# Patient Record
Sex: MALE | Race: WHITE | NOT HISPANIC OR LATINO
[De-identification: names, ages, dates, MRNs, and addresses within clinical notes are randomized per-mention and may not be internally consistent; named-entity substitution may affect disease eponyms.]

---

## 2017-01-03 ENCOUNTER — RESULT REVIEW (OUTPATIENT)
Age: 32
End: 2017-01-03

## 2017-01-04 ENCOUNTER — APPOINTMENT (OUTPATIENT)
Dept: HEMATOLOGY ONCOLOGY | Facility: CLINIC | Age: 32
End: 2017-01-04

## 2017-01-04 VITALS
TEMPERATURE: 99.2 F | BODY MASS INDEX: 28.7 KG/M2 | OXYGEN SATURATION: 98 % | WEIGHT: 178.55 LBS | HEIGHT: 65.98 IN | SYSTOLIC BLOOD PRESSURE: 120 MMHG | RESPIRATION RATE: 16 BRPM | DIASTOLIC BLOOD PRESSURE: 78 MMHG | HEART RATE: 120 BPM

## 2017-01-06 LAB
ALBUMIN SERPL ELPH-MCNC: 4.5 G/DL
ALP BLD-CCNC: 88 U/L
ALT SERPL-CCNC: 22 U/L
ANION GAP SERPL CALC-SCNC: 20 MMOL/L
AST SERPL-CCNC: 13 U/L
BILIRUB SERPL-MCNC: 0.4 MG/DL
BUN SERPL-MCNC: 10 MG/DL
CALCIUM SERPL-MCNC: 10.1 MG/DL
CEA SERPL-MCNC: 2.4 NG/ML
CHLORIDE SERPL-SCNC: 98 MMOL/L
CO2 SERPL-SCNC: 22 MMOL/L
CREAT SERPL-MCNC: 0.84 MG/DL
GLUCOSE SERPL-MCNC: 101 MG/DL
HBV CORE IGG+IGM SER QL: NONREACTIVE
HBV CORE IGM SER QL: NONREACTIVE
HBV SURFACE AB SER QL: NONREACTIVE
HBV SURFACE AG SER QL: NONREACTIVE
HCV AB SER QL: NONREACTIVE
HCV S/CO RATIO: 0.09 S/CO
POTASSIUM SERPL-SCNC: 4.1 MMOL/L
PROT SERPL-MCNC: 7.8 G/DL
SODIUM SERPL-SCNC: 140 MMOL/L

## 2017-01-09 ENCOUNTER — APPOINTMENT (OUTPATIENT)
Dept: RADIATION ONCOLOGY | Facility: CLINIC | Age: 32
End: 2017-01-09

## 2017-01-09 ENCOUNTER — RESULT REVIEW (OUTPATIENT)
Age: 32
End: 2017-01-09

## 2017-01-10 ENCOUNTER — LABORATORY RESULT (OUTPATIENT)
Age: 32
End: 2017-01-10

## 2017-01-10 ENCOUNTER — APPOINTMENT (OUTPATIENT)
Dept: INFUSION THERAPY | Facility: HOSPITAL | Age: 32
End: 2017-01-10

## 2017-01-24 ENCOUNTER — RESULT REVIEW (OUTPATIENT)
Age: 32
End: 2017-01-24

## 2017-01-25 ENCOUNTER — LABORATORY RESULT (OUTPATIENT)
Age: 32
End: 2017-01-25

## 2017-01-25 ENCOUNTER — APPOINTMENT (OUTPATIENT)
Dept: INFUSION THERAPY | Facility: HOSPITAL | Age: 32
End: 2017-01-25

## 2017-01-25 ENCOUNTER — APPOINTMENT (OUTPATIENT)
Dept: HEMATOLOGY ONCOLOGY | Facility: CLINIC | Age: 32
End: 2017-01-25

## 2017-01-25 VITALS
BODY MASS INDEX: 28.73 KG/M2 | SYSTOLIC BLOOD PRESSURE: 133 MMHG | TEMPERATURE: 98.9 F | OXYGEN SATURATION: 97 % | WEIGHT: 177.91 LBS | DIASTOLIC BLOOD PRESSURE: 89 MMHG | HEART RATE: 120 BPM | RESPIRATION RATE: 16 BRPM

## 2017-02-02 ENCOUNTER — MEDICATION RENEWAL (OUTPATIENT)
Age: 32
End: 2017-02-02

## 2017-02-03 ENCOUNTER — MEDICATION RENEWAL (OUTPATIENT)
Age: 32
End: 2017-02-03

## 2017-02-07 ENCOUNTER — RESULT REVIEW (OUTPATIENT)
Age: 32
End: 2017-02-07

## 2017-02-07 ENCOUNTER — OUTPATIENT (OUTPATIENT)
Dept: OUTPATIENT SERVICES | Facility: HOSPITAL | Age: 32
LOS: 1 days | Discharge: HOME | End: 2017-02-07

## 2017-02-07 DIAGNOSIS — C20 MALIGNANT NEOPLASM OF RECTUM: ICD-10-CM

## 2017-02-07 DIAGNOSIS — Z98.890 OTHER SPECIFIED POSTPROCEDURAL STATES: Chronic | ICD-10-CM

## 2017-02-07 PROBLEM — K21.9 GASTRO-ESOPHAGEAL REFLUX DISEASE WITHOUT ESOPHAGITIS: Chronic | Status: ACTIVE | Noted: 2017-01-10

## 2017-02-08 ENCOUNTER — APPOINTMENT (OUTPATIENT)
Dept: INFUSION THERAPY | Facility: HOSPITAL | Age: 32
End: 2017-02-08

## 2017-02-08 LAB
BASOPHILS # BLD AUTO: 0 K/UL — SIGNIFICANT CHANGE UP (ref 0–0.2)
BASOPHILS NFR BLD AUTO: 0 % — SIGNIFICANT CHANGE UP (ref 0–2)
EOSINOPHIL # BLD AUTO: 0.1 K/UL — SIGNIFICANT CHANGE UP (ref 0–0.5)
EOSINOPHIL NFR BLD AUTO: 3.3 % — SIGNIFICANT CHANGE UP (ref 0–6)
HCT VFR BLD CALC: 45.1 % — SIGNIFICANT CHANGE UP (ref 39–50)
HGB BLD-MCNC: 15.4 G/DL — SIGNIFICANT CHANGE UP (ref 13–17)
LYMPHOCYTES # BLD AUTO: 0.8 K/UL — LOW (ref 1–3.3)
LYMPHOCYTES # BLD AUTO: 22.7 % — SIGNIFICANT CHANGE UP (ref 13–44)
MCHC RBC-ENTMCNC: 28.9 PG — SIGNIFICANT CHANGE UP (ref 27–34)
MCHC RBC-ENTMCNC: 34 GM/DL — SIGNIFICANT CHANGE UP (ref 32–36)
MCV RBC AUTO: 85 FL — SIGNIFICANT CHANGE UP (ref 80–100)
MONOCYTES # BLD AUTO: 0.7 K/UL — SIGNIFICANT CHANGE UP (ref 0–0.9)
MONOCYTES NFR BLD AUTO: 20.5 % — HIGH (ref 2–14)
NEUTROPHILS # BLD AUTO: 1.8 K/UL — SIGNIFICANT CHANGE UP (ref 1.8–7.4)
NEUTROPHILS NFR BLD AUTO: 53.5 % — SIGNIFICANT CHANGE UP (ref 43–77)
PLATELET # BLD AUTO: 163 K/UL — SIGNIFICANT CHANGE UP (ref 150–400)
RBC # BLD: 5.31 M/UL — SIGNIFICANT CHANGE UP (ref 4.2–5.8)
RBC # FLD: 13.8 % — SIGNIFICANT CHANGE UP (ref 10.3–14.5)
WBC # BLD: 3.3 K/UL — LOW (ref 3.8–10.5)
WBC # FLD AUTO: 3.3 K/UL — LOW (ref 3.8–10.5)

## 2017-02-10 DIAGNOSIS — Z51.11 ENCOUNTER FOR ANTINEOPLASTIC CHEMOTHERAPY: ICD-10-CM

## 2017-02-10 DIAGNOSIS — R11.2 NAUSEA WITH VOMITING, UNSPECIFIED: ICD-10-CM

## 2017-02-21 ENCOUNTER — RESULT REVIEW (OUTPATIENT)
Age: 32
End: 2017-02-21

## 2017-02-22 ENCOUNTER — APPOINTMENT (OUTPATIENT)
Dept: INFUSION THERAPY | Facility: HOSPITAL | Age: 32
End: 2017-02-22

## 2017-02-22 ENCOUNTER — LABORATORY RESULT (OUTPATIENT)
Age: 32
End: 2017-02-22

## 2017-02-22 LAB
BASOPHILS # BLD AUTO: 0 K/UL — SIGNIFICANT CHANGE UP (ref 0–0.2)
BASOPHILS NFR BLD AUTO: 0.6 % — SIGNIFICANT CHANGE UP (ref 0–2)
EOSINOPHIL # BLD AUTO: 0.1 K/UL — SIGNIFICANT CHANGE UP (ref 0–0.5)
EOSINOPHIL NFR BLD AUTO: 2.8 % — SIGNIFICANT CHANGE UP (ref 0–6)
HCT VFR BLD CALC: 43.9 % — SIGNIFICANT CHANGE UP (ref 39–50)
HGB BLD-MCNC: 15.4 G/DL — SIGNIFICANT CHANGE UP (ref 13–17)
LYMPHOCYTES # BLD AUTO: 0.8 K/UL — LOW (ref 1–3.3)
LYMPHOCYTES # BLD AUTO: 25.1 % — SIGNIFICANT CHANGE UP (ref 13–44)
MCHC RBC-ENTMCNC: 29.8 PG — SIGNIFICANT CHANGE UP (ref 27–34)
MCHC RBC-ENTMCNC: 35 G/DL — SIGNIFICANT CHANGE UP (ref 32–36)
MCV RBC AUTO: 85.2 FL — SIGNIFICANT CHANGE UP (ref 80–100)
MONOCYTES # BLD AUTO: 0.6 K/UL — SIGNIFICANT CHANGE UP (ref 0–0.9)
MONOCYTES NFR BLD AUTO: 17.8 % — HIGH (ref 2–14)
NEUTROPHILS # BLD AUTO: 1.8 K/UL — SIGNIFICANT CHANGE UP (ref 1.8–7.4)
NEUTROPHILS NFR BLD AUTO: 53.6 % — SIGNIFICANT CHANGE UP (ref 43–77)
PLATELET # BLD AUTO: 160 K/UL — SIGNIFICANT CHANGE UP (ref 150–400)
RBC # BLD: 5.15 M/UL — SIGNIFICANT CHANGE UP (ref 4.2–5.8)
RBC # FLD: 14.3 % — SIGNIFICANT CHANGE UP (ref 10.3–14.5)
WBC # BLD: 3.3 K/UL — LOW (ref 3.8–10.5)
WBC # FLD AUTO: 3.3 K/UL — LOW (ref 3.8–10.5)

## 2017-03-07 ENCOUNTER — OUTPATIENT (OUTPATIENT)
Dept: OUTPATIENT SERVICES | Facility: HOSPITAL | Age: 32
LOS: 1 days | Discharge: HOME | End: 2017-03-07

## 2017-03-07 ENCOUNTER — RESULT REVIEW (OUTPATIENT)
Age: 32
End: 2017-03-07

## 2017-03-07 DIAGNOSIS — C20 MALIGNANT NEOPLASM OF RECTUM: ICD-10-CM

## 2017-03-07 DIAGNOSIS — Z98.890 OTHER SPECIFIED POSTPROCEDURAL STATES: Chronic | ICD-10-CM

## 2017-03-08 ENCOUNTER — APPOINTMENT (OUTPATIENT)
Dept: INFUSION THERAPY | Facility: HOSPITAL | Age: 32
End: 2017-03-08

## 2017-03-08 ENCOUNTER — LABORATORY RESULT (OUTPATIENT)
Age: 32
End: 2017-03-08

## 2017-03-08 LAB
BASOPHILS # BLD AUTO: 0 K/UL — SIGNIFICANT CHANGE UP (ref 0–0.2)
BASOPHILS NFR BLD AUTO: 0.4 % — SIGNIFICANT CHANGE UP (ref 0–2)
EOSINOPHIL # BLD AUTO: 0.1 K/UL — SIGNIFICANT CHANGE UP (ref 0–0.5)
EOSINOPHIL NFR BLD AUTO: 3.7 % — SIGNIFICANT CHANGE UP (ref 0–6)
HCT VFR BLD CALC: 44 % — SIGNIFICANT CHANGE UP (ref 39–50)
HGB BLD-MCNC: 15.1 G/DL — SIGNIFICANT CHANGE UP (ref 13–17)
LYMPHOCYTES # BLD AUTO: 0.9 K/UL — LOW (ref 1–3.3)
LYMPHOCYTES # BLD AUTO: 27.2 % — SIGNIFICANT CHANGE UP (ref 13–44)
MCHC RBC-ENTMCNC: 28.7 PG — SIGNIFICANT CHANGE UP (ref 27–34)
MCHC RBC-ENTMCNC: 34.3 G/DL — SIGNIFICANT CHANGE UP (ref 32–36)
MCV RBC AUTO: 83.8 FL — SIGNIFICANT CHANGE UP (ref 80–100)
MONOCYTES # BLD AUTO: 0.6 K/UL — SIGNIFICANT CHANGE UP (ref 0–0.9)
MONOCYTES NFR BLD AUTO: 18.9 % — HIGH (ref 2–14)
NEUTROPHILS # BLD AUTO: 1.6 K/UL — LOW (ref 1.8–7.4)
NEUTROPHILS NFR BLD AUTO: 49.9 % — SIGNIFICANT CHANGE UP (ref 43–77)
PLATELET # BLD AUTO: 178 K/UL — SIGNIFICANT CHANGE UP (ref 150–400)
RBC # BLD: 5.25 M/UL — SIGNIFICANT CHANGE UP (ref 4.2–5.8)
RBC # FLD: 14.5 % — SIGNIFICANT CHANGE UP (ref 10.3–14.5)
WBC # BLD: 3.2 K/UL — LOW (ref 3.8–10.5)
WBC # FLD AUTO: 3.2 K/UL — LOW (ref 3.8–10.5)

## 2017-03-09 DIAGNOSIS — R11.2 NAUSEA WITH VOMITING, UNSPECIFIED: ICD-10-CM

## 2017-03-09 DIAGNOSIS — Z51.11 ENCOUNTER FOR ANTINEOPLASTIC CHEMOTHERAPY: ICD-10-CM

## 2017-03-13 ENCOUNTER — MEDICATION RENEWAL (OUTPATIENT)
Age: 32
End: 2017-03-13

## 2017-03-21 ENCOUNTER — RESULT REVIEW (OUTPATIENT)
Age: 32
End: 2017-03-21

## 2017-03-22 ENCOUNTER — APPOINTMENT (OUTPATIENT)
Dept: INFUSION THERAPY | Facility: HOSPITAL | Age: 32
End: 2017-03-22

## 2017-03-22 ENCOUNTER — LABORATORY RESULT (OUTPATIENT)
Age: 32
End: 2017-03-22

## 2017-03-22 ENCOUNTER — APPOINTMENT (OUTPATIENT)
Dept: HEMATOLOGY ONCOLOGY | Facility: CLINIC | Age: 32
End: 2017-03-22

## 2017-03-22 VITALS
WEIGHT: 175.93 LBS | BODY MASS INDEX: 28.41 KG/M2 | OXYGEN SATURATION: 97 % | SYSTOLIC BLOOD PRESSURE: 136 MMHG | RESPIRATION RATE: 17 BRPM | HEART RATE: 138 BPM | TEMPERATURE: 99.2 F | DIASTOLIC BLOOD PRESSURE: 93 MMHG

## 2017-03-22 LAB
BASOPHILS # BLD AUTO: 0 K/UL — SIGNIFICANT CHANGE UP (ref 0–0.2)
BASOPHILS NFR BLD AUTO: 0.5 % — SIGNIFICANT CHANGE UP (ref 0–2)
EOSINOPHIL # BLD AUTO: 0.1 K/UL — SIGNIFICANT CHANGE UP (ref 0–0.5)
EOSINOPHIL NFR BLD AUTO: 3.3 % — SIGNIFICANT CHANGE UP (ref 0–6)
HCT VFR BLD CALC: 44.9 % — SIGNIFICANT CHANGE UP (ref 39–50)
HGB BLD-MCNC: 16 G/DL — SIGNIFICANT CHANGE UP (ref 13–17)
LYMPHOCYTES # BLD AUTO: 0.8 K/UL — LOW (ref 1–3.3)
LYMPHOCYTES # BLD AUTO: 21.3 % — SIGNIFICANT CHANGE UP (ref 13–44)
MCHC RBC-ENTMCNC: 29.9 PG — SIGNIFICANT CHANGE UP (ref 27–34)
MCHC RBC-ENTMCNC: 35.7 G/DL — SIGNIFICANT CHANGE UP (ref 32–36)
MCV RBC AUTO: 83.7 FL — SIGNIFICANT CHANGE UP (ref 80–100)
MONOCYTES # BLD AUTO: 0.9 K/UL — SIGNIFICANT CHANGE UP (ref 0–0.9)
MONOCYTES NFR BLD AUTO: 23.5 % — HIGH (ref 2–14)
NEUTROPHILS # BLD AUTO: 2 K/UL — SIGNIFICANT CHANGE UP (ref 1.8–7.4)
NEUTROPHILS NFR BLD AUTO: 51.3 % — SIGNIFICANT CHANGE UP (ref 43–77)
PLATELET # BLD AUTO: 189 K/UL — SIGNIFICANT CHANGE UP (ref 150–400)
RBC # BLD: 5.36 M/UL — SIGNIFICANT CHANGE UP (ref 4.2–5.8)
RBC # FLD: 14.8 % — HIGH (ref 10.3–14.5)
WBC # BLD: 3.8 K/UL — SIGNIFICANT CHANGE UP (ref 3.8–10.5)
WBC # FLD AUTO: 3.8 K/UL — SIGNIFICANT CHANGE UP (ref 3.8–10.5)

## 2017-04-02 ENCOUNTER — FORM ENCOUNTER (OUTPATIENT)
Age: 32
End: 2017-04-02

## 2017-04-03 ENCOUNTER — OUTPATIENT (OUTPATIENT)
Dept: OUTPATIENT SERVICES | Facility: HOSPITAL | Age: 32
LOS: 1 days | End: 2017-04-03
Payer: COMMERCIAL

## 2017-04-03 ENCOUNTER — APPOINTMENT (OUTPATIENT)
Dept: CT IMAGING | Facility: IMAGING CENTER | Age: 32
End: 2017-04-03

## 2017-04-03 DIAGNOSIS — C20 MALIGNANT NEOPLASM OF RECTUM: ICD-10-CM

## 2017-04-03 DIAGNOSIS — Z98.890 OTHER SPECIFIED POSTPROCEDURAL STATES: Chronic | ICD-10-CM

## 2017-04-03 PROCEDURE — 71260 CT THORAX DX C+: CPT

## 2017-04-03 PROCEDURE — 74177 CT ABD & PELVIS W/CONTRAST: CPT

## 2017-04-04 ENCOUNTER — RESULT REVIEW (OUTPATIENT)
Age: 32
End: 2017-04-04

## 2017-04-04 ENCOUNTER — OUTPATIENT (OUTPATIENT)
Dept: OUTPATIENT SERVICES | Facility: HOSPITAL | Age: 32
LOS: 1 days | Discharge: HOME | End: 2017-04-04

## 2017-04-04 DIAGNOSIS — Z79.899 OTHER LONG TERM (CURRENT) DRUG THERAPY: ICD-10-CM

## 2017-04-04 DIAGNOSIS — Z98.890 OTHER SPECIFIED POSTPROCEDURAL STATES: Chronic | ICD-10-CM

## 2017-04-04 DIAGNOSIS — C20 MALIGNANT NEOPLASM OF RECTUM: ICD-10-CM

## 2017-04-05 ENCOUNTER — LABORATORY RESULT (OUTPATIENT)
Age: 32
End: 2017-04-05

## 2017-04-05 ENCOUNTER — APPOINTMENT (OUTPATIENT)
Dept: INFUSION THERAPY | Facility: HOSPITAL | Age: 32
End: 2017-04-05

## 2017-04-05 LAB
BASOPHILS # BLD AUTO: 0 K/UL — SIGNIFICANT CHANGE UP (ref 0–0.2)
BASOPHILS NFR BLD AUTO: 0.7 % — SIGNIFICANT CHANGE UP (ref 0–2)
EOSINOPHIL # BLD AUTO: 0.1 K/UL — SIGNIFICANT CHANGE UP (ref 0–0.5)
EOSINOPHIL NFR BLD AUTO: 1.8 % — SIGNIFICANT CHANGE UP (ref 0–6)
HCT VFR BLD CALC: 41.7 % — SIGNIFICANT CHANGE UP (ref 39–50)
HGB BLD-MCNC: 14.6 G/DL — SIGNIFICANT CHANGE UP (ref 13–17)
LYMPHOCYTES # BLD AUTO: 0.9 K/UL — LOW (ref 1–3.3)
LYMPHOCYTES # BLD AUTO: 23.7 % — SIGNIFICANT CHANGE UP (ref 13–44)
MCHC RBC-ENTMCNC: 29.3 PG — SIGNIFICANT CHANGE UP (ref 27–34)
MCHC RBC-ENTMCNC: 35 G/DL — SIGNIFICANT CHANGE UP (ref 32–36)
MCV RBC AUTO: 83.7 FL — SIGNIFICANT CHANGE UP (ref 80–100)
MONOCYTES # BLD AUTO: 0.8 K/UL — SIGNIFICANT CHANGE UP (ref 0–0.9)
MONOCYTES NFR BLD AUTO: 22.7 % — HIGH (ref 2–14)
NEUTROPHILS # BLD AUTO: 1.9 K/UL — SIGNIFICANT CHANGE UP (ref 1.8–7.4)
NEUTROPHILS NFR BLD AUTO: 51.1 % — SIGNIFICANT CHANGE UP (ref 43–77)
PLATELET # BLD AUTO: 194 K/UL — SIGNIFICANT CHANGE UP (ref 150–400)
RBC # BLD: 4.98 M/UL — SIGNIFICANT CHANGE UP (ref 4.2–5.8)
RBC # FLD: 15.1 % — HIGH (ref 10.3–14.5)
WBC # BLD: 3.7 K/UL — LOW (ref 3.8–10.5)
WBC # FLD AUTO: 3.7 K/UL — LOW (ref 3.8–10.5)

## 2017-04-06 DIAGNOSIS — R11.2 NAUSEA WITH VOMITING, UNSPECIFIED: ICD-10-CM

## 2017-04-06 DIAGNOSIS — Z51.11 ENCOUNTER FOR ANTINEOPLASTIC CHEMOTHERAPY: ICD-10-CM

## 2017-04-19 ENCOUNTER — APPOINTMENT (OUTPATIENT)
Dept: INFUSION THERAPY | Facility: HOSPITAL | Age: 32
End: 2017-04-19

## 2017-04-24 ENCOUNTER — APPOINTMENT (OUTPATIENT)
Dept: HEMATOLOGY ONCOLOGY | Facility: CLINIC | Age: 32
End: 2017-04-24

## 2017-04-26 ENCOUNTER — APPOINTMENT (OUTPATIENT)
Dept: INFUSION THERAPY | Facility: HOSPITAL | Age: 32
End: 2017-04-26

## 2017-05-01 ENCOUNTER — OUTPATIENT (OUTPATIENT)
Dept: OUTPATIENT SERVICES | Facility: HOSPITAL | Age: 32
LOS: 1 days | Discharge: HOME | End: 2017-05-01

## 2017-05-01 DIAGNOSIS — C20 MALIGNANT NEOPLASM OF RECTUM: ICD-10-CM

## 2017-05-01 DIAGNOSIS — Z98.890 OTHER SPECIFIED POSTPROCEDURAL STATES: Chronic | ICD-10-CM

## 2017-05-02 ENCOUNTER — RESULT REVIEW (OUTPATIENT)
Age: 32
End: 2017-05-02

## 2017-05-02 ENCOUNTER — APPOINTMENT (OUTPATIENT)
Dept: INTERNAL MEDICINE | Facility: CLINIC | Age: 32
End: 2017-05-02

## 2017-05-02 VITALS
BODY MASS INDEX: 28.45 KG/M2 | HEART RATE: 106 BPM | RESPIRATION RATE: 16 BRPM | HEIGHT: 66 IN | DIASTOLIC BLOOD PRESSURE: 96 MMHG | SYSTOLIC BLOOD PRESSURE: 130 MMHG | OXYGEN SATURATION: 96 % | WEIGHT: 177 LBS | TEMPERATURE: 98.5 F

## 2017-05-02 DIAGNOSIS — K64.4 RESIDUAL HEMORRHOIDAL SKIN TAGS: ICD-10-CM

## 2017-05-02 DIAGNOSIS — Z87.898 PERSONAL HISTORY OF OTHER SPECIFIED CONDITIONS: ICD-10-CM

## 2017-05-02 RX ORDER — ZOLPIDEM TARTRATE 5 MG/1
5 TABLET ORAL
Qty: 15 | Refills: 0 | Status: DISCONTINUED | COMMUNITY
Start: 2016-12-13 | End: 2017-05-02

## 2017-05-03 ENCOUNTER — LABORATORY RESULT (OUTPATIENT)
Age: 32
End: 2017-05-03

## 2017-05-03 ENCOUNTER — APPOINTMENT (OUTPATIENT)
Dept: INFUSION THERAPY | Facility: HOSPITAL | Age: 32
End: 2017-05-03

## 2017-05-03 LAB
BASOPHILS # BLD AUTO: 0 K/UL — SIGNIFICANT CHANGE UP (ref 0–0.2)
BASOPHILS NFR BLD AUTO: 0.4 % — SIGNIFICANT CHANGE UP (ref 0–2)
EOSINOPHIL # BLD AUTO: 0.3 K/UL — SIGNIFICANT CHANGE UP (ref 0–0.5)
EOSINOPHIL NFR BLD AUTO: 4 % — SIGNIFICANT CHANGE UP (ref 0–6)
HCT VFR BLD CALC: 43.5 % — SIGNIFICANT CHANGE UP (ref 39–50)
HGB BLD-MCNC: 15 G/DL — SIGNIFICANT CHANGE UP (ref 13–17)
LYMPHOCYTES # BLD AUTO: 1.6 K/UL — SIGNIFICANT CHANGE UP (ref 1–3.3)
LYMPHOCYTES # BLD AUTO: 23.1 % — SIGNIFICANT CHANGE UP (ref 13–44)
MCHC RBC-ENTMCNC: 29.5 PG — SIGNIFICANT CHANGE UP (ref 27–34)
MCHC RBC-ENTMCNC: 34.4 G/DL — SIGNIFICANT CHANGE UP (ref 32–36)
MCV RBC AUTO: 85.7 FL — SIGNIFICANT CHANGE UP (ref 80–100)
MONOCYTES # BLD AUTO: 0.6 K/UL — SIGNIFICANT CHANGE UP (ref 0–0.9)
MONOCYTES NFR BLD AUTO: 9.1 % — SIGNIFICANT CHANGE UP (ref 2–14)
NEUTROPHILS # BLD AUTO: 4.3 K/UL — SIGNIFICANT CHANGE UP (ref 1.8–7.4)
NEUTROPHILS NFR BLD AUTO: 63.3 % — SIGNIFICANT CHANGE UP (ref 43–77)
PLATELET # BLD AUTO: 254 K/UL — SIGNIFICANT CHANGE UP (ref 150–400)
RBC # BLD: 5.07 M/UL — SIGNIFICANT CHANGE UP (ref 4.2–5.8)
RBC # FLD: 14.4 % — SIGNIFICANT CHANGE UP (ref 10.3–14.5)
WBC # BLD: 6.8 K/UL — SIGNIFICANT CHANGE UP (ref 3.8–10.5)
WBC # FLD AUTO: 6.8 K/UL — SIGNIFICANT CHANGE UP (ref 3.8–10.5)

## 2017-05-04 DIAGNOSIS — Z51.11 ENCOUNTER FOR ANTINEOPLASTIC CHEMOTHERAPY: ICD-10-CM

## 2017-05-04 DIAGNOSIS — R11.2 NAUSEA WITH VOMITING, UNSPECIFIED: ICD-10-CM

## 2017-05-17 ENCOUNTER — LABORATORY RESULT (OUTPATIENT)
Age: 32
End: 2017-05-17

## 2017-05-17 ENCOUNTER — RESULT REVIEW (OUTPATIENT)
Age: 32
End: 2017-05-17

## 2017-05-17 ENCOUNTER — APPOINTMENT (OUTPATIENT)
Dept: HEMATOLOGY ONCOLOGY | Facility: CLINIC | Age: 32
End: 2017-05-17

## 2017-05-17 ENCOUNTER — APPOINTMENT (OUTPATIENT)
Dept: INFUSION THERAPY | Facility: HOSPITAL | Age: 32
End: 2017-05-17

## 2017-05-17 VITALS
OXYGEN SATURATION: 97 % | TEMPERATURE: 98.9 F | HEART RATE: 124 BPM | RESPIRATION RATE: 18 BRPM | BODY MASS INDEX: 28.89 KG/M2 | SYSTOLIC BLOOD PRESSURE: 136 MMHG | WEIGHT: 179.01 LBS | DIASTOLIC BLOOD PRESSURE: 84 MMHG

## 2017-05-17 LAB
BASOPHILS # BLD AUTO: 0 K/UL — SIGNIFICANT CHANGE UP (ref 0–0.2)
BASOPHILS NFR BLD AUTO: 0.2 % — SIGNIFICANT CHANGE UP (ref 0–2)
EOSINOPHIL # BLD AUTO: 0.3 K/UL — SIGNIFICANT CHANGE UP (ref 0–0.5)
EOSINOPHIL NFR BLD AUTO: 3.9 % — SIGNIFICANT CHANGE UP (ref 0–6)
HCT VFR BLD CALC: 46.3 % — SIGNIFICANT CHANGE UP (ref 39–50)
HGB BLD-MCNC: 16.1 G/DL — SIGNIFICANT CHANGE UP (ref 13–17)
LYMPHOCYTES # BLD AUTO: 1.7 K/UL — SIGNIFICANT CHANGE UP (ref 1–3.3)
LYMPHOCYTES # BLD AUTO: 23.1 % — SIGNIFICANT CHANGE UP (ref 13–44)
MCHC RBC-ENTMCNC: 29.7 PG — SIGNIFICANT CHANGE UP (ref 27–34)
MCHC RBC-ENTMCNC: 34.7 G/DL — SIGNIFICANT CHANGE UP (ref 32–36)
MCV RBC AUTO: 85.5 FL — SIGNIFICANT CHANGE UP (ref 80–100)
MONOCYTES # BLD AUTO: 0.7 K/UL — SIGNIFICANT CHANGE UP (ref 0–0.9)
MONOCYTES NFR BLD AUTO: 9.8 % — SIGNIFICANT CHANGE UP (ref 2–14)
NEUTROPHILS # BLD AUTO: 4.6 K/UL — SIGNIFICANT CHANGE UP (ref 1.8–7.4)
NEUTROPHILS NFR BLD AUTO: 63.1 % — SIGNIFICANT CHANGE UP (ref 43–77)
PLATELET # BLD AUTO: 277 K/UL — SIGNIFICANT CHANGE UP (ref 150–400)
RBC # BLD: 5.41 M/UL — SIGNIFICANT CHANGE UP (ref 4.2–5.8)
RBC # FLD: 14.3 % — SIGNIFICANT CHANGE UP (ref 10.3–14.5)
WBC # BLD: 7.2 K/UL — SIGNIFICANT CHANGE UP (ref 3.8–10.5)
WBC # FLD AUTO: 7.2 K/UL — SIGNIFICANT CHANGE UP (ref 3.8–10.5)

## 2017-05-31 ENCOUNTER — APPOINTMENT (OUTPATIENT)
Dept: HEMATOLOGY ONCOLOGY | Facility: CLINIC | Age: 32
End: 2017-05-31

## 2017-05-31 ENCOUNTER — LABORATORY RESULT (OUTPATIENT)
Age: 32
End: 2017-05-31

## 2017-05-31 ENCOUNTER — RESULT REVIEW (OUTPATIENT)
Age: 32
End: 2017-05-31

## 2017-05-31 ENCOUNTER — APPOINTMENT (OUTPATIENT)
Dept: INFUSION THERAPY | Facility: HOSPITAL | Age: 32
End: 2017-05-31

## 2017-05-31 VITALS
RESPIRATION RATE: 16 BRPM | WEIGHT: 181.66 LBS | DIASTOLIC BLOOD PRESSURE: 92 MMHG | BODY MASS INDEX: 29.32 KG/M2 | TEMPERATURE: 98.4 F | SYSTOLIC BLOOD PRESSURE: 129 MMHG | OXYGEN SATURATION: 97 % | HEART RATE: 112 BPM

## 2017-05-31 LAB
BASOPHILS # BLD AUTO: 0 K/UL — SIGNIFICANT CHANGE UP (ref 0–0.2)
BASOPHILS NFR BLD AUTO: 0.5 % — SIGNIFICANT CHANGE UP (ref 0–2)
EOSINOPHIL # BLD AUTO: 0.2 K/UL — SIGNIFICANT CHANGE UP (ref 0–0.5)
EOSINOPHIL NFR BLD AUTO: 2.4 % — SIGNIFICANT CHANGE UP (ref 0–6)
HCT VFR BLD CALC: 45.2 % — SIGNIFICANT CHANGE UP (ref 39–50)
HGB BLD-MCNC: 15.4 G/DL — SIGNIFICANT CHANGE UP (ref 13–17)
LYMPHOCYTES # BLD AUTO: 1 K/UL — SIGNIFICANT CHANGE UP (ref 1–3.3)
LYMPHOCYTES # BLD AUTO: 14.1 % — SIGNIFICANT CHANGE UP (ref 13–44)
MCHC RBC-ENTMCNC: 29 PG — SIGNIFICANT CHANGE UP (ref 27–34)
MCHC RBC-ENTMCNC: 34 G/DL — SIGNIFICANT CHANGE UP (ref 32–36)
MCV RBC AUTO: 85.3 FL — SIGNIFICANT CHANGE UP (ref 80–100)
MONOCYTES # BLD AUTO: 0.6 K/UL — SIGNIFICANT CHANGE UP (ref 0–0.9)
MONOCYTES NFR BLD AUTO: 9 % — SIGNIFICANT CHANGE UP (ref 2–14)
NEUTROPHILS # BLD AUTO: 5 K/UL — SIGNIFICANT CHANGE UP (ref 1.8–7.4)
NEUTROPHILS NFR BLD AUTO: 74 % — SIGNIFICANT CHANGE UP (ref 43–77)
PLATELET # BLD AUTO: 232 K/UL — SIGNIFICANT CHANGE UP (ref 150–400)
RBC # BLD: 5.29 M/UL — SIGNIFICANT CHANGE UP (ref 4.2–5.8)
RBC # FLD: 14.4 % — SIGNIFICANT CHANGE UP (ref 10.3–14.5)
WBC # BLD: 6.8 K/UL — SIGNIFICANT CHANGE UP (ref 3.8–10.5)
WBC # FLD AUTO: 6.8 K/UL — SIGNIFICANT CHANGE UP (ref 3.8–10.5)

## 2017-06-08 ENCOUNTER — APPOINTMENT (OUTPATIENT)
Dept: SURGICAL ONCOLOGY | Facility: CLINIC | Age: 32
End: 2017-06-08

## 2017-06-08 VITALS
DIASTOLIC BLOOD PRESSURE: 98 MMHG | HEIGHT: 66 IN | HEART RATE: 122 BPM | WEIGHT: 177 LBS | SYSTOLIC BLOOD PRESSURE: 142 MMHG | OXYGEN SATURATION: 98 % | BODY MASS INDEX: 28.45 KG/M2

## 2017-06-09 ENCOUNTER — OUTPATIENT (OUTPATIENT)
Dept: OUTPATIENT SERVICES | Facility: HOSPITAL | Age: 32
LOS: 1 days | Discharge: HOME | End: 2017-06-09

## 2017-06-09 DIAGNOSIS — Z98.890 OTHER SPECIFIED POSTPROCEDURAL STATES: Chronic | ICD-10-CM

## 2017-06-09 DIAGNOSIS — C20 MALIGNANT NEOPLASM OF RECTUM: ICD-10-CM

## 2017-06-12 ENCOUNTER — APPOINTMENT (OUTPATIENT)
Dept: HEMATOLOGY ONCOLOGY | Facility: CLINIC | Age: 32
End: 2017-06-12

## 2017-06-14 ENCOUNTER — LABORATORY RESULT (OUTPATIENT)
Age: 32
End: 2017-06-14

## 2017-06-14 ENCOUNTER — RESULT REVIEW (OUTPATIENT)
Age: 32
End: 2017-06-14

## 2017-06-14 ENCOUNTER — APPOINTMENT (OUTPATIENT)
Dept: INFUSION THERAPY | Facility: HOSPITAL | Age: 32
End: 2017-06-14

## 2017-06-14 ENCOUNTER — APPOINTMENT (OUTPATIENT)
Dept: HEMATOLOGY ONCOLOGY | Facility: CLINIC | Age: 32
End: 2017-06-14

## 2017-06-14 LAB
BASOPHILS # BLD AUTO: 0 K/UL — SIGNIFICANT CHANGE UP (ref 0–0.2)
BASOPHILS NFR BLD AUTO: 0.1 % — SIGNIFICANT CHANGE UP (ref 0–2)
EOSINOPHIL # BLD AUTO: 0.1 K/UL — SIGNIFICANT CHANGE UP (ref 0–0.5)
EOSINOPHIL NFR BLD AUTO: 2.3 % — SIGNIFICANT CHANGE UP (ref 0–6)
HCT VFR BLD CALC: 43 % — SIGNIFICANT CHANGE UP (ref 39–50)
HGB BLD-MCNC: 15.1 G/DL — SIGNIFICANT CHANGE UP (ref 13–17)
LYMPHOCYTES # BLD AUTO: 1.4 K/UL — SIGNIFICANT CHANGE UP (ref 1–3.3)
LYMPHOCYTES # BLD AUTO: 21.8 % — SIGNIFICANT CHANGE UP (ref 13–44)
MCHC RBC-ENTMCNC: 29.7 PG — SIGNIFICANT CHANGE UP (ref 27–34)
MCHC RBC-ENTMCNC: 35.2 G/DL — SIGNIFICANT CHANGE UP (ref 32–36)
MCV RBC AUTO: 84.3 FL — SIGNIFICANT CHANGE UP (ref 80–100)
MONOCYTES # BLD AUTO: 0.7 K/UL — SIGNIFICANT CHANGE UP (ref 0–0.9)
MONOCYTES NFR BLD AUTO: 11.8 % — SIGNIFICANT CHANGE UP (ref 2–14)
NEUTROPHILS # BLD AUTO: 4 K/UL — SIGNIFICANT CHANGE UP (ref 1.8–7.4)
NEUTROPHILS NFR BLD AUTO: 63.9 % — SIGNIFICANT CHANGE UP (ref 43–77)
PLATELET # BLD AUTO: 239 K/UL — SIGNIFICANT CHANGE UP (ref 150–400)
RBC # BLD: 5.1 M/UL — SIGNIFICANT CHANGE UP (ref 4.2–5.8)
RBC # FLD: 14.3 % — SIGNIFICANT CHANGE UP (ref 10.3–14.5)
WBC # BLD: 6.2 K/UL — SIGNIFICANT CHANGE UP (ref 3.8–10.5)
WBC # FLD AUTO: 6.2 K/UL — SIGNIFICANT CHANGE UP (ref 3.8–10.5)

## 2017-06-15 DIAGNOSIS — R11.2 NAUSEA WITH VOMITING, UNSPECIFIED: ICD-10-CM

## 2017-06-15 DIAGNOSIS — Z51.11 ENCOUNTER FOR ANTINEOPLASTIC CHEMOTHERAPY: ICD-10-CM

## 2017-06-20 DIAGNOSIS — C18.9 MALIGNANT NEOPLASM OF COLON, UNSPECIFIED: ICD-10-CM

## 2017-06-20 DIAGNOSIS — Z79.899 OTHER LONG TERM (CURRENT) DRUG THERAPY: ICD-10-CM

## 2017-06-22 ENCOUNTER — MEDICATION RENEWAL (OUTPATIENT)
Age: 32
End: 2017-06-22

## 2017-06-24 ENCOUNTER — OUTPATIENT (OUTPATIENT)
Dept: OUTPATIENT SERVICES | Facility: HOSPITAL | Age: 32
LOS: 1 days | End: 2017-06-24
Payer: COMMERCIAL

## 2017-06-24 ENCOUNTER — APPOINTMENT (OUTPATIENT)
Dept: NUCLEAR MEDICINE | Facility: CLINIC | Age: 32
End: 2017-06-24

## 2017-06-24 DIAGNOSIS — Z00.8 ENCOUNTER FOR OTHER GENERAL EXAMINATION: ICD-10-CM

## 2017-06-24 DIAGNOSIS — Z98.890 OTHER SPECIFIED POSTPROCEDURAL STATES: Chronic | ICD-10-CM

## 2017-06-24 PROCEDURE — A9552: CPT

## 2017-06-24 PROCEDURE — 78815 PET IMAGE W/CT SKULL-THIGH: CPT

## 2017-06-25 ENCOUNTER — FORM ENCOUNTER (OUTPATIENT)
Age: 32
End: 2017-06-25

## 2017-06-26 ENCOUNTER — OUTPATIENT (OUTPATIENT)
Dept: OUTPATIENT SERVICES | Facility: HOSPITAL | Age: 32
LOS: 1 days | End: 2017-06-26
Payer: COMMERCIAL

## 2017-06-26 ENCOUNTER — APPOINTMENT (OUTPATIENT)
Dept: MRI IMAGING | Facility: IMAGING CENTER | Age: 32
End: 2017-06-26

## 2017-06-26 DIAGNOSIS — Z98.890 OTHER SPECIFIED POSTPROCEDURAL STATES: Chronic | ICD-10-CM

## 2017-06-26 DIAGNOSIS — C20 MALIGNANT NEOPLASM OF RECTUM: ICD-10-CM

## 2017-06-26 PROCEDURE — 74183 MRI ABD W/O CNTR FLWD CNTR: CPT

## 2017-06-26 PROCEDURE — A9585: CPT

## 2017-06-26 PROCEDURE — 72197 MRI PELVIS W/O & W/DYE: CPT

## 2017-06-28 ENCOUNTER — APPOINTMENT (OUTPATIENT)
Dept: INFUSION THERAPY | Facility: HOSPITAL | Age: 32
End: 2017-06-28

## 2017-07-07 ENCOUNTER — OUTPATIENT (OUTPATIENT)
Dept: OUTPATIENT SERVICES | Facility: HOSPITAL | Age: 32
LOS: 1 days | Discharge: HOME | End: 2017-07-07

## 2017-07-07 ENCOUNTER — APPOINTMENT (OUTPATIENT)
Dept: INTERVENTIONAL RADIOLOGY/VASCULAR | Facility: CLINIC | Age: 32
End: 2017-07-07

## 2017-07-07 ENCOUNTER — OUTPATIENT (OUTPATIENT)
Dept: OUTPATIENT SERVICES | Facility: HOSPITAL | Age: 32
LOS: 1 days | End: 2017-07-07
Payer: COMMERCIAL

## 2017-07-07 VITALS
SYSTOLIC BLOOD PRESSURE: 138 MMHG | RESPIRATION RATE: 16 BRPM | OXYGEN SATURATION: 98 % | TEMPERATURE: 99.1 F | DIASTOLIC BLOOD PRESSURE: 94 MMHG | HEIGHT: 66 IN | HEART RATE: 121 BPM | BODY MASS INDEX: 28.45 KG/M2 | WEIGHT: 177 LBS

## 2017-07-07 DIAGNOSIS — Z98.890 OTHER SPECIFIED POSTPROCEDURAL STATES: Chronic | ICD-10-CM

## 2017-07-07 DIAGNOSIS — C78.7 SECONDARY MALIGNANT NEOPLASM OF LIVER AND INTRAHEPATIC BILE DUCT: ICD-10-CM

## 2017-07-07 DIAGNOSIS — C20 MALIGNANT NEOPLASM OF RECTUM: ICD-10-CM

## 2017-07-07 PROCEDURE — 76705 ECHO EXAM OF ABDOMEN: CPT | Mod: 26,RT

## 2017-07-07 PROCEDURE — 76705 ECHO EXAM OF ABDOMEN: CPT

## 2017-07-07 RX ORDER — METOCLOPRAMIDE 10 MG/1
10 TABLET ORAL
Qty: 120 | Refills: 0 | Status: DISCONTINUED | COMMUNITY
Start: 2017-01-10 | End: 2017-07-07

## 2017-07-07 RX ORDER — ZOLPIDEM TARTRATE 5 MG/1
5 TABLET ORAL
Qty: 30 | Refills: 0 | Status: DISCONTINUED | COMMUNITY
Start: 2017-05-17 | End: 2017-07-07

## 2017-07-07 RX ORDER — DIPHENOXYLATE HYDROCHLORIDE AND ATROPINE SULFATE 2.5; .025 MG/1; MG/1
2.5-0.025 TABLET ORAL
Qty: 120 | Refills: 0 | Status: DISCONTINUED | COMMUNITY
Start: 2017-03-27 | End: 2017-07-07

## 2017-07-07 RX ORDER — ONDANSETRON 8 MG/1
8 TABLET ORAL
Qty: 12 | Refills: 0 | Status: DISCONTINUED | COMMUNITY
Start: 2016-12-21 | End: 2017-07-07

## 2017-07-07 RX ORDER — ALPRAZOLAM 0.5 MG/1
0.5 TABLET ORAL
Refills: 0 | Status: DISCONTINUED | COMMUNITY
End: 2017-07-07

## 2017-07-12 ENCOUNTER — LABORATORY RESULT (OUTPATIENT)
Age: 32
End: 2017-07-12

## 2017-07-12 ENCOUNTER — RESULT REVIEW (OUTPATIENT)
Age: 32
End: 2017-07-12

## 2017-07-12 ENCOUNTER — APPOINTMENT (OUTPATIENT)
Dept: INFUSION THERAPY | Facility: HOSPITAL | Age: 32
End: 2017-07-12

## 2017-07-12 LAB
BASOPHILS # BLD AUTO: 0 K/UL — SIGNIFICANT CHANGE UP (ref 0–0.2)
BASOPHILS NFR BLD AUTO: 0.1 % — SIGNIFICANT CHANGE UP (ref 0–2)
EOSINOPHIL # BLD AUTO: 0.3 K/UL — SIGNIFICANT CHANGE UP (ref 0–0.5)
EOSINOPHIL NFR BLD AUTO: 3.6 % — SIGNIFICANT CHANGE UP (ref 0–6)
HCT VFR BLD CALC: 46.2 % — SIGNIFICANT CHANGE UP (ref 39–50)
HGB BLD-MCNC: 15.3 G/DL — SIGNIFICANT CHANGE UP (ref 13–17)
LYMPHOCYTES # BLD AUTO: 1.3 K/UL — SIGNIFICANT CHANGE UP (ref 1–3.3)
LYMPHOCYTES # BLD AUTO: 17.9 % — SIGNIFICANT CHANGE UP (ref 13–44)
MCHC RBC-ENTMCNC: 27.8 PG — SIGNIFICANT CHANGE UP (ref 27–34)
MCHC RBC-ENTMCNC: 33.1 G/DL — SIGNIFICANT CHANGE UP (ref 32–36)
MCV RBC AUTO: 84 FL — SIGNIFICANT CHANGE UP (ref 80–100)
MONOCYTES # BLD AUTO: 0.7 K/UL — SIGNIFICANT CHANGE UP (ref 0–0.9)
MONOCYTES NFR BLD AUTO: 10.1 % — SIGNIFICANT CHANGE UP (ref 2–14)
NEUTROPHILS # BLD AUTO: 4.8 K/UL — SIGNIFICANT CHANGE UP (ref 1.8–7.4)
NEUTROPHILS NFR BLD AUTO: 68.4 % — SIGNIFICANT CHANGE UP (ref 43–77)
PLATELET # BLD AUTO: 240 K/UL — SIGNIFICANT CHANGE UP (ref 150–400)
RBC # BLD: 5.5 M/UL — SIGNIFICANT CHANGE UP (ref 4.2–5.8)
RBC # FLD: 16.1 % — HIGH (ref 10.3–14.5)
WBC # BLD: 7 K/UL — SIGNIFICANT CHANGE UP (ref 3.8–10.5)
WBC # FLD AUTO: 7 K/UL — SIGNIFICANT CHANGE UP (ref 3.8–10.5)

## 2017-07-13 DIAGNOSIS — Z79.899 OTHER LONG TERM (CURRENT) DRUG THERAPY: ICD-10-CM

## 2017-07-13 DIAGNOSIS — R11.2 NAUSEA WITH VOMITING, UNSPECIFIED: ICD-10-CM

## 2017-07-13 DIAGNOSIS — Z51.11 ENCOUNTER FOR ANTINEOPLASTIC CHEMOTHERAPY: ICD-10-CM

## 2017-07-20 ENCOUNTER — APPOINTMENT (OUTPATIENT)
Dept: SURGICAL ONCOLOGY | Facility: CLINIC | Age: 32
End: 2017-07-20

## 2017-07-25 ENCOUNTER — LABORATORY RESULT (OUTPATIENT)
Age: 32
End: 2017-07-25

## 2017-07-26 ENCOUNTER — RESULT REVIEW (OUTPATIENT)
Age: 32
End: 2017-07-26

## 2017-07-26 ENCOUNTER — APPOINTMENT (OUTPATIENT)
Dept: HEMATOLOGY ONCOLOGY | Facility: CLINIC | Age: 32
End: 2017-07-26

## 2017-07-26 ENCOUNTER — APPOINTMENT (OUTPATIENT)
Dept: INFUSION THERAPY | Facility: HOSPITAL | Age: 32
End: 2017-07-26

## 2017-07-26 VITALS
RESPIRATION RATE: 16 BRPM | TEMPERATURE: 98.8 F | SYSTOLIC BLOOD PRESSURE: 118 MMHG | BODY MASS INDEX: 28.54 KG/M2 | DIASTOLIC BLOOD PRESSURE: 78 MMHG | HEART RATE: 100 BPM | WEIGHT: 176.81 LBS

## 2017-07-26 LAB
BASOPHILS # BLD AUTO: 0 K/UL — SIGNIFICANT CHANGE UP (ref 0–0.2)
BASOPHILS NFR BLD AUTO: 0.2 % — SIGNIFICANT CHANGE UP (ref 0–2)
EOSINOPHIL # BLD AUTO: 0.4 K/UL — SIGNIFICANT CHANGE UP (ref 0–0.5)
EOSINOPHIL NFR BLD AUTO: 6.7 % — HIGH (ref 0–6)
HCT VFR BLD CALC: 41.9 % — SIGNIFICANT CHANGE UP (ref 39–50)
HGB BLD-MCNC: 14.2 G/DL — SIGNIFICANT CHANGE UP (ref 13–17)
LYMPHOCYTES # BLD AUTO: 1.3 K/UL — SIGNIFICANT CHANGE UP (ref 1–3.3)
LYMPHOCYTES # BLD AUTO: 19.3 % — SIGNIFICANT CHANGE UP (ref 13–44)
MCHC RBC-ENTMCNC: 28.1 PG — SIGNIFICANT CHANGE UP (ref 27–34)
MCHC RBC-ENTMCNC: 34 G/DL — SIGNIFICANT CHANGE UP (ref 32–36)
MCV RBC AUTO: 82.9 FL — SIGNIFICANT CHANGE UP (ref 80–100)
MONOCYTES # BLD AUTO: 0.6 K/UL — SIGNIFICANT CHANGE UP (ref 0–0.9)
MONOCYTES NFR BLD AUTO: 8.6 % — SIGNIFICANT CHANGE UP (ref 2–14)
NEUTROPHILS # BLD AUTO: 4.3 K/UL — SIGNIFICANT CHANGE UP (ref 1.8–7.4)
NEUTROPHILS NFR BLD AUTO: 65.2 % — SIGNIFICANT CHANGE UP (ref 43–77)
PLATELET # BLD AUTO: 260 K/UL — SIGNIFICANT CHANGE UP (ref 150–400)
RBC # BLD: 5.06 M/UL — SIGNIFICANT CHANGE UP (ref 4.2–5.8)
RBC # FLD: 15.6 % — HIGH (ref 10.3–14.5)
WBC # BLD: 6.6 K/UL — SIGNIFICANT CHANGE UP (ref 3.8–10.5)
WBC # FLD AUTO: 6.6 K/UL — SIGNIFICANT CHANGE UP (ref 3.8–10.5)

## 2017-07-31 ENCOUNTER — MEDICATION RENEWAL (OUTPATIENT)
Age: 32
End: 2017-07-31

## 2017-08-04 ENCOUNTER — MEDICATION RENEWAL (OUTPATIENT)
Age: 32
End: 2017-08-04

## 2017-08-09 ENCOUNTER — APPOINTMENT (OUTPATIENT)
Dept: HEMATOLOGY ONCOLOGY | Facility: CLINIC | Age: 32
End: 2017-08-09

## 2017-08-10 ENCOUNTER — OUTPATIENT (OUTPATIENT)
Dept: OUTPATIENT SERVICES | Facility: HOSPITAL | Age: 32
LOS: 1 days | Discharge: HOME | End: 2017-08-10

## 2017-08-10 DIAGNOSIS — C20 MALIGNANT NEOPLASM OF RECTUM: ICD-10-CM

## 2017-08-10 DIAGNOSIS — Z98.890 OTHER SPECIFIED POSTPROCEDURAL STATES: Chronic | ICD-10-CM

## 2017-08-15 ENCOUNTER — LABORATORY RESULT (OUTPATIENT)
Age: 32
End: 2017-08-15

## 2017-08-15 ENCOUNTER — RESULT REVIEW (OUTPATIENT)
Age: 32
End: 2017-08-15

## 2017-08-15 ENCOUNTER — APPOINTMENT (OUTPATIENT)
Dept: INFUSION THERAPY | Facility: HOSPITAL | Age: 32
End: 2017-08-15

## 2017-08-15 LAB
BASOPHILS # BLD AUTO: 0 K/UL — SIGNIFICANT CHANGE UP (ref 0–0.2)
BASOPHILS NFR BLD AUTO: 0.1 % — SIGNIFICANT CHANGE UP (ref 0–2)
EOSINOPHIL # BLD AUTO: 0.4 K/UL — SIGNIFICANT CHANGE UP (ref 0–0.5)
EOSINOPHIL NFR BLD AUTO: 5.7 % — SIGNIFICANT CHANGE UP (ref 0–6)
HCT VFR BLD CALC: 40.8 % — SIGNIFICANT CHANGE UP (ref 39–50)
HGB BLD-MCNC: 14 G/DL — SIGNIFICANT CHANGE UP (ref 13–17)
LYMPHOCYTES # BLD AUTO: 1 K/UL — SIGNIFICANT CHANGE UP (ref 1–3.3)
LYMPHOCYTES # BLD AUTO: 13.8 % — SIGNIFICANT CHANGE UP (ref 13–44)
MCHC RBC-ENTMCNC: 28.9 PG — SIGNIFICANT CHANGE UP (ref 27–34)
MCHC RBC-ENTMCNC: 34.3 G/DL — SIGNIFICANT CHANGE UP (ref 32–36)
MCV RBC AUTO: 84.3 FL — SIGNIFICANT CHANGE UP (ref 80–100)
MONOCYTES # BLD AUTO: 0.6 K/UL — SIGNIFICANT CHANGE UP (ref 0–0.9)
MONOCYTES NFR BLD AUTO: 8.3 % — SIGNIFICANT CHANGE UP (ref 2–14)
NEUTROPHILS # BLD AUTO: 5.4 K/UL — SIGNIFICANT CHANGE UP (ref 1.8–7.4)
NEUTROPHILS NFR BLD AUTO: 72 % — SIGNIFICANT CHANGE UP (ref 43–77)
PLATELET # BLD AUTO: 307 K/UL — SIGNIFICANT CHANGE UP (ref 150–400)
RBC # BLD: 4.84 M/UL — SIGNIFICANT CHANGE UP (ref 4.2–5.8)
RBC # FLD: 14.7 % — HIGH (ref 10.3–14.5)
WBC # BLD: 7.5 K/UL — SIGNIFICANT CHANGE UP (ref 3.8–10.5)
WBC # FLD AUTO: 7.5 K/UL — SIGNIFICANT CHANGE UP (ref 3.8–10.5)

## 2017-08-16 DIAGNOSIS — R11.2 NAUSEA WITH VOMITING, UNSPECIFIED: ICD-10-CM

## 2017-08-16 DIAGNOSIS — Z51.11 ENCOUNTER FOR ANTINEOPLASTIC CHEMOTHERAPY: ICD-10-CM

## 2017-08-27 ENCOUNTER — FORM ENCOUNTER (OUTPATIENT)
Age: 32
End: 2017-08-27

## 2017-08-28 ENCOUNTER — APPOINTMENT (OUTPATIENT)
Dept: CT IMAGING | Facility: IMAGING CENTER | Age: 32
End: 2017-08-28
Payer: COMMERCIAL

## 2017-08-28 ENCOUNTER — OUTPATIENT (OUTPATIENT)
Dept: OUTPATIENT SERVICES | Facility: HOSPITAL | Age: 32
LOS: 1 days | End: 2017-08-28
Payer: COMMERCIAL

## 2017-08-28 DIAGNOSIS — Z00.8 ENCOUNTER FOR OTHER GENERAL EXAMINATION: ICD-10-CM

## 2017-08-28 DIAGNOSIS — Z98.890 OTHER SPECIFIED POSTPROCEDURAL STATES: Chronic | ICD-10-CM

## 2017-08-28 PROCEDURE — 74177 CT ABD & PELVIS W/CONTRAST: CPT

## 2017-08-28 PROCEDURE — 71260 CT THORAX DX C+: CPT | Mod: 26

## 2017-08-28 PROCEDURE — 74177 CT ABD & PELVIS W/CONTRAST: CPT | Mod: 26

## 2017-08-28 PROCEDURE — 71260 CT THORAX DX C+: CPT

## 2017-08-30 ENCOUNTER — APPOINTMENT (OUTPATIENT)
Dept: HEMATOLOGY ONCOLOGY | Facility: CLINIC | Age: 32
End: 2017-08-30

## 2017-08-30 ENCOUNTER — APPOINTMENT (OUTPATIENT)
Dept: INFUSION THERAPY | Facility: HOSPITAL | Age: 32
End: 2017-08-30

## 2017-08-31 ENCOUNTER — APPOINTMENT (OUTPATIENT)
Dept: SURGICAL ONCOLOGY | Facility: CLINIC | Age: 32
End: 2017-08-31
Payer: COMMERCIAL

## 2017-08-31 VITALS
HEIGHT: 66 IN | RESPIRATION RATE: 15 BRPM | WEIGHT: 170 LBS | HEART RATE: 113 BPM | OXYGEN SATURATION: 99 % | BODY MASS INDEX: 27.32 KG/M2 | SYSTOLIC BLOOD PRESSURE: 131 MMHG | DIASTOLIC BLOOD PRESSURE: 84 MMHG

## 2017-08-31 PROCEDURE — 99214 OFFICE O/P EST MOD 30 MIN: CPT

## 2017-09-08 ENCOUNTER — APPOINTMENT (OUTPATIENT)
Dept: INTERVENTIONAL RADIOLOGY/VASCULAR | Facility: CLINIC | Age: 32
End: 2017-09-08
Payer: COMMERCIAL

## 2017-09-08 VITALS
RESPIRATION RATE: 16 BRPM | OXYGEN SATURATION: 98 % | BODY MASS INDEX: 27 KG/M2 | TEMPERATURE: 98.5 F | HEART RATE: 118 BPM | DIASTOLIC BLOOD PRESSURE: 94 MMHG | HEIGHT: 66 IN | WEIGHT: 168 LBS | SYSTOLIC BLOOD PRESSURE: 144 MMHG

## 2017-09-08 PROCEDURE — 99243 OFF/OP CNSLTJ NEW/EST LOW 30: CPT

## 2017-09-08 RX ORDER — ZOLPIDEM TARTRATE 5 MG/1
5 TABLET ORAL
Qty: 30 | Refills: 0 | Status: DISCONTINUED | COMMUNITY
Start: 2017-08-09 | End: 2017-09-08

## 2017-09-10 ENCOUNTER — TRANSCRIPTION ENCOUNTER (OUTPATIENT)
Age: 32
End: 2017-09-10

## 2017-09-25 ENCOUNTER — APPOINTMENT (OUTPATIENT)
Dept: SURGICAL ONCOLOGY | Facility: CLINIC | Age: 32
End: 2017-09-25
Payer: COMMERCIAL

## 2017-09-25 VITALS
RESPIRATION RATE: 16 BRPM | HEIGHT: 66 IN | WEIGHT: 168 LBS | BODY MASS INDEX: 27 KG/M2 | SYSTOLIC BLOOD PRESSURE: 139 MMHG | OXYGEN SATURATION: 98 % | DIASTOLIC BLOOD PRESSURE: 93 MMHG | HEART RATE: 111 BPM

## 2017-09-25 PROCEDURE — 45300 PROCTOSIGMOIDOSCOPY DX: CPT

## 2017-09-29 ENCOUNTER — OUTPATIENT (OUTPATIENT)
Dept: OUTPATIENT SERVICES | Facility: HOSPITAL | Age: 32
LOS: 1 days | End: 2017-09-29
Payer: COMMERCIAL

## 2017-09-29 DIAGNOSIS — C20 MALIGNANT NEOPLASM OF RECTUM: ICD-10-CM

## 2017-09-29 DIAGNOSIS — Z98.890 OTHER SPECIFIED POSTPROCEDURAL STATES: Chronic | ICD-10-CM

## 2017-10-02 ENCOUNTER — RESULT REVIEW (OUTPATIENT)
Age: 32
End: 2017-10-02

## 2017-10-02 PROCEDURE — 88321 CONSLTJ&REPRT SLD PREP ELSWR: CPT

## 2017-10-03 ENCOUNTER — OUTPATIENT (OUTPATIENT)
Dept: OUTPATIENT SERVICES | Facility: HOSPITAL | Age: 32
LOS: 1 days | End: 2017-10-03
Payer: COMMERCIAL

## 2017-10-03 VITALS
DIASTOLIC BLOOD PRESSURE: 94 MMHG | HEART RATE: 117 BPM | WEIGHT: 167.99 LBS | HEIGHT: 65 IN | RESPIRATION RATE: 16 BRPM | TEMPERATURE: 98 F | OXYGEN SATURATION: 98 % | SYSTOLIC BLOOD PRESSURE: 130 MMHG

## 2017-10-03 DIAGNOSIS — Z45.2 ENCOUNTER FOR ADJUSTMENT AND MANAGEMENT OF VASCULAR ACCESS DEVICE: Chronic | ICD-10-CM

## 2017-10-03 DIAGNOSIS — Z98.890 OTHER SPECIFIED POSTPROCEDURAL STATES: Chronic | ICD-10-CM

## 2017-10-03 DIAGNOSIS — Z01.818 ENCOUNTER FOR OTHER PREPROCEDURAL EXAMINATION: ICD-10-CM

## 2017-10-03 DIAGNOSIS — C20 MALIGNANT NEOPLASM OF RECTUM: ICD-10-CM

## 2017-10-03 LAB
BLD GP AB SCN SERPL QL: NEGATIVE — SIGNIFICANT CHANGE UP
HBA1C BLD-MCNC: 5.2 % — SIGNIFICANT CHANGE UP (ref 4–5.6)
HCT VFR BLD CALC: 40.6 % — SIGNIFICANT CHANGE UP (ref 39–50)
HGB BLD-MCNC: 13.2 G/DL — SIGNIFICANT CHANGE UP (ref 13–17)
INR BLD: 1.11 RATIO — SIGNIFICANT CHANGE UP (ref 0.88–1.16)
MCHC RBC-ENTMCNC: 27.6 PG — SIGNIFICANT CHANGE UP (ref 27–34)
MCHC RBC-ENTMCNC: 32.5 GM/DL — SIGNIFICANT CHANGE UP (ref 32–36)
MCV RBC AUTO: 84.9 FL — SIGNIFICANT CHANGE UP (ref 80–100)
PLATELET # BLD AUTO: 352 K/UL — SIGNIFICANT CHANGE UP (ref 150–400)
PROTHROM AB SERPL-ACNC: 12.6 SEC — SIGNIFICANT CHANGE UP (ref 10–13.1)
RBC # BLD: 4.78 M/UL — SIGNIFICANT CHANGE UP (ref 4.2–5.8)
RBC # FLD: 15.3 % — HIGH (ref 10.3–14.5)
RH IG SCN BLD-IMP: POSITIVE — SIGNIFICANT CHANGE UP
WBC # BLD: 8.81 K/UL — SIGNIFICANT CHANGE UP (ref 3.8–10.5)
WBC # FLD AUTO: 8.81 K/UL — SIGNIFICANT CHANGE UP (ref 3.8–10.5)

## 2017-10-03 PROCEDURE — 86901 BLOOD TYPING SEROLOGIC RH(D): CPT

## 2017-10-03 PROCEDURE — 86900 BLOOD TYPING SEROLOGIC ABO: CPT

## 2017-10-03 PROCEDURE — 83036 HEMOGLOBIN GLYCOSYLATED A1C: CPT

## 2017-10-03 PROCEDURE — 80053 COMPREHEN METABOLIC PANEL: CPT

## 2017-10-03 PROCEDURE — 85610 PROTHROMBIN TIME: CPT

## 2017-10-03 PROCEDURE — G0463: CPT

## 2017-10-03 PROCEDURE — 85027 COMPLETE CBC AUTOMATED: CPT

## 2017-10-03 PROCEDURE — 86850 RBC ANTIBODY SCREEN: CPT

## 2017-10-03 NOTE — H&P PST ADULT - NSANTHOSAYNRD_GEN_A_CORE
No. KAILYN screening performed.  STOP BANG Legend: 0-2 = LOW Risk; 3-4 = INTERMEDIATE Risk; 5-8 = HIGH Risk

## 2017-10-03 NOTE — H&P PST ADULT - PMH
Gastroesophageal reflux disease    Rectal cancer metastasized to liver Anxiety    Gastroesophageal reflux disease    Rectal cancer metastasized to liver Anxiety  hx of panic attacks  Gastroesophageal reflux disease    Rectal cancer metastasized to liver

## 2017-10-03 NOTE — H&P PST ADULT - HISTORY OF PRESENT ILLNESS
This is a 33 y/o male with pmhx of rectal cancer presents today for presurgical testing.  He was diagnosed with rectal cancer in 8/2016 and is now s/p radiation and chemotherapy (most recent chemo 8/15/17).  He was noted to have liver metastasis on his imaging in April, so he was evaluated by Dr. Camcaho in Interventional Radiology for possible treatment of the lesion.  He presents today to presurgical testing for planned exploratory laparotomy, liver resection, possible microwave ablation of liver lesion, and LAR.  Reports recent weight loss after chemotherapy in August and intermittent mild abdominal pain that is relieved by prn Oxycodone.  Denies n/v/d, fever, or melena.

## 2017-10-03 NOTE — H&P PST ADULT - MUSCULOSKELETAL
negative detailed exam no calf tenderness/ROM intact/no joint warmth/no joint swelling/no joint erythema/normal strength

## 2017-10-03 NOTE — H&P PST ADULT - FAMILY HISTORY
Mother  Still living? Unknown  Family history of type 2 diabetes mellitus in mother, Age at diagnosis: Age Unknown

## 2017-10-03 NOTE — H&P PST ADULT - PROBLEM SELECTOR PLAN 1
1.  Plan for ex lap, liver resection, possible MWA of liver, and LAR on 10/13/17 at 0730  2.  INR, hepatic function panel drawn for possible intraop MWA of liver lesion by Dr. Camacho  3.  Bowel prep per surgeon  4.  HgA1c, FS on day of procedure

## 2017-10-03 NOTE — H&P PST ADULT - PSH
History of hemorrhoidectomy Encounter for care related to vascular access port  right chest wall port placed 2017  History of hemorrhoidectomy

## 2017-10-03 NOTE — H&P PST ADULT - ATTENDING COMMENTS
D/w pt plan for ex lap LAR, likely ostomy, liver resection and ablation, intraop U/S    Discussed r/b/a post op expectations poss complications.      Pt understands and agrees to proceed.

## 2017-10-04 ENCOUNTER — MEDICATION RENEWAL (OUTPATIENT)
Age: 32
End: 2017-10-04

## 2017-10-04 LAB
ALBUMIN SERPL ELPH-MCNC: 4.1 G/DL — SIGNIFICANT CHANGE UP (ref 3.3–5)
ALP SERPL-CCNC: 107 U/L — SIGNIFICANT CHANGE UP (ref 40–120)
ALT FLD-CCNC: 25 U/L — SIGNIFICANT CHANGE UP (ref 10–45)
ANION GAP SERPL CALC-SCNC: 14 MMOL/L — SIGNIFICANT CHANGE UP (ref 5–17)
AST SERPL-CCNC: 24 U/L — SIGNIFICANT CHANGE UP (ref 10–40)
BILIRUB SERPL-MCNC: 0.3 MG/DL — SIGNIFICANT CHANGE UP (ref 0.2–1.2)
BUN SERPL-MCNC: 8 MG/DL — SIGNIFICANT CHANGE UP (ref 7–23)
CALCIUM SERPL-MCNC: 10.2 MG/DL — SIGNIFICANT CHANGE UP (ref 8.4–10.5)
CHLORIDE SERPL-SCNC: 100 MMOL/L — SIGNIFICANT CHANGE UP (ref 96–108)
CO2 SERPL-SCNC: 27 MMOL/L — SIGNIFICANT CHANGE UP (ref 22–31)
CREAT SERPL-MCNC: 0.88 MG/DL — SIGNIFICANT CHANGE UP (ref 0.5–1.3)
GLUCOSE SERPL-MCNC: 85 MG/DL — SIGNIFICANT CHANGE UP (ref 70–99)
POTASSIUM SERPL-MCNC: 3.8 MMOL/L — SIGNIFICANT CHANGE UP (ref 3.5–5.3)
POTASSIUM SERPL-SCNC: 3.8 MMOL/L — SIGNIFICANT CHANGE UP (ref 3.5–5.3)
PROT SERPL-MCNC: 8.1 G/DL — SIGNIFICANT CHANGE UP (ref 6–8.3)
SODIUM SERPL-SCNC: 141 MMOL/L — SIGNIFICANT CHANGE UP (ref 135–145)
SURGICAL PATHOLOGY STUDY: SIGNIFICANT CHANGE UP

## 2017-10-05 ENCOUNTER — RX RENEWAL (OUTPATIENT)
Age: 32
End: 2017-10-05

## 2017-10-10 ENCOUNTER — APPOINTMENT (OUTPATIENT)
Dept: INTERNAL MEDICINE | Facility: CLINIC | Age: 32
End: 2017-10-10
Payer: COMMERCIAL

## 2017-10-10 VITALS
HEIGHT: 66 IN | RESPIRATION RATE: 16 BRPM | TEMPERATURE: 98.9 F | DIASTOLIC BLOOD PRESSURE: 86 MMHG | OXYGEN SATURATION: 98 % | HEART RATE: 110 BPM | BODY MASS INDEX: 27.32 KG/M2 | WEIGHT: 170 LBS | SYSTOLIC BLOOD PRESSURE: 138 MMHG

## 2017-10-10 DIAGNOSIS — K52.1 TOXIC GASTROENTERITIS AND COLITIS: ICD-10-CM

## 2017-10-10 DIAGNOSIS — R03.0 ELEVATED BLOOD-PRESSURE READING, W/OUT DIAGNOSIS OF HYPERTENSION: ICD-10-CM

## 2017-10-10 DIAGNOSIS — T45.1X5A TOXIC GASTROENTERITIS AND COLITIS: ICD-10-CM

## 2017-10-10 DIAGNOSIS — Z86.59 PERSONAL HISTORY OF OTHER MENTAL AND BEHAVIORAL DISORDERS: ICD-10-CM

## 2017-10-10 DIAGNOSIS — F41.9 ANXIETY DISORDER, UNSPECIFIED: ICD-10-CM

## 2017-10-10 DIAGNOSIS — R68.89 OTHER GENERAL SYMPTOMS AND SIGNS: ICD-10-CM

## 2017-10-10 DIAGNOSIS — G47.9 SLEEP DISORDER, UNSPECIFIED: ICD-10-CM

## 2017-10-10 DIAGNOSIS — R97.0 ELEVATED CARCINOEMBRYONIC ANTIGEN [CEA]: ICD-10-CM

## 2017-10-10 DIAGNOSIS — Z79.899 OTHER LONG TERM (CURRENT) DRUG THERAPY: ICD-10-CM

## 2017-10-10 DIAGNOSIS — R79.9 ABNORMAL FINDING OF BLOOD CHEMISTRY, UNSPECIFIED: ICD-10-CM

## 2017-10-10 DIAGNOSIS — Z87.19 PERSONAL HISTORY OF OTHER DISEASES OF THE DIGESTIVE SYSTEM: ICD-10-CM

## 2017-10-10 PROCEDURE — 99244 OFF/OP CNSLTJ NEW/EST MOD 40: CPT | Mod: 25

## 2017-10-10 PROCEDURE — 90686 IIV4 VACC NO PRSV 0.5 ML IM: CPT

## 2017-10-10 PROCEDURE — 90471 IMMUNIZATION ADMIN: CPT

## 2017-10-10 RX ORDER — FAMOTIDINE 20 MG/1
20 TABLET, FILM COATED ORAL DAILY
Qty: 30 | Refills: 3 | Status: DISCONTINUED | COMMUNITY
Start: 2017-07-17 | End: 2017-10-10

## 2017-10-11 PROBLEM — G47.9 SLEEPING DIFFICULTY: Status: ACTIVE | Noted: 2017-05-02

## 2017-10-11 PROBLEM — R97.0 ELEVATED CEA: Status: ACTIVE | Noted: 2017-10-11

## 2017-10-11 PROBLEM — R68.89 COLD INTOLERANCE: Status: RESOLVED | Noted: 2017-05-02 | Resolved: 2017-10-11

## 2017-10-11 PROBLEM — R03.0 BLOOD PRESSURE ELEVATED WITHOUT HISTORY OF HTN: Status: ACTIVE | Noted: 2017-05-02

## 2017-10-12 ENCOUNTER — OUTPATIENT (OUTPATIENT)
Dept: OUTPATIENT SERVICES | Facility: HOSPITAL | Age: 32
LOS: 1 days | End: 2017-10-12
Payer: COMMERCIAL

## 2017-10-12 ENCOUNTER — APPOINTMENT (OUTPATIENT)
Dept: CT IMAGING | Facility: IMAGING CENTER | Age: 32
End: 2017-10-12
Payer: COMMERCIAL

## 2017-10-12 DIAGNOSIS — C20 MALIGNANT NEOPLASM OF RECTUM: ICD-10-CM

## 2017-10-12 DIAGNOSIS — Z98.890 OTHER SPECIFIED POSTPROCEDURAL STATES: Chronic | ICD-10-CM

## 2017-10-12 DIAGNOSIS — C78.7 SECONDARY MALIGNANT NEOPLASM OF LIVER AND INTRAHEPATIC BILE DUCT: ICD-10-CM

## 2017-10-12 DIAGNOSIS — Z45.2 ENCOUNTER FOR ADJUSTMENT AND MANAGEMENT OF VASCULAR ACCESS DEVICE: Chronic | ICD-10-CM

## 2017-10-12 PROCEDURE — 74177 CT ABD & PELVIS W/CONTRAST: CPT | Mod: 26

## 2017-10-12 PROCEDURE — 74177 CT ABD & PELVIS W/CONTRAST: CPT

## 2017-10-23 ENCOUNTER — APPOINTMENT (OUTPATIENT)
Dept: SURGICAL ONCOLOGY | Facility: HOSPITAL | Age: 32
End: 2017-10-23

## 2017-10-23 ENCOUNTER — INPATIENT (INPATIENT)
Facility: HOSPITAL | Age: 32
LOS: 10 days | Discharge: HOME | DRG: 329 | End: 2017-11-03
Attending: SURGERY | Admitting: SURGERY
Payer: COMMERCIAL

## 2017-10-23 ENCOUNTER — RESULT REVIEW (OUTPATIENT)
Age: 32
End: 2017-10-23

## 2017-10-23 VITALS
SYSTOLIC BLOOD PRESSURE: 132 MMHG | RESPIRATION RATE: 18 BRPM | DIASTOLIC BLOOD PRESSURE: 95 MMHG | HEIGHT: 65 IN | TEMPERATURE: 99 F | HEART RATE: 128 BPM | OXYGEN SATURATION: 98 % | WEIGHT: 167.99 LBS

## 2017-10-23 DIAGNOSIS — Z45.2 ENCOUNTER FOR ADJUSTMENT AND MANAGEMENT OF VASCULAR ACCESS DEVICE: Chronic | ICD-10-CM

## 2017-10-23 DIAGNOSIS — Z98.890 OTHER SPECIFIED POSTPROCEDURAL STATES: Chronic | ICD-10-CM

## 2017-10-23 DIAGNOSIS — C20 MALIGNANT NEOPLASM OF RECTUM: ICD-10-CM

## 2017-10-23 LAB
ANION GAP SERPL CALC-SCNC: 15 MMOL/L — SIGNIFICANT CHANGE UP (ref 5–17)
BUN SERPL-MCNC: 8 MG/DL — SIGNIFICANT CHANGE UP (ref 7–23)
CALCIUM SERPL-MCNC: 8.6 MG/DL — SIGNIFICANT CHANGE UP (ref 8.4–10.5)
CHLORIDE SERPL-SCNC: 99 MMOL/L — SIGNIFICANT CHANGE UP (ref 96–108)
CO2 SERPL-SCNC: 25 MMOL/L — SIGNIFICANT CHANGE UP (ref 22–31)
CREAT SERPL-MCNC: 0.99 MG/DL — SIGNIFICANT CHANGE UP (ref 0.5–1.3)
GAS PNL BLDA: SIGNIFICANT CHANGE UP
GLUCOSE BLDC GLUCOMTR-MCNC: 116 MG/DL — HIGH (ref 70–99)
GLUCOSE SERPL-MCNC: 138 MG/DL — HIGH (ref 70–99)
HCT VFR BLD CALC: 27 % — LOW (ref 39–50)
HGB BLD-MCNC: 9.1 G/DL — LOW (ref 13–17)
MCHC RBC-ENTMCNC: 27.8 PG — SIGNIFICANT CHANGE UP (ref 27–34)
MCHC RBC-ENTMCNC: 33.8 GM/DL — SIGNIFICANT CHANGE UP (ref 32–36)
MCV RBC AUTO: 82.2 FL — SIGNIFICANT CHANGE UP (ref 80–100)
PLATELET # BLD AUTO: 398 K/UL — SIGNIFICANT CHANGE UP (ref 150–400)
POTASSIUM SERPL-MCNC: 3.5 MMOL/L — SIGNIFICANT CHANGE UP (ref 3.5–5.3)
POTASSIUM SERPL-SCNC: 3.5 MMOL/L — SIGNIFICANT CHANGE UP (ref 3.5–5.3)
RBC # BLD: 3.28 M/UL — LOW (ref 4.2–5.8)
RBC # FLD: 14.2 % — SIGNIFICANT CHANGE UP (ref 10.3–14.5)
SODIUM SERPL-SCNC: 139 MMOL/L — SIGNIFICANT CHANGE UP (ref 135–145)
WBC # BLD: 9.3 K/UL — SIGNIFICANT CHANGE UP (ref 3.8–10.5)
WBC # FLD AUTO: 9.3 K/UL — SIGNIFICANT CHANGE UP (ref 3.8–10.5)

## 2017-10-23 PROCEDURE — 44141 PARTIAL REMOVAL OF COLON: CPT | Mod: 82

## 2017-10-23 PROCEDURE — 93010 ELECTROCARDIOGRAM REPORT: CPT

## 2017-10-23 PROCEDURE — 44141 PARTIAL REMOVAL OF COLON: CPT

## 2017-10-23 PROCEDURE — 47100 WEDGE BIOPSY OF LIVER: CPT | Mod: 59

## 2017-10-23 PROCEDURE — 47100 WEDGE BIOPSY OF LIVER: CPT | Mod: 82,59

## 2017-10-23 PROCEDURE — 50940 RELEASE OF URETER: CPT | Mod: 59

## 2017-10-23 RX ORDER — CEFOTETAN DISODIUM 1 G
2 VIAL (EA) INJECTION ONCE
Qty: 0 | Refills: 0 | Status: COMPLETED | OUTPATIENT
Start: 2017-10-23 | End: 2017-10-23

## 2017-10-23 RX ORDER — HYDROMORPHONE HYDROCHLORIDE 2 MG/ML
1 INJECTION INTRAMUSCULAR; INTRAVENOUS; SUBCUTANEOUS
Qty: 0 | Refills: 0 | Status: DISCONTINUED | OUTPATIENT
Start: 2017-10-23 | End: 2017-10-23

## 2017-10-23 RX ORDER — ONDANSETRON 8 MG/1
4 TABLET, FILM COATED ORAL EVERY 6 HOURS
Qty: 0 | Refills: 0 | Status: DISCONTINUED | OUTPATIENT
Start: 2017-10-23 | End: 2017-11-03

## 2017-10-23 RX ORDER — HYDROMORPHONE HYDROCHLORIDE 2 MG/ML
0.5 INJECTION INTRAMUSCULAR; INTRAVENOUS; SUBCUTANEOUS
Qty: 0 | Refills: 0 | Status: DISCONTINUED | OUTPATIENT
Start: 2017-10-23 | End: 2017-10-23

## 2017-10-23 RX ORDER — SODIUM CHLORIDE 9 MG/ML
1000 INJECTION INTRAMUSCULAR; INTRAVENOUS; SUBCUTANEOUS
Qty: 0 | Refills: 0 | Status: DISCONTINUED | OUTPATIENT
Start: 2017-10-23 | End: 2017-10-24

## 2017-10-23 RX ORDER — HEPARIN SODIUM 5000 [USP'U]/ML
5000 INJECTION INTRAVENOUS; SUBCUTANEOUS EVERY 8 HOURS
Qty: 0 | Refills: 0 | Status: DISCONTINUED | OUTPATIENT
Start: 2017-10-23 | End: 2017-10-28

## 2017-10-23 RX ORDER — ONDANSETRON 8 MG/1
4 TABLET, FILM COATED ORAL ONCE
Qty: 0 | Refills: 0 | Status: DISCONTINUED | OUTPATIENT
Start: 2017-10-23 | End: 2017-10-23

## 2017-10-23 RX ORDER — POTASSIUM CHLORIDE 20 MEQ
10 PACKET (EA) ORAL
Qty: 0 | Refills: 0 | Status: COMPLETED | OUTPATIENT
Start: 2017-10-23 | End: 2017-10-24

## 2017-10-23 RX ORDER — NALOXONE HYDROCHLORIDE 4 MG/.1ML
0.1 SPRAY NASAL
Qty: 0 | Refills: 0 | Status: DISCONTINUED | OUTPATIENT
Start: 2017-10-23 | End: 2017-11-03

## 2017-10-23 RX ORDER — SODIUM CHLORIDE 9 MG/ML
3 INJECTION INTRAMUSCULAR; INTRAVENOUS; SUBCUTANEOUS EVERY 8 HOURS
Qty: 0 | Refills: 0 | Status: DISCONTINUED | OUTPATIENT
Start: 2017-10-23 | End: 2017-10-23

## 2017-10-23 RX ADMIN — Medication 100 GRAM(S): at 09:23

## 2017-10-23 RX ADMIN — HEPARIN SODIUM 5000 UNIT(S): 5000 INJECTION INTRAVENOUS; SUBCUTANEOUS at 21:12

## 2017-10-23 RX ADMIN — Medication 100 MILLIEQUIVALENT(S): at 22:26

## 2017-10-23 RX ADMIN — SODIUM CHLORIDE 100 MILLILITER(S): 9 INJECTION INTRAMUSCULAR; INTRAVENOUS; SUBCUTANEOUS at 16:05

## 2017-10-23 NOTE — PATIENT PROFILE ADULT. - PMH
Anxiety  hx of panic attacks  Gastroesophageal reflux disease    Rectal cancer metastasized to liver

## 2017-10-23 NOTE — PROVIDER CONTACT NOTE (OTHER) - ASSESSMENT
Pt's V/S: 99.6 oral temp, , B/P 125/82, RR 18, 96 room air. Pt denies SOB, dizziness, lightheadedness, nausea/vomiting, chest pain.

## 2017-10-23 NOTE — PATIENT PROFILE ADULT. - PSH
Encounter for care related to vascular access port  right chest wall port placed 2017  History of hemorrhoidectomy

## 2017-10-23 NOTE — BRIEF OPERATIVE NOTE - OPERATION/FINDINGS
Exploratory laparotomy, Ryan's procedure, liver biopsy, rectal biopsy    Perforated rectal cancer invading in right lateral side wall, and posterior wall.

## 2017-10-23 NOTE — BRIEF OPERATIVE NOTE - PROCEDURE
<<-----Click on this checkbox to enter Procedure Liver biopsy  10/23/2017    Active  [id number]  Ryan procedure  10/23/2017    Active  [id number]

## 2017-10-24 LAB
ANION GAP SERPL CALC-SCNC: 16 MMOL/L — SIGNIFICANT CHANGE UP (ref 5–17)
BUN SERPL-MCNC: 9 MG/DL — SIGNIFICANT CHANGE UP (ref 7–23)
CALCIUM SERPL-MCNC: 9 MG/DL — SIGNIFICANT CHANGE UP (ref 8.4–10.5)
CHLORIDE SERPL-SCNC: 98 MMOL/L — SIGNIFICANT CHANGE UP (ref 96–108)
CO2 SERPL-SCNC: 24 MMOL/L — SIGNIFICANT CHANGE UP (ref 22–31)
CREAT SERPL-MCNC: 0.98 MG/DL — SIGNIFICANT CHANGE UP (ref 0.5–1.3)
GLUCOSE SERPL-MCNC: 112 MG/DL — HIGH (ref 70–99)
HCT VFR BLD CALC: 27.3 % — LOW (ref 39–50)
HGB BLD-MCNC: 8.9 G/DL — LOW (ref 13–17)
MAGNESIUM SERPL-MCNC: 1.9 MG/DL — SIGNIFICANT CHANGE UP (ref 1.6–2.6)
MCHC RBC-ENTMCNC: 27.1 PG — SIGNIFICANT CHANGE UP (ref 27–34)
MCHC RBC-ENTMCNC: 32.5 GM/DL — SIGNIFICANT CHANGE UP (ref 32–36)
MCV RBC AUTO: 83.3 FL — SIGNIFICANT CHANGE UP (ref 80–100)
PHOSPHATE SERPL-MCNC: 2.2 MG/DL — LOW (ref 2.5–4.5)
PLATELET # BLD AUTO: 485 K/UL — HIGH (ref 150–400)
POTASSIUM SERPL-MCNC: 4 MMOL/L — SIGNIFICANT CHANGE UP (ref 3.5–5.3)
POTASSIUM SERPL-SCNC: 4 MMOL/L — SIGNIFICANT CHANGE UP (ref 3.5–5.3)
RBC # BLD: 3.28 M/UL — LOW (ref 4.2–5.8)
RBC # FLD: 14.4 % — SIGNIFICANT CHANGE UP (ref 10.3–14.5)
SODIUM SERPL-SCNC: 138 MMOL/L — SIGNIFICANT CHANGE UP (ref 135–145)
WBC # BLD: 12.7 K/UL — HIGH (ref 3.8–10.5)
WBC # FLD AUTO: 12.7 K/UL — HIGH (ref 3.8–10.5)

## 2017-10-24 RX ORDER — DIPHENHYDRAMINE HCL 50 MG
12.5 CAPSULE ORAL ONCE
Qty: 0 | Refills: 0 | Status: DISCONTINUED | OUTPATIENT
Start: 2017-10-24 | End: 2017-10-24

## 2017-10-24 RX ORDER — DEXTROSE MONOHYDRATE, SODIUM CHLORIDE, AND POTASSIUM CHLORIDE 50; .745; 4.5 G/1000ML; G/1000ML; G/1000ML
1000 INJECTION, SOLUTION INTRAVENOUS
Qty: 0 | Refills: 0 | Status: DISCONTINUED | OUTPATIENT
Start: 2017-10-24 | End: 2017-10-24

## 2017-10-24 RX ORDER — DIPHENHYDRAMINE HCL 50 MG
12.5 CAPSULE ORAL ONCE
Qty: 0 | Refills: 0 | Status: COMPLETED | OUTPATIENT
Start: 2017-10-24 | End: 2017-10-24

## 2017-10-24 RX ORDER — DEXTROSE MONOHYDRATE, SODIUM CHLORIDE, AND POTASSIUM CHLORIDE 50; .745; 4.5 G/1000ML; G/1000ML; G/1000ML
1000 INJECTION, SOLUTION INTRAVENOUS
Qty: 0 | Refills: 0 | Status: DISCONTINUED | OUTPATIENT
Start: 2017-10-24 | End: 2017-10-26

## 2017-10-24 RX ORDER — MAGNESIUM SULFATE 500 MG/ML
1 VIAL (ML) INJECTION ONCE
Qty: 0 | Refills: 0 | Status: COMPLETED | OUTPATIENT
Start: 2017-10-24 | End: 2017-10-24

## 2017-10-24 RX ADMIN — HEPARIN SODIUM 5000 UNIT(S): 5000 INJECTION INTRAVENOUS; SUBCUTANEOUS at 13:07

## 2017-10-24 RX ADMIN — Medication 100 MILLIEQUIVALENT(S): at 03:23

## 2017-10-24 RX ADMIN — HEPARIN SODIUM 5000 UNIT(S): 5000 INJECTION INTRAVENOUS; SUBCUTANEOUS at 05:12

## 2017-10-24 RX ADMIN — Medication 12.5 MILLIGRAM(S): at 15:51

## 2017-10-24 RX ADMIN — Medication 63.75 MILLIMOLE(S): at 13:08

## 2017-10-24 RX ADMIN — HEPARIN SODIUM 5000 UNIT(S): 5000 INJECTION INTRAVENOUS; SUBCUTANEOUS at 21:17

## 2017-10-24 RX ADMIN — Medication 100 MILLIEQUIVALENT(S): at 00:50

## 2017-10-24 RX ADMIN — DEXTROSE MONOHYDRATE, SODIUM CHLORIDE, AND POTASSIUM CHLORIDE 75 MILLILITER(S): 50; .745; 4.5 INJECTION, SOLUTION INTRAVENOUS at 13:15

## 2017-10-24 RX ADMIN — Medication 100 GRAM(S): at 18:29

## 2017-10-24 NOTE — PROGRESS NOTE ADULT - SUBJECTIVE AND OBJECTIVE BOX
Day 1 of Anesthesia Pain Management Service    SUBJECTIVE: Patient doing well with PCEA    Pain Scale Score:   Refer to charted pain scores    THERAPY:  [X] Epidural Bupivacaine 0.0625% and Hydromorphone         [X] 10 micrograms/mL 	[ ] 5 micrograms/mL  [ ] Epidural Ropivacaine 0.1% plain – 1 mg/mL    Demand dose: 3 mL  Lockout: 15 minutes  Continuous Rate: 10 mL/hr    MEDICATIONS  (STANDING):  cefoTEtan  IVPB 2 Gram(s) IV Intermittent once  heparin  Injectable 5000 Unit(s) SubCutaneous every 8 hours  HYDROmorphone (10 MICROgram(s)/mL) + BUpivacaine 0.0625% in 0.9% Sodium Chloride PCEA 250 milliLiter(s) Epidural PCA Continuous  sodium chloride 0.9%. 1000 milliLiter(s) (100 mL/Hr) IV Continuous <Continuous>    MEDICATIONS  (PRN):  HYDROmorphone (10 MICROgram(s)/mL) + BUpivacaine 0.0625% in 0.9% Sodium Chloride PCEA Rescue Clinician  Bolus 5 milliLiter(s) Epidural every 15 minutes PRN for Pain Score greater than 6  naloxone Injectable 0.1 milliGRAM(s) IV Push every 3 minutes PRN For ANY of the following changes in patient status:  A. RR LESS THAN 10 breaths per minute, B. Oxygen saturation LESS THAN 90%, C. Sedation score of 6  ondansetron Injectable 4 milliGRAM(s) IV Push every 6 hours PRN Nausea      OBJECTIVE:    Assessment of Catheter Site:    [X] Epidural 	  [X] Dressing intact	[X] Site non-tender	[X] Site without erythema, discharge, edema  [X] Epidural tubing and connection checked	[X] Gross neurological exam within normal limits  [ ] Catheter removed – tip intact		[X] Afebrile	            [ ] Febrile: ___                          9.1    9.3   )-----------( 398      ( 23 Oct 2017 14:34 )             27.0     Vital Signs Last 24 Hrs  T(C): 37.7 (10-24-17 @ 05:45), Max: 37.7 (10-24-17 @ 05:45)  T(F): 99.9 (10-24-17 @ 05:45), Max: 99.9 (10-24-17 @ 05:45)  HR: 111 (10-24-17 @ 05:45) (97 - 112)  BP: 136/93 (10-24-17 @ 05:45) (120/78 - 138/84)  BP(mean): 98 (10-23-17 @ 18:00) (95 - 104)  RR: 18 (10-24-17 @ 05:45) (16 - 18)  SpO2: 95% (10-24-17 @ 05:45) (95% - 100%)      Sedation Score:	[X] Alert	[ ] Drowsy	[ ] Arousable  [ ] Asleep     [ ] Unresponsive    Side Effects:	[X] None	[ ] Nausea	[ ] Vomiting   [ ] Pruritus  		[ ] Weakness     [ ] Numbness	[ ] Other:    ASSESSMENT/ PLAN:    Therapy:	[X] Continue   [ ] Discontinue   [ ] Change to PRN Analgesics   [ ] Change to PCA    Documentation and Verification of current medications:  [X] Done	[ ] Not done, not eligible, reason:    COMMENTS: Reeducated to use

## 2017-10-24 NOTE — PROGRESS NOTE ADULT - SUBJECTIVE AND OBJECTIVE BOX
GENERAL SURGERY DAILY PROGRESS NOTE:     Subjective: Patient seen and examined. Patient stable with no overnight events. Patient denies CP/ SOB/ Palpatations. Patient denies N/V. Reports No flatus and No BM. Patient reports that he is thirsty, and denies pain.     MEDICATIONS  (STANDING):  cefoTEtan  IVPB 2 Gram(s) IV Intermittent once  heparin  Injectable 5000 Unit(s) SubCutaneous every 8 hours  HYDROmorphone (10 MICROgram(s)/mL) + BUpivacaine 0.0625% in 0.9% Sodium Chloride PCEA 250 milliLiter(s) Epidural PCA Continuous  sodium chloride 0.9%. 1000 milliLiter(s) (100 mL/Hr) IV Continuous <Continuous>    MEDICATIONS  (PRN):  HYDROmorphone (10 MICROgram(s)/mL) + BUpivacaine 0.0625% in 0.9% Sodium Chloride PCEA Rescue Clinician  Bolus 5 milliLiter(s) Epidural every 15 minutes PRN for Pain Score greater than 6  naloxone Injectable 0.1 milliGRAM(s) IV Push every 3 minutes PRN For ANY of the following changes in patient status:  A. RR LESS THAN 10 breaths per minute, B. Oxygen saturation LESS THAN 90%, C. Sedation score of 6  ondansetron Injectable 4 milliGRAM(s) IV Push every 6 hours PRN Nausea    Vital Signs Last 24 Hrs:   T(C): 37.7 (24 Oct 2017 05:45), Max: 37.7 (24 Oct 2017 05:45)  T(F): 99.9 (24 Oct 2017 05:45), Max: 99.9 (24 Oct 2017 05:45)  HR: 111 (24 Oct 2017 05:45) (97 - 112)  BP: 136/93 (24 Oct 2017 05:45) (120/78 - 138/84)  BP(mean): 98 (23 Oct 2017 18:00) (95 - 104)  RR: 18 (24 Oct 2017 05:45) (16 - 18)  SpO2: 95% (24 Oct 2017 05:45) (95% - 100%)    I&O's Detail:   23 Oct 2017 07:01  -  24 Oct 2017 07:00  --------------------------------------------------------  IN:    sodium chloride 0.9%.: 1600 mL    Solution: 300 mL  Total IN: 1900 mL    OUT:    Bulb: 105 mL    Bulb: 80 mL    Colostomy: 400 mL    Indwelling Catheter - Urethral: 1375 mL  Total OUT: 1960 mL  Total NET: -60 mL      24 Oct 2017 07:01  -  24 Oct 2017 09:18  --------------------------------------------------------  IN:  Total IN: 0 mL  OUT:    Bulb: 40 mL    Bulb: 30 mL  Total OUT: 70 mL  Total NET: -70 mL    LABS:                    9.1    9.3   )-----------( 398      ( 23 Oct 2017 14:34 )             27.0   10-23  139  |  99  |  8   ----------------------------<  138<H>  3.5   |  25  |  0.99  Ca    8.6      23 Oct 2017 14:34    Physical Exam:   Gen: NAD, AAOx3  Abd: soft, NT, ND  Ostomy: Patent with noted output   clean/dry/intact

## 2017-10-24 NOTE — PROGRESS NOTE ADULT - ASSESSMENT
Assessment:    32y Male who presents with Malignant neoplasm of rectum s/p Ex-Lap, Driscoll's procedure, liver biopsy, rectal biopsy     Plan:  -DVT PPX- SQH   -Pain control   -OOB as tolerated with assistance   -Sips, Ice Chips   -await GI Function     Nery Bravo   #[id number] 10-24-17 @ 09:18

## 2017-10-25 ENCOUNTER — TRANSCRIPTION ENCOUNTER (OUTPATIENT)
Age: 32
End: 2017-10-25

## 2017-10-25 LAB
ANION GAP SERPL CALC-SCNC: 13 MMOL/L — SIGNIFICANT CHANGE UP (ref 5–17)
APTT BLD: 32.2 SEC — SIGNIFICANT CHANGE UP (ref 27.5–37.4)
BUN SERPL-MCNC: 8 MG/DL — SIGNIFICANT CHANGE UP (ref 7–23)
CALCIUM SERPL-MCNC: 8.8 MG/DL — SIGNIFICANT CHANGE UP (ref 8.4–10.5)
CHLORIDE SERPL-SCNC: 96 MMOL/L — SIGNIFICANT CHANGE UP (ref 96–108)
CO2 SERPL-SCNC: 25 MMOL/L — SIGNIFICANT CHANGE UP (ref 22–31)
CREAT SERPL-MCNC: 0.94 MG/DL — SIGNIFICANT CHANGE UP (ref 0.5–1.3)
GAS PNL BLDA: SIGNIFICANT CHANGE UP
GLUCOSE SERPL-MCNC: 120 MG/DL — HIGH (ref 70–99)
HCT VFR BLD CALC: 23.6 % — LOW (ref 39–50)
HCT VFR BLD CALC: 25.9 % — LOW (ref 39–50)
HCT VFR BLD CALC: 26 % — LOW (ref 39–50)
HGB BLD-MCNC: 7.7 G/DL — LOW (ref 13–17)
HGB BLD-MCNC: 8.1 G/DL — LOW (ref 13–17)
HGB BLD-MCNC: 8.6 G/DL — LOW (ref 13–17)
INR BLD: 1.55 RATIO — HIGH (ref 0.88–1.16)
MAGNESIUM SERPL-MCNC: 2.3 MG/DL — SIGNIFICANT CHANGE UP (ref 1.6–2.6)
MCHC RBC-ENTMCNC: 25.9 PG — LOW (ref 27–34)
MCHC RBC-ENTMCNC: 27.3 PG — SIGNIFICANT CHANGE UP (ref 27–34)
MCHC RBC-ENTMCNC: 27.5 PG — SIGNIFICANT CHANGE UP (ref 27–34)
MCHC RBC-ENTMCNC: 31.2 GM/DL — LOW (ref 32–36)
MCHC RBC-ENTMCNC: 32.8 GM/DL — SIGNIFICANT CHANGE UP (ref 32–36)
MCHC RBC-ENTMCNC: 33.2 GM/DL — SIGNIFICANT CHANGE UP (ref 32–36)
MCV RBC AUTO: 83 FL — SIGNIFICANT CHANGE UP (ref 80–100)
MCV RBC AUTO: 83.1 FL — SIGNIFICANT CHANGE UP (ref 80–100)
MCV RBC AUTO: 83.4 FL — SIGNIFICANT CHANGE UP (ref 80–100)
PHOSPHATE SERPL-MCNC: 2.3 MG/DL — LOW (ref 2.5–4.5)
PLATELET # BLD AUTO: 414 K/UL — HIGH (ref 150–400)
PLATELET # BLD AUTO: 464 K/UL — HIGH (ref 150–400)
PLATELET # BLD AUTO: 531 K/UL — HIGH (ref 150–400)
POTASSIUM SERPL-MCNC: 4.1 MMOL/L — SIGNIFICANT CHANGE UP (ref 3.5–5.3)
POTASSIUM SERPL-SCNC: 4.1 MMOL/L — SIGNIFICANT CHANGE UP (ref 3.5–5.3)
PROTHROM AB SERPL-ACNC: 17 SEC — HIGH (ref 9.8–12.7)
RBC # BLD: 2.83 M/UL — LOW (ref 4.2–5.8)
RBC # BLD: 3.12 M/UL — LOW (ref 4.2–5.8)
RBC # BLD: 3.13 M/UL — LOW (ref 4.2–5.8)
RBC # FLD: 14.2 % — SIGNIFICANT CHANGE UP (ref 10.3–14.5)
RBC # FLD: 14.4 % — SIGNIFICANT CHANGE UP (ref 10.3–14.5)
RBC # FLD: 15.7 % — HIGH (ref 10.3–14.5)
SODIUM SERPL-SCNC: 134 MMOL/L — LOW (ref 135–145)
WBC # BLD: 11.84 K/UL — HIGH (ref 3.8–10.5)
WBC # BLD: 12.3 K/UL — HIGH (ref 3.8–10.5)
WBC # BLD: 14.3 K/UL — HIGH (ref 3.8–10.5)
WBC # FLD AUTO: 11.84 K/UL — HIGH (ref 3.8–10.5)
WBC # FLD AUTO: 12.3 K/UL — HIGH (ref 3.8–10.5)
WBC # FLD AUTO: 14.3 K/UL — HIGH (ref 3.8–10.5)

## 2017-10-25 PROCEDURE — 93010 ELECTROCARDIOGRAM REPORT: CPT

## 2017-10-25 PROCEDURE — 71010: CPT | Mod: 26

## 2017-10-25 PROCEDURE — 71275 CT ANGIOGRAPHY CHEST: CPT | Mod: 26

## 2017-10-25 RX ADMIN — HEPARIN SODIUM 5000 UNIT(S): 5000 INJECTION INTRAVENOUS; SUBCUTANEOUS at 05:30

## 2017-10-25 RX ADMIN — HEPARIN SODIUM 5000 UNIT(S): 5000 INJECTION INTRAVENOUS; SUBCUTANEOUS at 21:21

## 2017-10-25 RX ADMIN — Medication 63.75 MILLIMOLE(S): at 14:06

## 2017-10-25 RX ADMIN — HEPARIN SODIUM 5000 UNIT(S): 5000 INJECTION INTRAVENOUS; SUBCUTANEOUS at 14:06

## 2017-10-25 NOTE — PROGRESS NOTE ADULT - SUBJECTIVE AND OBJECTIVE BOX
GENERAL SURGERY DAILY PROGRESS NOTE:     Subjective: Patient seen and examined. Patient stable with no overnight events. Patient denies CP/ SOB/ Palpatations. Patient denies N/V. Reports No flatus and No BM.     MEDICATIONS  (STANDING):  dextrose 5% + sodium chloride 0.45% with potassium chloride 20 mEq/L 1000 milliLiter(s) (100 mL/Hr) IV Continuous <Continuous>  heparin  Injectable 5000 Unit(s) SubCutaneous every 8 hours  HYDROmorphone (10 MICROgram(s)/mL) + BUpivacaine 0.0625% in 0.9% Sodium Chloride PCEA 250 milliLiter(s) Epidural PCA Continuous    MEDICATIONS  (PRN):  HYDROmorphone (10 MICROgram(s)/mL) + BUpivacaine 0.0625% in 0.9% Sodium Chloride PCEA Rescue Clinician  Bolus 5 milliLiter(s) Epidural every 15 minutes PRN for Pain Score greater than 6  naloxone Injectable 0.1 milliGRAM(s) IV Push every 3 minutes PRN For ANY of the following changes in patient status:  A. RR LESS THAN 10 breaths per minute, B. Oxygen saturation LESS THAN 90%, C. Sedation score of 6  ondansetron Injectable 4 milliGRAM(s) IV Push every 6 hours PRN Nausea      Vital Signs Last 24 Hrs  T(C): 36.8 (25 Oct 2017 05:52), Max: 37.7 (24 Oct 2017 10:09)  T(F): 98.2 (25 Oct 2017 05:52), Max: 99.9 (24 Oct 2017 10:09)  HR: 114 (25 Oct 2017 06:20) (71 - 123)  BP: 138/89 (25 Oct 2017 06:20) (134/84 - 147/88)  BP(mean): --  RR: 18 (25 Oct 2017 05:52) (18 - 18)  SpO2: 93% (25 Oct 2017 05:52) (93% - 96%)    I&O's Detail    24 Oct 2017 07:01  -  25 Oct 2017 07:00  --------------------------------------------------------  IN:    dextrose 5% + sodium chloride 0.45% with potassium chloride 20 mEq/L: 1375 mL    dextrose 5% + sodium chloride 0.45% with potassium chloride 20 mEq/L: 300 mL    sodium chloride 0.9%: 781 mL    Solution: 100 mL    Solution: 250 mL  Total IN: 2806 mL    OUT:    Bulb: 115 mL    Bulb: 95 mL    Colostomy: 100 mL    Indwelling Catheter - Urethral: 2300 mL  Total OUT: 2610 mL    Total NET: 196 mL        LABS:                        8.9    12.7  )-----------( 485      ( 24 Oct 2017 11:45 )             27.3     10-24    138  |  98  |  9   ----------------------------<  112<H>  4.0   |  24  |  0.98    Ca    9.0      24 Oct 2017 11:45  Phos  2.2     10-24  Mg     1.9     10-24      Physical Exam:   Gen: NAD, AAOx3  Abd: soft, NT, Mild distention   dressing: clean, dry, intact   ostomy patent: no noted gas or output   STEPHANE drain: serosanguinous

## 2017-10-25 NOTE — PROGRESS NOTE ADULT - SUBJECTIVE AND OBJECTIVE BOX
Pain Management Attending Addendum    SUBJECTIVE: Patient doing well with PCEA    Therapy:    [X] PCEA    OBJECTIVE:   [X] Pain appropriately controlled    [ ] Other:    Side Effects:  [ ] None	             [ ] Nausea              [X] Pruritis        [ ] Weakness          [ ] Numbness        	[ ] Other:    ASSESSMENT/PLAN:    [X] Continue current therapy    [ ] Therapy changed to:    [ ] PRN Analgesics   [ ] IV PCA    Comments: Pruritis tolerable as per patient

## 2017-10-25 NOTE — PROVIDER CONTACT NOTE (OTHER) - ACTION/TREATMENT ORDERED:
MD aware, no further intervention at this time, will continue to monitor and notify md of any change

## 2017-10-25 NOTE — PROVIDER CONTACT NOTE (OTHER) - ASSESSMENT
pt resting comfortably w/ no complaints.  electronic, palpated @ 111bpm, regular rhythm.   pt states "my heart rate is usually high" pt w/ no c/o chest pain, or palpitations, EKG earlier in day showed sinus tach

## 2017-10-25 NOTE — PROGRESS NOTE ADULT - ASSESSMENT
Assessment:    32y Male who presents with Malignant neoplasm of rectum    Plan:  -DVT PPX- SQH   -Pain control   -OOB as tolerated with assistance   -Advance diet as tolerated, possibly CLD later today  -r/o PE due to tachycardia- CXR, ABG, EKG  -F/u AM labs   -possible d/c of PCEA bryan Bravo   #[id number] 10-25-17 @ 07:31

## 2017-10-25 NOTE — PROGRESS NOTE ADULT - SUBJECTIVE AND OBJECTIVE BOX
Day 2 of Anesthesia Pain Management Service    SUBJECTIVE: Patient doing well with PCEA    Pain Scale Score:   Refer to charted pain scores    THERAPY:  [X] Epidural Bupivacaine 0.0625% and Hydromorphone         [X] 10 micrograms/mL 	[ ] 5 micrograms/mL  [ ] Epidural Ropivacaine 0.1% plain – 1 mg/mL    Demand dose: 3 mL  Lockout: 15 minutes  Continuous Rate: 10 mL/hr    MEDICATIONS  (STANDING):  dextrose 5% + sodium chloride 0.45% with potassium chloride 20 mEq/L 1000 milliLiter(s) (100 mL/Hr) IV Continuous <Continuous>  heparin  Injectable 5000 Unit(s) SubCutaneous every 8 hours  HYDROmorphone (10 MICROgram(s)/mL) + BUpivacaine 0.0625% in 0.9% Sodium Chloride PCEA 250 milliLiter(s) Epidural PCA Continuous    MEDICATIONS  (PRN):  HYDROmorphone (10 MICROgram(s)/mL) + BUpivacaine 0.0625% in 0.9% Sodium Chloride PCEA Rescue Clinician  Bolus 5 milliLiter(s) Epidural every 15 minutes PRN for Pain Score greater than 6  naloxone Injectable 0.1 milliGRAM(s) IV Push every 3 minutes PRN For ANY of the following changes in patient status:  A. RR LESS THAN 10 breaths per minute, B. Oxygen saturation LESS THAN 90%, C. Sedation score of 6  ondansetron Injectable 4 milliGRAM(s) IV Push every 6 hours PRN Nausea      OBJECTIVE:    Assessment of Catheter Site:    [X] Epidural 	  [X] Dressing intact	[X] Site non-tender	[X] Site without erythema, discharge, edema  [X] Epidural tubing and connection checked	[X] Gross neurological exam within normal limits  [ ] Catheter removed – tip intact		[X] Afebrile	            [ ] Febrile: ___    PT/INR - ( 25 Oct 2017 08:16 )   PT: 17.0 sec;   INR: 1.55 ratio         PTT - ( 25 Oct 2017 08:16 )  PTT:32.2 sec                      8.1    11.84 )-----------( 464      ( 25 Oct 2017 08:35 )             26.0     Vital Signs Last 24 Hrs  T(C): 37.9 (10-25-17 @ 10:24), Max: 37.9 (10-25-17 @ 10:24)  T(F): 100.2 (10-25-17 @ 10:24), Max: 100.2 (10-25-17 @ 10:24)  HR: 112 (10-25-17 @ 10:24) (107 - 123)  BP: 142/95 (10-25-17 @ 10:24) (134/84 - 147/88)  BP(mean): --  RR: 18 (10-25-17 @ 10:24) (18 - 18)  SpO2: 92% (10-25-17 @ 10:24) (92% - 96%)      Sedation Score:	[X] Alert	[ ] Drowsy	[ ] Arousable  [ ] Asleep     [ ] Unresponsive    Side Effects:	[X] None	[ ] Nausea	[ ] Vomiting   [X ] Pruritus: Benadryl PRN   		[ ] Weakness     [ ] Numbness	[ ] Other:    ASSESSMENT/ PLAN:    Therapy:	[X] Continue   [ ] Discontinue   [ ] Change to PRN Analgesics   [ ] Change to PCA    Documentation and Verification of current medications:  [X] Done	[ ] Not done, not eligible, reason:    COMMENTS:

## 2017-10-26 ENCOUNTER — APPOINTMENT (OUTPATIENT)
Dept: INTERNAL MEDICINE | Facility: CLINIC | Age: 32
End: 2017-10-26

## 2017-10-26 LAB
ANION GAP SERPL CALC-SCNC: 14 MMOL/L — SIGNIFICANT CHANGE UP (ref 5–17)
APPEARANCE UR: CLEAR — SIGNIFICANT CHANGE UP
APTT BLD: 27.9 SEC — SIGNIFICANT CHANGE UP (ref 27.5–37.4)
BACTERIA # UR AUTO: ABNORMAL /HPF
BILIRUB UR-MCNC: NEGATIVE — SIGNIFICANT CHANGE UP
BLD GP AB SCN SERPL QL: NEGATIVE — SIGNIFICANT CHANGE UP
BUN SERPL-MCNC: 7 MG/DL — SIGNIFICANT CHANGE UP (ref 7–23)
CALCIUM SERPL-MCNC: 8.3 MG/DL — LOW (ref 8.4–10.5)
CHLORIDE SERPL-SCNC: 95 MMOL/L — LOW (ref 96–108)
CO2 SERPL-SCNC: 25 MMOL/L — SIGNIFICANT CHANGE UP (ref 22–31)
COLOR SPEC: SIGNIFICANT CHANGE UP
COMMENT - URINE: SIGNIFICANT CHANGE UP
CREAT SERPL-MCNC: 0.75 MG/DL — SIGNIFICANT CHANGE UP (ref 0.5–1.3)
DIFF PNL FLD: NEGATIVE — SIGNIFICANT CHANGE UP
GLUCOSE SERPL-MCNC: 136 MG/DL — HIGH (ref 70–99)
GLUCOSE UR QL: NEGATIVE — SIGNIFICANT CHANGE UP
HCT VFR BLD CALC: 23.9 % — LOW (ref 39–50)
HCT VFR BLD CALC: 25.5 % — LOW (ref 39–50)
HGB BLD-MCNC: 7.9 G/DL — LOW (ref 13–17)
HGB BLD-MCNC: 8 G/DL — LOW (ref 13–17)
INR BLD: 1.41 RATIO — HIGH (ref 0.88–1.16)
KETONES UR-MCNC: NEGATIVE — SIGNIFICANT CHANGE UP
LEUKOCYTE ESTERASE UR-ACNC: NEGATIVE — SIGNIFICANT CHANGE UP
MAGNESIUM SERPL-MCNC: 2.3 MG/DL — SIGNIFICANT CHANGE UP (ref 1.6–2.6)
MCHC RBC-ENTMCNC: 25.9 PG — LOW (ref 27–34)
MCHC RBC-ENTMCNC: 27.1 PG — SIGNIFICANT CHANGE UP (ref 27–34)
MCHC RBC-ENTMCNC: 31.4 GM/DL — LOW (ref 32–36)
MCHC RBC-ENTMCNC: 32.8 GM/DL — SIGNIFICANT CHANGE UP (ref 32–36)
MCV RBC AUTO: 82.5 FL — SIGNIFICANT CHANGE UP (ref 80–100)
MCV RBC AUTO: 82.5 FL — SIGNIFICANT CHANGE UP (ref 80–100)
NITRITE UR-MCNC: NEGATIVE — SIGNIFICANT CHANGE UP
PH UR: 7.5 — SIGNIFICANT CHANGE UP (ref 5–8)
PHOSPHATE SERPL-MCNC: 2.9 MG/DL — SIGNIFICANT CHANGE UP (ref 2.5–4.5)
PLATELET # BLD AUTO: 462 K/UL — HIGH (ref 150–400)
PLATELET # BLD AUTO: 488 K/UL — HIGH (ref 150–400)
POTASSIUM SERPL-MCNC: 4.7 MMOL/L — SIGNIFICANT CHANGE UP (ref 3.5–5.3)
POTASSIUM SERPL-SCNC: 4.7 MMOL/L — SIGNIFICANT CHANGE UP (ref 3.5–5.3)
PROT UR-MCNC: 30 MG/DL
PROTHROM AB SERPL-ACNC: 16 SEC — HIGH (ref 10–13.1)
RBC # BLD: 2.9 M/UL — LOW (ref 4.2–5.8)
RBC # BLD: 3.09 M/UL — LOW (ref 4.2–5.8)
RBC # FLD: 14.4 % — SIGNIFICANT CHANGE UP (ref 10.3–14.5)
RBC # FLD: 15.7 % — HIGH (ref 10.3–14.5)
RBC CASTS # UR COMP ASSIST: SIGNIFICANT CHANGE UP /HPF (ref 0–2)
RH IG SCN BLD-IMP: POSITIVE — SIGNIFICANT CHANGE UP
SODIUM SERPL-SCNC: 134 MMOL/L — LOW (ref 135–145)
SP GR SPEC: 1.01 — LOW (ref 1.01–1.02)
UROBILINOGEN FLD QL: NEGATIVE — SIGNIFICANT CHANGE UP
WBC # BLD: 12.1 K/UL — HIGH (ref 3.8–10.5)
WBC # BLD: 12.2 K/UL — HIGH (ref 3.8–10.5)
WBC # FLD AUTO: 12.1 K/UL — HIGH (ref 3.8–10.5)
WBC # FLD AUTO: 12.2 K/UL — HIGH (ref 3.8–10.5)
WBC UR QL: SIGNIFICANT CHANGE UP /HPF (ref 0–5)

## 2017-10-26 PROCEDURE — 71010: CPT | Mod: 26

## 2017-10-26 RX ORDER — ACETAMINOPHEN 500 MG
325 TABLET ORAL ONCE
Qty: 0 | Refills: 0 | Status: COMPLETED | OUTPATIENT
Start: 2017-10-26 | End: 2017-10-26

## 2017-10-26 RX ORDER — SODIUM CHLORIDE 9 MG/ML
1000 INJECTION, SOLUTION INTRAVENOUS
Qty: 0 | Refills: 0 | Status: DISCONTINUED | OUTPATIENT
Start: 2017-10-26 | End: 2017-10-28

## 2017-10-26 RX ADMIN — ONDANSETRON 4 MILLIGRAM(S): 8 TABLET, FILM COATED ORAL at 02:41

## 2017-10-26 RX ADMIN — ONDANSETRON 4 MILLIGRAM(S): 8 TABLET, FILM COATED ORAL at 09:21

## 2017-10-26 RX ADMIN — HEPARIN SODIUM 5000 UNIT(S): 5000 INJECTION INTRAVENOUS; SUBCUTANEOUS at 05:09

## 2017-10-26 RX ADMIN — HEPARIN SODIUM 5000 UNIT(S): 5000 INJECTION INTRAVENOUS; SUBCUTANEOUS at 13:36

## 2017-10-26 RX ADMIN — HEPARIN SODIUM 5000 UNIT(S): 5000 INJECTION INTRAVENOUS; SUBCUTANEOUS at 21:15

## 2017-10-26 RX ADMIN — Medication 325 MILLIGRAM(S): at 22:06

## 2017-10-26 NOTE — PROGRESS NOTE ADULT - SUBJECTIVE AND OBJECTIVE BOX
Pain Management Attending Addendum    SUBJECTIVE: Patient comfortable    Therapy:	  [ ] IV PCA	   [x ] Epidural           [ ] s/p Spinal Opoid              [ ] Postpartum infusion	  [ ] Patient controlled regional anesthesia (PCRA)    [ ] prn Analgesics    OBJECTIVE:   [x ] No new signs     [ ] Other:    Side Effects:  [x ] None			[ ] Other:    Assessment of Catheter Site:		[ ] Intact		[ ] Other:    ASSESSMENT/PLAN  [x ] Continue current therapy    [ ] Therapy changed to:    [ ] IV PCA       [ ] Epidural     [ ] prn Analgesics     [ ] post partum infusion    Comments:

## 2017-10-26 NOTE — PROGRESS NOTE ADULT - SUBJECTIVE AND OBJECTIVE BOX
Day 3 of Anesthesia Pain Management Service    SUBJECTIVE: Patient doing well with PCEA    Pain Scale Score:   Refer to charted pain scores    THERAPY:  [X] Epidural Bupivacaine 0.0625% and Hydromorphone         [X] 10 micrograms/mL 	[ ] 5 micrograms/mL  [ ] Epidural Ropivacaine 0.1% plain – 1 mg/mL    Demand dose: 3 mL  Lockout: 15 minutes  Continuous Rate: 10 mL/hr    MEDICATIONS  (STANDING):  dextrose 5% + sodium chloride 0.45% with potassium chloride 20 mEq/L 1000 milliLiter(s) (50 mL/Hr) IV Continuous <Continuous>  heparin  Injectable 5000 Unit(s) SubCutaneous every 8 hours  HYDROmorphone (10 MICROgram(s)/mL) + BUpivacaine 0.0625% in 0.9% Sodium Chloride PCEA 250 milliLiter(s) Epidural PCA Continuous    MEDICATIONS  (PRN):  HYDROmorphone (10 MICROgram(s)/mL) + BUpivacaine 0.0625% in 0.9% Sodium Chloride PCEA Rescue Clinician  Bolus 5 milliLiter(s) Epidural every 15 minutes PRN for Pain Score greater than 6  naloxone Injectable 0.1 milliGRAM(s) IV Push every 3 minutes PRN For ANY of the following changes in patient status:  A. RR LESS THAN 10 breaths per minute, B. Oxygen saturation LESS THAN 90%, C. Sedation score of 6  ondansetron Injectable 4 milliGRAM(s) IV Push every 6 hours PRN Nausea      OBJECTIVE:    Assessment of Catheter Site:    [X] Epidural 	  [X] Dressing intact	[X] Site non-tender	[X] Site without erythema, discharge, edema  [X] Epidural tubing and connection checked	[X] Gross neurological exam within normal limits  [ ] Catheter removed – tip intact		[X] Afebrile	            [ ] Febrile: ___    PT/INR - ( 26 Oct 2017 08:40 )   PT: 16.0 sec;   INR: 1.41 ratio         PTT - ( 26 Oct 2017 08:40 )  PTT:27.9 sec                      8.0    12.1  )-----------( 488      ( 26 Oct 2017 08:53 )             25.5     Vital Signs Last 24 Hrs  T(C): 36.6 (10-26-17 @ 05:33), Max: 37.6 (10-25-17 @ 14:17)  T(F): 97.8 (10-26-17 @ 05:33), Max: 99.7 (10-25-17 @ 14:17)  HR: 94 (10-26-17 @ 05:33) (94 - 117)  BP: 140/95 (10-26-17 @ 05:33) (133/89 - 141/93)  BP(mean): --  RR: 18 (10-26-17 @ 05:33) (18 - 18)  SpO2: 96% (10-26-17 @ 05:33) (94% - 97%)      Sedation Score:	[X] Alert 	[ ] Drowsy	[ ] Arousable  [ ] Asleep     [ ] Unresponsive    Side Effects:	[X] None	[ ] Nausea	[ ] Vomiting   [ ] Pruritus  		[ ] Weakness     [ ] Numbness	[ ] Other:    ASSESSMENT/ PLAN:    Therapy:	[X] Continue   [ ] Discontinue   [ ] Change to PRN Analgesics   [ ] Change to PCA    Documentation and Verification of current medications:  [X] Done	[ ] Not done, not eligible, reason:    COMMENTS: Monitor INR 1.4

## 2017-10-26 NOTE — PROGRESS NOTE ADULT - ASSESSMENT
31yo M s/p ex-lap, Ryan's procedure, rectal biopsy, and liver biopsy    -Pain control with PCEA  -Trial of clears  -Decrease mIVF to 50cc/hr  -DVT ppx (SQH)  -Drain education

## 2017-10-26 NOTE — PROGRESS NOTE ADULT - SUBJECTIVE AND OBJECTIVE BOX
STATUS POST:  Ryan procedure  Liver biopsy, rectal biopsy      POST OPERATIVE DAY #: 3    Vital Signs Last 24 Hrs  T(C): 36.6 (26 Oct 2017 05:33), Max: 37.9 (25 Oct 2017 10:24)  T(F): 97.8 (26 Oct 2017 05:33), Max: 100.2 (25 Oct 2017 10:24)  HR: 94 (26 Oct 2017 05:33) (94 - 117)  BP: 140/95 (26 Oct 2017 05:33) (133/89 - 142/95)  BP(mean): --  RR: 18 (26 Oct 2017 05:33) (18 - 18)  SpO2: 96% (26 Oct 2017 05:33) (92% - 97%)    I&O's Summary    25 Oct 2017 07:01  -  26 Oct 2017 07:00  --------------------------------------------------------  IN: 2650 mL / OUT: 2540 mL / NET: 110 mL        SUBJECTIVE: Pt seen and examined. Patient admits to slight nausea that was relieved by medication, as well as some belching when sitting up. Ambulating well and tolerating sips currently. No flatus or stool in ostomy appliance. Denies CP, SOB, vomiting and pain.       Physical Exam:  General Appearance: Appears well, NAD  Abdomen: Soft, ND, appropriate incisional tenderness, dressings clean and dry and intact, ostomy pink and viable  Extremities: Grossly symmetric, SCD's in place     LABS:                        8.0    12.1  )-----------( 488      ( 26 Oct 2017 08:53 )             25.5     10-26    134<L>  |  95<L>  |  7   ----------------------------<  136<H>  4.7   |  25  |  0.75    Ca    8.3<L>      26 Oct 2017 08:55  Phos  2.9     10-26  Mg     2.3     10-26      PT/INR - ( 26 Oct 2017 08:40 )   PT: 16.0 sec;   INR: 1.41 ratio         PTT - ( 26 Oct 2017 08:40 )  PTT:27.9 sec      RADIOLOGY & ADDITIONAL STUDIES: < from: CT Angio Chest w/ IV Cont (10.25.17 @ 12:55) >  No pulmonary emboli.  2.  The pulmonary metastases have increased in size.  3.  Hepatic metastases have increased in size.    < end of copied text >        Pamela Cortez PA-C  p#[id number] STATUS POST:  Ryan procedure  Liver biopsy, rectal biopsy      POST OPERATIVE DAY #: 3    Vital Signs Last 24 Hrs  T(C): 36.6 (26 Oct 2017 05:33), Max: 37.9 (25 Oct 2017 10:24)  T(F): 97.8 (26 Oct 2017 05:33), Max: 100.2 (25 Oct 2017 10:24)  HR: 94 (26 Oct 2017 05:33) (94 - 117)  BP: 140/95 (26 Oct 2017 05:33) (133/89 - 142/95)  BP(mean): --  RR: 18 (26 Oct 2017 05:33) (18 - 18)  SpO2: 96% (26 Oct 2017 05:33) (92% - 97%)    I&O's Summary    25 Oct 2017 07:01  -  26 Oct 2017 07:00  --------------------------------------------------------  IN: 2650 mL / OUT: 2540 mL / NET: 110 mL        SUBJECTIVE: Pt seen and examined. Patient admits to slight nausea that was relieved by medication, as well as some belching when sitting up. Ambulating well and tolerating sips currently. No flatus or stool in ostomy appliance. Denies CP, SOB, vomiting and pain.       Physical Exam:  General Appearance: Appears well, NAD  Abdomen: Soft, ND, appropriate incisional tenderness, dressings clean and dry and intact, ostomy pink and viable, Drains with serosanguineous output  Extremities: Grossly symmetric, SCD's in place     LABS:                        8.0    12.1  )-----------( 488      ( 26 Oct 2017 08:53 )             25.5     10-26    134<L>  |  95<L>  |  7   ----------------------------<  136<H>  4.7   |  25  |  0.75    Ca    8.3<L>      26 Oct 2017 08:55  Phos  2.9     10-26  Mg     2.3     10-26      PT/INR - ( 26 Oct 2017 08:40 )   PT: 16.0 sec;   INR: 1.41 ratio         PTT - ( 26 Oct 2017 08:40 )  PTT:27.9 sec      RADIOLOGY & ADDITIONAL STUDIES: < from: CT Angio Chest w/ IV Cont (10.25.17 @ 12:55) >  No pulmonary emboli.  2.  The pulmonary metastases have increased in size.  3.  Hepatic metastases have increased in size.    < end of copied text >        Pamela Cortez PA-C  p#[phone]

## 2017-10-27 LAB
ANION GAP SERPL CALC-SCNC: 14 MMOL/L — SIGNIFICANT CHANGE UP (ref 5–17)
APTT BLD: 30 SEC — SIGNIFICANT CHANGE UP (ref 27.5–37.4)
BUN SERPL-MCNC: 9 MG/DL — SIGNIFICANT CHANGE UP (ref 7–23)
CALCIUM SERPL-MCNC: 8.8 MG/DL — SIGNIFICANT CHANGE UP (ref 8.4–10.5)
CHLORIDE SERPL-SCNC: 94 MMOL/L — LOW (ref 96–108)
CO2 SERPL-SCNC: 25 MMOL/L — SIGNIFICANT CHANGE UP (ref 22–31)
CREAT SERPL-MCNC: 0.93 MG/DL — SIGNIFICANT CHANGE UP (ref 0.5–1.3)
GLUCOSE SERPL-MCNC: 104 MG/DL — HIGH (ref 70–99)
HCT VFR BLD CALC: 23.4 % — LOW (ref 39–50)
HGB BLD-MCNC: 7.8 G/DL — LOW (ref 13–17)
INR BLD: 1.78 RATIO — HIGH (ref 0.88–1.16)
MAGNESIUM SERPL-MCNC: 1.9 MG/DL — SIGNIFICANT CHANGE UP (ref 1.6–2.6)
MCHC RBC-ENTMCNC: 27.4 PG — SIGNIFICANT CHANGE UP (ref 27–34)
MCHC RBC-ENTMCNC: 33.2 GM/DL — SIGNIFICANT CHANGE UP (ref 32–36)
MCV RBC AUTO: 82.4 FL — SIGNIFICANT CHANGE UP (ref 80–100)
PHOSPHATE SERPL-MCNC: 2.8 MG/DL — SIGNIFICANT CHANGE UP (ref 2.5–4.5)
PLATELET # BLD AUTO: 486 K/UL — HIGH (ref 150–400)
POTASSIUM SERPL-MCNC: 4.2 MMOL/L — SIGNIFICANT CHANGE UP (ref 3.5–5.3)
POTASSIUM SERPL-SCNC: 4.2 MMOL/L — SIGNIFICANT CHANGE UP (ref 3.5–5.3)
PROTHROM AB SERPL-ACNC: 19.5 SEC — HIGH (ref 9.8–12.7)
RBC # BLD: 2.83 M/UL — LOW (ref 4.2–5.8)
RBC # FLD: 14.6 % — HIGH (ref 10.3–14.5)
SODIUM SERPL-SCNC: 133 MMOL/L — LOW (ref 135–145)
WBC # BLD: 14 K/UL — HIGH (ref 3.8–10.5)
WBC # FLD AUTO: 14 K/UL — HIGH (ref 3.8–10.5)

## 2017-10-27 PROCEDURE — 74177 CT ABD & PELVIS W/CONTRAST: CPT | Mod: 26

## 2017-10-27 PROCEDURE — 93970 EXTREMITY STUDY: CPT | Mod: 26

## 2017-10-27 RX ORDER — PHYTONADIONE (VIT K1) 5 MG
5 TABLET ORAL ONCE
Qty: 0 | Refills: 0 | Status: COMPLETED | OUTPATIENT
Start: 2017-10-27 | End: 2017-10-27

## 2017-10-27 RX ORDER — ACETAMINOPHEN 500 MG
1000 TABLET ORAL ONCE
Qty: 0 | Refills: 0 | Status: COMPLETED | OUTPATIENT
Start: 2017-10-27 | End: 2017-10-27

## 2017-10-27 RX ORDER — MAGNESIUM SULFATE 500 MG/ML
1 VIAL (ML) INJECTION ONCE
Qty: 0 | Refills: 0 | Status: COMPLETED | OUTPATIENT
Start: 2017-10-27 | End: 2017-10-27

## 2017-10-27 RX ADMIN — HEPARIN SODIUM 5000 UNIT(S): 5000 INJECTION INTRAVENOUS; SUBCUTANEOUS at 05:19

## 2017-10-27 RX ADMIN — Medication 400 MILLIGRAM(S): at 17:45

## 2017-10-27 RX ADMIN — HEPARIN SODIUM 5000 UNIT(S): 5000 INJECTION INTRAVENOUS; SUBCUTANEOUS at 23:02

## 2017-10-27 RX ADMIN — HEPARIN SODIUM 5000 UNIT(S): 5000 INJECTION INTRAVENOUS; SUBCUTANEOUS at 16:37

## 2017-10-27 RX ADMIN — Medication 101 MILLIGRAM(S): at 12:12

## 2017-10-27 RX ADMIN — Medication 63.75 MILLIMOLE(S): at 16:37

## 2017-10-27 RX ADMIN — Medication 1000 MILLIGRAM(S): at 17:50

## 2017-10-27 RX ADMIN — Medication 100 GRAM(S): at 20:50

## 2017-10-27 NOTE — PROGRESS NOTE ADULT - SUBJECTIVE AND OBJECTIVE BOX
SURGICAL ONCOLOGY PROGRESS NOTE    No complaints.  Felt slightly "warm" last night.  Denies any complaints    Vital Signs Last 24 Hrs  T(C): 37.7 (27 Oct 2017 06:23), Max: 39.2 (26 Oct 2017 21:47)  T(F): 99.9 (27 Oct 2017 06:23), Max: 102.5 (26 Oct 2017 21:47)  HR: 113 (27 Oct 2017 06:23) (100 - 115)  BP: 138/95 (27 Oct 2017 06:23) (132/94 - 145/93)  BP(mean): --  RR: 18 (27 Oct 2017 06:23) (18 - 18)  SpO2: 95% (27 Oct 2017 06:23) (94% - 95%)  I&O's Detail    26 Oct 2017 07:01  -  27 Oct 2017 07:00  --------------------------------------------------------  IN:    dextrose 5% + sodium chloride 0.45% with potassium chloride 20 mEq/L: 350 mL    dextrose 5% + sodium chloride 0.9%.: 1025 mL    Oral Fluid: 720 mL  Total IN: 2095 mL    OUT:    Bulb: 22 mL    Bulb: 3 mL    Indwelling Catheter - Urethral: 1900 mL  Total OUT: 1925 mL    Total NET: 170 mL          PE:    A&A  NAD    soft, NT, ND, No peritoneal signs    inc  cdi                          7.9    12.2  )-----------( 462      ( 26 Oct 2017 22:43 )             23.9     10-26    134<L>  |  95<L>  |  7   ----------------------------<  136<H>  4.7   |  25  |  0.75    Ca    8.3<L>      26 Oct 2017 08:55  Phos  2.9     10-26  Mg     2.3     10-26

## 2017-10-27 NOTE — PROGRESS NOTE ADULT - SUBJECTIVE AND OBJECTIVE BOX
Surgery Progress Note    S: Patient seen and examined. No acute events overnight. Pain well controlled with current regimen. Denies nausea/vomiting.     O:  Vital Signs Last 24 Hrs  T(C): 37.9 (27 Oct 2017 14:10), Max: 39.2 (26 Oct 2017 21:47)  T(F): 100.3 (27 Oct 2017 14:10), Max: 102.5 (26 Oct 2017 21:47)  HR: 108 (27 Oct 2017 14:10) (106 - 115)  BP: 129/86 (27 Oct 2017 14:10) (126/84 - 144/92)  BP(mean): --  RR: 18 (27 Oct 2017 14:10) (18 - 18)  SpO2: 95% (27 Oct 2017 14:10) (95% - 97%)    I&O's Detail    26 Oct 2017 07:01  -  27 Oct 2017 07:00  --------------------------------------------------------  IN:    dextrose 5% + sodium chloride 0.45% with potassium chloride 20 mEq/L: 350 mL    dextrose 5% + sodium chloride 0.9%.: 1025 mL    Oral Fluid: 720 mL  Total IN: 2095 mL    OUT:    Bulb: 22 mL    Bulb: 3 mL    Indwelling Catheter - Urethral: 1900 mL  Total OUT: 1925 mL    Total NET: 170 mL      27 Oct 2017 07:01  -  27 Oct 2017 17:05  --------------------------------------------------------  IN:    dextrose 5% + sodium chloride 0.9%.: 400 mL    Oral Fluid: 250 mL  Total IN: 650 mL    OUT:    Bulb: 10 mL    Colostomy: 20 mL    Indwelling Catheter - Urethral: 600 mL  Total OUT: 630 mL    Total NET: 20 mL          MEDICATIONS  (STANDING):  dextrose 5% + sodium chloride 0.9%. 1000 milliLiter(s) (50 mL/Hr) IV Continuous <Continuous>  heparin  Injectable 5000 Unit(s) SubCutaneous every 8 hours  HYDROmorphone (10 MICROgram(s)/mL) + BUpivacaine 0.0625% in 0.9% Sodium Chloride PCEA 250 milliLiter(s) Epidural PCA Continuous  magnesium sulfate  IVPB 1 Gram(s) IV Intermittent once    MEDICATIONS  (PRN):  HYDROmorphone (10 MICROgram(s)/mL) + BUpivacaine 0.0625% in 0.9% Sodium Chloride PCEA Rescue Clinician  Bolus 5 milliLiter(s) Epidural every 15 minutes PRN for Pain Score greater than 6  naloxone Injectable 0.1 milliGRAM(s) IV Push every 3 minutes PRN For ANY of the following changes in patient status:  A. RR LESS THAN 10 breaths per minute, B. Oxygen saturation LESS THAN 90%, C. Sedation score of 6  ondansetron Injectable 4 milliGRAM(s) IV Push every 6 hours PRN Nausea                            7.8    14.0  )-----------( 486      ( 27 Oct 2017 10:30 )             23.4       10-27    133<L>  |  94<L>  |  9   ----------------------------<  104<H>  4.2   |  25  |  0.93    Ca    8.8      27 Oct 2017 10:30  Phos  2.8     10-27  Mg     1.9     10-27        Physical Exam:  Gen: Laying in bed, NAD, alert and oriented.   Resp: Unlabored breathing  Abd: soft, NTND. Ostomy pink/viable, with stool in bag. STEPHANE#1 serosang, STEPHANE#2 with brown, fowl smelling contents.

## 2017-10-27 NOTE — PROGRESS NOTE ADULT - SUBJECTIVE AND OBJECTIVE BOX
Pain Management Attending Addendum    SUBJECTIVE: Patient doing well with PCEA    Therapy:    [X] PCEA    OBJECTIVE:   [X] Pain appropriately controlled    [ ] Other:    Side Effects:  [X] None	             [ ] Nausea              [ ] Pruritis        [ ] Weakness          [ ] Numbness        	[ ] Other:    ASSESSMENT/PLAN:    [ ] Continue current therapy    [X] Therapy changed to:    [X] PRN Analgesics   [ ] IV PCA    Comments: Can d/c epidural today or tomorrow pending coags

## 2017-10-27 NOTE — PROVIDER CONTACT NOTE (OTHER) - ACTION/TREATMENT ORDERED:
MD aware, Tylenol, CBC, blood CX, UA, CXR, ordered and  completed. B/L LE dopplers pending. will continue to monitor

## 2017-10-27 NOTE — PROVIDER CONTACT NOTE (OTHER) - RECOMMENDATIONS
cold packs given, IS encouraged, will notify MD cold packs given, IS encouraged, Lory from pain service notified, will notify MD

## 2017-10-27 NOTE — PROGRESS NOTE ADULT - ASSESSMENT
31yo M s/p ex-lap, Ryan's procedure, rectal biopsy, and liver biopsy    -Pain control with PCEA  -Advance to LRD  -DVT ppx (SQH)  -Drain education  -CT a/p with contrast to evaluate for source of STEPHANE#2 contents

## 2017-10-27 NOTE — PROGRESS NOTE ADULT - SUBJECTIVE AND OBJECTIVE BOX
Day 4 of Anesthesia Pain Management Service    SUBJECTIVE: I'm okay  Pain Scale Score:    [X] Refer to charted pain scores    THERAPY: Epidural Bupivacaine 0.01 % and Fentanyl 3 micrograms/mL     Demand Dose: 3 mL  Lockout: 15 minutes   Continuous Rate:  10 mL    MEDICATIONS  (STANDING):  dextrose 5% + sodium chloride 0.9%. 1000 milliLiter(s) (50 mL/Hr) IV Continuous <Continuous>  heparin  Injectable 5000 Unit(s) SubCutaneous every 8 hours  HYDROmorphone (10 MICROgram(s)/mL) + BUpivacaine 0.0625% in 0.9% Sodium Chloride PCEA 250 milliLiter(s) Epidural PCA Continuous    MEDICATIONS  (PRN):  HYDROmorphone (10 MICROgram(s)/mL) + BUpivacaine 0.0625% in 0.9% Sodium Chloride PCEA Rescue Clinician  Bolus 5 milliLiter(s) Epidural every 15 minutes PRN for Pain Score greater than 6  naloxone Injectable 0.1 milliGRAM(s) IV Push every 3 minutes PRN For ANY of the following changes in patient status:  A. RR LESS THAN 10 breaths per minute, B. Oxygen saturation LESS THAN 90%, C. Sedation score of 6  ondansetron Injectable 4 milliGRAM(s) IV Push every 6 hours PRN Nausea      OBJECTIVE:    Assessment of Epidural Catheter Site: 	    [X] Dressing intact	[X] Site non-tender	[X] Site without erythema, discharge, edema  [X] Epidural tubing and connection checked	[X] Gross neurological exam within normal limits  [ ] Catheter removed – tip intact		    PT/INR - ( 27 Oct 2017 10:29 )   PT: 19.5 sec;   INR: 1.78 ratio         PTT - ( 27 Oct 2017 10:29 )  PTT:30.0 sec                      7.8    14.0  )-----------( 486      ( 27 Oct 2017 10:30 )             23.4     Vital Signs Last 24 Hrs  T(C): 37.7 (10-27-17 @ 10:00), Max: 39.2 (10-26-17 @ 21:47)  T(F): 99.8 (10-27-17 @ 10:00), Max: 102.5 (10-26-17 @ 21:47)  HR: 109 (10-27-17 @ 10:00) (100 - 115)  BP: 126/84 (10-27-17 @ 10:00) (126/84 - 145/93)  BP(mean): --  RR: 18 (10-27-17 @ 10:00) (18 - 18)  SpO2: 97% (10-27-17 @ 10:00) (94% - 97%)      Sedation Score:	[X] Alert 	[ ] Drowsy	[ ] Arousable  [ ] Asleep  [ ] Unresponsive    Side Effects:	[X] None	[ ] Nausea	[ ] Vomiting  [ ] Pruritus  		[ ] Weakness  [ ] Numbness  [ ] Other:    ASSESSMENT/ PLAN:    Therapy:                         [X] Continue   [ ] Discontinue   [ ] Change to PRN Analgesics    Documentation and Verification of current medications:  [X] Done	[ ] Not done, not eligible, reason:    Comments: INR 1.78 Continue epidural until INR normalizes. Site unremarkable.

## 2017-10-28 LAB
ANION GAP SERPL CALC-SCNC: 17 MMOL/L — SIGNIFICANT CHANGE UP (ref 5–17)
APPEARANCE UR: CLEAR — SIGNIFICANT CHANGE UP
APTT BLD: 28.9 SEC — SIGNIFICANT CHANGE UP (ref 27.5–37.4)
BILIRUB UR-MCNC: NEGATIVE — SIGNIFICANT CHANGE UP
BUN SERPL-MCNC: 9 MG/DL — SIGNIFICANT CHANGE UP (ref 7–23)
CALCIUM SERPL-MCNC: 9 MG/DL — SIGNIFICANT CHANGE UP (ref 8.4–10.5)
CHLORIDE SERPL-SCNC: 97 MMOL/L — SIGNIFICANT CHANGE UP (ref 96–108)
CO2 SERPL-SCNC: 22 MMOL/L — SIGNIFICANT CHANGE UP (ref 22–31)
COLOR SPEC: SIGNIFICANT CHANGE UP
CREAT SERPL-MCNC: 0.84 MG/DL — SIGNIFICANT CHANGE UP (ref 0.5–1.3)
DIFF PNL FLD: NEGATIVE — SIGNIFICANT CHANGE UP
GLUCOSE SERPL-MCNC: 88 MG/DL — SIGNIFICANT CHANGE UP (ref 70–99)
GLUCOSE UR QL: NEGATIVE — SIGNIFICANT CHANGE UP
HCT VFR BLD CALC: 23.1 % — LOW (ref 39–50)
HCT VFR BLD CALC: 24.2 % — LOW (ref 39–50)
HGB BLD-MCNC: 7.6 G/DL — LOW (ref 13–17)
HGB BLD-MCNC: 8 G/DL — LOW (ref 13–17)
INR BLD: 1.33 RATIO — HIGH (ref 0.88–1.16)
KETONES UR-MCNC: ABNORMAL
LEUKOCYTE ESTERASE UR-ACNC: NEGATIVE — SIGNIFICANT CHANGE UP
MAGNESIUM SERPL-MCNC: 2.1 MG/DL — SIGNIFICANT CHANGE UP (ref 1.6–2.6)
MCHC RBC-ENTMCNC: 25.4 PG — LOW (ref 27–34)
MCHC RBC-ENTMCNC: 28.4 PG — SIGNIFICANT CHANGE UP (ref 27–34)
MCHC RBC-ENTMCNC: 31.4 GM/DL — LOW (ref 32–36)
MCHC RBC-ENTMCNC: 34.5 GM/DL — SIGNIFICANT CHANGE UP (ref 32–36)
MCV RBC AUTO: 80.9 FL — SIGNIFICANT CHANGE UP (ref 80–100)
MCV RBC AUTO: 82.3 FL — SIGNIFICANT CHANGE UP (ref 80–100)
NITRITE UR-MCNC: NEGATIVE — SIGNIFICANT CHANGE UP
PH UR: 6 — SIGNIFICANT CHANGE UP (ref 5–8)
PHOSPHATE SERPL-MCNC: 3.3 MG/DL — SIGNIFICANT CHANGE UP (ref 2.5–4.5)
PLATELET # BLD AUTO: 455 K/UL — HIGH (ref 150–400)
PLATELET # BLD AUTO: 507 K/UL — HIGH (ref 150–400)
POTASSIUM SERPL-MCNC: 3.9 MMOL/L — SIGNIFICANT CHANGE UP (ref 3.5–5.3)
POTASSIUM SERPL-SCNC: 3.9 MMOL/L — SIGNIFICANT CHANGE UP (ref 3.5–5.3)
PROT UR-MCNC: SIGNIFICANT CHANGE UP
PROTHROM AB SERPL-ACNC: 15.1 SEC — HIGH (ref 10–13.1)
RBC # BLD: 2.81 M/UL — LOW (ref 4.2–5.8)
RBC # BLD: 2.99 M/UL — LOW (ref 4.2–5.8)
RBC # FLD: 14.8 % — HIGH (ref 10.3–14.5)
RBC # FLD: 16 % — HIGH (ref 10.3–14.5)
SODIUM SERPL-SCNC: 136 MMOL/L — SIGNIFICANT CHANGE UP (ref 135–145)
SP GR SPEC: 1.01 — LOW (ref 1.01–1.02)
UROBILINOGEN FLD QL: NEGATIVE — SIGNIFICANT CHANGE UP
WBC # BLD: 12.64 K/UL — HIGH (ref 3.8–10.5)
WBC # BLD: 17.3 K/UL — HIGH (ref 3.8–10.5)
WBC # FLD AUTO: 12.64 K/UL — HIGH (ref 3.8–10.5)
WBC # FLD AUTO: 17.3 K/UL — HIGH (ref 3.8–10.5)

## 2017-10-28 PROCEDURE — 71010: CPT | Mod: 26

## 2017-10-28 RX ORDER — ACETAMINOPHEN 500 MG
650 TABLET ORAL EVERY 6 HOURS
Qty: 0 | Refills: 0 | Status: DISCONTINUED | OUTPATIENT
Start: 2017-10-28 | End: 2017-10-29

## 2017-10-28 RX ORDER — ENOXAPARIN SODIUM 100 MG/ML
40 INJECTION SUBCUTANEOUS DAILY
Qty: 0 | Refills: 0 | Status: DISCONTINUED | OUTPATIENT
Start: 2017-10-28 | End: 2017-11-03

## 2017-10-28 RX ORDER — ACETAMINOPHEN 500 MG
650 TABLET ORAL ONCE
Qty: 0 | Refills: 0 | Status: COMPLETED | OUTPATIENT
Start: 2017-10-28 | End: 2017-10-28

## 2017-10-28 RX ORDER — OXYCODONE HYDROCHLORIDE 5 MG/1
5 TABLET ORAL EVERY 4 HOURS
Qty: 0 | Refills: 0 | Status: DISCONTINUED | OUTPATIENT
Start: 2017-10-28 | End: 2017-10-29

## 2017-10-28 RX ORDER — OXYCODONE HYDROCHLORIDE 5 MG/1
10 TABLET ORAL EVERY 4 HOURS
Qty: 0 | Refills: 0 | Status: DISCONTINUED | OUTPATIENT
Start: 2017-10-28 | End: 2017-10-29

## 2017-10-28 RX ORDER — HYDROMORPHONE HYDROCHLORIDE 2 MG/ML
1 INJECTION INTRAMUSCULAR; INTRAVENOUS; SUBCUTANEOUS EVERY 4 HOURS
Qty: 0 | Refills: 0 | Status: DISCONTINUED | OUTPATIENT
Start: 2017-10-28 | End: 2017-11-03

## 2017-10-28 RX ORDER — PIPERACILLIN AND TAZOBACTAM 4; .5 G/20ML; G/20ML
3.38 INJECTION, POWDER, LYOPHILIZED, FOR SOLUTION INTRAVENOUS EVERY 8 HOURS
Qty: 0 | Refills: 0 | Status: DISCONTINUED | OUTPATIENT
Start: 2017-10-28 | End: 2017-10-29

## 2017-10-28 RX ORDER — POTASSIUM CHLORIDE 20 MEQ
10 PACKET (EA) ORAL ONCE
Qty: 0 | Refills: 0 | Status: COMPLETED | OUTPATIENT
Start: 2017-10-28 | End: 2017-10-28

## 2017-10-28 RX ADMIN — OXYCODONE HYDROCHLORIDE 10 MILLIGRAM(S): 5 TABLET ORAL at 17:23

## 2017-10-28 RX ADMIN — ENOXAPARIN SODIUM 40 MILLIGRAM(S): 100 INJECTION SUBCUTANEOUS at 14:46

## 2017-10-28 RX ADMIN — PIPERACILLIN AND TAZOBACTAM 25 GRAM(S): 4; .5 INJECTION, POWDER, LYOPHILIZED, FOR SOLUTION INTRAVENOUS at 21:17

## 2017-10-28 RX ADMIN — OXYCODONE HYDROCHLORIDE 10 MILLIGRAM(S): 5 TABLET ORAL at 17:24

## 2017-10-28 RX ADMIN — OXYCODONE HYDROCHLORIDE 10 MILLIGRAM(S): 5 TABLET ORAL at 21:56

## 2017-10-28 RX ADMIN — PIPERACILLIN AND TAZOBACTAM 25 GRAM(S): 4; .5 INJECTION, POWDER, LYOPHILIZED, FOR SOLUTION INTRAVENOUS at 14:46

## 2017-10-28 RX ADMIN — Medication 10 MILLIEQUIVALENT(S): at 17:23

## 2017-10-28 RX ADMIN — HEPARIN SODIUM 5000 UNIT(S): 5000 INJECTION INTRAVENOUS; SUBCUTANEOUS at 06:13

## 2017-10-28 RX ADMIN — OXYCODONE HYDROCHLORIDE 10 MILLIGRAM(S): 5 TABLET ORAL at 13:53

## 2017-10-28 RX ADMIN — Medication 650 MILLIGRAM(S): at 22:01

## 2017-10-28 RX ADMIN — OXYCODONE HYDROCHLORIDE 10 MILLIGRAM(S): 5 TABLET ORAL at 21:26

## 2017-10-28 RX ADMIN — OXYCODONE HYDROCHLORIDE 10 MILLIGRAM(S): 5 TABLET ORAL at 13:52

## 2017-10-28 NOTE — PROGRESS NOTE ADULT - SUBJECTIVE AND OBJECTIVE BOX
Surgery Progress Note    S: Patient seen and examined. No acute events overnight. Pain well controlled with current regimen. Denies nausea/vomiting.     O:  Vital Signs Last 24 Hrs  T(C): 37.6 (28 Oct 2017 06:08), Max: 38.1 (27 Oct 2017 17:24)  T(F): 99.7 (28 Oct 2017 06:08), Max: 100.5 (27 Oct 2017 17:24)  HR: 108 (28 Oct 2017 06:08) (99 - 113)  BP: 125/82 (28 Oct 2017 06:08) (125/82 - 149/89)  BP(mean): --  RR: 18 (28 Oct 2017 06:08) (18 - 18)  SpO2: 95% (28 Oct 2017 06:08) (95% - 98%)    I&O's Detail    27 Oct 2017 07:01  -  28 Oct 2017 07:00  --------------------------------------------------------  IN:    dextrose 5% + sodium chloride 0.9%.: 1200 mL    Oral Fluid: 730 mL    Solution: 100 mL    Solution: 250 mL    Solution: 100 mL  Total IN: 2380 mL    OUT:    Bulb: 10 mL    Bulb: 20 mL    Colostomy: 20 mL    Indwelling Catheter - Urethral: 4900 mL  Total OUT: 4950 mL    Total NET: -2570 mL          MEDICATIONS  (STANDING):  dextrose 5% + sodium chloride 0.9%. 1000 milliLiter(s) (50 mL/Hr) IV Continuous <Continuous>  heparin  Injectable 5000 Unit(s) SubCutaneous every 8 hours  HYDROmorphone (10 MICROgram(s)/mL) + BUpivacaine 0.0625% in 0.9% Sodium Chloride PCEA 250 milliLiter(s) Epidural PCA Continuous    MEDICATIONS  (PRN):  HYDROmorphone (10 MICROgram(s)/mL) + BUpivacaine 0.0625% in 0.9% Sodium Chloride PCEA Rescue Clinician  Bolus 5 milliLiter(s) Epidural every 15 minutes PRN for Pain Score greater than 6  naloxone Injectable 0.1 milliGRAM(s) IV Push every 3 minutes PRN For ANY of the following changes in patient status:  A. RR LESS THAN 10 breaths per minute, B. Oxygen saturation LESS THAN 90%, C. Sedation score of 6  ondansetron Injectable 4 milliGRAM(s) IV Push every 6 hours PRN Nausea                            7.8    14.0  )-----------( 486      ( 27 Oct 2017 10:30 )             23.4       10-27    133<L>  |  94<L>  |  9   ----------------------------<  104<H>  4.2   |  25  |  0.93    Ca    8.8      27 Oct 2017 10:30  Phos  2.8     10-27  Mg     1.9     10-27        Physical Exam:  Gen: Laying in bed, NAD, alert and oriented.   Resp: Unlabored breathing  Abd: soft, NTND. Ostomy with fecal contents & gas in bag. Vertical midline incision c/d/i with staples. STEPHANE#1 serosanguinous, STEPHANE#2 with feculent, foul-smelling contents.

## 2017-10-28 NOTE — PROGRESS NOTE ADULT - SUBJECTIVE AND OBJECTIVE BOX
Day ___ of Anesthesia Pain Management Service    SUBJECTIVE:  Pain Scale Score	At rest: ___ 	With Activity: ___ 	[  ] Refer to charted pain scores    THERAPY:  [ ] Epidural Bupivacaine 0.0625% and Hydromorphone 10 micrograms/mL  [ ] Epidural Ropivacaine 0.2% plain   [ ] Epidural Bupivacaine 0.01 % and Fentanyl 3 micrograms/mL  (OB)    Demand dose ___ lockout ___ (minutes) Continuous Rate ___       MEDICATIONS  (STANDING):  dextrose 5% + sodium chloride 0.9%. 1000 milliLiter(s) (50 mL/Hr) IV Continuous <Continuous>  heparin  Injectable 5000 Unit(s) SubCutaneous every 8 hours  HYDROmorphone (10 MICROgram(s)/mL) + BUpivacaine 0.0625% in 0.9% Sodium Chloride PCEA 250 milliLiter(s) Epidural PCA Continuous    MEDICATIONS  (PRN):  HYDROmorphone (10 MICROgram(s)/mL) + BUpivacaine 0.0625% in 0.9% Sodium Chloride PCEA Rescue Clinician  Bolus 5 milliLiter(s) Epidural every 15 minutes PRN for Pain Score greater than 6  naloxone Injectable 0.1 milliGRAM(s) IV Push every 3 minutes PRN For ANY of the following changes in patient status:  A. RR LESS THAN 10 breaths per minute, B. Oxygen saturation LESS THAN 90%, C. Sedation score of 6  ondansetron Injectable 4 milliGRAM(s) IV Push every 6 hours PRN Nausea      OBJECTIVE:    Assessment of Epidural Catheter Site: 	    [x ] Dressing intact	[x ] Site non-tender	[ x] Site without erythema, discharge, edema  [ ]x Epidural tubing and connection checked	[x [ Gross neurological exam within normal limits  [ x] Catheter removed – tip intact		    PT/INR - ( 28 Oct 2017 08:51 )   PT: 15.1 sec;   INR: 1.33 ratio         PTT - ( 28 Oct 2017 08:51 )  PTT:28.9 sec                      7.6    12.64 )-----------( 455      ( 28 Oct 2017 08:51 )             24.2     Vital Signs Last 24 Hrs  T(C): 37.7 (10-28-17 @ 09:38), Max: 38.1 (10-27-17 @ 17:24)  T(F): 99.9 (10-28-17 @ 09:38), Max: 100.5 (10-27-17 @ 17:24)  HR: 106 (10-28-17 @ 09:38) (99 - 113)  BP: 126/79 (10-28-17 @ 09:38) (125/82 - 149/89)  BP(mean): --  RR: 18 (10-28-17 @ 09:38) (18 - 18)  SpO2: 96% (10-28-17 @ 09:38) (95% - 98%)      Sedation Score:	[ x] Alert	[ ] Drowsy	[ ] Arousable  [ ] Asleep  [ ] Unresponsive    Side Effects:	[ x] None	[ ] Nausea	[ ] Vomiting  [ ] Pruritus  		[ ] Weakness  [ ] Numbness  [ ] Other:    ASSESSMENT/ PLAN:    Therapy to  be:	[ ] Continue   [x ] Discontinued   [x ] Change to prn Analgesics    Documentation and Verification of current medications:  [ X ] Done	[ ] Not done, not eligible, reason:    Comments:

## 2017-10-28 NOTE — PROGRESS NOTE ADULT - ASSESSMENT
33yo M s/p ex-lap, Ryan's procedure, rectal biopsy, and liver biopsy    -Pain control - plan to dc PCEA and transition to PO pain meds today  -Advanced to LRD  -DVT ppx (SQH)  -Drain education  -CT a/p with contrast to evaluate for source of STEPHANE#2 contents - prelim read says small collection just posterior to drain tip    Anselmo Joseph, PGY-1  [facility] [phone] 33yo M s/p ex-lap, Ryan's procedure, rectal biopsy, and liver biopsy    - Pain control - dc PCEA and transition to PO pain meds today  - Advanced to LRD  - Start zosyn  - Monitor vitals  - DVT ppx with lovenox  - Drain education  - CT a/p with contrast to evaluate for source of STEPHANE#2 contents - prelim read says small collection just posterior to drain tip  - Consult IR for drainage for abdominal fluid collection  - Flush JPs with sterile saline    Patient seen and examined with Dr. Pedro Joseph, PGY-1  [facility] [phone]

## 2017-10-29 LAB
ANION GAP SERPL CALC-SCNC: 15 MMOL/L — SIGNIFICANT CHANGE UP (ref 5–17)
APTT BLD: 27.9 SEC — SIGNIFICANT CHANGE UP (ref 27.5–37.4)
BLD GP AB SCN SERPL QL: NEGATIVE — SIGNIFICANT CHANGE UP
BUN SERPL-MCNC: 8 MG/DL — SIGNIFICANT CHANGE UP (ref 7–23)
CALCIUM SERPL-MCNC: 9.1 MG/DL — SIGNIFICANT CHANGE UP (ref 8.4–10.5)
CHLORIDE SERPL-SCNC: 97 MMOL/L — SIGNIFICANT CHANGE UP (ref 96–108)
CO2 SERPL-SCNC: 26 MMOL/L — SIGNIFICANT CHANGE UP (ref 22–31)
CREAT SERPL-MCNC: 1.03 MG/DL — SIGNIFICANT CHANGE UP (ref 0.5–1.3)
GLUCOSE SERPL-MCNC: 106 MG/DL — HIGH (ref 70–99)
HCT VFR BLD CALC: 23.6 % — LOW (ref 39–50)
HGB BLD-MCNC: 7.3 G/DL — LOW (ref 13–17)
INR BLD: 1.29 RATIO — HIGH (ref 0.88–1.16)
MAGNESIUM SERPL-MCNC: 2.2 MG/DL — SIGNIFICANT CHANGE UP (ref 1.6–2.6)
MCHC RBC-ENTMCNC: 25.2 PG — LOW (ref 27–34)
MCHC RBC-ENTMCNC: 30.9 GM/DL — LOW (ref 32–36)
MCV RBC AUTO: 81.4 FL — SIGNIFICANT CHANGE UP (ref 80–100)
PHOSPHATE SERPL-MCNC: 3.6 MG/DL — SIGNIFICANT CHANGE UP (ref 2.5–4.5)
PLATELET # BLD AUTO: 538 K/UL — HIGH (ref 150–400)
POTASSIUM SERPL-MCNC: 4 MMOL/L — SIGNIFICANT CHANGE UP (ref 3.5–5.3)
POTASSIUM SERPL-SCNC: 4 MMOL/L — SIGNIFICANT CHANGE UP (ref 3.5–5.3)
PROTHROM AB SERPL-ACNC: 14.7 SEC — HIGH (ref 10–13.1)
RBC # BLD: 2.9 M/UL — LOW (ref 4.2–5.8)
RBC # FLD: 16.2 % — HIGH (ref 10.3–14.5)
RH IG SCN BLD-IMP: POSITIVE — SIGNIFICANT CHANGE UP
SODIUM SERPL-SCNC: 138 MMOL/L — SIGNIFICANT CHANGE UP (ref 135–145)
WBC # BLD: 16.2 K/UL — HIGH (ref 3.8–10.5)
WBC # FLD AUTO: 16.2 K/UL — HIGH (ref 3.8–10.5)

## 2017-10-29 RX ORDER — VANCOMYCIN HCL 1 G
1000 VIAL (EA) INTRAVENOUS EVERY 12 HOURS
Qty: 0 | Refills: 0 | Status: DISCONTINUED | OUTPATIENT
Start: 2017-10-29 | End: 2017-10-31

## 2017-10-29 RX ORDER — ACETAMINOPHEN 500 MG
1000 TABLET ORAL ONCE
Qty: 0 | Refills: 0 | Status: DISCONTINUED | OUTPATIENT
Start: 2017-10-29 | End: 2017-11-03

## 2017-10-29 RX ORDER — OXYCODONE AND ACETAMINOPHEN 5; 325 MG/1; MG/1
2 TABLET ORAL EVERY 6 HOURS
Qty: 0 | Refills: 0 | Status: DISCONTINUED | OUTPATIENT
Start: 2017-10-29 | End: 2017-11-03

## 2017-10-29 RX ORDER — VANCOMYCIN HCL 1 G
1000 VIAL (EA) INTRAVENOUS ONCE
Qty: 0 | Refills: 0 | Status: COMPLETED | OUTPATIENT
Start: 2017-10-29 | End: 2017-10-29

## 2017-10-29 RX ORDER — ACETAMINOPHEN 500 MG
1000 TABLET ORAL ONCE
Qty: 0 | Refills: 0 | Status: COMPLETED | OUTPATIENT
Start: 2017-10-29 | End: 2017-10-29

## 2017-10-29 RX ORDER — DEXTROSE MONOHYDRATE, SODIUM CHLORIDE, AND POTASSIUM CHLORIDE 50; .745; 4.5 G/1000ML; G/1000ML; G/1000ML
1000 INJECTION, SOLUTION INTRAVENOUS
Qty: 0 | Refills: 0 | Status: DISCONTINUED | OUTPATIENT
Start: 2017-10-29 | End: 2017-10-30

## 2017-10-29 RX ORDER — PIPERACILLIN AND TAZOBACTAM 4; .5 G/20ML; G/20ML
3.38 INJECTION, POWDER, LYOPHILIZED, FOR SOLUTION INTRAVENOUS EVERY 8 HOURS
Qty: 0 | Refills: 0 | Status: DISCONTINUED | OUTPATIENT
Start: 2017-10-29 | End: 2017-11-02

## 2017-10-29 RX ORDER — VANCOMYCIN HCL 1 G
VIAL (EA) INTRAVENOUS
Qty: 0 | Refills: 0 | Status: DISCONTINUED | OUTPATIENT
Start: 2017-10-29 | End: 2017-10-31

## 2017-10-29 RX ORDER — OXYCODONE AND ACETAMINOPHEN 5; 325 MG/1; MG/1
1 TABLET ORAL EVERY 4 HOURS
Qty: 0 | Refills: 0 | Status: DISCONTINUED | OUTPATIENT
Start: 2017-10-29 | End: 2017-11-03

## 2017-10-29 RX ADMIN — OXYCODONE AND ACETAMINOPHEN 2 TABLET(S): 5; 325 TABLET ORAL at 10:34

## 2017-10-29 RX ADMIN — PIPERACILLIN AND TAZOBACTAM 25 GRAM(S): 4; .5 INJECTION, POWDER, LYOPHILIZED, FOR SOLUTION INTRAVENOUS at 06:01

## 2017-10-29 RX ADMIN — Medication 250 MILLIGRAM(S): at 17:19

## 2017-10-29 RX ADMIN — ENOXAPARIN SODIUM 40 MILLIGRAM(S): 100 INJECTION SUBCUTANEOUS at 11:07

## 2017-10-29 RX ADMIN — PIPERACILLIN AND TAZOBACTAM 25 GRAM(S): 4; .5 INJECTION, POWDER, LYOPHILIZED, FOR SOLUTION INTRAVENOUS at 13:04

## 2017-10-29 RX ADMIN — OXYCODONE AND ACETAMINOPHEN 2 TABLET(S): 5; 325 TABLET ORAL at 17:23

## 2017-10-29 RX ADMIN — OXYCODONE AND ACETAMINOPHEN 2 TABLET(S): 5; 325 TABLET ORAL at 23:32

## 2017-10-29 RX ADMIN — Medication 400 MILLIGRAM(S): at 23:32

## 2017-10-29 RX ADMIN — PIPERACILLIN AND TAZOBACTAM 25 GRAM(S): 4; .5 INJECTION, POWDER, LYOPHILIZED, FOR SOLUTION INTRAVENOUS at 21:47

## 2017-10-29 RX ADMIN — Medication 250 MILLIGRAM(S): at 07:46

## 2017-10-29 RX ADMIN — OXYCODONE HYDROCHLORIDE 10 MILLIGRAM(S): 5 TABLET ORAL at 05:58

## 2017-10-29 NOTE — PROGRESS NOTE ADULT - SUBJECTIVE AND OBJECTIVE BOX
Surgery Progress Note    S: Patient seen and examined. No acute events overnight. Pain well controlled with current regimen. Denies nausea/vomiting. Ostomy functioning well. Febrile overnight to 102.6F.    O:  Vital Signs Last 24 Hrs  T(C): 38.3 (29 Oct 2017 05:47), Max: 39.2 (28 Oct 2017 21:34)  T(F): 101 (29 Oct 2017 05:47), Max: 102.6 (28 Oct 2017 21:34)  HR: 116 (29 Oct 2017 05:47) (96 - 123)  BP: 129/82 (29 Oct 2017 05:47) (122/83 - 143/91)  BP(mean): --  RR: 18 (29 Oct 2017 05:47) (18 - 18)  SpO2: 98% (29 Oct 2017 05:47) (96% - 99%)    I&O's Detail    27 Oct 2017 07:01  -  28 Oct 2017 07:00  --------------------------------------------------------  IN:    dextrose 5% + sodium chloride 0.9%: 1200 mL    Oral Fluid: 730 mL    Solution: 100 mL    Solution: 250 mL    Solution: 100 mL  Total IN: 2380 mL    OUT:    Bulb: 10 mL    Bulb: 20 mL    Colostomy: 20 mL    Indwelling Catheter - Urethral: 4900 mL  Total OUT: 4950 mL    Total NET: -2570 mL      28 Oct 2017 07:01  -  29 Oct 2017 06:57  --------------------------------------------------------  IN:    dextrose 5% + sodium chloride 0.9%: 200 mL    Oral Fluid: 480 mL    Solution: 100 mL  Total IN: 780 mL    OUT:    Bulb: 30 mL    Bulb: 30 mL    Colostomy: 350 mL    Indwelling Catheter - Urethral: 1850 mL    Voided: 1200 mL  Total OUT: 3460 mL    Total NET: -2680 mL          MEDICATIONS  (STANDING):  enoxaparin Injectable 40 milliGRAM(s) SubCutaneous daily  piperacillin/tazobactam IVPB. 3.375 Gram(s) IV Intermittent every 8 hours    MEDICATIONS  (PRN):  acetaminophen   Tablet. 650 milliGRAM(s) Oral every 6 hours PRN Mild Pain (1 - 3)  HYDROmorphone  Injectable 1 milliGRAM(s) IV Push every 4 hours PRN Breakthrough pain  naloxone Injectable 0.1 milliGRAM(s) IV Push every 3 minutes PRN For ANY of the following changes in patient status:  A. RR LESS THAN 10 breaths per minute, B. Oxygen saturation LESS THAN 90%, C. Sedation score of 6  ondansetron Injectable 4 milliGRAM(s) IV Push every 6 hours PRN Nausea  oxyCODONE    IR 5 milliGRAM(s) Oral every 4 hours PRN Moderate Pain (4 - 6)  oxyCODONE    IR 10 milliGRAM(s) Oral every 4 hours PRN Severe Pain (7 - 10)                            8.0    17.3  )-----------( 507      ( 28 Oct 2017 23:01 )             23.1       10-28    136  |  97  |  9   ----------------------------<  88  3.9   |  22  |  0.84    Ca    9.0      28 Oct 2017 08:51  Phos  3.3     10-28  Mg     2.1     10-28        Physical Exam:  Gen: Laying in bed, NAD, alert and oriented.   Resp: Unlabored breathing  Abd: soft, NTND. Incision c/d/i. Ostomy with stool and gas in bag. JPs feculent x2, foul-smelling.

## 2017-10-29 NOTE — PROGRESS NOTE ADULT - ASSESSMENT
31yo M s/p ex-lap, Ryan's procedure, rectal biopsy, and liver biopsy    - Febrile to 102.6F overnight - cultures sent, CXR performed - f/u results  - Pain control w/ PO pain meds  - Continue LRD  - Continue zosyn  - Monitor vitals  - DVT ppx with lovenox  - Drain education  - CT a/p 10/28 shows: peripherally enhancing, thick rimmed fluid and air-containing structure of 4.2 x 3.4 cm adjacent to the rectal stump  - Consult IR for drainage for abdominal fluid collection  - Flush JPs with sterile saline qd    Patient seen and examined with Dr. Pedro Joseph, PGY-1  [facility] [phone]

## 2017-10-29 NOTE — PROVIDER CONTACT NOTE (OTHER) - BACKGROUND
10/23 paulaap anahi, liver biopsy. 10/25CT angio r/o PE. 10/26 pan cultured for elevated temperature

## 2017-10-30 LAB
ANION GAP SERPL CALC-SCNC: 13 MMOL/L — SIGNIFICANT CHANGE UP (ref 5–17)
BUN SERPL-MCNC: 6 MG/DL — LOW (ref 7–23)
CALCIUM SERPL-MCNC: 9.1 MG/DL — SIGNIFICANT CHANGE UP (ref 8.4–10.5)
CHLORIDE SERPL-SCNC: 96 MMOL/L — SIGNIFICANT CHANGE UP (ref 96–108)
CO2 SERPL-SCNC: 27 MMOL/L — SIGNIFICANT CHANGE UP (ref 22–31)
CREAT SERPL-MCNC: 1.02 MG/DL — SIGNIFICANT CHANGE UP (ref 0.5–1.3)
CULTURE RESULTS: NO GROWTH — SIGNIFICANT CHANGE UP
GLUCOSE SERPL-MCNC: 126 MG/DL — HIGH (ref 70–99)
HCT VFR BLD CALC: 23.6 % — LOW (ref 39–50)
HGB BLD-MCNC: 7.2 G/DL — LOW (ref 13–17)
MAGNESIUM SERPL-MCNC: 2.3 MG/DL — SIGNIFICANT CHANGE UP (ref 1.6–2.6)
MCHC RBC-ENTMCNC: 25.2 PG — LOW (ref 27–34)
MCHC RBC-ENTMCNC: 30.5 GM/DL — LOW (ref 32–36)
MCV RBC AUTO: 82.5 FL — SIGNIFICANT CHANGE UP (ref 80–100)
PHOSPHATE SERPL-MCNC: 3.5 MG/DL — SIGNIFICANT CHANGE UP (ref 2.5–4.5)
PLATELET # BLD AUTO: 512 K/UL — HIGH (ref 150–400)
POTASSIUM SERPL-MCNC: 3.8 MMOL/L — SIGNIFICANT CHANGE UP (ref 3.5–5.3)
POTASSIUM SERPL-SCNC: 3.8 MMOL/L — SIGNIFICANT CHANGE UP (ref 3.5–5.3)
RBC # BLD: 2.86 M/UL — LOW (ref 4.2–5.8)
RBC # FLD: 16 % — HIGH (ref 10.3–14.5)
SODIUM SERPL-SCNC: 136 MMOL/L — SIGNIFICANT CHANGE UP (ref 135–145)
SPECIMEN SOURCE: SIGNIFICANT CHANGE UP
WBC # BLD: 12.48 K/UL — HIGH (ref 3.8–10.5)
WBC # FLD AUTO: 12.48 K/UL — HIGH (ref 3.8–10.5)

## 2017-10-30 PROCEDURE — 49406 IMAGE CATH FLUID PERI/RETRO: CPT

## 2017-10-30 PROCEDURE — 99232 SBSQ HOSP IP/OBS MODERATE 35: CPT | Mod: 24

## 2017-10-30 RX ORDER — ALPRAZOLAM 0.25 MG
0.5 TABLET ORAL DAILY
Qty: 0 | Refills: 0 | Status: DISCONTINUED | OUTPATIENT
Start: 2017-10-30 | End: 2017-11-03

## 2017-10-30 RX ORDER — ALPRAZOLAM 0.25 MG
1 TABLET ORAL DAILY
Qty: 0 | Refills: 0 | Status: DISCONTINUED | OUTPATIENT
Start: 2017-10-30 | End: 2017-10-30

## 2017-10-30 RX ORDER — SODIUM CHLORIDE 9 MG/ML
3 INJECTION INTRAMUSCULAR; INTRAVENOUS; SUBCUTANEOUS EVERY 8 HOURS
Qty: 0 | Refills: 0 | Status: DISCONTINUED | OUTPATIENT
Start: 2017-10-30 | End: 2017-11-03

## 2017-10-30 RX ADMIN — Medication 250 MILLIGRAM(S): at 04:48

## 2017-10-30 RX ADMIN — OXYCODONE AND ACETAMINOPHEN 2 TABLET(S): 5; 325 TABLET ORAL at 19:45

## 2017-10-30 RX ADMIN — Medication 250 MILLIGRAM(S): at 18:50

## 2017-10-30 RX ADMIN — OXYCODONE AND ACETAMINOPHEN 2 TABLET(S): 5; 325 TABLET ORAL at 18:45

## 2017-10-30 RX ADMIN — OXYCODONE AND ACETAMINOPHEN 2 TABLET(S): 5; 325 TABLET ORAL at 09:42

## 2017-10-30 RX ADMIN — OXYCODONE AND ACETAMINOPHEN 2 TABLET(S): 5; 325 TABLET ORAL at 10:42

## 2017-10-30 RX ADMIN — DEXTROSE MONOHYDRATE, SODIUM CHLORIDE, AND POTASSIUM CHLORIDE 100 MILLILITER(S): 50; .745; 4.5 INJECTION, SOLUTION INTRAVENOUS at 13:11

## 2017-10-30 RX ADMIN — PIPERACILLIN AND TAZOBACTAM 25 GRAM(S): 4; .5 INJECTION, POWDER, LYOPHILIZED, FOR SOLUTION INTRAVENOUS at 22:22

## 2017-10-30 RX ADMIN — OXYCODONE AND ACETAMINOPHEN 2 TABLET(S): 5; 325 TABLET ORAL at 00:02

## 2017-10-30 RX ADMIN — PIPERACILLIN AND TAZOBACTAM 25 GRAM(S): 4; .5 INJECTION, POWDER, LYOPHILIZED, FOR SOLUTION INTRAVENOUS at 06:41

## 2017-10-30 RX ADMIN — Medication 0.5 MILLIGRAM(S): at 23:13

## 2017-10-30 RX ADMIN — PIPERACILLIN AND TAZOBACTAM 25 GRAM(S): 4; .5 INJECTION, POWDER, LYOPHILIZED, FOR SOLUTION INTRAVENOUS at 13:11

## 2017-10-30 RX ADMIN — DEXTROSE MONOHYDRATE, SODIUM CHLORIDE, AND POTASSIUM CHLORIDE 100 MILLILITER(S): 50; .745; 4.5 INJECTION, SOLUTION INTRAVENOUS at 22:22

## 2017-10-30 NOTE — PROGRESS NOTE ADULT - ASSESSMENT
Assessment:    32y Male who presents with Malignant neoplasm of rectum s/p Ex-La[, Ryan procedure, liver biopsy, rectal biopsy     Plan:  -DVT PPX- Lovenox  -Pain control   -OOB as tolerated with assistance   -Advance diet as tolerated s/p IR   -monitor Gi Fxn   -Possible IR drainage today  -Vanco trough in the am   -continue with zosyn and vancomycin   -Dispo Planning     Nery Bravo   #[phone] 10-30-17 @ 07:29

## 2017-10-30 NOTE — PROGRESS NOTE ADULT - SUBJECTIVE AND OBJECTIVE BOX
Surgery Post-Op Note    Procedure: IR drainage of pelvic collection    Subjective:   Pt seen and examined at the bedside. Pt w/ no complaints. Denies F/C/N/V. Pain controlled with medication. Tolerating diet well    Vital Signs Last 24 Hrs  T(C): 37.5 (30 Oct 2017 20:30), Max: 37.9 (29 Oct 2017 21:42)  T(F): 99.5 (30 Oct 2017 20:30), Max: 100.3 (29 Oct 2017 21:42)  HR: 109 (30 Oct 2017 20:30) (101 - 125)  BP: 145/89 (30 Oct 2017 20:30) (121/79 - 152/106)  BP(mean): --  RR: 18 (30 Oct 2017 20:30) (18 - 18)  SpO2: 96% (30 Oct 2017 20:30) (94% - 98%)    Physical Exam:  General: NAD, resting comfortably in bed  Neuro: A/O x 3, no focal deficits  Pulmonary: Nonlabored breathing, no respiratory distress  Cardiovascular: NSR  Abdominal: soft, nt, nd  Incision: C/D/I   Drain, draining serosanguinous, nonpurulent fluid.        LABS:                        7.2    12.48 )-----------( 512      ( 30 Oct 2017 08:59 )             23.6     10-30    136  |  96  |  6<L>  ----------------------------<  126<H>  3.8   |  27  |  1.02    Ca    9.1      30 Oct 2017 09:03  Phos  3.5     10-30  Mg     2.3     10-30      PT/INR - ( 29 Oct 2017 08:33 )   PT: 14.7 sec;   INR: 1.29 ratio         PTT - ( 29 Oct 2017 08:33 )  PTT:27.9 sec  CAPILLARY BLOOD GLUCOSE        Urinalysis Basic - ( 28 Oct 2017 23:42 )    Color: PL Yellow / Appearance: Clear / S.008 / pH: x  Gluc: x / Ketone: Trace  / Bili: Negative / Urobili: Negative   Blood: x / Protein: Trace / Nitrite: Negative   Leuk Esterase: Negative / RBC: x / WBC x   Sq Epi: x / Non Sq Epi: x / Bacteria: x      Assessment:32y Male s/p IR drainage, doing well    Plan:  regular diet  monitor drain output  trend fever curve  oob/ambulate  pain controle

## 2017-10-30 NOTE — PROGRESS NOTE ADULT - SUBJECTIVE AND OBJECTIVE BOX
Interventional Radiology Brief- Operative Note    Procedure: CT guided pelvic collection drainage    Operators: Kay Worthy    Anesthesia (type): Monitored anesthesia care per anesthesiology attending	    Contrast:  0    EBL: 1 cc    Findings/Follow up Plan of Care: 20 cc cloudy thick fluid aspirated. Follow up drain output and fluid culture.    Specimens Removed: 20 cc cloudy thick fluid aspirated.    Implants: 8.5 FR drain left in place.    Complications: None.    Condition/Disposition: Stable/Recovery.    Please call Interventional Radiology x [phone] with any questions, concerns, or issues.

## 2017-10-30 NOTE — PROGRESS NOTE ADULT - SUBJECTIVE AND OBJECTIVE BOX
GENERAL SURGERY DAILY PROGRESS NOTE:     Subjective: Patient seen and examined. Patient stable with no overnight events. Patient denies CP/ SOB/ Palpatations. Patient denies N/V. Reports flatus and ostomy output. Tolerating diet with no issues.     MEDICATIONS  (STANDING):  dextrose 5% + sodium chloride 0.45% with potassium chloride 20 mEq/L 1000 milliLiter(s) (100 mL/Hr) IV Continuous <Continuous>  enoxaparin Injectable 40 milliGRAM(s) SubCutaneous daily  piperacillin/tazobactam IVPB. 3.375 Gram(s) IV Intermittent every 8 hours  vancomycin  IVPB 1000 milliGRAM(s) IV Intermittent every 12 hours  vancomycin  IVPB        MEDICATIONS  (PRN):  acetaminophen  IVPB. 1000 milliGRAM(s) IV Intermittent once PRN Pain/Headache  HYDROmorphone  Injectable 1 milliGRAM(s) IV Push every 4 hours PRN Breakthrough pain  naloxone Injectable 0.1 milliGRAM(s) IV Push every 3 minutes PRN For ANY of the following changes in patient status:  A. RR LESS THAN 10 breaths per minute, B. Oxygen saturation LESS THAN 90%, C. Sedation score of 6  ondansetron Injectable 4 milliGRAM(s) IV Push every 6 hours PRN Nausea  oxyCODONE    5 mG/acetaminophen 325 mG 1 Tablet(s) Oral every 4 hours PRN Mild Pain (1 - 3)  oxyCODONE    5 mG/acetaminophen 325 mG 2 Tablet(s) Oral every 6 hours PRN Moderate Pain (4 - 6)    Vital Signs Last 24 Hrs:   T(C): 37.5 (30 Oct 2017 06:30), Max: 37.9 (29 Oct 2017 21:42)  T(F): 99.5 (30 Oct 2017 06:30), Max: 100.3 (29 Oct 2017 21:42)  HR: 106 (30 Oct 2017 06:30) (102 - 128)  BP: 123/84 (30 Oct 2017 06:30) (121/79 - 132/85)  RR: 18 (30 Oct 2017 06:30) (18 - 18)  SpO2: 97% (30 Oct 2017 06:30) (96% - 97%)    I&O's Detail:   29 Oct 2017 07:01  -  30 Oct 2017 07:00  --------------------------------------------------------  IN:    dextrose 5% + sodium chloride 0.45% with potassium chloride 20 mEq/L: 1200 mL    Oral Fluid: 1020 mL    Solution: 350 mL  Total IN: 2570 mL  OUT:    Bulb: 28 mL    Bulb: 28 mL    Voided: 3475 mL  Total OUT: 3531 mL  Total NET: -961 mL    LABS:                     7.3    16.20 )-----------( 538      ( 29 Oct 2017 08:33 )             23.6   10-  138  |  97  |  8   ----------------------------<  106<H>  4.0   |  26  |  1.03  Ca    9.1      29 Oct 2017 08:43  Phos  3.6     10-  Mg     2.2     10-  PT/INR - ( 29 Oct 2017 08:33 )   PT: 14.7 sec;   INR: 1.29 ratio    PTT - ( 29 Oct 2017 08:33 )  PTT:27.9 sec    Urinalysis Basic - ( 28 Oct 2017 23:42 )  Color: PL Yellow / Appearance: Clear / S.008 / pH: x  Gluc: x / Ketone: Trace  / Bili: Negative / Urobili: Negative   Blood: x / Protein: Trace / Nitrite: Negative   Leuk Esterase: Negative / RBC: x / WBC x   Sq Epi: x / Non Sq Epi: x / Bacteria: x    Physical Exam:   Gen: NAD, AAOx3  Abd: soft, NT, ND  ostomy: patent with function   clean/dry/intact

## 2017-10-30 NOTE — PROGRESS NOTE ADULT - SUBJECTIVE AND OBJECTIVE BOX
Interventional Radiology Pre-Procedure Note    This is a 32y Male with PMH of GERD, anxiety and rectal CA with liver mets who was admitted on 10/23 for planned surgical procedure, now s/p hartmanns procedure and liver bx. Pt found to have "A peripherally enhancing, thick rimmed fluid and air-containing structure of 4.2 x 3.4 cm adjacent to the rectal stump" on CT from 10/27 and has been febrile Tmax 101 in past 24hr. Pt referred to IR fro drainage of collection. Pt offers no complaints at this time, NPO since midnight, denies adverse events from anesthesia in the past. Currently on Zosyn q8h (last dose 1312),  vancomycin q12 (last dose 0448) and ppx Lovenox last dose >24hrs ago. Plan is for CT guided drainage of pelvic collection.        PAST MEDICAL & SURGICAL HISTORY:  Anxiety: hx of panic attacks  Gastroesophageal reflux disease  Rectal cancer metastasized to liver  Encounter for care related to vascular access port: right chest wall port placed 2017  History of hemorrhoidectomy     Vital Signs Last 24 Hrs  T(C): 37 (30 Oct 2017 13:59), Max: 37.9 (29 Oct 2017 21:42)  T(F): 98.6 (30 Oct 2017 13:59), Max: 100.3 (29 Oct 2017 21:42)  HR: 101 (30 Oct 2017 13:59) (101 - 114)  BP: 131/84 (30 Oct 2017 13:59) (121/79 - 132/81)  BP(mean): --  RR: 18 (30 Oct 2017 13:59) (18 - 18)  SpO2: 97% (30 Oct 2017 13:59) (94% - 97%)    Allergies: No Known Allergies    Physical Exam: Gen: NAD, A&Ox3    Labs:                         7.2    12.48 )-----------( 512      ( 30 Oct 2017 08:59 )             23.6     10-30    136  |  96  |  6<L>  ----------------------------<  126<H>  3.8   |  27  |  1.02    Ca    9.1      30 Oct 2017 09:03  Phos  3.5     10-30  Mg     2.3     10-30      PT/INR - ( 29 Oct 2017 08:33 )   PT: 14.7 sec;   INR: 1.29 ratio         PTT - ( 29 Oct 2017 08:33 )  PTT:27.9 sec    Plan is for CT guided drainage of pelvic collection. The full procedure/ risks/ benefits and alternatives were discussed with the pt and informed consent obtained. All questions and concerns have been addressed at this time.

## 2017-10-31 LAB
ANION GAP SERPL CALC-SCNC: 15 MMOL/L — SIGNIFICANT CHANGE UP (ref 5–17)
BUN SERPL-MCNC: 5 MG/DL — LOW (ref 7–23)
CALCIUM SERPL-MCNC: 9.1 MG/DL — SIGNIFICANT CHANGE UP (ref 8.4–10.5)
CHLORIDE SERPL-SCNC: 97 MMOL/L — SIGNIFICANT CHANGE UP (ref 96–108)
CO2 SERPL-SCNC: 25 MMOL/L — SIGNIFICANT CHANGE UP (ref 22–31)
CREAT SERPL-MCNC: 1.06 MG/DL — SIGNIFICANT CHANGE UP (ref 0.5–1.3)
GLUCOSE SERPL-MCNC: 102 MG/DL — HIGH (ref 70–99)
GRAM STN FLD: SIGNIFICANT CHANGE UP
HCT VFR BLD CALC: 23.5 % — LOW (ref 39–50)
HGB BLD-MCNC: 7.1 G/DL — LOW (ref 13–17)
MAGNESIUM SERPL-MCNC: 2.3 MG/DL — SIGNIFICANT CHANGE UP (ref 1.6–2.6)
MCHC RBC-ENTMCNC: 24.7 PG — LOW (ref 27–34)
MCHC RBC-ENTMCNC: 30.2 GM/DL — LOW (ref 32–36)
MCV RBC AUTO: 81.9 FL — SIGNIFICANT CHANGE UP (ref 80–100)
PHOSPHATE SERPL-MCNC: 4.1 MG/DL — SIGNIFICANT CHANGE UP (ref 2.5–4.5)
PLATELET # BLD AUTO: 571 K/UL — HIGH (ref 150–400)
POTASSIUM SERPL-MCNC: 4.2 MMOL/L — SIGNIFICANT CHANGE UP (ref 3.5–5.3)
POTASSIUM SERPL-SCNC: 4.2 MMOL/L — SIGNIFICANT CHANGE UP (ref 3.5–5.3)
RBC # BLD: 2.87 M/UL — LOW (ref 4.2–5.8)
RBC # FLD: 16.3 % — HIGH (ref 10.3–14.5)
SODIUM SERPL-SCNC: 137 MMOL/L — SIGNIFICANT CHANGE UP (ref 135–145)
SPECIMEN SOURCE: SIGNIFICANT CHANGE UP
VANCOMYCIN TROUGH SERPL-MCNC: 7.3 UG/ML — LOW (ref 10–20)
WBC # BLD: 11.5 K/UL — HIGH (ref 3.8–10.5)
WBC # FLD AUTO: 11.5 K/UL — HIGH (ref 3.8–10.5)

## 2017-10-31 PROCEDURE — 99232 SBSQ HOSP IP/OBS MODERATE 35: CPT | Mod: 24

## 2017-10-31 RX ORDER — FERROUS SULFATE 325(65) MG
325 TABLET ORAL EVERY 8 HOURS
Qty: 0 | Refills: 0 | Status: DISCONTINUED | OUTPATIENT
Start: 2017-10-31 | End: 2017-11-03

## 2017-10-31 RX ORDER — VANCOMYCIN HCL 1 G
1250 VIAL (EA) INTRAVENOUS EVERY 12 HOURS
Qty: 0 | Refills: 0 | Status: DISCONTINUED | OUTPATIENT
Start: 2017-10-31 | End: 2017-11-02

## 2017-10-31 RX ADMIN — Medication 0.5 MILLIGRAM(S): at 22:38

## 2017-10-31 RX ADMIN — HYDROMORPHONE HYDROCHLORIDE 1 MILLIGRAM(S): 2 INJECTION INTRAMUSCULAR; INTRAVENOUS; SUBCUTANEOUS at 04:35

## 2017-10-31 RX ADMIN — Medication 325 MILLIGRAM(S): at 21:19

## 2017-10-31 RX ADMIN — OXYCODONE AND ACETAMINOPHEN 2 TABLET(S): 5; 325 TABLET ORAL at 00:59

## 2017-10-31 RX ADMIN — ENOXAPARIN SODIUM 40 MILLIGRAM(S): 100 INJECTION SUBCUTANEOUS at 11:50

## 2017-10-31 RX ADMIN — OXYCODONE AND ACETAMINOPHEN 2 TABLET(S): 5; 325 TABLET ORAL at 19:48

## 2017-10-31 RX ADMIN — PIPERACILLIN AND TAZOBACTAM 25 GRAM(S): 4; .5 INJECTION, POWDER, LYOPHILIZED, FOR SOLUTION INTRAVENOUS at 21:19

## 2017-10-31 RX ADMIN — Medication 166.67 MILLIGRAM(S): at 18:08

## 2017-10-31 RX ADMIN — SODIUM CHLORIDE 3 MILLILITER(S): 9 INJECTION INTRAMUSCULAR; INTRAVENOUS; SUBCUTANEOUS at 21:23

## 2017-10-31 RX ADMIN — HYDROMORPHONE HYDROCHLORIDE 1 MILLIGRAM(S): 2 INJECTION INTRAMUSCULAR; INTRAVENOUS; SUBCUTANEOUS at 05:30

## 2017-10-31 RX ADMIN — SODIUM CHLORIDE 3 MILLILITER(S): 9 INJECTION INTRAMUSCULAR; INTRAVENOUS; SUBCUTANEOUS at 13:02

## 2017-10-31 RX ADMIN — Medication 250 MILLIGRAM(S): at 05:52

## 2017-10-31 RX ADMIN — OXYCODONE AND ACETAMINOPHEN 2 TABLET(S): 5; 325 TABLET ORAL at 02:12

## 2017-10-31 RX ADMIN — SODIUM CHLORIDE 3 MILLILITER(S): 9 INJECTION INTRAMUSCULAR; INTRAVENOUS; SUBCUTANEOUS at 05:57

## 2017-10-31 RX ADMIN — PIPERACILLIN AND TAZOBACTAM 25 GRAM(S): 4; .5 INJECTION, POWDER, LYOPHILIZED, FOR SOLUTION INTRAVENOUS at 07:30

## 2017-10-31 RX ADMIN — PIPERACILLIN AND TAZOBACTAM 25 GRAM(S): 4; .5 INJECTION, POWDER, LYOPHILIZED, FOR SOLUTION INTRAVENOUS at 13:12

## 2017-10-31 RX ADMIN — OXYCODONE AND ACETAMINOPHEN 2 TABLET(S): 5; 325 TABLET ORAL at 20:30

## 2017-10-31 RX ADMIN — OXYCODONE AND ACETAMINOPHEN 2 TABLET(S): 5; 325 TABLET ORAL at 12:38

## 2017-10-31 RX ADMIN — OXYCODONE AND ACETAMINOPHEN 2 TABLET(S): 5; 325 TABLET ORAL at 07:07

## 2017-10-31 RX ADMIN — Medication 325 MILLIGRAM(S): at 13:12

## 2017-10-31 NOTE — DIETITIAN INITIAL EVALUATION ADULT. - ORAL INTAKE PTA
good/Breakfast: oatmeal or cream of rice; Lunch: turkey sandwich; Dinner: chicken or turkey with baked potato and salad. No vitamins/supplements PTA. Pt states overall appetite/intake has been strong before/during/after treatments/chemotherapy. Per chart review, pt noted with wt loss in august (chart attributes to chemo but pt states it was due to trimmed portions and healthier eating.

## 2017-10-31 NOTE — PROVIDER CONTACT NOTE (CRITICAL VALUE NOTIFICATION) - TEST AND RESULT REPORTED:
Body Fluid with Gram Stain: polymorphonuclear leukocytes per low power field, few gram positive rods per oil power field, few gram positive cocci in pairs per oil power

## 2017-10-31 NOTE — PROVIDER CONTACT NOTE (OTHER) - ACTION/TREATMENT ORDERED:
MD Veliz stated that she was aware of the situation and that she knows that he runs tachycardic. Xanax ordered PRN for pt. MD Veliz stated that EKG was not necessary at this time.

## 2017-10-31 NOTE — PROGRESS NOTE ADULT - SUBJECTIVE AND OBJECTIVE BOX
Interventional Radiology Follow- Up Note    S: No acute events overnight. Patient reports soreness at site of right transgluteal drain, otherwise feels fine. Drain put out ~30cc.    O:  Vitals: T(F): 98.5 (10-31-17 @ 05:46), Max: 99.8 (10-30-17 @ 10:30)  HR: 104 (10-31-17 @ 05:46) (101 - 125)  BP: 132/88 (10-31-17 @ 05:46) (128/86 - 152/106)  RR: 18 (10-31-17 @ 05:46) (16 - 18)  SpO2: 98% (10-31-17 @ 05:46) (94% - 98%)  Wt(kg): --    LABS:                        7.2    12.48 )-----------( 512      ( 30 Oct 2017 08:59 )             23.6     10-30    136  |  96  |  6<L>  ----------------------------<  126<H>  3.8   |  27  |  1.02    Ca    9.1      30 Oct 2017 09:03  Phos  3.5     10-30  Mg     2.3     10-30      PT/INR - ( 29 Oct 2017 08:33 )   PT: 14.7 sec;   INR: 1.29 ratio         PTT - ( 29 Oct 2017 08:33 )  PTT:27.9 sec  I&O's Detail    29 Oct 2017 07:01  -  30 Oct 2017 07:00  --------------------------------------------------------  IN:    dextrose 5% + sodium chloride 0.45% with potassium chloride 20 mEq/L: 1200 mL    Oral Fluid: 1020 mL    Solution: 350 mL  Total IN: 2570 mL    OUT:    Bulb: 28 mL    Bulb: 28 mL    Voided: 3475 mL  Total OUT: 3531 mL    Total NET: -961 mL      30 Oct 2017 07:01  -  31 Oct 2017 06:54  --------------------------------------------------------  IN:    Oral Fluid: 240 mL    Solution: 350 mL  Total IN: 590 mL    OUT:    Bulb: 10 mL    Bulb: 30 mL    Bulb: 5 mL    Colostomy: 120 mL    Voided: 1405 mL  Total OUT: 1570 mL    Total NET: -980 mL    PHYSICAL EXAM:    General: Awake, alert, pleasant  Abdomen: Soft, NT, ND. Right transgluteal drain (#3) to STEPHANE suction. Dressing c/d/i.      Impression: 31 y/o male with rectal cancer and liver metastases s/p Ryan's procedure with pelvic collection s/p transgluteal drainage.     Plan:  -continue to monitor drain output  -flush drain 5 cc normal saline everyday  -f/u culture results       Please call IR at extension [phone] with any questions, concerns, or issues regarding above.

## 2017-10-31 NOTE — DIETITIAN INITIAL EVALUATION ADULT. - ENERGY NEEDS
Height: 65 inches, Weight: 166.5 pounds, BMI: 27.7 kg/m2 IBW: 136 pounds (+/-10%), %IBW: 122%  No edema, no pressure injuries noted at present.

## 2017-10-31 NOTE — PROVIDER CONTACT NOTE (OTHER) - ASSESSMENT
Pt A&Ox4. PT vital signs , BP, 152/106, RR 18, SpO2 97% and temp 98.5. Pt resting comfortably in bed w/ no c/o chest pain, sob or difficulty breathing. Pt states that he is currently feeling anxious at this time and that he takes xanax at home.

## 2017-10-31 NOTE — DIETITIAN INITIAL EVALUATION ADULT. - NS AS NUTRI INTERV MEALS SNACK
continue low fiber diet as tolerated. Encouraged PO intake. Adequate nutrition emphasized for optimal strength and energy. Discussed small, frequent meals high in protein and energy.

## 2017-10-31 NOTE — PROGRESS NOTE ADULT - ASSESSMENT
Assessment:    32y Male who presents with Malignant neoplasm of rectum s/p Ex-Lap, Driscoll's procedure, liver biopsy, rectal biopsy s/p IR drainage of collection     Plan:  -DVT PPX- Lovenox  -Pain control   -OOB as tolerated with assistance   -Advance diet as tolerated  -monitor Gi Meghan Bravo   #[id number] 10-31-17 @ 07:10

## 2017-10-31 NOTE — PROGRESS NOTE ADULT - SUBJECTIVE AND OBJECTIVE BOX
GENERAL SURGERY DAILY PROGRESS NOTE:     Subjective: Patient seen and examined. Patient stable with no overnight events. Patient denies CP/ SOB/ Palpatations. Patient denies N/V. Reports flatus and ostomy output. Patient reports he is hungry.     MEDICATIONS  (STANDING):  enoxaparin Injectable 40 milliGRAM(s) SubCutaneous daily  piperacillin/tazobactam IVPB. 3.375 Gram(s) IV Intermittent every 8 hours  sodium chloride 0.9% lock flush 3 milliLiter(s) IV Push every 8 hours  vancomycin  IVPB 1000 milliGRAM(s) IV Intermittent every 12 hours  vancomycin  IVPB        MEDICATIONS  (PRN):  acetaminophen  IVPB. 1000 milliGRAM(s) IV Intermittent once PRN Pain/Headache  ALPRAZolam 0.5 milliGRAM(s) Oral daily PRN anxiety  HYDROmorphone  Injectable 1 milliGRAM(s) IV Push every 4 hours PRN Breakthrough pain  naloxone Injectable 0.1 milliGRAM(s) IV Push every 3 minutes PRN For ANY of the following changes in patient status:  A. RR LESS THAN 10 breaths per minute, B. Oxygen saturation LESS THAN 90%, C. Sedation score of 6  ondansetron Injectable 4 milliGRAM(s) IV Push every 6 hours PRN Nausea  oxyCODONE    5 mG/acetaminophen 325 mG 1 Tablet(s) Oral every 4 hours PRN Mild Pain (1 - 3)  oxyCODONE    5 mG/acetaminophen 325 mG 2 Tablet(s) Oral every 6 hours PRN Moderate Pain (4 - 6)    Vital Signs Last 24 Hrs  T(C): 36.9 (31 Oct 2017 05:46), Max: 37.7 (30 Oct 2017 10:30)  T(F): 98.5 (31 Oct 2017 05:46), Max: 99.8 (30 Oct 2017 10:30)  HR: 104 (31 Oct 2017 05:46) (101 - 125)  BP: 132/88 (31 Oct 2017 05:46) (128/86 - 152/106)  RR: 18 (31 Oct 2017 05:46) (16 - 18)  SpO2: 98% (31 Oct 2017 05:46) (94% - 98%)    I&O's Detail  30 Oct 2017 07:01  -  31 Oct 2017 07:00  --------------------------------------------------------  IN:    Oral Fluid: 240 mL    Solution: 350 mL  Total IN: 590 mL  OUT:    Bulb: 10 mL    Bulb: 30 mL    Bulb: 5 mL    Colostomy: 120 mL    Voided: 1405 mL  Total OUT: 1570 mL  Total NET: -980 mL    LABS:                    7.2    12.48 )-----------( 512      ( 30 Oct 2017 08:59 )             23.6   10-30  136  |  96  |  6<L>  ----------------------------<  126<H>  3.8   |  27  |  1.02  Ca    9.1      30 Oct 2017 09:03  Phos  3.5     10-30  Mg     2.3     10-30  PT/INR - ( 29 Oct 2017 08:33 )   PT: 14.7 sec;   INR: 1.29 ratio    PTT - ( 29 Oct 2017 08:33 )  PTT:27.9 sec    Physical Exam:   Gen: NAD, AAOx3  Abd: soft, NT, ND  clean/dry/ intact  Surgical Drains: serous x2   IR drain: serosanguinous

## 2017-10-31 NOTE — PROVIDER CONTACT NOTE (CRITICAL VALUE NOTIFICATION) - ACTION/TREATMENT ORDERED:
Pt on IVPB Vancomyocin and Zosyn. MD Veliz made aware, no action necessar at this time. MD Veliz will consult with day team in the morning. Will continue to monitor the pt.

## 2017-10-31 NOTE — DIETITIAN INITIAL EVALUATION ADULT. - NS AS NUTRI INTERV ED CONTENT
Nutrition relationship to health/disease/Recommended modifications/Purpose of the nutrition education/Other or related topics/Other (specify)/Reviewed [facility] Colostomy nutrition therapy/food list handout (provided by ostomy RN). Discussed eating low-fiber foods, chewing foods well, small frequent meals high in protein & energy. Reviewed foods that may cause odors &/or gas, discussed foods that bulk stool and thin stool, and importance of adequate hydration. RD to remain available for further nutritional interventions as indicated/requested by medical team/pt.

## 2017-10-31 NOTE — DIETITIAN INITIAL EVALUATION ADULT. - OTHER INFO
Pt seen for length of stay. S/p ex-lap, Ryan procedure, liver biopsy, rectal biopsy, s/p IR drainage of collection. Pt noted with rectal cancer with mets to the liver. Pt reports good appetite/intake at meals (~75% x 3 meals/day). Denies N+V. Colostomy output noted, pasty/solid per chart review. Pt states NKFA. Reports some prior knowledge of colostomy/low fiber diet but accepts ostomy nutrition education review/reinforcement.

## 2017-11-01 LAB
-  AMPICILLIN: SIGNIFICANT CHANGE UP
-  CIPROFLOXACIN: SIGNIFICANT CHANGE UP
-  ERYTHROMYCIN: SIGNIFICANT CHANGE UP
-  TETRACYCLINE: SIGNIFICANT CHANGE UP
-  VANCOMYCIN: SIGNIFICANT CHANGE UP
ANION GAP SERPL CALC-SCNC: 14 MMOL/L — SIGNIFICANT CHANGE UP (ref 5–17)
BUN SERPL-MCNC: 6 MG/DL — LOW (ref 7–23)
CALCIUM SERPL-MCNC: 9.2 MG/DL — SIGNIFICANT CHANGE UP (ref 8.4–10.5)
CHLORIDE SERPL-SCNC: 98 MMOL/L — SIGNIFICANT CHANGE UP (ref 96–108)
CO2 SERPL-SCNC: 26 MMOL/L — SIGNIFICANT CHANGE UP (ref 22–31)
CREAT SERPL-MCNC: 0.98 MG/DL — SIGNIFICANT CHANGE UP (ref 0.5–1.3)
CULTURE RESULTS: SIGNIFICANT CHANGE UP
GLUCOSE SERPL-MCNC: 99 MG/DL — SIGNIFICANT CHANGE UP (ref 70–99)
HCT VFR BLD CALC: 24.8 % — LOW (ref 39–50)
HGB BLD-MCNC: 7.5 G/DL — LOW (ref 13–17)
MAGNESIUM SERPL-MCNC: 2.2 MG/DL — SIGNIFICANT CHANGE UP (ref 1.6–2.6)
MCHC RBC-ENTMCNC: 25.2 PG — LOW (ref 27–34)
MCHC RBC-ENTMCNC: 30.2 GM/DL — LOW (ref 32–36)
MCV RBC AUTO: 83.2 FL — SIGNIFICANT CHANGE UP (ref 80–100)
METHOD TYPE: SIGNIFICANT CHANGE UP
PHOSPHATE SERPL-MCNC: 4.4 MG/DL — SIGNIFICANT CHANGE UP (ref 2.5–4.5)
PLATELET # BLD AUTO: 601 K/UL — HIGH (ref 150–400)
POTASSIUM SERPL-MCNC: 4.3 MMOL/L — SIGNIFICANT CHANGE UP (ref 3.5–5.3)
POTASSIUM SERPL-SCNC: 4.3 MMOL/L — SIGNIFICANT CHANGE UP (ref 3.5–5.3)
RBC # BLD: 2.98 M/UL — LOW (ref 4.2–5.8)
RBC # FLD: 16 % — HIGH (ref 10.3–14.5)
SODIUM SERPL-SCNC: 138 MMOL/L — SIGNIFICANT CHANGE UP (ref 135–145)
SPECIMEN SOURCE: SIGNIFICANT CHANGE UP
SURGICAL PATHOLOGY STUDY: SIGNIFICANT CHANGE UP
VANCOMYCIN TROUGH SERPL-MCNC: 9.3 UG/ML — LOW (ref 10–20)
WBC # BLD: 10.51 K/UL — HIGH (ref 3.8–10.5)
WBC # FLD AUTO: 10.51 K/UL — HIGH (ref 3.8–10.5)

## 2017-11-01 PROCEDURE — 99232 SBSQ HOSP IP/OBS MODERATE 35: CPT | Mod: 24

## 2017-11-01 RX ADMIN — SODIUM CHLORIDE 3 MILLILITER(S): 9 INJECTION INTRAMUSCULAR; INTRAVENOUS; SUBCUTANEOUS at 21:40

## 2017-11-01 RX ADMIN — HYDROMORPHONE HYDROCHLORIDE 1 MILLIGRAM(S): 2 INJECTION INTRAMUSCULAR; INTRAVENOUS; SUBCUTANEOUS at 06:54

## 2017-11-01 RX ADMIN — Medication 325 MILLIGRAM(S): at 13:23

## 2017-11-01 RX ADMIN — OXYCODONE AND ACETAMINOPHEN 2 TABLET(S): 5; 325 TABLET ORAL at 17:41

## 2017-11-01 RX ADMIN — Medication 325 MILLIGRAM(S): at 06:17

## 2017-11-01 RX ADMIN — OXYCODONE AND ACETAMINOPHEN 2 TABLET(S): 5; 325 TABLET ORAL at 02:40

## 2017-11-01 RX ADMIN — OXYCODONE AND ACETAMINOPHEN 2 TABLET(S): 5; 325 TABLET ORAL at 10:21

## 2017-11-01 RX ADMIN — Medication 0.5 MILLIGRAM(S): at 22:53

## 2017-11-01 RX ADMIN — PIPERACILLIN AND TAZOBACTAM 25 GRAM(S): 4; .5 INJECTION, POWDER, LYOPHILIZED, FOR SOLUTION INTRAVENOUS at 13:24

## 2017-11-01 RX ADMIN — HYDROMORPHONE HYDROCHLORIDE 1 MILLIGRAM(S): 2 INJECTION INTRAMUSCULAR; INTRAVENOUS; SUBCUTANEOUS at 06:12

## 2017-11-01 RX ADMIN — OXYCODONE AND ACETAMINOPHEN 2 TABLET(S): 5; 325 TABLET ORAL at 10:19

## 2017-11-01 RX ADMIN — OXYCODONE AND ACETAMINOPHEN 2 TABLET(S): 5; 325 TABLET ORAL at 01:56

## 2017-11-01 RX ADMIN — Medication 166.67 MILLIGRAM(S): at 06:17

## 2017-11-01 RX ADMIN — Medication 166.67 MILLIGRAM(S): at 17:36

## 2017-11-01 RX ADMIN — OXYCODONE AND ACETAMINOPHEN 2 TABLET(S): 5; 325 TABLET ORAL at 23:50

## 2017-11-01 RX ADMIN — PIPERACILLIN AND TAZOBACTAM 25 GRAM(S): 4; .5 INJECTION, POWDER, LYOPHILIZED, FOR SOLUTION INTRAVENOUS at 06:17

## 2017-11-01 RX ADMIN — SODIUM CHLORIDE 3 MILLILITER(S): 9 INJECTION INTRAMUSCULAR; INTRAVENOUS; SUBCUTANEOUS at 13:25

## 2017-11-01 RX ADMIN — SODIUM CHLORIDE 3 MILLILITER(S): 9 INJECTION INTRAMUSCULAR; INTRAVENOUS; SUBCUTANEOUS at 06:05

## 2017-11-01 RX ADMIN — PIPERACILLIN AND TAZOBACTAM 25 GRAM(S): 4; .5 INJECTION, POWDER, LYOPHILIZED, FOR SOLUTION INTRAVENOUS at 21:41

## 2017-11-01 RX ADMIN — Medication 325 MILLIGRAM(S): at 21:41

## 2017-11-01 RX ADMIN — ENOXAPARIN SODIUM 40 MILLIGRAM(S): 100 INJECTION SUBCUTANEOUS at 11:22

## 2017-11-01 RX ADMIN — OXYCODONE AND ACETAMINOPHEN 2 TABLET(S): 5; 325 TABLET ORAL at 17:40

## 2017-11-01 NOTE — PROGRESS NOTE ADULT - SUBJECTIVE AND OBJECTIVE BOX
GENERAL SURGERY DAILY PROGRESS NOTE:     Subjective: Patient seen and examined. Patient stable with no overnight events. Patient denies CP/ SOB/ Palpatations. Patient denies N/V. Reports  flatus and  report stool and ostomy output BM.     MEDICATIONS  (STANDING):  enoxaparin Injectable 40 milliGRAM(s) SubCutaneous daily  ferrous    sulfate 325 milliGRAM(s) Oral every 8 hours  piperacillin/tazobactam IVPB. 3.375 Gram(s) IV Intermittent every 8 hours  sodium chloride 0.9% lock flush 3 milliLiter(s) IV Push every 8 hours  vancomycin  IVPB 1250 milliGRAM(s) IV Intermittent every 12 hours    MEDICATIONS  (PRN):  acetaminophen  IVPB. 1000 milliGRAM(s) IV Intermittent once PRN Pain/Headache  ALPRAZolam 0.5 milliGRAM(s) Oral daily PRN anxiety  HYDROmorphone  Injectable 1 milliGRAM(s) IV Push every 4 hours PRN Breakthrough pain  naloxone Injectable 0.1 milliGRAM(s) IV Push every 3 minutes PRN For ANY of the following changes in patient status:  A. RR LESS THAN 10 breaths per minute, B. Oxygen saturation LESS THAN 90%, C. Sedation score of 6  ondansetron Injectable 4 milliGRAM(s) IV Push every 6 hours PRN Nausea  oxyCODONE    5 mG/acetaminophen 325 mG 1 Tablet(s) Oral every 4 hours PRN Mild Pain (1 - 3)  oxyCODONE    5 mG/acetaminophen 325 mG 2 Tablet(s) Oral every 6 hours PRN Moderate Pain (4 - 6)    Vital Signs Last 24 Hrs:   T(C): 37.1 (2017 05:56), Max: 37.4 (31 Oct 2017 14:28)  T(F): 98.7 (2017 05:56), Max: 99.4 (2017 00:49)  HR: 103 (2017 05:56) (94 - 116)  BP: 133/87 (2017 05:56) (126/86 - 135/86)  RR: 18 (2017 05:56) (18 - 18)  SpO2: 98% (2017 05:56) (96% - 98%)    I&O's Detail:   31 Oct 2017 07:01  -  2017 07:00  --------------------------------------------------------  IN:    Oral Fluid: 480 mL    Solution: 800 mL  Total IN: 1280 mL  OUT:    Bulb: 15 mL    Bulb: 5 mL    Bulb: 13 mL    Colostomy: 100 mL    Voided: 2700 mL  Total OUT: 2833 mL  Total NET: -1553 mL  Daily Weight in k.5 (31 Oct 2017 11:10)    LABS:                    7.1    11.50 )-----------( 571      ( 31 Oct 2017 07:34 )             23.5   10  137  |  97  |  5<L>  ----------------------------<  102<H>  4.2   |  25  |  1.06  Ca    9.1      31 Oct 2017 07:39  Phos  4.1     10-31  Mg     2.3     10-31    Physical Exam:  Gen: NAD, AAOx3  Abd: soft, NT, ND  STEPHANE x2: serosanguinous   STEPHANE IR drain: merky but serous

## 2017-11-01 NOTE — PROGRESS NOTE ADULT - ASSESSMENT
Assessment:    32y Male who presents with Malignant neoplasm of rectum    Plan:  -DVT PPX- Lovenox  -Pain control   -OOB as tolerated with assistance   -Regular diet as tolerated  -continue to monitor Gi Meghan Bravo   #[id number] 11-01-17 @ 07:52

## 2017-11-02 LAB
-  AMPICILLIN: SIGNIFICANT CHANGE UP
-  CIPROFLOXACIN: SIGNIFICANT CHANGE UP
-  ERYTHROMYCIN: SIGNIFICANT CHANGE UP
-  TETRACYCLINE: SIGNIFICANT CHANGE UP
-  VANCOMYCIN: SIGNIFICANT CHANGE UP
ANION GAP SERPL CALC-SCNC: 16 MMOL/L — SIGNIFICANT CHANGE UP (ref 5–17)
BUN SERPL-MCNC: 6 MG/DL — LOW (ref 7–23)
CALCIUM SERPL-MCNC: 9.6 MG/DL — SIGNIFICANT CHANGE UP (ref 8.4–10.5)
CHLORIDE SERPL-SCNC: 95 MMOL/L — LOW (ref 96–108)
CO2 SERPL-SCNC: 26 MMOL/L — SIGNIFICANT CHANGE UP (ref 22–31)
CREAT SERPL-MCNC: 1.21 MG/DL — SIGNIFICANT CHANGE UP (ref 0.5–1.3)
GLUCOSE SERPL-MCNC: 104 MG/DL — HIGH (ref 70–99)
HCT VFR BLD CALC: 26 % — LOW (ref 39–50)
HGB BLD-MCNC: 7.8 G/DL — LOW (ref 13–17)
MAGNESIUM SERPL-MCNC: 2.3 MG/DL — SIGNIFICANT CHANGE UP (ref 1.6–2.6)
MCHC RBC-ENTMCNC: 24.7 PG — LOW (ref 27–34)
MCHC RBC-ENTMCNC: 30 GM/DL — LOW (ref 32–36)
MCV RBC AUTO: 82.3 FL — SIGNIFICANT CHANGE UP (ref 80–100)
METHOD TYPE: SIGNIFICANT CHANGE UP
PHOSPHATE SERPL-MCNC: 4 MG/DL — SIGNIFICANT CHANGE UP (ref 2.5–4.5)
PLATELET # BLD AUTO: 668 K/UL — HIGH (ref 150–400)
POTASSIUM SERPL-MCNC: 4.6 MMOL/L — SIGNIFICANT CHANGE UP (ref 3.5–5.3)
POTASSIUM SERPL-SCNC: 4.6 MMOL/L — SIGNIFICANT CHANGE UP (ref 3.5–5.3)
RBC # BLD: 3.16 M/UL — LOW (ref 4.2–5.8)
RBC # FLD: 15.9 % — HIGH (ref 10.3–14.5)
SODIUM SERPL-SCNC: 137 MMOL/L — SIGNIFICANT CHANGE UP (ref 135–145)
VANCOMYCIN TROUGH SERPL-MCNC: 13.5 UG/ML — SIGNIFICANT CHANGE UP (ref 10–20)
WBC # BLD: 12.98 K/UL — HIGH (ref 3.8–10.5)
WBC # FLD AUTO: 12.98 K/UL — HIGH (ref 3.8–10.5)

## 2017-11-02 PROCEDURE — 99232 SBSQ HOSP IP/OBS MODERATE 35: CPT | Mod: 24

## 2017-11-02 RX ORDER — METRONIDAZOLE 500 MG
500 TABLET ORAL EVERY 8 HOURS
Qty: 0 | Refills: 0 | Status: DISCONTINUED | OUTPATIENT
Start: 2017-11-02 | End: 2017-11-03

## 2017-11-02 RX ORDER — CIPROFLOXACIN LACTATE 400MG/40ML
400 VIAL (ML) INTRAVENOUS EVERY 12 HOURS
Qty: 0 | Refills: 0 | Status: DISCONTINUED | OUTPATIENT
Start: 2017-11-02 | End: 2017-11-03

## 2017-11-02 RX ADMIN — OXYCODONE AND ACETAMINOPHEN 2 TABLET(S): 5; 325 TABLET ORAL at 14:51

## 2017-11-02 RX ADMIN — OXYCODONE AND ACETAMINOPHEN 2 TABLET(S): 5; 325 TABLET ORAL at 21:37

## 2017-11-02 RX ADMIN — Medication 100 MILLIGRAM(S): at 21:07

## 2017-11-02 RX ADMIN — OXYCODONE AND ACETAMINOPHEN 2 TABLET(S): 5; 325 TABLET ORAL at 07:56

## 2017-11-02 RX ADMIN — OXYCODONE AND ACETAMINOPHEN 2 TABLET(S): 5; 325 TABLET ORAL at 00:20

## 2017-11-02 RX ADMIN — Medication 325 MILLIGRAM(S): at 05:45

## 2017-11-02 RX ADMIN — SODIUM CHLORIDE 3 MILLILITER(S): 9 INJECTION INTRAMUSCULAR; INTRAVENOUS; SUBCUTANEOUS at 21:07

## 2017-11-02 RX ADMIN — Medication 325 MILLIGRAM(S): at 14:14

## 2017-11-02 RX ADMIN — Medication 200 MILLIGRAM(S): at 17:27

## 2017-11-02 RX ADMIN — OXYCODONE AND ACETAMINOPHEN 2 TABLET(S): 5; 325 TABLET ORAL at 14:21

## 2017-11-02 RX ADMIN — Medication 100 MILLIGRAM(S): at 14:14

## 2017-11-02 RX ADMIN — OXYCODONE AND ACETAMINOPHEN 2 TABLET(S): 5; 325 TABLET ORAL at 08:26

## 2017-11-02 RX ADMIN — SODIUM CHLORIDE 3 MILLILITER(S): 9 INJECTION INTRAMUSCULAR; INTRAVENOUS; SUBCUTANEOUS at 14:01

## 2017-11-02 RX ADMIN — PIPERACILLIN AND TAZOBACTAM 25 GRAM(S): 4; .5 INJECTION, POWDER, LYOPHILIZED, FOR SOLUTION INTRAVENOUS at 05:44

## 2017-11-02 RX ADMIN — SODIUM CHLORIDE 3 MILLILITER(S): 9 INJECTION INTRAMUSCULAR; INTRAVENOUS; SUBCUTANEOUS at 05:45

## 2017-11-02 RX ADMIN — OXYCODONE AND ACETAMINOPHEN 2 TABLET(S): 5; 325 TABLET ORAL at 21:07

## 2017-11-02 RX ADMIN — ENOXAPARIN SODIUM 40 MILLIGRAM(S): 100 INJECTION SUBCUTANEOUS at 11:43

## 2017-11-02 RX ADMIN — Medication 325 MILLIGRAM(S): at 21:06

## 2017-11-02 RX ADMIN — Medication 0.5 MILLIGRAM(S): at 22:12

## 2017-11-02 NOTE — PROGRESS NOTE ADULT - ASSESSMENT
Assessment:    32y Male who presents with Malignant neoplasm of rectum s/p Ryan procedure, Liver biopsy, rectal biopsy, and IR drain of pelvic collection POD #10    Plan:  -DVT PPX- Lovenox  -Pain control   -OOB as tolerated with assistance   -Regular diet as tolerated  -continue to monitor Gi Fxn   -Dispo planning  -STEPHANE drain education  -PO ABX

## 2017-11-02 NOTE — PROGRESS NOTE ADULT - SUBJECTIVE AND OBJECTIVE BOX
STATUS POST:  Ryan procedure, Liver biopsy, rectal biopsy, and IR drain of pelvic collection      POST OPERATIVE DAY #: 10    Vital Signs Last 24 Hrs  T(C): 37.2 (02 Nov 2017 05:39), Max: 37.3 (01 Nov 2017 21:08)  T(F): 99 (02 Nov 2017 05:39), Max: 99.1 (01 Nov 2017 21:08)  HR: 100 (02 Nov 2017 05:39) (80 - 100)  BP: 131/86 (02 Nov 2017 05:39) (127/88 - 136/85)  BP(mean): --  RR: 18 (02 Nov 2017 05:39) (18 - 18)  SpO2: 97% (02 Nov 2017 05:39) (94% - 97%)    I&O's Summary    01 Nov 2017 07:01  -  02 Nov 2017 07:00  --------------------------------------------------------  IN: 1270 mL / OUT: 2371 mL / NET: -1101 mL        SUBJECTIVE: Pt seen and examined. No complaints. Denies N/V and pain. No SOB and CP. Ostomy with air and stool.       Physical Exam:  General Appearance: Appears well, NAD  Abdomen: Soft, NT, ND, ostomy with stool, Incision c/d/i, staples in place, STEPHANE #3 purulent, STEPHANE #1-2 serous  Extremities: Grossly symmetric, SCD's in place     LABS:                        7.8    12.98 )-----------( 668      ( 02 Nov 2017 06:40 )             26.0     11-01    138  |  98  |  6<L>  ----------------------------<  99  4.3   |  26  |  0.98    Ca    9.2      01 Nov 2017 07:48  Phos  4.4     11-01  Mg     2.2     11-01        Pamela Cortez PA-C  p#[id number]

## 2017-11-03 ENCOUNTER — TRANSCRIPTION ENCOUNTER (OUTPATIENT)
Age: 32
End: 2017-11-03

## 2017-11-03 VITALS
HEART RATE: 93 BPM | SYSTOLIC BLOOD PRESSURE: 129 MMHG | RESPIRATION RATE: 18 BRPM | OXYGEN SATURATION: 97 % | TEMPERATURE: 99 F | DIASTOLIC BLOOD PRESSURE: 84 MMHG

## 2017-11-03 LAB
CULTURE RESULTS: SIGNIFICANT CHANGE UP
HCT VFR BLD CALC: 24.7 % — LOW (ref 39–50)
HGB BLD-MCNC: 7.6 G/DL — LOW (ref 13–17)
MCHC RBC-ENTMCNC: 25.1 PG — LOW (ref 27–34)
MCHC RBC-ENTMCNC: 30.8 GM/DL — LOW (ref 32–36)
MCV RBC AUTO: 81.5 FL — SIGNIFICANT CHANGE UP (ref 80–100)
ORGANISM # SPEC MICROSCOPIC CNT: SIGNIFICANT CHANGE UP
PLATELET # BLD AUTO: 629 K/UL — HIGH (ref 150–400)
RBC # BLD: 3.03 M/UL — LOW (ref 4.2–5.8)
RBC # FLD: 16 % — HIGH (ref 10.3–14.5)
SPECIMEN SOURCE: SIGNIFICANT CHANGE UP
WBC # BLD: 11.65 K/UL — HIGH (ref 3.8–10.5)
WBC # FLD AUTO: 11.65 K/UL — HIGH (ref 3.8–10.5)

## 2017-11-03 PROCEDURE — 84295 ASSAY OF SERUM SODIUM: CPT

## 2017-11-03 PROCEDURE — 82962 GLUCOSE BLOOD TEST: CPT

## 2017-11-03 PROCEDURE — 81311 NRAS GENE VARIANTS EXON 2&3: CPT

## 2017-11-03 PROCEDURE — 81276 KRAS GENE ADDL VARIANTS: CPT

## 2017-11-03 PROCEDURE — 87186 SC STD MICRODIL/AGAR DIL: CPT

## 2017-11-03 PROCEDURE — 71275 CT ANGIOGRAPHY CHEST: CPT

## 2017-11-03 PROCEDURE — 93970 EXTREMITY STUDY: CPT

## 2017-11-03 PROCEDURE — C1769: CPT

## 2017-11-03 PROCEDURE — 93005 ELECTROCARDIOGRAM TRACING: CPT

## 2017-11-03 PROCEDURE — C1729: CPT

## 2017-11-03 PROCEDURE — 81405 MOPATH PROCEDURE LEVEL 6: CPT

## 2017-11-03 PROCEDURE — 99232 SBSQ HOSP IP/OBS MODERATE 35: CPT | Mod: 24

## 2017-11-03 PROCEDURE — 81001 URINALYSIS AUTO W/SCOPE: CPT

## 2017-11-03 PROCEDURE — 82435 ASSAY OF BLOOD CHLORIDE: CPT

## 2017-11-03 PROCEDURE — 87075 CULTR BACTERIA EXCEPT BLOOD: CPT

## 2017-11-03 PROCEDURE — 87040 BLOOD CULTURE FOR BACTERIA: CPT

## 2017-11-03 PROCEDURE — 82330 ASSAY OF CALCIUM: CPT

## 2017-11-03 PROCEDURE — 84100 ASSAY OF PHOSPHORUS: CPT

## 2017-11-03 PROCEDURE — 83735 ASSAY OF MAGNESIUM: CPT

## 2017-11-03 PROCEDURE — 81003 URINALYSIS AUTO W/O SCOPE: CPT

## 2017-11-03 PROCEDURE — 84132 ASSAY OF SERUM POTASSIUM: CPT

## 2017-11-03 PROCEDURE — 81404 MOPATH PROCEDURE LEVEL 5: CPT

## 2017-11-03 PROCEDURE — 87205 SMEAR GRAM STAIN: CPT

## 2017-11-03 PROCEDURE — 81235 EGFR GENE COM VARIANTS: CPT

## 2017-11-03 PROCEDURE — 85027 COMPLETE CBC AUTOMATED: CPT

## 2017-11-03 PROCEDURE — 74177 CT ABD & PELVIS W/CONTRAST: CPT

## 2017-11-03 PROCEDURE — 85730 THROMBOPLASTIN TIME PARTIAL: CPT

## 2017-11-03 PROCEDURE — 82803 BLOOD GASES ANY COMBINATION: CPT

## 2017-11-03 PROCEDURE — 49406 IMAGE CATH FLUID PERI/RETRO: CPT

## 2017-11-03 PROCEDURE — 80048 BASIC METABOLIC PNL TOTAL CA: CPT

## 2017-11-03 PROCEDURE — 83605 ASSAY OF LACTIC ACID: CPT

## 2017-11-03 PROCEDURE — 85610 PROTHROMBIN TIME: CPT

## 2017-11-03 PROCEDURE — 71045 X-RAY EXAM CHEST 1 VIEW: CPT

## 2017-11-03 PROCEDURE — 87070 CULTURE OTHR SPECIMN AEROBIC: CPT

## 2017-11-03 PROCEDURE — 87086 URINE CULTURE/COLONY COUNT: CPT

## 2017-11-03 PROCEDURE — 81314 PDGFRA GENE: CPT

## 2017-11-03 PROCEDURE — C1889: CPT

## 2017-11-03 PROCEDURE — 81210 BRAF GENE: CPT

## 2017-11-03 PROCEDURE — P9016: CPT

## 2017-11-03 PROCEDURE — 86923 COMPATIBILITY TEST ELECTRIC: CPT

## 2017-11-03 PROCEDURE — 85014 HEMATOCRIT: CPT

## 2017-11-03 PROCEDURE — 82947 ASSAY GLUCOSE BLOOD QUANT: CPT

## 2017-11-03 PROCEDURE — 82565 ASSAY OF CREATININE: CPT

## 2017-11-03 PROCEDURE — 81275 KRAS GENE VARIANTS EXON 2: CPT

## 2017-11-03 PROCEDURE — 86850 RBC ANTIBODY SCREEN: CPT

## 2017-11-03 PROCEDURE — 81403 MOPATH PROCEDURE LEVEL 4: CPT

## 2017-11-03 PROCEDURE — 86901 BLOOD TYPING SEROLOGIC RH(D): CPT

## 2017-11-03 PROCEDURE — 81272 KIT GENE TARGETED SEQ ANALYS: CPT

## 2017-11-03 PROCEDURE — 86900 BLOOD TYPING SEROLOGIC ABO: CPT

## 2017-11-03 PROCEDURE — 80202 ASSAY OF VANCOMYCIN: CPT

## 2017-11-03 RX ORDER — OXYCODONE HYDROCHLORIDE 5 MG/1
1 TABLET ORAL
Qty: 0 | Refills: 0 | COMMUNITY

## 2017-11-03 RX ORDER — MOXIFLOXACIN HYDROCHLORIDE TABLETS, 400 MG 400 MG/1
1 TABLET, FILM COATED ORAL
Qty: 20 | Refills: 0 | OUTPATIENT
Start: 2017-11-03 | End: 2017-11-13

## 2017-11-03 RX ORDER — METRONIDAZOLE 500 MG
1 TABLET ORAL
Qty: 30 | Refills: 0 | OUTPATIENT
Start: 2017-11-03 | End: 2017-11-13

## 2017-11-03 RX ORDER — OXYCODONE HYDROCHLORIDE 5 MG/1
1 TABLET ORAL
Qty: 16 | Refills: 0 | OUTPATIENT
Start: 2017-11-03 | End: 2017-11-07

## 2017-11-03 RX ADMIN — SODIUM CHLORIDE 3 MILLILITER(S): 9 INJECTION INTRAMUSCULAR; INTRAVENOUS; SUBCUTANEOUS at 14:39

## 2017-11-03 RX ADMIN — OXYCODONE AND ACETAMINOPHEN 2 TABLET(S): 5; 325 TABLET ORAL at 19:20

## 2017-11-03 RX ADMIN — Medication 100 MILLIGRAM(S): at 05:38

## 2017-11-03 RX ADMIN — Medication 100 MILLIGRAM(S): at 14:55

## 2017-11-03 RX ADMIN — OXYCODONE AND ACETAMINOPHEN 2 TABLET(S): 5; 325 TABLET ORAL at 09:56

## 2017-11-03 RX ADMIN — Medication 200 MILLIGRAM(S): at 05:38

## 2017-11-03 RX ADMIN — ENOXAPARIN SODIUM 40 MILLIGRAM(S): 100 INJECTION SUBCUTANEOUS at 12:20

## 2017-11-03 RX ADMIN — OXYCODONE AND ACETAMINOPHEN 2 TABLET(S): 5; 325 TABLET ORAL at 03:51

## 2017-11-03 RX ADMIN — Medication 325 MILLIGRAM(S): at 14:40

## 2017-11-03 RX ADMIN — OXYCODONE AND ACETAMINOPHEN 2 TABLET(S): 5; 325 TABLET ORAL at 10:26

## 2017-11-03 RX ADMIN — Medication 200 MILLIGRAM(S): at 17:19

## 2017-11-03 RX ADMIN — Medication 325 MILLIGRAM(S): at 05:38

## 2017-11-03 RX ADMIN — OXYCODONE AND ACETAMINOPHEN 2 TABLET(S): 5; 325 TABLET ORAL at 03:21

## 2017-11-03 RX ADMIN — SODIUM CHLORIDE 3 MILLILITER(S): 9 INJECTION INTRAMUSCULAR; INTRAVENOUS; SUBCUTANEOUS at 05:40

## 2017-11-03 RX ADMIN — OXYCODONE AND ACETAMINOPHEN 2 TABLET(S): 5; 325 TABLET ORAL at 18:50

## 2017-11-03 NOTE — DISCHARGE NOTE ADULT - CARE PROVIDER_API CALL
Scottie Kelly (MD), Surgery  [address] [place] [address]  Phone: [phone]  Fax: [phone]

## 2017-11-03 NOTE — DISCHARGE NOTE ADULT - MEDICATION SUMMARY - MEDICATIONS TO TAKE
I will START or STAY ON the medications listed below when I get home from the hospital:    Flagyl 500 mg oral tablet  -- 1 tab(s) by mouth every 8 hours x 10 days  until complete   -- Do not drink alcoholic beverages when taking this medication.  Finish all this medication unless otherwise directed by prescriber.  May discolor urine or feces.    -- Indication: For Infection    oxyCODONE 5 mg oral tablet  -- 1 tab(s) by mouth every 6 hours, As Needed - for moderate painto severe pain MDD:4  -- Indication: For Pain control    Xanax 1 mg oral tablet  -- 1 tab(s) by mouth once a day, As Needed  -- Indication: For Anxiety    Cipro 500 mg oral tablet  -- 1 tab(s) by mouth every 12 hours until complete   -- Avoid prolonged or excessive exposure to direct and/or artificial sunlight while taking this medication.  Check with your doctor before becoming pregnant.  Do not take dairy products, antacids, or iron preparations within one hour of this medication.  Finish all this medication unless otherwise directed by prescriber.  Medication should be taken with plenty of water.    -- Indication: For Infection

## 2017-11-03 NOTE — PROGRESS NOTE ADULT - ASSESSMENT
Assessment:    32y Male who presents with Malignant neoplasm of rectum s/p Ryan procedure, Liver biopsy, rectal biopsy, and IR drain of pelvic collection POD #11    Plan:  -DVT PPX- Lovenox  -Pain control   -OOB as tolerated with assistance   -Regular diet as tolerated  -Dispo planning home today   -IR drain education including flushes  -DC remaining STEPHANE from OR  -PO ABX

## 2017-11-03 NOTE — PROGRESS NOTE ADULT - PROVIDER SPECIALTY LIST ADULT
Anesthesia
Intervent Radiology
Pain Medicine
Surgery
Anesthesia

## 2017-11-03 NOTE — DISCHARGE NOTE ADULT - PLAN OF CARE
Follow up / continue antibiotics - Follow up with Dr. Kelly with in 1-2 weeks following discharge to home, please call his office to schedule an appointment.   - Follow up with Dr. Worthy, Dr. Villanueva within 1-2 weeks for re-evaluation of the remaining drain.   - Continue ostomy care as instructed.   - Flush drain with 10cc of normal saline each day as instructed.

## 2017-11-03 NOTE — DISCHARGE NOTE ADULT - NS TRANSFER PATIENT BELONGINGS
Cell Phone/PDA (specify)/Clothing/Jewelry/Money (specify)/ipad/ earrings/Electronic Device (specify)/Wrist Watch

## 2017-11-03 NOTE — PROGRESS NOTE ADULT - ATTENDING COMMENTS
DC epidural, DC lorenzo.  po analgesia  Regular diet  OOB and amb  Aggressive pulm toileting.  Trend fever curve  DVT US
DC w po abx

## 2017-11-03 NOTE — DISCHARGE NOTE ADULT - CARE PLAN
Principal Discharge DX:	Rectal cancer metastasized to liver  Goal:	Follow up / continue antibiotics  Instructions for follow-up, activity and diet:	- Follow up with Dr. Kelly with in 1-2 weeks following discharge to home, please call his office to schedule an appointment.   - Follow up with Dr. Worthy, Dr. Villanueva within 1-2 weeks for re-evaluation of the remaining drain.   - Continue ostomy care as instructed.   - Flush drain with 10cc of normal saline each day as instructed.

## 2017-11-03 NOTE — PROGRESS NOTE ADULT - SUBJECTIVE AND OBJECTIVE BOX
STATUS POST:  Ryan procedure  Liver biopsy      POST OPERATIVE DAY #: 11    Vital Signs Last 24 Hrs  T(C): 37.1 (03 Nov 2017 06:13), Max: 37.2 (02 Nov 2017 09:45)  T(F): 98.7 (03 Nov 2017 06:13), Max: 98.9 (02 Nov 2017 09:45)  HR: 99 (03 Nov 2017 06:13) (98 - 107)  BP: 122/85 (03 Nov 2017 06:13) (122/85 - 135/88)  BP(mean): --  RR: 18 (03 Nov 2017 06:13) (18 - 18)  SpO2: 95% (03 Nov 2017 06:13) (95% - 98%)    I&O's Summary    02 Nov 2017 07:01  -  03 Nov 2017 07:00  --------------------------------------------------------  IN: 1260 mL / OUT: 2869 mL / NET: -1609 mL    03 Nov 2017 07:01  -  03 Nov 2017 09:03  --------------------------------------------------------  IN: 0 mL / OUT: 300 mL / NET: -300 mL        SUBJECTIVE: Pt seen and examined. Doing well. Ambulating and tolerating diet. Stool in ostomy. Denies N/V, pain, and fevers.         Physical Exam:  General Appearance: Appears well, NAD  Abdomen: Soft, NT, appropriate incisional tenderness, dressings clean and dry and intact, ostomy appliance in place, drain in place  Extremities: Grossly symmetric, SCD's in place     LABS:                        7.8    12.98 )-----------( 668      ( 02 Nov 2017 06:40 )             26.0     11-02    137  |  95<L>  |  6<L>  ----------------------------<  104<H>  4.6   |  26  |  1.21    Ca    9.6      02 Nov 2017 06:40  Phos  4.0     11-02  Mg     2.3     11-02            Pamela Cortez PA-C  p#[phone]

## 2017-11-03 NOTE — DISCHARGE NOTE ADULT - ADDITIONAL INSTRUCTIONS
- Drain Instructions: Flush remaining drain w/ 10cc of normal saline daily   - Home teaching for new ostomy education

## 2017-11-03 NOTE — DISCHARGE NOTE ADULT - HOME CARE AGENCY
[facility] [phone] will reinforce ostomy and misha drain teaching.  An RN will call you to schedule your initial visit.  Please call [facility] for any questions.

## 2017-11-03 NOTE — DISCHARGE NOTE ADULT - HOSPITAL COURSE
This is a 31 y/o male with PMHx of rectal cancer presented 10/23 for the surgical treatement. He was diagnosed with rectal cancer in 8/2016 and is now s/p radiation and chemotherapy (most recent chemo 8/15/17).  He was noted to have liver metastasis on his imaging in April, so he was evaluated by Dr. Camacho in Interventional Radiology for possible treatment of the lesion.  On 10/23 he underwent exploratory laparotomy, Driscoll's procedure and liver/ rectal biopsies On day of surgery he reported recent weight loss after chemotherapy in August and intermittent mild abdominal pain that is relieved by prn Oxycodone.  Denies n/v/d, fever, or melena.    Post-operatively, he was found to have "A peripherally enhancing, thick rimmed fluid and air-containing structure of 4.2 x 3.4 cm adjacent to the rectal stump" on CT from 10/27 and has been febrile Tmax 101 in past 24hr. Pt was referred to IR for drainage of collection. Initially was treated with IV antibiotics. Cultures grew Enterococcus faecalis and sensitivities came back positive for Cipro and Flagyl. Patient was d/c to home on additional 10 day course of Cipro and Flagyl.     Surgical pathology results were positive for residual rectal adenocarcinoma with ervin involved and metastatic adenocarcinoma of the liver.     Prior to discharge both surgical STEPHANE drains (anterior/posterior) were discontinued. Patient to be d/c to home with IR drain for drainage of pelvic collection planned for close outpatient follow up with IR and Dr. Kelly. Patient is to have home nursing education for new ostomy placement and IR drain flushing. He is to flush the drain with 10cc of normal saline daily.     At the time of discharge, the patient was hemodynamically stable, was tolerating PO diet, was voiding urine and passing stool in ostomy, was ambulating, and was comfortable with adequate pain control. The patient was instructed to follow up with Dr. Kelly and interventional radiologist Dr. Kay Quiroga within 1-2 weeks after discharge from the hospital. The patient/family felt comfortable with discharge. The patient was discharged to home. The patient had no other issues.

## 2017-11-06 ENCOUNTER — RX RENEWAL (OUTPATIENT)
Age: 32
End: 2017-11-06

## 2017-11-13 ENCOUNTER — APPOINTMENT (OUTPATIENT)
Dept: SURGICAL ONCOLOGY | Facility: CLINIC | Age: 32
End: 2017-11-13
Payer: COMMERCIAL

## 2017-11-13 VITALS
BODY MASS INDEX: 27.32 KG/M2 | HEIGHT: 66 IN | OXYGEN SATURATION: 97 % | TEMPERATURE: 98.3 F | RESPIRATION RATE: 16 BRPM | SYSTOLIC BLOOD PRESSURE: 138 MMHG | DIASTOLIC BLOOD PRESSURE: 87 MMHG | HEART RATE: 114 BPM | WEIGHT: 170 LBS

## 2017-11-13 PROCEDURE — 99024 POSTOP FOLLOW-UP VISIT: CPT

## 2017-11-16 ENCOUNTER — RX RENEWAL (OUTPATIENT)
Age: 32
End: 2017-11-16

## 2017-11-19 ENCOUNTER — APPOINTMENT (OUTPATIENT)
Dept: CT IMAGING | Facility: IMAGING CENTER | Age: 32
End: 2017-11-19
Payer: COMMERCIAL

## 2017-11-19 ENCOUNTER — OUTPATIENT (OUTPATIENT)
Dept: OUTPATIENT SERVICES | Facility: HOSPITAL | Age: 32
LOS: 1 days | End: 2017-11-19
Payer: COMMERCIAL

## 2017-11-19 DIAGNOSIS — Z98.890 OTHER SPECIFIED POSTPROCEDURAL STATES: Chronic | ICD-10-CM

## 2017-11-19 DIAGNOSIS — Z45.2 ENCOUNTER FOR ADJUSTMENT AND MANAGEMENT OF VASCULAR ACCESS DEVICE: Chronic | ICD-10-CM

## 2017-11-19 DIAGNOSIS — C20 MALIGNANT NEOPLASM OF RECTUM: ICD-10-CM

## 2017-11-19 PROCEDURE — 74177 CT ABD & PELVIS W/CONTRAST: CPT

## 2017-11-19 PROCEDURE — 74177 CT ABD & PELVIS W/CONTRAST: CPT | Mod: 26

## 2017-11-27 ENCOUNTER — APPOINTMENT (OUTPATIENT)
Dept: SURGICAL ONCOLOGY | Facility: CLINIC | Age: 32
End: 2017-11-27
Payer: COMMERCIAL

## 2017-11-27 VITALS
HEIGHT: 66 IN | WEIGHT: 170 LBS | DIASTOLIC BLOOD PRESSURE: 96 MMHG | OXYGEN SATURATION: 98 % | RESPIRATION RATE: 16 BRPM | SYSTOLIC BLOOD PRESSURE: 136 MMHG | HEART RATE: 131 BPM | BODY MASS INDEX: 27.32 KG/M2

## 2017-11-27 PROCEDURE — 99024 POSTOP FOLLOW-UP VISIT: CPT

## 2017-11-28 ENCOUNTER — OUTPATIENT (OUTPATIENT)
Dept: OUTPATIENT SERVICES | Facility: HOSPITAL | Age: 32
LOS: 1 days | End: 2017-11-28
Payer: COMMERCIAL

## 2017-11-28 DIAGNOSIS — C20 MALIGNANT NEOPLASM OF RECTUM: ICD-10-CM

## 2017-11-28 DIAGNOSIS — Z98.890 OTHER SPECIFIED POSTPROCEDURAL STATES: Chronic | ICD-10-CM

## 2017-11-28 DIAGNOSIS — Z45.2 ENCOUNTER FOR ADJUSTMENT AND MANAGEMENT OF VASCULAR ACCESS DEVICE: Chronic | ICD-10-CM

## 2017-11-28 PROCEDURE — 76080 X-RAY EXAM OF FISTULA: CPT

## 2017-11-28 PROCEDURE — 76080 X-RAY EXAM OF FISTULA: CPT | Mod: 26

## 2017-11-28 PROCEDURE — 49424 ASSESS CYST CONTRAST INJECT: CPT

## 2017-11-28 PROCEDURE — C1769: CPT

## 2017-11-30 DIAGNOSIS — Z43.4 ENCOUNTER FOR ATTENTION TO OTHER ARTIFICIAL OPENINGS OF DIGESTIVE TRACT: ICD-10-CM

## 2017-11-30 DIAGNOSIS — K65.1 PERITONEAL ABSCESS: ICD-10-CM

## 2017-12-01 ENCOUNTER — APPOINTMENT (OUTPATIENT)
Dept: INTERVENTIONAL RADIOLOGY/VASCULAR | Facility: CLINIC | Age: 32
End: 2017-12-01
Payer: COMMERCIAL

## 2017-12-01 VITALS
TEMPERATURE: 99 F | WEIGHT: 150 LBS | SYSTOLIC BLOOD PRESSURE: 125 MMHG | HEIGHT: 66 IN | HEART RATE: 137 BPM | DIASTOLIC BLOOD PRESSURE: 84 MMHG | OXYGEN SATURATION: 96 % | RESPIRATION RATE: 16 BRPM | BODY MASS INDEX: 24.11 KG/M2

## 2017-12-01 DIAGNOSIS — R93.5 ABNORMAL FINDINGS ON DIAGNOSTIC IMAGING OF OTHER ABDOMINAL REGIONS, INCLUDING RETROPERITONEUM: ICD-10-CM

## 2017-12-01 DIAGNOSIS — R00.0 TACHYCARDIA, UNSPECIFIED: ICD-10-CM

## 2017-12-01 DIAGNOSIS — E66.3 OVERWEIGHT: ICD-10-CM

## 2017-12-01 PROCEDURE — 99244 OFF/OP CNSLTJ NEW/EST MOD 40: CPT

## 2017-12-01 RX ORDER — POLYETHYLENE GLYCOL 3350, SODIUM SULFATE, SODIUM CHLORIDE, POTASSIUM CHLORIDE, ASCORBIC ACID, SODIUM ASCORBATE 7.5-2.691G
100 KIT ORAL
Qty: 1 | Refills: 0 | Status: DISCONTINUED | COMMUNITY
Start: 2017-10-05 | End: 2017-12-01

## 2017-12-01 RX ORDER — ZOLPIDEM TARTRATE 10 MG/1
10 TABLET ORAL
Qty: 30 | Refills: 0 | Status: DISCONTINUED | COMMUNITY
End: 2017-12-01

## 2017-12-01 RX ORDER — POTASSIUM CHLORIDE 1500 MG/1
20 TABLET, FILM COATED, EXTENDED RELEASE ORAL AS DIRECTED
Qty: 4 | Refills: 0 | Status: DISCONTINUED | COMMUNITY
Start: 2017-10-05 | End: 2017-12-01

## 2017-12-01 RX ORDER — METRONIDAZOLE 250 MG/1
250 TABLET ORAL AS DIRECTED
Qty: 3 | Refills: 0 | Status: DISCONTINUED | COMMUNITY
Start: 2017-10-05 | End: 2017-12-01

## 2017-12-01 RX ORDER — NEOMYCIN SULFATE 500 MG/1
500 TABLET ORAL AS DIRECTED
Qty: 6 | Refills: 0 | Status: DISCONTINUED | COMMUNITY
Start: 2017-10-05 | End: 2017-12-01

## 2017-12-01 RX ORDER — OXYCODONE AND ACETAMINOPHEN 5; 325 MG/1; MG/1
5-325 TABLET ORAL
Qty: 40 | Refills: 0 | Status: DISCONTINUED | COMMUNITY
Start: 2017-11-22 | End: 2017-12-01

## 2017-12-01 RX ORDER — HYDROCORTISONE 25 MG/G
2.5 CREAM TOPICAL 3 TIMES DAILY
Qty: 2 | Refills: 3 | Status: DISCONTINUED | COMMUNITY
Start: 2017-08-09 | End: 2017-12-01

## 2017-12-06 ENCOUNTER — RX RENEWAL (OUTPATIENT)
Age: 32
End: 2017-12-06

## 2017-12-12 ENCOUNTER — OUTPATIENT (OUTPATIENT)
Dept: OUTPATIENT SERVICES | Facility: HOSPITAL | Age: 32
LOS: 1 days | Discharge: HOME | End: 2017-12-12

## 2017-12-12 ENCOUNTER — RX RENEWAL (OUTPATIENT)
Age: 32
End: 2017-12-12

## 2017-12-12 DIAGNOSIS — C18.9 MALIGNANT NEOPLASM OF COLON, UNSPECIFIED: ICD-10-CM

## 2017-12-12 DIAGNOSIS — Z79.899 OTHER LONG TERM (CURRENT) DRUG THERAPY: ICD-10-CM

## 2017-12-12 DIAGNOSIS — Z98.890 OTHER SPECIFIED POSTPROCEDURAL STATES: Chronic | ICD-10-CM

## 2017-12-12 DIAGNOSIS — Z45.2 ENCOUNTER FOR ADJUSTMENT AND MANAGEMENT OF VASCULAR ACCESS DEVICE: Chronic | ICD-10-CM

## 2017-12-12 DIAGNOSIS — C20 MALIGNANT NEOPLASM OF RECTUM: ICD-10-CM

## 2017-12-14 ENCOUNTER — APPOINTMENT (OUTPATIENT)
Dept: UROLOGY | Facility: CLINIC | Age: 32
End: 2017-12-14
Payer: COMMERCIAL

## 2017-12-14 ENCOUNTER — INPATIENT (INPATIENT)
Facility: HOSPITAL | Age: 32
LOS: 9 days | Discharge: HOME | End: 2017-12-24
Attending: SURGERY | Admitting: SURGERY
Payer: COMMERCIAL

## 2017-12-14 ENCOUNTER — MEDICATION RENEWAL (OUTPATIENT)
Age: 32
End: 2017-12-14

## 2017-12-14 VITALS
DIASTOLIC BLOOD PRESSURE: 83 MMHG | HEIGHT: 66 IN | OXYGEN SATURATION: 100 % | TEMPERATURE: 102 F | RESPIRATION RATE: 20 BRPM | SYSTOLIC BLOOD PRESSURE: 136 MMHG | WEIGHT: 149.91 LBS | HEART RATE: 137 BPM

## 2017-12-14 VITALS
HEART RATE: 149 BPM | TEMPERATURE: 103.2 F | WEIGHT: 150 LBS | RESPIRATION RATE: 17 BRPM | HEIGHT: 66 IN | BODY MASS INDEX: 24.11 KG/M2 | DIASTOLIC BLOOD PRESSURE: 84 MMHG | SYSTOLIC BLOOD PRESSURE: 132 MMHG

## 2017-12-14 VITALS
SYSTOLIC BLOOD PRESSURE: 132 MMHG | TEMPERATURE: 102.4 F | HEART RATE: 142 BPM | DIASTOLIC BLOOD PRESSURE: 85 MMHG | RESPIRATION RATE: 16 BRPM

## 2017-12-14 DIAGNOSIS — Z45.2 ENCOUNTER FOR ADJUSTMENT AND MANAGEMENT OF VASCULAR ACCESS DEVICE: Chronic | ICD-10-CM

## 2017-12-14 DIAGNOSIS — N13.5 CROSSING VESSEL AND STRICTURE OF URETER W/OUT HYDRONEPHROSIS: ICD-10-CM

## 2017-12-14 DIAGNOSIS — K65.1 PERITONEAL ABSCESS: ICD-10-CM

## 2017-12-14 DIAGNOSIS — Z98.890 OTHER SPECIFIED POSTPROCEDURAL STATES: Chronic | ICD-10-CM

## 2017-12-14 DIAGNOSIS — Z93.3 COLOSTOMY STATUS: Chronic | ICD-10-CM

## 2017-12-14 LAB
ALBUMIN SERPL ELPH-MCNC: 3.5 G/DL — SIGNIFICANT CHANGE UP (ref 3.3–5)
ALP SERPL-CCNC: 157 U/L — HIGH (ref 40–120)
ALT FLD-CCNC: 14 U/L — SIGNIFICANT CHANGE UP (ref 4–41)
APPEARANCE UR: SIGNIFICANT CHANGE UP
APTT BLD: 30 SEC — SIGNIFICANT CHANGE UP (ref 27.5–37.4)
AST SERPL-CCNC: 17 U/L — SIGNIFICANT CHANGE UP (ref 4–40)
BASE EXCESS BLDV CALC-SCNC: 2.2 MMOL/L — SIGNIFICANT CHANGE UP
BASOPHILS # BLD AUTO: 0.05 K/UL — SIGNIFICANT CHANGE UP (ref 0–0.2)
BASOPHILS NFR BLD AUTO: 0.3 % — SIGNIFICANT CHANGE UP (ref 0–2)
BILIRUB SERPL-MCNC: 0.5 MG/DL — SIGNIFICANT CHANGE UP (ref 0.2–1.2)
BILIRUB UR-MCNC: NEGATIVE — SIGNIFICANT CHANGE UP
BLOOD GAS VENOUS - CREATININE: 0.92 MG/DL — SIGNIFICANT CHANGE UP (ref 0.5–1.3)
BLOOD UR QL VISUAL: NEGATIVE — SIGNIFICANT CHANGE UP
BUN SERPL-MCNC: 10 MG/DL — SIGNIFICANT CHANGE UP (ref 7–23)
CALCIUM SERPL-MCNC: 9.2 MG/DL — SIGNIFICANT CHANGE UP (ref 8.4–10.5)
CHLORIDE BLDV-SCNC: 96 MMOL/L — SIGNIFICANT CHANGE UP (ref 96–108)
CHLORIDE SERPL-SCNC: 93 MMOL/L — LOW (ref 98–107)
CO2 SERPL-SCNC: 25 MMOL/L — SIGNIFICANT CHANGE UP (ref 22–31)
COLOR SPEC: YELLOW — SIGNIFICANT CHANGE UP
CREAT SERPL-MCNC: 1.03 MG/DL — SIGNIFICANT CHANGE UP (ref 0.5–1.3)
EOSINOPHIL # BLD AUTO: 0 K/UL — SIGNIFICANT CHANGE UP (ref 0–0.5)
EOSINOPHIL NFR BLD AUTO: 0 % — SIGNIFICANT CHANGE UP (ref 0–6)
GAS PNL BLDV: 135 MMOL/L — LOW (ref 136–146)
GLUCOSE BLDV-MCNC: 112 — HIGH (ref 70–99)
GLUCOSE SERPL-MCNC: 111 MG/DL — HIGH (ref 70–99)
GLUCOSE UR-MCNC: NEGATIVE — SIGNIFICANT CHANGE UP
HCO3 BLDV-SCNC: 25 MMOL/L — SIGNIFICANT CHANGE UP (ref 20–27)
HCT VFR BLD CALC: 30.2 % — LOW (ref 39–50)
HCT VFR BLDV CALC: 28.4 % — LOW (ref 39–51)
HGB BLD-MCNC: 9.2 G/DL — LOW (ref 13–17)
HGB BLDV-MCNC: 9.2 G/DL — LOW (ref 13–17)
IMM GRANULOCYTES # BLD AUTO: 0.36 # — SIGNIFICANT CHANGE UP
IMM GRANULOCYTES NFR BLD AUTO: 1.9 % — HIGH (ref 0–1.5)
INR BLD: 1.71 — HIGH (ref 0.88–1.17)
KETONES UR-MCNC: HIGH
LACTATE BLDV-MCNC: 1.9 MMOL/L — SIGNIFICANT CHANGE UP (ref 0.5–2)
LEUKOCYTE ESTERASE UR-ACNC: NEGATIVE — SIGNIFICANT CHANGE UP
LYMPHOCYTES # BLD AUTO: 0.43 K/UL — LOW (ref 1–3.3)
LYMPHOCYTES # BLD AUTO: 2.3 % — LOW (ref 13–44)
MCHC RBC-ENTMCNC: 23.6 PG — LOW (ref 27–34)
MCHC RBC-ENTMCNC: 30.5 % — LOW (ref 32–36)
MCV RBC AUTO: 77.4 FL — LOW (ref 80–100)
MONOCYTES # BLD AUTO: 1.4 K/UL — HIGH (ref 0–0.9)
MONOCYTES NFR BLD AUTO: 7.4 % — SIGNIFICANT CHANGE UP (ref 2–14)
MUCOUS THREADS # UR AUTO: SIGNIFICANT CHANGE UP
NEUTROPHILS # BLD AUTO: 16.68 K/UL — HIGH (ref 1.8–7.4)
NEUTROPHILS NFR BLD AUTO: 88.1 % — HIGH (ref 43–77)
NITRITE UR-MCNC: NEGATIVE — SIGNIFICANT CHANGE UP
NON-SQ EPI CELLS # UR AUTO: <1 — SIGNIFICANT CHANGE UP
NRBC # FLD: 0 — SIGNIFICANT CHANGE UP
PCO2 BLDV: 41 MMHG — SIGNIFICANT CHANGE UP (ref 41–51)
PH BLDV: 7.42 PH — SIGNIFICANT CHANGE UP (ref 7.32–7.43)
PH UR: 6 — SIGNIFICANT CHANGE UP (ref 4.6–8)
PLATELET # BLD AUTO: 429 K/UL — HIGH (ref 150–400)
PMV BLD: 9.4 FL — SIGNIFICANT CHANGE UP (ref 7–13)
PO2 BLDV: < 24 MMHG — LOW (ref 35–40)
POTASSIUM BLDV-SCNC: 3.3 MMOL/L — LOW (ref 3.4–4.5)
POTASSIUM SERPL-MCNC: 3.4 MMOL/L — LOW (ref 3.5–5.3)
POTASSIUM SERPL-SCNC: 3.4 MMOL/L — LOW (ref 3.5–5.3)
PROT SERPL-MCNC: 7.5 G/DL — SIGNIFICANT CHANGE UP (ref 6–8.3)
PROT UR-MCNC: 100 MG/DL — SIGNIFICANT CHANGE UP
PROTHROM AB SERPL-ACNC: 19.2 SEC — HIGH (ref 9.8–13.1)
RBC # BLD: 3.9 M/UL — LOW (ref 4.2–5.8)
RBC # FLD: 15.8 % — HIGH (ref 10.3–14.5)
SAO2 % BLDV: 24.5 % — LOW (ref 60–85)
SODIUM SERPL-SCNC: 136 MMOL/L — SIGNIFICANT CHANGE UP (ref 135–145)
SP GR SPEC: 1.02 — SIGNIFICANT CHANGE UP (ref 1–1.04)
SQUAMOUS # UR AUTO: SIGNIFICANT CHANGE UP
UROBILINOGEN FLD QL: NORMAL MG/DL — SIGNIFICANT CHANGE UP
WBC # BLD: 18.92 K/UL — HIGH (ref 3.8–10.5)
WBC # FLD AUTO: 18.92 K/UL — HIGH (ref 3.8–10.5)
WBC UR QL: HIGH (ref 0–?)

## 2017-12-14 PROCEDURE — 99204 OFFICE O/P NEW MOD 45 MIN: CPT

## 2017-12-14 PROCEDURE — 99223 1ST HOSP IP/OBS HIGH 75: CPT | Mod: 24

## 2017-12-14 PROCEDURE — 74177 CT ABD & PELVIS W/CONTRAST: CPT | Mod: 26

## 2017-12-14 PROCEDURE — 71020: CPT | Mod: 26

## 2017-12-14 RX ORDER — CIPROFLOXACIN LACTATE 400MG/40ML
400 VIAL (ML) INTRAVENOUS EVERY 12 HOURS
Qty: 0 | Refills: 0 | Status: DISCONTINUED | OUTPATIENT
Start: 2017-12-15 | End: 2017-12-16

## 2017-12-14 RX ORDER — ALPRAZOLAM 0.25 MG
1 TABLET ORAL DAILY
Qty: 0 | Refills: 0 | Status: DISCONTINUED | OUTPATIENT
Start: 2017-12-14 | End: 2017-12-20

## 2017-12-14 RX ORDER — ACETAMINOPHEN 500 MG
1000 TABLET ORAL ONCE
Qty: 0 | Refills: 0 | Status: COMPLETED | OUTPATIENT
Start: 2017-12-14 | End: 2017-12-14

## 2017-12-14 RX ORDER — METRONIDAZOLE 500 MG
500 TABLET ORAL EVERY 8 HOURS
Qty: 0 | Refills: 0 | Status: DISCONTINUED | OUTPATIENT
Start: 2017-12-15 | End: 2017-12-16

## 2017-12-14 RX ORDER — CIPROFLOXACIN HYDROCHLORIDE 500 MG/1
500 TABLET, FILM COATED ORAL
Qty: 28 | Refills: 0 | Status: COMPLETED | COMMUNITY
Start: 2017-11-16 | End: 2017-11-30

## 2017-12-14 RX ORDER — ENOXAPARIN SODIUM 100 MG/ML
40 INJECTION SUBCUTANEOUS EVERY 24 HOURS
Qty: 0 | Refills: 0 | Status: DISCONTINUED | OUTPATIENT
Start: 2017-12-14 | End: 2017-12-24

## 2017-12-14 RX ORDER — DEXTROSE MONOHYDRATE, SODIUM CHLORIDE, AND POTASSIUM CHLORIDE 50; .745; 4.5 G/1000ML; G/1000ML; G/1000ML
1000 INJECTION, SOLUTION INTRAVENOUS
Qty: 0 | Refills: 0 | Status: DISCONTINUED | OUTPATIENT
Start: 2017-12-14 | End: 2017-12-14

## 2017-12-14 RX ORDER — SODIUM CHLORIDE 9 MG/ML
1000 INJECTION, SOLUTION INTRAVENOUS
Qty: 0 | Refills: 0 | Status: DISCONTINUED | OUTPATIENT
Start: 2017-12-14 | End: 2017-12-14

## 2017-12-14 RX ORDER — CIPROFLOXACIN LACTATE 400MG/40ML
400 VIAL (ML) INTRAVENOUS ONCE
Qty: 0 | Refills: 0 | Status: COMPLETED | OUTPATIENT
Start: 2017-12-14 | End: 2017-12-14

## 2017-12-14 RX ORDER — SODIUM CHLORIDE 9 MG/ML
1000 INJECTION INTRAMUSCULAR; INTRAVENOUS; SUBCUTANEOUS ONCE
Qty: 0 | Refills: 0 | Status: COMPLETED | OUTPATIENT
Start: 2017-12-14 | End: 2017-12-14

## 2017-12-14 RX ORDER — METRONIDAZOLE 500 MG/1
500 TABLET ORAL EVERY 8 HOURS
Qty: 42 | Refills: 0 | Status: COMPLETED | COMMUNITY
Start: 2017-11-16 | End: 2017-11-30

## 2017-12-14 RX ORDER — PIPERACILLIN AND TAZOBACTAM 4; .5 G/20ML; G/20ML
3.38 INJECTION, POWDER, LYOPHILIZED, FOR SOLUTION INTRAVENOUS ONCE
Qty: 0 | Refills: 0 | Status: COMPLETED | OUTPATIENT
Start: 2017-12-14 | End: 2017-12-14

## 2017-12-14 RX ORDER — VANCOMYCIN HCL 1 G
1000 VIAL (EA) INTRAVENOUS ONCE
Qty: 0 | Refills: 0 | Status: COMPLETED | OUTPATIENT
Start: 2017-12-14 | End: 2017-12-14

## 2017-12-14 RX ORDER — IBUPROFEN 200 MG
600 TABLET ORAL ONCE
Qty: 0 | Refills: 0 | Status: COMPLETED | OUTPATIENT
Start: 2017-12-14 | End: 2017-12-14

## 2017-12-14 RX ORDER — DEXTROSE MONOHYDRATE, SODIUM CHLORIDE, AND POTASSIUM CHLORIDE 50; .745; 4.5 G/1000ML; G/1000ML; G/1000ML
1000 INJECTION, SOLUTION INTRAVENOUS
Qty: 0 | Refills: 0 | Status: DISCONTINUED | OUTPATIENT
Start: 2017-12-14 | End: 2017-12-16

## 2017-12-14 RX ORDER — METRONIDAZOLE 500 MG
500 TABLET ORAL ONCE
Qty: 0 | Refills: 0 | Status: COMPLETED | OUTPATIENT
Start: 2017-12-14 | End: 2017-12-14

## 2017-12-14 RX ADMIN — SODIUM CHLORIDE 1000 MILLILITER(S): 9 INJECTION INTRAMUSCULAR; INTRAVENOUS; SUBCUTANEOUS at 11:50

## 2017-12-14 RX ADMIN — Medication 600 MILLIGRAM(S): at 15:19

## 2017-12-14 RX ADMIN — Medication 400 MILLIGRAM(S): at 12:00

## 2017-12-14 RX ADMIN — Medication 1 MILLIGRAM(S): at 23:38

## 2017-12-14 RX ADMIN — Medication 250 MILLIGRAM(S): at 13:08

## 2017-12-14 RX ADMIN — SODIUM CHLORIDE 1000 MILLILITER(S): 9 INJECTION INTRAMUSCULAR; INTRAVENOUS; SUBCUTANEOUS at 12:00

## 2017-12-14 RX ADMIN — PIPERACILLIN AND TAZOBACTAM 200 GRAM(S): 4; .5 INJECTION, POWDER, LYOPHILIZED, FOR SOLUTION INTRAVENOUS at 12:00

## 2017-12-14 RX ADMIN — Medication 200 MILLIGRAM(S): at 18:17

## 2017-12-14 RX ADMIN — Medication 100 MILLIGRAM(S): at 19:44

## 2017-12-14 NOTE — H&P ADULT - HISTORY OF PRESENT ILLNESS
HPI: 31 yo M well known to the surgical oncology service presenting from outpatient urology office with fever. Patient has history significant for rectal cancer (unresectable, and metastatic to liver) s/p a diverting colostomy on 10/23/2017. Postoperatively, patient had a pelvic collection that was managed by STEPHANE surgical drains and an IR placed drain. On outpatient followup, he had a tube check that demonstrated resolution of the cavity around the IR drain, and it was removed. Denies pain, change in bowel habits, appetite, fevers or chills. He was noted to have hydronephrosis and was referred for outpatient urology evaluation. Since that time, patient has been doing well. He was seen in the office and found to be asymptomatically febrile to 102F and was sent to ED for further management.    PMHx: anxiety, GERD, rectal cancer metastasized to liver and lung    PSHx: placement of mediport, hemorrhoidectomy, diverting colostomy/Ryan's    Medications (home): xanax PRN  Allergies: No Known Allergies  (Intolerances: None)  Social Hx: former smoker (2 pack years, quit 10 years ago), rare EtOH use (2-3 times per year), engaged, lives with fiancee    Physical exam significant for soft, nontender abdomen with appropriate ostomy output	    Imaging and labs remarkable for increased pelvic collection, stable R sided hydronephrosis and new mild L hydronephrosis

## 2017-12-14 NOTE — ED ADULT NURSE NOTE - OBJECTIVE STATEMENT
Pt. A&Ox4, presents to ER via EMS from urologist for tachycardia (HR 140s-150s) and fever 102 orally. Pt. states "I feel fine". Denies any chills, cp, sob, palpitations, weakness, n/v/d or abdominal pain. Diagnosed with colon ca aug 2016 with rectal surgery to remove tumor and placement of colostomy 2 months ago. No complaints of stool/urine. #20g IV placed in right AC, labs and urine sent. #18g IV in left wrist by EMS with IVF running. VS done as charted, breathing well on RA. Sinus tach on cardiac monitor/ekg. MD at bedside. Will continue to monitor.

## 2017-12-14 NOTE — ED PROVIDER NOTE - MEDICAL DECISION MAKING DETAILS
31 y/o male with PMHx of rectal adenocarcinoma (dx 8/2016, chemo/ radiation at first, last chemo 8/15/17), mets to liver s/p hartmans procedure on 10/23, post op found to have collection/ right hydronephrosis on CT on 10/ 27, IR drain placed. Pt went to urologist and noted to have a fever: code sepsis: r.o collection: ct abd/ pelvis, IVF, tyelnol, tx with broad iv abx - admit

## 2017-12-14 NOTE — ED PROVIDER NOTE - PROGRESS NOTE DETAILS
Ct showing increase in collection in pelvis. Surgery called for consult. Pt seen by surgeon resident. Pt to be admitted to Dr. Kelly. Give pt dose of cipro/ flagy as per surgical resident. Pt agrees with plan.

## 2017-12-14 NOTE — ED PROVIDER NOTE - CONSTITUTIONAL DEVELOPMENT, MLM
Pt transferred back to room, assisted to bed, given call light. Oxygen at 2lpm via nc.   well developed

## 2017-12-14 NOTE — ED ADULT TRIAGE NOTE - CHIEF COMPLAINT QUOTE
Pt. arrived via EMS, sent from oncologist for tachycardia and fever of 102. Denies any cp, sob, palpitations, n/v/d. chills or weakness. Diagnosed with colon ca aug 2016, has colostomy bag. Pt. well appearing in room. HR in 140s-150s. Pt. arrived via EMS, sent from urologist for tachycardia and fever of 102. Denies any cp, sob, palpitations, n/v/d. chills or weakness. Diagnosed with colon ca aug 2016, has colostomy bag. Pt. well appearing in room. HR in 140s-150s.

## 2017-12-14 NOTE — ED PROVIDER NOTE - CARE PLAN
Principal Discharge DX:	Pelvic abscess in male  Secondary Diagnosis:	Rectal cancer metastasized to liver

## 2017-12-14 NOTE — CONSULT NOTE ADULT - SUBJECTIVE AND OBJECTIVE BOX
HPI  This is a 46 y/o M with metastatic colorectal cancer s/p diverting colostomy and chemotherapy.  He was sent into the ED with fevers (Tm 103.2) and tachycardia to the 150s, in addition to right hydronephrosis again identified in the office.  The patient denied any subjective fevers, chills, incomplete emptying, dysuria, pyuria, or hematuria.  A CT in the ED revealed bilateral hydronephrosis, worse on the right than the left, with a delayed right nephrogram, stable from prior CT in 2017.  Of note, he underwent rectal drainage of an abscess in 10/2017 and the drain was removed after a tube check revealed a collapsed cavity on 17.  Urology is consulted for evaluation.    PAST MEDICAL & SURGICAL HISTORY:  Anxiety: hx of panic attacks  Gastroesophageal reflux disease  Rectal cancer metastasized to liver  Encounter for care related to vascular access port: right chest wall port placed   History of hemorrhoidectomy      MEDICATIONS  (STANDING):  dextrose 5% + sodium chloride 0.45% with potassium chloride 20 mEq/L 1000 milliLiter(s) (125 mL/Hr) IV Continuous <Continuous>  enoxaparin Injectable 40 milliGRAM(s) SubCutaneous every 24 hours    MEDICATIONS  (PRN):  ALPRAZolam 1 milliGRAM(s) Oral daily PRN anxiety      FAMILY HISTORY:  Family history of type 2 diabetes mellitus in mother      Allergies: No Known Allergies    SOCIAL HISTORY: caffeine, rare EtOH, former smoker    REVIEW OF SYSTEMS: Otherwise negative as stated in HPI    Physical Exam  Vital signs  T(C): 38 (17 @ 19:40), Max: 38.8 (17 @ 11:36)  HR: 113 (17 @ 19:40)  BP: 120/74 (17 @ 19:40)  SpO2: 100% (17 @ 19:40)  Void: 200 + several missed, PVR 40cc    Gen: NAD, non-toxic, awake and alert  Pulm: no resp distress	  CV: tachycardic  GI: soft, non-tender, non-distended  : uncircumcised, no discharge, testes descended, atrophic, non-tender                  	  MSK: no edema    LABS:   @ 11:40  WBC 18.92 / Hct 30.2  / SCr 1.03       136  |  93<L>  |  10  ----------------------------<  111<H>  3.4<L>   |  25  |  1.03    Ca    9.2      14 Dec 2017 11:40    TPro  7.5  /  Alb  3.5  /  TBili  0.5  /  DBili  x   /  AST  17  /  ALT  14  /  AlkPhos  157<H>  12-14    PT/INR - ( 14 Dec 2017 11:40 )   PT: 19.2 SEC;   INR: 1.71          PTT - ( 14 Dec 2017 11:40 )  PTT:30.0 SEC  Urinalysis Basic - ( 14 Dec 2017 11:55 )    Color: YELLOW / Appearance: HAZY / S.017 / pH: 6.0  Gluc: NEGATIVE / Ketone: MODERATE  / Bili: NEGATIVE / Urobili: NORMAL mg/dL   Blood: NEGATIVE / Protein: 100 mg/dL / Nitrite: NEGATIVE   Leuk Esterase: NEGATIVE / RBC: x / WBC 5-10   Sq Epi: OCC / Non Sq Epi: x / Bacteria: x    Urine Cx: pending  Blood Cx: pending    RADIOLOGY:  < from: CT Abdomen and Pelvis w/ Oral Cont and w/ IV Cont (.14.17 @ 14:29) >    EXAM:  CT ABDOMEN AND PELVIS OC IC        PROCEDURE DATE:  Dec 14 2017         INTERPRETATION:  CLINICAL INFORMATION: History of colon cancer, status   post colectomy and drain placed and removed, fever/sepsis.    COMPARISON: CT abdomen and pelvis 2017.    PROCEDURE:   CT of the Abdomen and Pelvis was performed with intravenous contrast.   Intravenous contrast: 90 ml Omnipaque 350. 10 ml discarded.  Oral contrast: positive contrast was administered.  Sagittal and coronal reformats were performed.    FINDINGS:    LOWER CHEST: Scattered nodules in the visualized lung bases have slightly   increased in size in the interim. For reference a 1.8 x 1.7 cm right   middle lobe nodule, prior measuring 1.6 cm.    LIVER: Multiple hypodense lesions which have increased in size and in   number compared to prior exam. For reference, a 8.1 x 5.8 cm left lobe   lesion, prior measuring 7.8 x 5.8 cm.  BILE DUCTS: Normal caliber.  GALLBLADDER: Within normal limits.  SPLEEN: Within normal limits.  PANCREAS: Within normal limits.  ADRENALS: Within normal limits.  KIDNEYS/URETERS: Moderate right hydroureteronephrosis to the level of   pelvic collection and delayed excretion is unchanged. Mild left   hydroureteronephrosis to the level of left pelvic collection is new.    BLADDER: Reactive wall thickening of the base of the bladder.  REPRODUCTIVE ORGANS: The prostate is within normal limits.    BOWEL: Status post Driscoll's procedure with a left lower quadrant   colostomy. There is a 4.3 x 3.3 cm thick-walled collection, just   posterior to the bladder and anterior and to the right of the rectal   stump, increased. There is another component of this collection extending   into the left hemipelvis, into the lower abdomen, increased from the   prior exam. There is increased stranding in the pelvis on today's exam.   Interval removal of percutaneous drainage catheter. There is a distended   fluid-filled and thick-walled tubular structure in the right hemipelvis,   which is likely the appendix.The tip of the appendix is not well   visualized and is obscured by the pelvic collection. Thickening of the   terminal and distal ileum is likely reactive from surrounding   inflammatory changes. No bowel obstruction.     PERITONEUM: Trace ascites.  VESSELS:  Within normal limits.  RETROPERITONEUM: No lymphadenopathy.    ABDOMINAL WALL: Within normal limits.  BONES: Within normal limits.    IMPRESSION:    Interval increase in size of thick-walled fluid collection, anterior and   to the right of rectal stump. Interval removal of drainage catheter.   Another component of this collection extending into the left hemipelvis   and left lower abdomen has also increased in the interim.  New changes in the appendix which is markedly distended and fluid-filled.   This is consistent with acute appendicitis, in the right clinical setting.  Stable moderate right hydroureteronephrosis. Left hydroureteronephrosis   is new.  Metastatic disease to lungs and liver, progressed since prior exam.    Findingswere discussed with Dr. Zelaya by Dr. Niño on 2017 at   3:37PM with read back.              SHEMAR NIÑO M.D., RADIOLOGY RESIDENT  This document has been electronically signed.  MATHEUS BOLTON M.D. ATTENDING RADIOLOGIST    < end of copied text >

## 2017-12-14 NOTE — H&P ADULT - ASSESSMENT
33 yo M with metastatic rectal cancer, presenting with fever identified in office, mildly tachycardic, normotensive.    - denies abdominal pain, nontender on exam, tolerating diet with no change in bowel habits, unlikely appendicitis on clinical settings  - new L sided hydro and stable R hydro, concern for obstruction, discussed with urology  - started on antibiotics (cipro, flagyl)  - NPO past midnight for possible ureteral stent placement  - tachycardia extensively worked up during previous admission, likely associated with metastatic disease and worsened with fever, will continue to monitor at this time    Discussed with Dr. Kelly  --ZAKIYA Sidhu, [id number]

## 2017-12-14 NOTE — ED PROVIDER NOTE - ATTENDING CONTRIBUTION TO CARE
Attending note:   After face to face evaluation of this patient, I concur with above noted hx, pe, and care plan for this patient.  33 y/o M with known metastatic rectal cancer s/p colostomy two months ago with subsequent pelvic infection requiring draining, now with fever noted at md's office today.   Mo pain, no dysuria, Evaluation in progress Attending note:   After face to face evaluation of this patient, I concur with above noted hx, pe, and care plan for this patient.  31 y/o M with known metastatic rectal cancer s/p colostomy two months ago with subsequent pelvic infection requiring draining, now with fever noted at md's office today.   Mo pain, no dysuria, Evaluation in progress.

## 2017-12-14 NOTE — ED PROVIDER NOTE - OBJECTIVE STATEMENT
33 y/o male with PMHx of rectal adenocarcinoma (dx 8/2016, chemo/ radiation at first, last chemo 8/15/17), mets to liver s/p hartmans procedure on 10/23, post op found to have collection on CT on 10/ 27, IR drain placed, treated with IV abx, and then d/c'd home with cipro/ flagyl for 10 days, STEPHANE drains were removed prior to discharge on 11/3. Pt was discharged with IR drain in place, and pt was flushing at home. IR drain was then removed at end of november as per pt. Last ct showing right hydronephrosis, pt was told to fu with urology. Pt went to urologist- Dr. Turner today for follow up and noted to have a fever and was sent to ED for further eval. Pt unknown when fever began. Denies any symptoms, chest pain, sob, urinary complaints or abdominal pain.   Surgeon- Dr. Kelly   PMD- Dr. Arias   Oncologist- Dr. Jose

## 2017-12-14 NOTE — H&P ADULT - NSHPPHYSICALEXAM_GEN_ALL_CORE
General: well developed, well nourished, NAD  Neuro: alert and oriented, no focal deficits, moves all extremities spontaneously  HEENT: NCAT, EOMI, anicteric, mucosa moist  Respiratory: airway patent, respirations unlabored  CVS: regular rate and rhythm  Abdomen: soft, nontender, nondistended, ostomy in place with formed stool in bag  Extremities: no edema, sensation and movement grossly intact  Skin: warm, dry, appropriate color

## 2017-12-14 NOTE — H&P ADULT - PSH
Encounter for care related to vascular access port  right chest wall port placed 2017  History of hemorrhoidectomy    Status post Ryan procedure

## 2017-12-14 NOTE — CONSULT NOTE ADULT - ASSESSMENT
This is a 47 year old male with stable Right hydroureteronephrosis and persistent reji-rectal collection after recent IR drainage    - Plan for IR nephrostomy tube (suspect right ureter is involved in reji-rectal collection) and replacement of reji-rectal drain  - aggressive resuscitation and agree with cipro/flagyl  - will follow closely, please call with questions

## 2017-12-14 NOTE — H&P ADULT - NSHPSOCIALHISTORY_GEN_ALL_CORE
Former social smoker, intermittent EtOH (less than one drink per month), [facility]

## 2017-12-14 NOTE — H&P ADULT - NSHPLABSRESULTS_GEN_ALL_CORE
9.2    18.92 )-----------( 429      ( 14 Dec 2017 11:40 )             30.2   12-14    136  |  93<L>  |  10  ----------------------------<  111<H>  3.4<L>   |  25  |  1.03    Ca    9.2      14 Dec 2017 11:40    TPro  7.5  /  Alb  3.5  /  TBili  0.5  /  DBili  x   /  AST  17  /  ALT  14  /  AlkPhos  157<H>  12-14    < from: CT Abdomen and Pelvis w/ Oral Cont and w/ IV Cont (12.14.17 @ 14:29) >    IMPRESSION:    Interval increase in size of thick-walled fluid collection, anterior and to the right of rectal stump. Interval removal of drainage catheter. Another component of this collection extending into the left hemipelvis and left lower abdomen has also increased in the interim. New changes in the appendix which is markedly distended and fluid-filled. This is consistent with acute appendicitis, in the right clinical setting. Stable moderate right hydroureteronephrosis. Left hydroureteronephrosis is new. Metastatic disease to lungs and liver, progressed since prior exam.    < end of copied text >

## 2017-12-14 NOTE — ED ADULT NURSE NOTE - CHIEF COMPLAINT QUOTE
Pt. arrived via EMS, sent from oncologist for tachycardia and fever of 102. Denies any cp, sob, palpitations, n/v/d. chills or weakness. Diagnosed with colon ca aug 2016, has colostomy bag. Pt. well appearing in room. HR in 140s-150s.

## 2017-12-15 DIAGNOSIS — N13.30 UNSPECIFIED HYDRONEPHROSIS: ICD-10-CM

## 2017-12-15 LAB
BACTERIA UR CULT: SIGNIFICANT CHANGE UP
BASOPHILS # BLD AUTO: 0.04 K/UL — SIGNIFICANT CHANGE UP (ref 0–0.2)
BASOPHILS NFR BLD AUTO: 0.2 % — SIGNIFICANT CHANGE UP (ref 0–2)
BLD GP AB SCN SERPL QL: NEGATIVE — SIGNIFICANT CHANGE UP
BUN SERPL-MCNC: 8 MG/DL — SIGNIFICANT CHANGE UP (ref 7–23)
CALCIUM SERPL-MCNC: 8.8 MG/DL — SIGNIFICANT CHANGE UP (ref 8.4–10.5)
CHLORIDE SERPL-SCNC: 100 MMOL/L — SIGNIFICANT CHANGE UP (ref 98–107)
CO2 SERPL-SCNC: 25 MMOL/L — SIGNIFICANT CHANGE UP (ref 22–31)
CREAT SERPL-MCNC: 0.97 MG/DL — SIGNIFICANT CHANGE UP (ref 0.5–1.3)
EOSINOPHIL # BLD AUTO: 0.03 K/UL — SIGNIFICANT CHANGE UP (ref 0–0.5)
EOSINOPHIL NFR BLD AUTO: 0.2 % — SIGNIFICANT CHANGE UP (ref 0–6)
GLUCOSE SERPL-MCNC: 113 MG/DL — HIGH (ref 70–99)
GRAM STN WND: SIGNIFICANT CHANGE UP
HCT VFR BLD CALC: 27.3 % — LOW (ref 39–50)
HGB BLD-MCNC: 8.1 G/DL — LOW (ref 13–17)
IMM GRANULOCYTES # BLD AUTO: 0.2 # — SIGNIFICANT CHANGE UP
IMM GRANULOCYTES NFR BLD AUTO: 1.2 % — SIGNIFICANT CHANGE UP (ref 0–1.5)
LYMPHOCYTES # BLD AUTO: 0.6 K/UL — LOW (ref 1–3.3)
LYMPHOCYTES # BLD AUTO: 3.7 % — LOW (ref 13–44)
MAGNESIUM SERPL-MCNC: 1.7 MG/DL — SIGNIFICANT CHANGE UP (ref 1.6–2.6)
MCHC RBC-ENTMCNC: 23.4 PG — LOW (ref 27–34)
MCHC RBC-ENTMCNC: 29.7 % — LOW (ref 32–36)
MCV RBC AUTO: 78.9 FL — LOW (ref 80–100)
MONOCYTES # BLD AUTO: 1.26 K/UL — HIGH (ref 0–0.9)
MONOCYTES NFR BLD AUTO: 7.8 % — SIGNIFICANT CHANGE UP (ref 2–14)
NEUTROPHILS # BLD AUTO: 14.12 K/UL — HIGH (ref 1.8–7.4)
NEUTROPHILS NFR BLD AUTO: 86.9 % — HIGH (ref 43–77)
NRBC # FLD: 0 — SIGNIFICANT CHANGE UP
PLATELET # BLD AUTO: 382 K/UL — SIGNIFICANT CHANGE UP (ref 150–400)
PMV BLD: 10 FL — SIGNIFICANT CHANGE UP (ref 7–13)
POTASSIUM SERPL-MCNC: 3.5 MMOL/L — SIGNIFICANT CHANGE UP (ref 3.5–5.3)
POTASSIUM SERPL-SCNC: 3.5 MMOL/L — SIGNIFICANT CHANGE UP (ref 3.5–5.3)
RBC # BLD: 3.46 M/UL — LOW (ref 4.2–5.8)
RBC # FLD: 15.7 % — HIGH (ref 10.3–14.5)
RH IG SCN BLD-IMP: POSITIVE — SIGNIFICANT CHANGE UP
SODIUM SERPL-SCNC: 140 MMOL/L — SIGNIFICANT CHANGE UP (ref 135–145)
SPECIMEN SOURCE: SIGNIFICANT CHANGE UP
WBC # BLD: 16.25 K/UL — HIGH (ref 3.8–10.5)
WBC # FLD AUTO: 16.25 K/UL — HIGH (ref 3.8–10.5)

## 2017-12-15 PROCEDURE — 50432 PLMT NEPHROSTOMY CATHETER: CPT | Mod: RT

## 2017-12-15 PROCEDURE — 99232 SBSQ HOSP IP/OBS MODERATE 35: CPT | Mod: 24

## 2017-12-15 RX ORDER — ACETAMINOPHEN 500 MG
1000 TABLET ORAL ONCE
Qty: 0 | Refills: 0 | Status: COMPLETED | OUTPATIENT
Start: 2017-12-15 | End: 2017-12-15

## 2017-12-15 RX ORDER — ACETAMINOPHEN 500 MG
650 TABLET ORAL ONCE
Qty: 0 | Refills: 0 | Status: COMPLETED | OUTPATIENT
Start: 2017-12-15 | End: 2017-12-15

## 2017-12-15 RX ADMIN — DEXTROSE MONOHYDRATE, SODIUM CHLORIDE, AND POTASSIUM CHLORIDE 125 MILLILITER(S): 50; .745; 4.5 INJECTION, SOLUTION INTRAVENOUS at 21:00

## 2017-12-15 RX ADMIN — Medication 100 MILLIGRAM(S): at 12:32

## 2017-12-15 RX ADMIN — Medication 400 MILLIGRAM(S): at 00:50

## 2017-12-15 RX ADMIN — Medication 100 MILLIGRAM(S): at 20:49

## 2017-12-15 RX ADMIN — Medication 650 MILLIGRAM(S): at 21:53

## 2017-12-15 RX ADMIN — Medication 200 MILLIGRAM(S): at 17:13

## 2017-12-15 RX ADMIN — ENOXAPARIN SODIUM 40 MILLIGRAM(S): 100 INJECTION SUBCUTANEOUS at 20:49

## 2017-12-15 RX ADMIN — Medication 100 MILLIGRAM(S): at 04:36

## 2017-12-15 RX ADMIN — Medication 200 MILLIGRAM(S): at 05:40

## 2017-12-15 RX ADMIN — DEXTROSE MONOHYDRATE, SODIUM CHLORIDE, AND POTASSIUM CHLORIDE 125 MILLILITER(S): 50; .745; 4.5 INJECTION, SOLUTION INTRAVENOUS at 08:19

## 2017-12-15 RX ADMIN — Medication 400 MILLIGRAM(S): at 08:48

## 2017-12-15 NOTE — PROGRESS NOTE ADULT - SUBJECTIVE AND OBJECTIVE BOX
Surgery Team D (Surgery Oncology) Progress Note:    Subjective: Pt seen this AM. Pain controlled. Denies N/V. NPO. Febrile to 39.4. Known to be tachy/febrile at baseline. Extensive workup of both at [facility] upon previous admission.          Objective:  T(C): 39.4 (12-15-17 @ 08:15), Max: 39.4 (12-15-17 @ 08:15)  HR: 134 (12-15-17 @ 08:15) (112 - 137)  BP: 116/60 (12-15-17 @ 08:15) (116/60 - 140/80)  RR: 16 (12-15-17 @ 08:15) (16 - 20)  SpO2: 98% (12-15-17 @ 08:15) (98% - 100%)    Labs:                      8.1    16.25 )-----------( 382      ( 15 Dec 2017 05:40 )             27.3       12-15    140  |  100  |  8   ----------------------------<  113<H>  3.5   |  25  |  0.97    Ca    8.8      15 Dec 2017 05:40  Mg     1.7     12-15    TPro  7.5  /  Alb  3.5  /  TBili  0.5  /  DBili  x   /  AST  17  /  ALT  14  /  AlkPhos  157<H>  12-14      I&O's Detail    14 Dec 2017 07:01  -  15 Dec 2017 07:00  --------------------------------------------------------  IN:    dextrose 5% + sodium chloride 0.45% with potassium chloride 20 mEq/L: 625 mL    lactated ringers.: 125 mL    Solution: 100 mL    Solution: 300 mL    Solution: 200 mL  Total IN: 1350 mL    OUT:    Colostomy: 240 mL    Voided: 1000 mL  Total OUT: 1240 mL    Total NET: 110 mL          Focused Physical Exam:  General: well developed, well nourished, NAD  Respiratory: airway patent, respirations unlabored  CVS: regular rate and rhythm  Abdomen: soft, nontender, nondistended, ostomy in place with formed stool in bag  Extremities: no edema, sensation and movement grossly intact      Medications:  acetaminophen  IVPB 1000 milliGRAM(s) IV Intermittent once  ALPRAZolam 1 milliGRAM(s) Oral daily PRN  ciprofloxacin   IVPB 400 milliGRAM(s) IV Intermittent every 12 hours  dextrose 5% + sodium chloride 0.45% with potassium chloride 20 mEq/L 1000 milliLiter(s) IV Continuous <Continuous>  enoxaparin Injectable 40 milliGRAM(s) SubCutaneous every 24 hours  metroNIDAZOLE  IVPB 500 milliGRAM(s) IV Intermittent every 8 hours

## 2017-12-15 NOTE — CONSULT NOTE ADULT - PROBLEM SELECTOR RECOMMENDATION 9
- IR to place R nephrostomy  - Pt will receive anesthesia, please keep NPO  - FFP Standing order in place  - Consent obtained  - Lovenox held today  - Please call IR with any questions, [phone]

## 2017-12-15 NOTE — CONSULT NOTE ADULT - SUBJECTIVE AND OBJECTIVE BOX
Chief Complaint:  Patient is a 32y old  Male who presents with a chief complaint of fever, fluid collection in pelvis     HPI:  33 yo M presenting from outpatient urology office with fever. Patient has history significant for rectal cancer (unresectable, and metastatic to liver) s/p a diverting colostomy on 10/23/2017. Postoperatively, patient had a pelvic collection that was managed by STEPHANE surgical drains and an IR placed drain. On outpatient followup, he had a tube check that demonstrated resolution of the cavity around the IR drain, and it was removed. He was noted to have hydronephrosis and was referred for outpatient urology evaluation. Since that time, patient has been doing well. He was seen in the office and found to be asymptomatically febrile to 102F and was sent to ED for further management. Now referred to IR for Right nephrostomy. Denies NVD, pain, dysuria, hematuria, change in bowel habits, appetite changes, or chills    Allergies    No Known Allergies    Intolerances      MEDICATIONS  (STANDING):  ciprofloxacin   IVPB 400 milliGRAM(s) IV Intermittent every 12 hours  dextrose 5% + sodium chloride 0.45% with potassium chloride 20 mEq/L 1000 milliLiter(s) (125 mL/Hr) IV Continuous <Continuous>  enoxaparin Injectable 40 milliGRAM(s) SubCutaneous every 24 hours  metroNIDAZOLE  IVPB 500 milliGRAM(s) IV Intermittent every 8 hours    MEDICATIONS  (PRN):  ALPRAZolam 1 milliGRAM(s) Oral daily PRN anxiety      PAST MEDICAL & SURGICAL HISTORY:  Anxiety: hx of panic attacks  Gastroesophageal reflux disease  Rectal cancer metastasized to liver  Status post Ryan procedure  Encounter for care related to vascular access port: right chest wall port placed 2017  History of hemorrhoidectomy      FAMILY HISTORY:  Family history of type 2 diabetes mellitus in mother (Mother)      Review of Systems:    As per HPI    Physical Exam:    Vital Signs Last 24 Hrs  T(C): 37.9 (15 Dec 2017 10:05), Max: 39.4 (15 Dec 2017 08:15)  T(F): 100.3 (15 Dec 2017 10:05), Max: 103 (15 Dec 2017 08:15)  HR: 124 (15 Dec 2017 10:05) (112 - 134)  BP: 116/60 (15 Dec 2017 08:15) (116/60 - 140/80)  BP(mean): --  RR: 16 (15 Dec 2017 08:15) (16 - 18)  SpO2: 98% (15 Dec 2017 08:15) (98% - 100%)      GENERAL APPEARANCE: Well developed, well nourished, in no acute distress.    SKIN: Inspection of the skin reveals no rashes, ulcerations or petechiae.    HEENT: The sclerae were anicteric and conjunctivae were pink and moist. Extraocular movements were intact and pupils were equal, round, and reactive to light with normal accommodation.     LUNGS: Auscultation of the lungs revealed normal breath sounds without any other adventitious sounds or rubs.    CARDIOVASCULAR: There was a regular rate and rhythm without any murmurs, gallops, rubs. The carotid pulses were normal and 2+ bilaterally without bruits. Peripheral pulses were 2+ and symmetric.    ABDOMEN: Soft and nontender with normal bowel sounds. Colostomy in place with midline surgical scar.    NEUROLOGIC: Alert and oriented x 3. Normal affect.       CBC                        8.1    16.25 )-----------( 382      ( 15 Dec 2017 05:40 )             27.3       Chemistry  12-15    140  |  100  |  8   ----------------------------<  113<H>  3.5   |  25  |  0.97    Ca    8.8      15 Dec 2017 05:40  Mg     1.7     12-15    TPro  7.5  /  Alb  3.5  /  TBili  0.5  /  DBili  x   /  AST  17  /  ALT  14  /  AlkPhos  157<H>  12-14    Coags:  PT/INR - ( 14 Dec 2017 11:40 )   PT: 19.2 SEC;   INR: 1.71     PTT - ( 14 Dec 2017 11:40 )  PTT:30.0 SEC    < from: CT Abdomen and Pelvis w/ Oral Cont and w/ IV Cont (12.14.17 @ 14:29) >  IMPRESSION:    Stable moderate right hydroureteronephrosis. Left hydroureteronephrosis   is new.  Metastatic disease to lungs and liver, progressed since prior exam.    < end of copied text >

## 2017-12-15 NOTE — CONSULT NOTE ADULT - ASSESSMENT
31yo M with Right sided hydronephrosis 2/2 metastatic rectal CA, referred to IR for nephrostomy  - Febrile and tachycardic at baseline  - Pt is consentable  - Pt is NPO  - Hold lovenox

## 2017-12-15 NOTE — PROVIDER CONTACT NOTE (OTHER) - ASSESSMENT
asymptomatic. pt states "gets hot at night all the time". NAD noted. denies any pain or palpitations.

## 2017-12-15 NOTE — CHART NOTE - NSCHARTNOTEFT_GEN_A_CORE
Patient Age: 32    Patient Gender: M    Procedure (including site / side if known): RIGHT nephrostomy tube insertion    Diagnosis / Indication: right hydronephrosis    Interventional Radiology Attending Physician: Magalys    Ordering Attending Physician: Yue    Pertinent Medical History:    PAST MEDICAL & SURGICAL HISTORY:  Anxiety: hx of panic attacks  Gastroesophageal reflux disease  Rectal cancer metastasized to liver  Status post Ryan procedure  Encounter for care related to vascular access port: right chest wall port placed 2017  History of hemorrhoidectomy        Pertinent Labs:                            8.1    16.25 )-----------( 382      ( 15 Dec 2017 05:40 )             27.3       12-15    140  |  100  |  8   ----------------------------<  113<H>  3.5   |  25  |  0.97    Ca    8.8      15 Dec 2017 05:40  Mg     1.7     12-15    TPro  7.5  /  Alb  3.5  /  TBili  0.5  /  DBili  x   /  AST  17  /  ALT  14  /  AlkPhos  157<H>  12-14      PT/INR - ( 14 Dec 2017 11:40 )   PT: 19.2 SEC;   INR: 1.71          PTT - ( 14 Dec 2017 11:40 )  PTT:30.0 SEC    Patient and Family aware:   [ x ]Y   [  ]N

## 2017-12-15 NOTE — PROGRESS NOTE ADULT - ASSESSMENT
33 yo M with metastatic rectal cancer, presenting with fever identified in office, mildly tachycardic, normotensive:  - Pain control  - NPO  - Urology to f/u w/ IR for neph tubes  - No IR intervention for fluid collections  - DVT ppx

## 2017-12-16 LAB
BUN SERPL-MCNC: 5 MG/DL — LOW (ref 7–23)
CALCIUM SERPL-MCNC: 8.5 MG/DL — SIGNIFICANT CHANGE UP (ref 8.4–10.5)
CHLORIDE SERPL-SCNC: 102 MMOL/L — SIGNIFICANT CHANGE UP (ref 98–107)
CO2 SERPL-SCNC: 24 MMOL/L — SIGNIFICANT CHANGE UP (ref 22–31)
CREAT SERPL-MCNC: 0.88 MG/DL — SIGNIFICANT CHANGE UP (ref 0.5–1.3)
GLUCOSE SERPL-MCNC: 103 MG/DL — HIGH (ref 70–99)
HCT VFR BLD CALC: 26.1 % — LOW (ref 39–50)
HGB BLD-MCNC: 7.7 G/DL — LOW (ref 13–17)
MAGNESIUM SERPL-MCNC: 1.7 MG/DL — SIGNIFICANT CHANGE UP (ref 1.6–2.6)
MCHC RBC-ENTMCNC: 23.2 PG — LOW (ref 27–34)
MCHC RBC-ENTMCNC: 29.5 % — LOW (ref 32–36)
MCV RBC AUTO: 78.6 FL — LOW (ref 80–100)
NRBC # FLD: 0 — SIGNIFICANT CHANGE UP
PHOSPHATE SERPL-MCNC: 2.8 MG/DL — SIGNIFICANT CHANGE UP (ref 2.5–4.5)
PLATELET # BLD AUTO: 407 K/UL — HIGH (ref 150–400)
PMV BLD: 10 FL — SIGNIFICANT CHANGE UP (ref 7–13)
POTASSIUM SERPL-MCNC: 3.7 MMOL/L — SIGNIFICANT CHANGE UP (ref 3.5–5.3)
POTASSIUM SERPL-SCNC: 3.7 MMOL/L — SIGNIFICANT CHANGE UP (ref 3.5–5.3)
RBC # BLD: 3.32 M/UL — LOW (ref 4.2–5.8)
RBC # FLD: 15.8 % — HIGH (ref 10.3–14.5)
SODIUM SERPL-SCNC: 141 MMOL/L — SIGNIFICANT CHANGE UP (ref 135–145)
WBC # BLD: 14.79 K/UL — HIGH (ref 3.8–10.5)
WBC # FLD AUTO: 14.79 K/UL — HIGH (ref 3.8–10.5)

## 2017-12-16 PROCEDURE — 99232 SBSQ HOSP IP/OBS MODERATE 35: CPT

## 2017-12-16 RX ORDER — MAGNESIUM SULFATE 500 MG/ML
2 VIAL (ML) INJECTION ONCE
Qty: 0 | Refills: 0 | Status: COMPLETED | OUTPATIENT
Start: 2017-12-16 | End: 2017-12-16

## 2017-12-16 RX ORDER — SODIUM CHLORIDE 9 MG/ML
3 INJECTION INTRAMUSCULAR; INTRAVENOUS; SUBCUTANEOUS EVERY 8 HOURS
Qty: 0 | Refills: 0 | Status: DISCONTINUED | OUTPATIENT
Start: 2017-12-16 | End: 2017-12-24

## 2017-12-16 RX ORDER — POTASSIUM PHOSPHATE, MONOBASIC POTASSIUM PHOSPHATE, DIBASIC 236; 224 MG/ML; MG/ML
15 INJECTION, SOLUTION INTRAVENOUS ONCE
Qty: 0 | Refills: 0 | Status: COMPLETED | OUTPATIENT
Start: 2017-12-16 | End: 2017-12-16

## 2017-12-16 RX ORDER — ACETAMINOPHEN 500 MG
650 TABLET ORAL EVERY 6 HOURS
Qty: 0 | Refills: 0 | Status: DISCONTINUED | OUTPATIENT
Start: 2017-12-16 | End: 2017-12-24

## 2017-12-16 RX ORDER — OXYCODONE HYDROCHLORIDE 5 MG/1
5 TABLET ORAL EVERY 4 HOURS
Qty: 0 | Refills: 0 | Status: DISCONTINUED | OUTPATIENT
Start: 2017-12-16 | End: 2017-12-19

## 2017-12-16 RX ADMIN — OXYCODONE HYDROCHLORIDE 5 MILLIGRAM(S): 5 TABLET ORAL at 13:45

## 2017-12-16 RX ADMIN — Medication 50 GRAM(S): at 11:09

## 2017-12-16 RX ADMIN — OXYCODONE HYDROCHLORIDE 5 MILLIGRAM(S): 5 TABLET ORAL at 17:44

## 2017-12-16 RX ADMIN — OXYCODONE HYDROCHLORIDE 5 MILLIGRAM(S): 5 TABLET ORAL at 22:10

## 2017-12-16 RX ADMIN — ENOXAPARIN SODIUM 40 MILLIGRAM(S): 100 INJECTION SUBCUTANEOUS at 20:52

## 2017-12-16 RX ADMIN — SODIUM CHLORIDE 3 MILLILITER(S): 9 INJECTION INTRAMUSCULAR; INTRAVENOUS; SUBCUTANEOUS at 21:03

## 2017-12-16 RX ADMIN — OXYCODONE HYDROCHLORIDE 5 MILLIGRAM(S): 5 TABLET ORAL at 17:20

## 2017-12-16 RX ADMIN — POTASSIUM PHOSPHATE, MONOBASIC POTASSIUM PHOSPHATE, DIBASIC 62.5 MILLIMOLE(S): 236; 224 INJECTION, SOLUTION INTRAVENOUS at 12:20

## 2017-12-16 RX ADMIN — OXYCODONE HYDROCHLORIDE 5 MILLIGRAM(S): 5 TABLET ORAL at 23:15

## 2017-12-16 RX ADMIN — OXYCODONE HYDROCHLORIDE 5 MILLIGRAM(S): 5 TABLET ORAL at 12:20

## 2017-12-16 NOTE — PROGRESS NOTE ADULT - SUBJECTIVE AND OBJECTIVE BOX
Subjective    Patient seen and examined. Persistent fevers and tachycardia. S/P IR insertion of R nephrostomy tube. Notes mild pain at tube insertion site, otherwise feels well. Denies n/v.     Objective    Vital signs  T(F): , Max: 102.8 (12-15-17 @ 20:16)  HR: 123 (12-16-17 @ 07:43)  BP: 124/80 (12-16-17 @ 07:43)  SpO2: 99% (12-16-17 @ 07:43)  Wt(kg): --    Output     12-15 @ 07:01  -  12-16 @ 07:00  --------------------------------------------------------  IN: 2625 mL / OUT: 1870 mL / NET: 755 mL    12-16 @ 07:01  -  12-16 @ 15:11  --------------------------------------------------------  IN: 750 mL / OUT: 1420 mL / NET: -670 mL        General: NAD  Abdomen: soft/non-tender/non-distended  : R NT in place, clear urine.     Labs      12-16 @ 05:00    WBC 14.79 / Hct 26.1  / SCr 0.88     12-15 @ 05:40    WBC 16.25 / Hct 27.3  / SCr 0.97       Culture - Surg Site Aerob/Anaer w/Gm St (12.15.17 @ 18:55)    Gram Stain Wound:   NOS^No Organisms Seen  WBC^White Blood Cells  QNTY CELLS IN GRAM STAIN: NO CELLS SEEN    Specimen Source: KIDNEY - RIGHT

## 2017-12-16 NOTE — PROGRESS NOTE ADULT - SUBJECTIVE AND OBJECTIVE BOX
Surgery Team D (Surgery Oncology) Progress Note:    Subjective: Pt seen this AM. Had R sided nephrostomy tube placed by IR yesterday. Remains tachycardic/febrile; known to be his baseline. Tolerating diet. Febrile to 39.4          Objective:  T(C): 39.3 (12-15-17 @ 20:16), Max: 39.4 (12-15-17 @ 08:15)  HR: 127 (12-15-17 @ 20:16) (122 - 135)  BP: 137/74 (12-15-17 @ 20:16) (116/60 - 137/74)  RR: 18 (12-15-17 @ 20:16) (16 - 18)  SpO2: 100% (12-15-17 @ 20:16) (98% - 100%)    Labs:                      8.1    16.25 )-----------( 382      ( 15 Dec 2017 05:40 )             27.3       12-15    140  |  100  |  8   ----------------------------<  113<H>  3.5   |  25  |  0.97    Ca    8.8      15 Dec 2017 05:40  Mg     1.7     12-15    TPro  7.5  /  Alb  3.5  /  TBili  0.5  /  DBili  x   /  AST  17  /  ALT  14  /  AlkPhos  157<H>  12-14      I&O's Detail    14 Dec 2017 07:01  -  15 Dec 2017 07:00  --------------------------------------------------------  IN:    dextrose 5% + sodium chloride 0.45% with potassium chloride 20 mEq/L: 625 mL    lactated ringers.: 125 mL    Solution: 300 mL    Solution: 200 mL    Solution: 100 mL  Total IN: 1350 mL    OUT:    Colostomy: 240 mL    Voided: 1000 mL  Total OUT: 1240 mL    Total NET: 110 mL      15 Dec 2017 07:01  -  16 Dec 2017 00:57  --------------------------------------------------------  IN:    dextrose 5% + sodium chloride 0.45% with potassium chloride 20 mEq/L: 1500 mL    Solution: 300 mL  Total IN: 1800 mL    OUT:    Colostomy: 260 mL    Drain: 300 mL    Voided: 575 mL  Total OUT: 1135 mL    Total NET: 665 mL          Focused Physical Exam:  General: NAD  Respiratory: respirations unlabored  CVS: tachycardic, S1S2  Abdomen: soft, nontender, nondistended, ostomy in place with formed stool in bag  : R nephrostomy tube in place    Medications:  ALPRAZolam 1 milliGRAM(s) Oral daily PRN  ciprofloxacin   IVPB 400 milliGRAM(s) IV Intermittent every 12 hours  dextrose 5% + sodium chloride 0.45% with potassium chloride 20 mEq/L 1000 milliLiter(s) IV Continuous <Continuous>  enoxaparin Injectable 40 milliGRAM(s) SubCutaneous every 24 hours  metroNIDAZOLE  IVPB 500 milliGRAM(s) IV Intermittent every 8 hours Surgery Team D (Surgery Oncology) Progress Note:    Subjective: Pt seen this AM. Had R sided nephrostomy tube placed by IR yesterday. Remains tachycardic/febrile; known to be his baseline. Tolerating diet. Febrile to 39.4    Objective:  T(C): 39.3 (12-15-17 @ 20:16), Max: 39.4 (12-15-17 @ 08:15)  HR: 127 (12-15-17 @ 20:16) (122 - 135)  BP: 137/74 (12-15-17 @ 20:16) (116/60 - 137/74)  RR: 18 (12-15-17 @ 20:16) (16 - 18)  SpO2: 100% (12-15-17 @ 20:16) (98% - 100%)    Labs:                      8.1    16.25 )-----------( 382      ( 15 Dec 2017 05:40 )             27.3     12-15    140  |  100  |  8   ----------------------------<  113<H>  3.5   |  25  |  0.97    Ca    8.8      15 Dec 2017 05:40  Mg     1.7     12-15    TPro  7.5  /  Alb  3.5  /  TBili  0.5  /  DBili  x   /  AST  17  /  ALT  14  /  AlkPhos  157<H>  12-14      I&O's Detail    14 Dec 2017 07:01  -  15 Dec 2017 07:00  --------------------------------------------------------  IN:    dextrose 5% + sodium chloride 0.45% with potassium chloride 20 mEq/L: 625 mL    lactated ringers.: 125 mL    Solution: 300 mL    Solution: 200 mL    Solution: 100 mL  Total IN: 1350 mL    OUT:    Colostomy: 240 mL    Voided: 1000 mL  Total OUT: 1240 mL    Total NET: 110 mL      15 Dec 2017 07:01  -  16 Dec 2017 00:57  --------------------------------------------------------  IN:    dextrose 5% + sodium chloride 0.45% with potassium chloride 20 mEq/L: 1500 mL    Solution: 300 mL  Total IN: 1800 mL    OUT:    Colostomy: 260 mL    Drain: 300 mL    Voided: 575 mL  Total OUT: 1135 mL    Total NET: 665 mL    Focused Physical Exam:  General: NAD  Respiratory: respirations unlabored  CVS: tachycardic, S1S2  Abdomen: soft, nontender, nondistended, ostomy in place with formed stool in bag  : R nephrostomy tube in place    Medications:  ALPRAZolam 1 milliGRAM(s) Oral daily PRN  ciprofloxacin   IVPB 400 milliGRAM(s) IV Intermittent every 12 hours  dextrose 5% + sodium chloride 0.45% with potassium chloride 20 mEq/L 1000 milliLiter(s) IV Continuous <Continuous>  enoxaparin Injectable 40 milliGRAM(s) SubCutaneous every 24 hours  metroNIDAZOLE  IVPB 500 milliGRAM(s) IV Intermittent every 8 hours

## 2017-12-16 NOTE — PROGRESS NOTE ADULT - ASSESSMENT
32M with metastatic colorectal cancer, pelvic collection/malignancy causing R hydronephrosis with delayed nephrogram, s/p IR R NT insertion. Gram stain of kidney urine with no organisms.    --continue nephrostomy tube to gravity drainage  --f/up R kidney urine culture  -- monitor fevers, trachycardia  -- f/up blood cultures  --f/up surg onc  --should follow up with Dr. Josué Turner upon discharge.  --please call with questions      [facility]  [address] [place] [id number] [phone]

## 2017-12-16 NOTE — PROGRESS NOTE ADULT - ASSESSMENT
31 yo gentleman with metastatic rectal cancer, presenting with fever identified in office, mildly tachycardic, normotensive now s/p R nephrostomy tube with IR (12/15):  - Reg diet  - Pain control  - Appreciate Uro reccs  - No IR intervention for fluid collections  - DVT ppx

## 2017-12-17 LAB
BUN SERPL-MCNC: 6 MG/DL — LOW (ref 7–23)
CALCIUM SERPL-MCNC: 8.8 MG/DL — SIGNIFICANT CHANGE UP (ref 8.4–10.5)
CHLORIDE SERPL-SCNC: 98 MMOL/L — SIGNIFICANT CHANGE UP (ref 98–107)
CO2 SERPL-SCNC: 24 MMOL/L — SIGNIFICANT CHANGE UP (ref 22–31)
CREAT SERPL-MCNC: 0.83 MG/DL — SIGNIFICANT CHANGE UP (ref 0.5–1.3)
GLUCOSE SERPL-MCNC: 89 MG/DL — SIGNIFICANT CHANGE UP (ref 70–99)
HCT VFR BLD CALC: 26.4 % — LOW (ref 39–50)
HGB BLD-MCNC: 7.8 G/DL — LOW (ref 13–17)
MAGNESIUM SERPL-MCNC: 1.9 MG/DL — SIGNIFICANT CHANGE UP (ref 1.6–2.6)
MCHC RBC-ENTMCNC: 23 PG — LOW (ref 27–34)
MCHC RBC-ENTMCNC: 29.5 % — LOW (ref 32–36)
MCV RBC AUTO: 77.9 FL — LOW (ref 80–100)
NRBC # FLD: 0 — SIGNIFICANT CHANGE UP
PHOSPHATE SERPL-MCNC: 3.8 MG/DL — SIGNIFICANT CHANGE UP (ref 2.5–4.5)
PLATELET # BLD AUTO: 420 K/UL — HIGH (ref 150–400)
PMV BLD: 9.9 FL — SIGNIFICANT CHANGE UP (ref 7–13)
POTASSIUM SERPL-MCNC: 3.8 MMOL/L — SIGNIFICANT CHANGE UP (ref 3.5–5.3)
POTASSIUM SERPL-SCNC: 3.8 MMOL/L — SIGNIFICANT CHANGE UP (ref 3.5–5.3)
RBC # BLD: 3.39 M/UL — LOW (ref 4.2–5.8)
RBC # FLD: 16 % — HIGH (ref 10.3–14.5)
SODIUM SERPL-SCNC: 138 MMOL/L — SIGNIFICANT CHANGE UP (ref 135–145)
WBC # BLD: 17.97 K/UL — HIGH (ref 3.8–10.5)
WBC # FLD AUTO: 17.97 K/UL — HIGH (ref 3.8–10.5)

## 2017-12-17 PROCEDURE — 99232 SBSQ HOSP IP/OBS MODERATE 35: CPT

## 2017-12-17 RX ORDER — PIPERACILLIN AND TAZOBACTAM 4; .5 G/20ML; G/20ML
3.38 INJECTION, POWDER, LYOPHILIZED, FOR SOLUTION INTRAVENOUS EVERY 8 HOURS
Qty: 0 | Refills: 0 | Status: DISCONTINUED | OUTPATIENT
Start: 2017-12-17 | End: 2017-12-24

## 2017-12-17 RX ADMIN — OXYCODONE HYDROCHLORIDE 5 MILLIGRAM(S): 5 TABLET ORAL at 19:23

## 2017-12-17 RX ADMIN — SODIUM CHLORIDE 3 MILLILITER(S): 9 INJECTION INTRAMUSCULAR; INTRAVENOUS; SUBCUTANEOUS at 05:16

## 2017-12-17 RX ADMIN — OXYCODONE HYDROCHLORIDE 5 MILLIGRAM(S): 5 TABLET ORAL at 22:20

## 2017-12-17 RX ADMIN — OXYCODONE HYDROCHLORIDE 5 MILLIGRAM(S): 5 TABLET ORAL at 09:05

## 2017-12-17 RX ADMIN — OXYCODONE HYDROCHLORIDE 5 MILLIGRAM(S): 5 TABLET ORAL at 23:05

## 2017-12-17 RX ADMIN — OXYCODONE HYDROCHLORIDE 5 MILLIGRAM(S): 5 TABLET ORAL at 02:45

## 2017-12-17 RX ADMIN — OXYCODONE HYDROCHLORIDE 5 MILLIGRAM(S): 5 TABLET ORAL at 11:21

## 2017-12-17 RX ADMIN — SODIUM CHLORIDE 3 MILLILITER(S): 9 INJECTION INTRAMUSCULAR; INTRAVENOUS; SUBCUTANEOUS at 21:07

## 2017-12-17 RX ADMIN — Medication 650 MILLIGRAM(S): at 21:07

## 2017-12-17 RX ADMIN — SODIUM CHLORIDE 3 MILLILITER(S): 9 INJECTION INTRAMUSCULAR; INTRAVENOUS; SUBCUTANEOUS at 14:05

## 2017-12-17 RX ADMIN — Medication 650 MILLIGRAM(S): at 21:30

## 2017-12-17 RX ADMIN — PIPERACILLIN AND TAZOBACTAM 25 GRAM(S): 4; .5 INJECTION, POWDER, LYOPHILIZED, FOR SOLUTION INTRAVENOUS at 21:07

## 2017-12-17 RX ADMIN — OXYCODONE HYDROCHLORIDE 5 MILLIGRAM(S): 5 TABLET ORAL at 18:07

## 2017-12-17 RX ADMIN — OXYCODONE HYDROCHLORIDE 5 MILLIGRAM(S): 5 TABLET ORAL at 03:50

## 2017-12-17 RX ADMIN — ENOXAPARIN SODIUM 40 MILLIGRAM(S): 100 INJECTION SUBCUTANEOUS at 21:07

## 2017-12-17 NOTE — PROGRESS NOTE ADULT - ASSESSMENT
A: 33 yo gentleman with metastatic rectal cancer, presenting with fever identified in office, mildly tachycardic, normotensive now s/p R nephrostomy tube with IR (12/15):    P:  - Reg diet  - Pain control  - Monitor temperature  - No IR intervention for fluid collections at this time  - DVT ppx  - no growth from cultures as of this morning  - d/c planning

## 2017-12-17 NOTE — PROGRESS NOTE ADULT - SUBJECTIVE AND OBJECTIVE BOX
D TEAM SURGERY DAILY PROGRESS NOTE:    S: Patient seen and examined. He was febrile overnight. His pain is well controlled with his current regimen. He is positive for flatus and bowel movements. His ostomy is functioning well.    O:  Vital Signs Last 24 Hrs  T(C): 38.2 (17 Dec 2017 09:01), Max: 39.4 (17 Dec 2017 00:51)  T(F): 100.7 (17 Dec 2017 09:01), Max: 102.9 (17 Dec 2017 00:51)  HR: 124 (17 Dec 2017 09:01) (124 - 126)  BP: 130/70 (17 Dec 2017 09:01) (123/80 - 140/85)  BP(mean): --  RR: 17 (17 Dec 2017 09:01) (17 - 17)  SpO2: 99% (17 Dec 2017 09:01) (98% - 100%)    I&O's Detail    16 Dec 2017 07:01  -  17 Dec 2017 07:00  --------------------------------------------------------  IN:    dextrose 5% + sodium chloride 0.45% with potassium chloride 20 mEq/L: 750 mL  Total IN: 750 mL    OUT:    Colostomy: 120 mL    Drain: 2985 mL  Total OUT: 3105 mL    Total NET: -2355 mL    MEDICATIONS  (STANDING):  enoxaparin Injectable 40 milliGRAM(s) SubCutaneous every 24 hours  sodium chloride 0.9% lock flush 3 milliLiter(s) IV Push every 8 hours    MEDICATIONS  (PRN):  acetaminophen   Tablet. 650 milliGRAM(s) Oral every 6 hours PRN Mild Pain (1 - 3)  ALPRAZolam 1 milliGRAM(s) Oral daily PRN anxiety  oxyCODONE    IR 5 milliGRAM(s) Oral every 4 hours PRN Moderate Pain (4 - 6)                   7.8    17.97 )-----------( 420      ( 17 Dec 2017 05:06 )             26.4     12-17    138  |  98  |  6<L>  ----------------------------<  89  3.8   |  24  |  0.83    Ca    8.8      17 Dec 2017 05:06  Phos  3.8     12-17  Mg     1.9     12-17    Physical Exam:  Gen: Laying in bed, NAD, alert and oriented.   Resp: Unlabored breathing  Abdomen: soft, nontender, nondistended, ostomy in place with formed stool in bag  : R nephrostomy tube in place    Lines:   IV: patent, in place.

## 2017-12-18 ENCOUNTER — RESULT REVIEW (OUTPATIENT)
Age: 32
End: 2017-12-18

## 2017-12-18 ENCOUNTER — APPOINTMENT (OUTPATIENT)
Dept: HEMATOLOGY ONCOLOGY | Facility: CLINIC | Age: 32
End: 2017-12-18

## 2017-12-18 LAB
APTT BLD: 32.8 SEC — SIGNIFICANT CHANGE UP (ref 27.5–37.4)
GRAM STN WND: SIGNIFICANT CHANGE UP
HCT VFR BLD CALC: 26.8 % — LOW (ref 39–50)
HGB BLD-MCNC: 7.8 G/DL — LOW (ref 13–17)
INR BLD: 1.64 — HIGH (ref 0.88–1.17)
MCHC RBC-ENTMCNC: 22 PG — LOW (ref 27–34)
MCHC RBC-ENTMCNC: 29.1 % — LOW (ref 32–36)
MCV RBC AUTO: 75.5 FL — LOW (ref 80–100)
NRBC # FLD: 0 — SIGNIFICANT CHANGE UP
PLATELET # BLD AUTO: 430 K/UL — HIGH (ref 150–400)
PMV BLD: 9.6 FL — SIGNIFICANT CHANGE UP (ref 7–13)
PROTHROM AB SERPL-ACNC: 18.4 SEC — HIGH (ref 9.8–13.1)
RBC # BLD: 3.55 M/UL — LOW (ref 4.2–5.8)
RBC # FLD: 15.9 % — HIGH (ref 10.3–14.5)
SPECIMEN SOURCE: SIGNIFICANT CHANGE UP
WBC # BLD: 18.99 K/UL — HIGH (ref 3.8–10.5)
WBC # FLD AUTO: 18.99 K/UL — HIGH (ref 3.8–10.5)

## 2017-12-18 PROCEDURE — 99232 SBSQ HOSP IP/OBS MODERATE 35: CPT | Mod: 24

## 2017-12-18 PROCEDURE — 88112 CYTOPATH CELL ENHANCE TECH: CPT | Mod: 26

## 2017-12-18 PROCEDURE — 99254 IP/OBS CNSLTJ NEW/EST MOD 60: CPT | Mod: GC

## 2017-12-18 PROCEDURE — 49406 IMAGE CATH FLUID PERI/RETRO: CPT

## 2017-12-18 PROCEDURE — 88305 TISSUE EXAM BY PATHOLOGIST: CPT | Mod: 26

## 2017-12-18 RX ORDER — POTASSIUM CHLORIDE 20 MEQ
20 PACKET (EA) ORAL ONCE
Qty: 0 | Refills: 0 | Status: COMPLETED | OUTPATIENT
Start: 2017-12-18 | End: 2017-12-18

## 2017-12-18 RX ORDER — MAGNESIUM SULFATE 500 MG/ML
1 VIAL (ML) INJECTION ONCE
Qty: 0 | Refills: 0 | Status: COMPLETED | OUTPATIENT
Start: 2017-12-18 | End: 2017-12-18

## 2017-12-18 RX ADMIN — Medication 20 MILLIEQUIVALENT(S): at 07:59

## 2017-12-18 RX ADMIN — SODIUM CHLORIDE 3 MILLILITER(S): 9 INJECTION INTRAMUSCULAR; INTRAVENOUS; SUBCUTANEOUS at 21:55

## 2017-12-18 RX ADMIN — PIPERACILLIN AND TAZOBACTAM 25 GRAM(S): 4; .5 INJECTION, POWDER, LYOPHILIZED, FOR SOLUTION INTRAVENOUS at 06:36

## 2017-12-18 RX ADMIN — Medication 650 MILLIGRAM(S): at 06:35

## 2017-12-18 RX ADMIN — Medication 1 MILLIGRAM(S): at 21:57

## 2017-12-18 RX ADMIN — ENOXAPARIN SODIUM 40 MILLIGRAM(S): 100 INJECTION SUBCUTANEOUS at 22:09

## 2017-12-18 RX ADMIN — OXYCODONE HYDROCHLORIDE 5 MILLIGRAM(S): 5 TABLET ORAL at 07:40

## 2017-12-18 RX ADMIN — PIPERACILLIN AND TAZOBACTAM 25 GRAM(S): 4; .5 INJECTION, POWDER, LYOPHILIZED, FOR SOLUTION INTRAVENOUS at 14:39

## 2017-12-18 RX ADMIN — SODIUM CHLORIDE 3 MILLILITER(S): 9 INJECTION INTRAMUSCULAR; INTRAVENOUS; SUBCUTANEOUS at 06:36

## 2017-12-18 RX ADMIN — PIPERACILLIN AND TAZOBACTAM 25 GRAM(S): 4; .5 INJECTION, POWDER, LYOPHILIZED, FOR SOLUTION INTRAVENOUS at 22:10

## 2017-12-18 RX ADMIN — OXYCODONE HYDROCHLORIDE 5 MILLIGRAM(S): 5 TABLET ORAL at 19:08

## 2017-12-18 RX ADMIN — SODIUM CHLORIDE 3 MILLILITER(S): 9 INJECTION INTRAMUSCULAR; INTRAVENOUS; SUBCUTANEOUS at 14:30

## 2017-12-18 RX ADMIN — Medication 100 GRAM(S): at 07:59

## 2017-12-18 RX ADMIN — Medication 650 MILLIGRAM(S): at 07:10

## 2017-12-18 NOTE — PROGRESS NOTE ADULT - SUBJECTIVE AND OBJECTIVE BOX
D TEAM SURGERY DAILY PROGRESS NOTE:    S: Patient seen and examined. Last night he was febrile and was given tylenol, cultures were taken, and Zosyn was started. He was also tachycardic per his recent baseline.     O:  Vital Signs Last 24 Hrs  T(C): 37.4 (18 Dec 2017 00:22), Max: 39.3 (17 Dec 2017 20:21)  T(F): 99.3 (18 Dec 2017 00:22), Max: 102.8 (17 Dec 2017 20:21)  HR: 124 (18 Dec 2017 00:22) (124 - 133)  BP: 127/85 (18 Dec 2017 00:22) (127/85 - 141/80)  BP(mean): --  RR: 18 (18 Dec 2017 00:22) (17 - 18)  SpO2: 99% (18 Dec 2017 00:22) (98% - 99%)    I&O's Detail    16 Dec 2017 07:01  -  17 Dec 2017 07:00  --------------------------------------------------------  IN:    dextrose 5% + sodium chloride 0.45% with potassium chloride 20 mEq/L: 750 mL  Total IN: 750 mL    OUT:    Colostomy: 120 mL    Drain: 2985 mL  Total OUT: 3105 mL    Total NET: -2355 mL      17 Dec 2017 07:01  -  18 Dec 2017 04:07  --------------------------------------------------------  IN:  Total IN: 0 mL    OUT:    Drain: 1100 mL  Total OUT: 1100 mL    Total NET: -1100 mL    MEDICATIONS  (STANDING):  enoxaparin Injectable 40 milliGRAM(s) SubCutaneous every 24 hours  piperacillin/tazobactam IVPB. 3.375 Gram(s) IV Intermittent every 8 hours  sodium chloride 0.9% lock flush 3 milliLiter(s) IV Push every 8 hours    MEDICATIONS  (PRN):  acetaminophen   Tablet. 650 milliGRAM(s) Oral every 6 hours PRN Mild Pain (1 - 3)  ALPRAZolam 1 milliGRAM(s) Oral daily PRN anxiety  oxyCODONE    IR 5 milliGRAM(s) Oral every 4 hours PRN Moderate Pain (4 - 6)                        7.8    17.97 )-----------( 420      ( 17 Dec 2017 05:06 )             26.4     12-17    138  |  98  |  6<L>  ----------------------------<  89  3.8   |  24  |  0.83    Ca    8.8      17 Dec 2017 05:06  Phos  3.8     12-17  Mg     1.9     12-17    Physical Exam:  Gen: Laying in bed, NAD, alert and oriented.   Resp: Unlabored breathing  Abdomen: soft, nontender, nondistended, ostomy in place with formed stool in bag  : R nephrostomy tube in place    Lines:   IV: patent, in place.

## 2017-12-18 NOTE — CONSULT NOTE ADULT - ATTENDING COMMENTS
Abscess 4cm x 3 cm anterior to rectal stump in 31 yo man with metastatic colorectal cancer s/p Driscoll's procedure 10/23/17, LLQ colostomy.  now returned from IR where abscess aspirated and drain placed. Purulent fluid in drain.  Also found to have hydronephrosis; right nephrostomy placed 12/15.  agree with empiric zosyn while await culture results.

## 2017-12-18 NOTE — CONSULT NOTE ADULT - SUBJECTIVE AND OBJECTIVE BOX
HPI:  HPI: 33 yo M well known to the surgical oncology service presenting from outpatient urology office with fever. Patient has history significant for rectal cancer (unresectable, and metastatic to liver) s/p a diverting colostomy on 10/23/2017. Postoperatively, patient had a pelvic collection that was managed by STEPHANE surgical drains and an IR placed drain. On outpatient followup, he had a tube check that demonstrated resolution of the cavity around the IR drain, and it was removed. Denies pain, change in bowel habits, appetite, fevers or chills. He was noted to have hydronephrosis and was referred for outpatient urology evaluation. Since that time, patient has been doing well. He was seen in the office and found to be asymptomatically febrile to 102F and was sent to ED for further management.    PMHx: anxiety, GERD, rectal cancer metastasized to liver and lung    PSHx: placement of mediport, hemorrhoidectomy, diverting colostomy/Ryan's    Medications (home): xanax PRN  Allergies: No Known Allergies  (Intolerances: None)  Social Hx: former smoker (2 pack years, quit 10 years ago), rare EtOH use (2-3 times per year), engaged, lives with fiancee    Physical exam significant for soft, nontender abdomen with appropriate ostomy output	    Imaging and labs remarkable for increased pelvic collection, stable R sided hydronephrosis and new mild L hydronephrosis (14 Dec 2017 17:57)      PAST MEDICAL & SURGICAL HISTORY:  Anxiety: hx of panic attacks  Gastroesophageal reflux disease  Rectal cancer metastasized to liver  Status post Ryan procedure  Encounter for care related to vascular access port: right chest wall port placed 2017  History of hemorrhoidectomy      Allergies  No Known Allergies        ANTIMICROBIALS:  piperacillin/tazobactam IVPB. 3.375 every 8 hours      OTHER MEDS: MEDICATIONS  (STANDING):  acetaminophen   Tablet. 650 every 6 hours PRN  ALPRAZolam 1 daily PRN  enoxaparin Injectable 40 every 24 hours  oxyCODONE    IR 5 every 4 hours PRN      SOCIAL HISTORY:  [ ] etoh [ ] tobacco [ ] former smoker [ ] IVDU    FAMILY HISTORY:  Family history of type 2 diabetes mellitus in mother (Mother)      REVIEW OF SYSTEMS  [  ] ROS unobtainable because:    [  ] All other systems negative except as noted below:	    Constitutional:  [ ] fever [ ] weight loss  Skin:  [ ] rash [ ] phlebitis	  Eyes: [ ] icterus [ ] inflammation	  ENMT: [ ] discharge [ ] thrush [ ] ulcers [ ] exudates  Respiratory: [ ] dyspnea [ ] hemoptysis [ ] cough [ ] sputum	  Cardiovascular:  [ ] chest pain [ ] palpitations [ ] edema	  Gastrointestinal:  [ ] nausea [ ] vomiting [ ] diarrhea [ ] constipation [ ] pain	  Genitourinary:  [ ] dysuria [ ] frequency [ ] hematuria [ ] discharge [ ] flank pain  Musculoskeletal:  [ ] myalgias [ ] arthralgias [ ] arthritis	  Neurological:  [ ] headache [ ] seizures	  Psychiatric:  [ ] anxiety [ ] depression	  Hematology/Lymphatics:  [ ] lymphadenopathy  Endocrine:  [ ] adrenal [ ] thyroid  Allergic/Immunologic:	 [ ] transplant [ ] seasonal    Vital Signs Last 24 Hrs  T(F): 102.9 (12-18-17 @ 05:28), Max: 103 (12-15-17 @ 08:15)    Vital Signs Last 24 Hrs  HR: 136 (12-18-17 @ 05:28) (124 - 136)  BP: 131/82 (12-18-17 @ 05:28) (127/85 - 141/80)  RR: 18 (12-18-17 @ 05:28)  SpO2: 99% (12-18-17 @ 05:28) (98% - 99%)  Wt(kg): --    PHYSICAL EXAM:  General: non-toxic  HEAD/EYES: anicteric, PERRL  ENT:  supple  Cardiovascular:   S1, S2  Respiratory:  clear bilaterally  GI:  soft, non-tender, normal bowel sounds  :  no CVA tenderness   Musculoskeletal:  no synovitis  Neurologic:  grossly non-focal  Skin:  no rash  Lymph: no lymphadenopathy  Psychiatric:  appropriate affect  Vascular:  no phlebitis                                7.8    18.99 )-----------( 430      ( 18 Dec 2017 06:19 )             26.8       12-17    138  |  98  |  6<L>  ----------------------------<  89  3.8   |  24  |  0.83    Ca    8.8      17 Dec 2017 05:06  Phos  3.8     12-17  Mg     1.9     12-17            MICROBIOLOGY:  KIDNEY - RIGHT  12-15-17 --  --  --      BLOOD PERIPHERAL  12-14-17 --  --  --      URINE MIDSTREAM  12-14-17 --  --  --      BLOOD VENOUS  12-14-17 --  --  --              v            RADIOLOGY:  < from: CT Abdomen and Pelvis w/ Oral Cont and w/ IV Cont (12.14.17 @ 14:29) >  IMPRESSION:    Interval increase in size of thick-walled fluid collection, anterior and   to the right of rectal stump. Interval removal of drainage catheter.   Another component of this collection extending into the left hemipelvis   and left lower abdomen has also increased in the interim.  New changes in the appendix which is markedly distended and fluid-filled.   This is consistent with acute appendicitis, in the right clinical setting.  Stable moderate right hydroureteronephrosis. Left hydroureteronephrosis   is new.  Metastatic disease to lungs and liver, progressed since prior exam.    Findingswere discussed with Dr. Zelaya by Dr. Cid on 12/14/2017 at   3:37PM with read back.    < end of copied text > HPI:  HPI: 33 yo M well known to the surgical oncology service presenting from outpatient urology office with fever. Patient has history significant for rectal cancer (unresectable, and metastatic to liver) s/p a diverting colostomy on 10/23/2017. Postoperatively, patient had a pelvic collection that was managed by STEPHANE surgical drains and an IR placed drain. On outpatient followup, he had a tube check that demonstrated resolution of the cavity around the IR drain, and it was removed. Denies pain, change in bowel habits, appetite, fevers or chills. He was noted to have right hydronephrosis and was referred for outpatient urology evaluation. Since that time, patient has been doing well. He was seen in the office and found to be asymptomatically febrile to 102F and was sent to ED for further management.   On last admission he had developed post op fever and was on IV Abx for reji rectal fluid collection which was drained by IR. Prior to discharge drains were removed and he was discharged on cipro and flagyl which he took.  IR cultures grew E.faecalis and E.faecium, Actinomyces turisensis. He felt well since then and had no complaints.    PMHx: anxiety, GERD, rectal cancer metastasized to liver and lung    PSHx: placement of mediport, hemorrhoidectomy, diverting colostomy/Ryan's    Medications (home): xanax PRN  Allergies: No Known Allergies  (Intolerances: None)  Social Hx: former smoker (2 pack years, quit 10 years ago), rare EtOH use (2-3 times per year), engaged, lives with fiancee    Physical exam significant for soft, nontender abdomen with appropriate ostomy output	    Imaging and labs remarkable for increased pelvic collection, stable R sided hydronephrosis and new mild L hydronephrosis (14 Dec 2017 17:57)      PAST MEDICAL & SURGICAL HISTORY:  Anxiety: hx of panic attacks  Gastroesophageal reflux disease  Rectal cancer metastasized to liver  Status post Ryan procedure  Encounter for care related to vascular access port: right chest wall port placed 2017  History of hemorrhoidectomy      Allergies  No Known Allergies        ANTIMICROBIALS:  piperacillin/tazobactam IVPB. 3.375 every 8 hours      OTHER MEDS: MEDICATIONS  (STANDING):  acetaminophen   Tablet. 650 every 6 hours PRN  ALPRAZolam 1 daily PRN  enoxaparin Injectable 40 every 24 hours  oxyCODONE    IR 5 every 4 hours PRN      SOCIAL HISTORY:  [ ] etoh [ ] tobacco [ ] former smoker [ ] IVDU    FAMILY HISTORY:  Family history of type 2 diabetes mellitus in mother (Mother)      REVIEW OF SYSTEMS  [  ] ROS unobtainable because:    [x  ] All other systems negative except as noted below:	    Constitutional:  [ x] fever [ ] weight loss  Skin:  [ ] rash [ ] phlebitis	  Eyes: [ ] icterus [ ] inflammation	  ENMT: [ ] discharge [ ] thrush [ ] ulcers [ ] exudates  Respiratory: [ ] dyspnea [ ] hemoptysis [ ] cough [ ] sputum	  Cardiovascular:  [ ] chest pain [ ] palpitations [ ] edema	  Gastrointestinal:  [ ] nausea [ ] vomiting [ ] diarrhea [ ] constipation [ ] pain	  Genitourinary:  [ ] dysuria [ ] frequency [ ] hematuria [ ] discharge [ ] flank pain  Musculoskeletal:  [ ] myalgias [ ] arthralgias [ ] arthritis	  Neurological:  [ ] headache [ ] seizures	  Psychiatric:  [ ] anxiety [ ] depression	  Hematology/Lymphatics:  [ ] lymphadenopathy  Endocrine:  [ ] adrenal [ ] thyroid  Allergic/Immunologic:	 [ ] transplant [ ] seasonal    Vital Signs Last 24 Hrs  T(F): 102.9 (12-18-17 @ 05:28), Max: 103 (12-15-17 @ 08:15)    Vital Signs Last 24 Hrs  HR: 136 (12-18-17 @ 05:28) (124 - 136)  BP: 131/82 (12-18-17 @ 05:28) (127/85 - 141/80)  RR: 18 (12-18-17 @ 05:28)  SpO2: 99% (12-18-17 @ 05:28) (98% - 99%)  Wt(kg): --    PHYSICAL EXAM:  General: non-toxic  HEAD/EYES: anicteric, PERRL  ENT:  supple  Cardiovascular:   S1, S2 right chest mediport  Respiratory:  clear bilaterally  GI:  soft, non-tender,  LLQ colostomy, normal bowel sounds  :  no CVA tenderness right nephrostomy draining pinkish urine  Musculoskeletal:  no synovitis  Neurologic:  grossly non-focal  Skin:  no rash  Lymph: no lymphadenopathy  Psychiatric:  appropriate affect  Vascular:  no phlebitis                                7.8    18.99 )-----------( 430      ( 18 Dec 2017 06:19 )             26.8       12-17    138  |  98  |  6<L>  ----------------------------<  89  3.8   |  24  |  0.83    Ca    8.8      17 Dec 2017 05:06  Phos  3.8     12-17  Mg     1.9     12-17            MICROBIOLOGY:  KIDNEY - RIGHT  12-15-17 --  --  --      BLOOD PERIPHERAL  12-14-17 --  --  --      URINE MIDSTREAM  12-14-17 --  --  --      BLOOD VENOUS  12-14-17 --  --  --              v            RADIOLOGY:  < from: CT Abdomen and Pelvis w/ Oral Cont and w/ IV Cont (12.14.17 @ 14:29) >  IMPRESSION:    Interval increase in size of thick-walled fluid collection, anterior and   to the right of rectal stump. Interval removal of drainage catheter.   Another component of this collection extending into the left hemipelvis   and left lower abdomen has also increased in the interim.  New changes in the appendix which is markedly distended and fluid-filled.   This is consistent with acute appendicitis, in the right clinical setting.  Stable moderate right hydroureteronephrosis. Left hydroureteronephrosis   is new.  Metastatic disease to lungs and liver, progressed since prior exam.    Findingswere discussed with Dr. Zelaya by Dr. Cid on 12/14/2017 at   3:37PM with read back.    < end of copied text >

## 2017-12-18 NOTE — PROVIDER CONTACT NOTE (OTHER) - ASSESSMENT
Pt feels warm to touch, no signs of distress or pain, pt has been tachycardiac in the 120 with fevers throughout admission

## 2017-12-18 NOTE — PROGRESS NOTE ADULT - ASSESSMENT
A: 31 yo gentleman with metastatic rectal cancer, presenting with fever identified in office, mildly tachycardic, normotensive now s/p R nephrostomy tube with IR (12/15):    P:  - Reg diet  - Pain control  - Monitor temperature  - f/u cultures  - IV Zosyn  - DVT ppx  - Re-evaluate possibility of intervention on fluid collection A: 31 yo gentleman with metastatic rectal cancer, presenting with fever identified in office, mildly tachycardic, normotensive now s/p R nephrostomy tube with IR (12/15):    P:  - Pain control  - Monitor temperature  - f/u cultures  - IV Zosyn  - DVT ppx  - Re-evaluate possibility of intervention on fluid collection  - NPO for possible IR drainage of reji-rectal abscess today    D Team [id number]

## 2017-12-18 NOTE — CONSULT NOTE ADULT - ASSESSMENT
32 M  PMH GERd, anxiety, metastatic anal cancer s/p exp lap with Hartmans procedure 10/23, post op course complicated by pelvic fluid collection which was drained (cultures growing E faecalis, E.faecium and Actinomyces) treated with cipro/flagyl at home, presenting from outpatient urology office with fever. He was being evaluated for right hydronephrosis and found to have fever so he was sent in. Repeat CT showed increase in pelvic fluid collection. s/p Right nephrostomy on 12/15, for IR drainage today     For IR drainage today. Please send specimens for gram stain and cultures  Agree with Zosyn for now. Will adjust antibiotics based on IR culture results. 32 M  PMH GERd, anxiety, metastatic anal cancer s/p exp lap with Hartmans procedure 10/23, post op course complicated by reji-rectal fluid collection which was drained (cultures growing E faecalis, E.faecium and Actinomyces) treated with cipro/flagyl at home, presenting from outpatient urology office with fever. He was being evaluated for right hydronephrosis and found to have fever so he was sent in. Repeat CT showed b/l hydro increase in per-rectal fluid collection with extension into left hemipelvis and ?appendicitis (but patient is asymptomatic) s/p Right nephrostomy on 12/15, for IR drainage for reji-rectal fluid collection today     For IR drainage today. Please send specimens for gram stain and cultures  Agree with Elizabeth for now. Will adjust antibiotics based on IR culture results.

## 2017-12-18 NOTE — CHART NOTE - NSCHARTNOTEFT_GEN_A_CORE
Patient s/p IR drainage of pelvic collection by R transgluteal drainage  Doing well, no subjective fevers.   Pain is controlled.    Objective    Vital signs  T(F): , Max: 102.9 (12-18-17 @ 05:28)  HR: 127 (12-18-17 @ 08:10)  BP: 130/80 (12-18-17 @ 08:10)  SpO2: 99% (12-18-17 @ 08:10)  Wt(kg): --    Output     12-17 @ 07:01  -  12-18 @ 07:00  --------------------------------------------------------  IN: 0 mL / OUT: 1500 mL / NET: -1500 mL    12-18 @ 07:01  -  12-18 @ 14:20  --------------------------------------------------------  IN: 0 mL / OUT: 200 mL / NET: -200 mL    Gen NAD  Abd soft, midline incision scar.  Colostomy with + gas  R pigtail drain above Iliac crest c/d/i  R gluteal drain with ~ 5 cc seropurulent drainage    Labs  12-18 @ 06:19    WBC 18.99 / Hct 26.8  / SCr --       12-17 @ 05:06    WBC 17.97 / Hct 26.4  / SCr 0.83       Urine Cx: ?  Blood Cx: ? Patient s/p IR drainage of pelvic collection by R transgluteal drainage  Doing well, no subjective fevers.   Pain is controlled.    Objective    Vital signs  T(F): , Max: 102.9 (12-18-17 @ 05:28)  HR: 127 (12-18-17 @ 08:10)  BP: 130/80 (12-18-17 @ 08:10)  SpO2: 99% (12-18-17 @ 08:10)  Wt(kg): --    Output     12-17 @ 07:01  -  12-18 @ 07:00  --------------------------------------------------------  IN: 0 mL / OUT: 1500 mL / NET: -1500 mL    12-18 @ 07:01  -  12-18 @ 14:20  --------------------------------------------------------  IN: 0 mL / OUT: 200 mL / NET: -200 mL    Gen NAD  Abd soft, midline incision scar.  Colostomy with + gas  R pigtail drain above Iliac crest c/d/i  R gluteal drain with ~ 5 cc seropurulent drainage    Labs  12-18 @ 06:19    WBC 18.99 / Hct 26.8  / SCr --       12-17 @ 05:06    WBC 17.97 / Hct 26.4  / SCr 0.83       Urine Cx: ?    Plan  - Pain control  - Monitor temperature  - f/u IR culture  - Per ID, continue zosyn and f/u cultures  - IV Zosyn  - DVT ppx

## 2017-12-18 NOTE — PROGRESS NOTE ADULT - SUBJECTIVE AND OBJECTIVE BOX
Interventional Radiology Pre-Procedure Note    This is a 32y Male    Procedure: drainage of pelvic abscess     Diagnosis/Indication: Patient is a 32y old  Male who presents with a chief complaint of fever, fluid collection in pelvis (14 Dec 2017 19:25)      PAST MEDICAL & SURGICAL HISTORY:  Anxiety: hx of panic attacks  Gastroesophageal reflux disease  Rectal cancer metastasized to liver  Status post Rayn procedure  Encounter for care related to vascular access port: right chest wall port placed 2017  History of hemorrhoidectomy       Male    Allergies: No Known Allergies      LABS:  CBC Full  -  ( 18 Dec 2017 06:19 )  WBC Count : 18.99 K/uL  Hemoglobin : 7.8 g/dL  Hematocrit : 26.8 %  Platelet Count - Automated : 430 K/uL  Mean Cell Volume : 75.5 fL  Mean Cell Hemoglobin : 22.0 pg  Mean Cell Hemoglobin Concentration : 29.1 %  Auto Neutrophil # : x  Auto Lymphocyte # : x  Auto Monocyte # : x  Auto Eosinophil # : x  Auto Basophil # : x  Auto Neutrophil % : x  Auto Lymphocyte % : x  Auto Monocyte % : x  Auto Eosinophil % : x  Auto Basophil % : x    12-17    138  |  98  |  6<L>  ----------------------------<  89  3.8   |  24  |  0.83    Ca    8.8      17 Dec 2017 05:06  Phos  3.8     12-17  Mg     1.9     12-17      PT/INR - ( 18 Dec 2017 07:55 )   PT: 18.4 SEC;   INR: 1.64          PTT - ( 18 Dec 2017 07:55 )  PTT:32.8 SEC    Procedure/ risks/ benefits will be explained and informed consent obtained from patient by IR provider Interventional Radiology Pre-Procedure Note    This is a 32y Male with hx of metastatic rectal cancer s/p Ryan's procedure     Procedure: drainage of pelvic abscess     Diagnosis/Indication: Patient is a 32y old  Male who presents with a chief complaint of fever, fluid collection in pelvis (14 Dec 2017 19:25)      PAST MEDICAL & SURGICAL HISTORY:  Anxiety: hx of panic attacks  Gastroesophageal reflux disease  Rectal cancer metastasized to liver  Status post Ryan procedure  Encounter for care related to vascular access port: right chest wall port placed 2017  History of hemorrhoidectomy       Male    Allergies: No Known Allergies      LABS:  CBC Full  -  ( 18 Dec 2017 06:19 )  WBC Count : 18.99 K/uL  Hemoglobin : 7.8 g/dL  Hematocrit : 26.8 %  Platelet Count - Automated : 430 K/uL  Mean Cell Volume : 75.5 fL  Mean Cell Hemoglobin : 22.0 pg  Mean Cell Hemoglobin Concentration : 29.1 %  Auto Neutrophil # : x  Auto Lymphocyte # : x  Auto Monocyte # : x  Auto Eosinophil # : x  Auto Basophil # : x  Auto Neutrophil % : x  Auto Lymphocyte % : x  Auto Monocyte % : x  Auto Eosinophil % : x  Auto Basophil % : x    12-17    138  |  98  |  6<L>  ----------------------------<  89  3.8   |  24  |  0.83    Ca    8.8      17 Dec 2017 05:06  Phos  3.8     12-17  Mg     1.9     12-17      PT/INR - ( 18 Dec 2017 07:55 )   PT: 18.4 SEC;   INR: 1.64          PTT - ( 18 Dec 2017 07:55 )  PTT:32.8 SEC    Procedure/ risks/ benefits will be explained and informed consent obtained from patient by IR provider

## 2017-12-19 LAB
BACTERIA BLD CULT: SIGNIFICANT CHANGE UP
BACTERIA BLD CULT: SIGNIFICANT CHANGE UP
BUN SERPL-MCNC: 17 MG/DL — SIGNIFICANT CHANGE UP (ref 7–23)
CALCIUM SERPL-MCNC: 9.7 MG/DL — SIGNIFICANT CHANGE UP (ref 8.4–10.5)
CHLORIDE SERPL-SCNC: 96 MMOL/L — LOW (ref 98–107)
CO2 SERPL-SCNC: 26 MMOL/L — SIGNIFICANT CHANGE UP (ref 22–31)
CREAT SERPL-MCNC: 0.97 MG/DL — SIGNIFICANT CHANGE UP (ref 0.5–1.3)
GLUCOSE SERPL-MCNC: 121 MG/DL — HIGH (ref 70–99)
HCT VFR BLD CALC: 28.5 % — LOW (ref 39–50)
HGB BLD-MCNC: 8 G/DL — LOW (ref 13–17)
MAGNESIUM SERPL-MCNC: 2.5 MG/DL — SIGNIFICANT CHANGE UP (ref 1.6–2.6)
MCHC RBC-ENTMCNC: 22.1 PG — LOW (ref 27–34)
MCHC RBC-ENTMCNC: 28.1 % — LOW (ref 32–36)
MCV RBC AUTO: 78.7 FL — LOW (ref 80–100)
NRBC # FLD: 0 — SIGNIFICANT CHANGE UP
PHOSPHATE SERPL-MCNC: 4.6 MG/DL — HIGH (ref 2.5–4.5)
PLATELET # BLD AUTO: 520 K/UL — HIGH (ref 150–400)
PMV BLD: 10 FL — SIGNIFICANT CHANGE UP (ref 7–13)
POTASSIUM SERPL-MCNC: 4 MMOL/L — SIGNIFICANT CHANGE UP (ref 3.5–5.3)
POTASSIUM SERPL-SCNC: 4 MMOL/L — SIGNIFICANT CHANGE UP (ref 3.5–5.3)
RBC # BLD: 3.62 M/UL — LOW (ref 4.2–5.8)
RBC # FLD: 15.8 % — HIGH (ref 10.3–14.5)
SODIUM SERPL-SCNC: 141 MMOL/L — SIGNIFICANT CHANGE UP (ref 135–145)
SPECIMEN SOURCE: SIGNIFICANT CHANGE UP
WBC # BLD: 19.62 K/UL — HIGH (ref 3.8–10.5)
WBC # FLD AUTO: 19.62 K/UL — HIGH (ref 3.8–10.5)

## 2017-12-19 PROCEDURE — 99232 SBSQ HOSP IP/OBS MODERATE 35: CPT | Mod: GC

## 2017-12-19 RX ORDER — VANCOMYCIN HCL 1 G
1000 VIAL (EA) INTRAVENOUS ONCE
Qty: 0 | Refills: 0 | Status: COMPLETED | OUTPATIENT
Start: 2017-12-19 | End: 2017-12-19

## 2017-12-19 RX ORDER — VANCOMYCIN HCL 1 G
1000 VIAL (EA) INTRAVENOUS EVERY 12 HOURS
Qty: 0 | Refills: 0 | Status: DISCONTINUED | OUTPATIENT
Start: 2017-12-20 | End: 2017-12-21

## 2017-12-19 RX ORDER — OXYCODONE HYDROCHLORIDE 5 MG/1
10 TABLET ORAL EVERY 6 HOURS
Qty: 0 | Refills: 0 | Status: DISCONTINUED | OUTPATIENT
Start: 2017-12-19 | End: 2017-12-24

## 2017-12-19 RX ORDER — VANCOMYCIN HCL 1 G
VIAL (EA) INTRAVENOUS
Qty: 0 | Refills: 0 | Status: DISCONTINUED | OUTPATIENT
Start: 2017-12-19 | End: 2017-12-21

## 2017-12-19 RX ADMIN — OXYCODONE HYDROCHLORIDE 5 MILLIGRAM(S): 5 TABLET ORAL at 01:50

## 2017-12-19 RX ADMIN — PIPERACILLIN AND TAZOBACTAM 25 GRAM(S): 4; .5 INJECTION, POWDER, LYOPHILIZED, FOR SOLUTION INTRAVENOUS at 22:19

## 2017-12-19 RX ADMIN — SODIUM CHLORIDE 3 MILLILITER(S): 9 INJECTION INTRAMUSCULAR; INTRAVENOUS; SUBCUTANEOUS at 05:11

## 2017-12-19 RX ADMIN — OXYCODONE HYDROCHLORIDE 5 MILLIGRAM(S): 5 TABLET ORAL at 14:58

## 2017-12-19 RX ADMIN — SODIUM CHLORIDE 3 MILLILITER(S): 9 INJECTION INTRAMUSCULAR; INTRAVENOUS; SUBCUTANEOUS at 15:39

## 2017-12-19 RX ADMIN — OXYCODONE HYDROCHLORIDE 5 MILLIGRAM(S): 5 TABLET ORAL at 20:50

## 2017-12-19 RX ADMIN — OXYCODONE HYDROCHLORIDE 5 MILLIGRAM(S): 5 TABLET ORAL at 06:28

## 2017-12-19 RX ADMIN — OXYCODONE HYDROCHLORIDE 5 MILLIGRAM(S): 5 TABLET ORAL at 07:15

## 2017-12-19 RX ADMIN — PIPERACILLIN AND TAZOBACTAM 25 GRAM(S): 4; .5 INJECTION, POWDER, LYOPHILIZED, FOR SOLUTION INTRAVENOUS at 15:39

## 2017-12-19 RX ADMIN — SODIUM CHLORIDE 3 MILLILITER(S): 9 INJECTION INTRAMUSCULAR; INTRAVENOUS; SUBCUTANEOUS at 22:13

## 2017-12-19 RX ADMIN — Medication 250 MILLIGRAM(S): at 14:16

## 2017-12-19 RX ADMIN — OXYCODONE HYDROCHLORIDE 5 MILLIGRAM(S): 5 TABLET ORAL at 00:52

## 2017-12-19 RX ADMIN — ENOXAPARIN SODIUM 40 MILLIGRAM(S): 100 INJECTION SUBCUTANEOUS at 20:23

## 2017-12-19 RX ADMIN — OXYCODONE HYDROCHLORIDE 5 MILLIGRAM(S): 5 TABLET ORAL at 14:16

## 2017-12-19 RX ADMIN — OXYCODONE HYDROCHLORIDE 5 MILLIGRAM(S): 5 TABLET ORAL at 20:23

## 2017-12-19 RX ADMIN — PIPERACILLIN AND TAZOBACTAM 25 GRAM(S): 4; .5 INJECTION, POWDER, LYOPHILIZED, FOR SOLUTION INTRAVENOUS at 06:25

## 2017-12-19 NOTE — PROGRESS NOTE ADULT - ASSESSMENT
32 year old male with metastatic rectal cancer, s/p rectal drainage of an abscess in 10/2017 and the drain was removed after a tube check revealed a collapsed cavity on 11/28/17.  s/p right nephrostomy tube insertion by IR for right hydronephrosis on 12/15/17. Patient continued to have fevers 32 year old male with metastatic rectal cancer, s/p rectal drainage of an abscess in 10/2017 and the drain was removed after a tube check revealed a collapsed cavity on 11/28/17.  s/p right nephrostomy tube insertion by IR for right hydronephrosis on 12/15/17. Patient continued to have fevers, CT on 12/14 showed an anterior and   to the right of rectal stump s/p drain placement by IR on 12/18.     Neuro - Tylenol, Oxycodone prn pain, Xanax prn anxiety  CV/Pulm - no issues  GI - Regular diet, rectal drain in place  Heme - Lovenox for VTE prophylaxis  Renal - voiding well  ID - Continue Zosyn (12/17- ) for reji-rectal abscess  Endo - No issues 32 year old male with metastatic rectal cancer, s/p rectal drainage of an abscess in 10/2017 and the drain was removed after a tube check revealed a collapsed cavity on 11/28/17.  s/p right nephrostomy tube insertion by IR for right hydronephrosis on 12/15/17. Patient continued to have fevers, CT on 12/14 showed an anterior and   to the right of rectal stump s/p drain placement by IR on 12/18.     Neuro - Tylenol, Oxycodone prn pain, Xanax prn anxiety  CV/Pulm - no issues  GI - Regular diet, rectal drain in place  Heme - Lovenox for VTE prophylaxis  Renal - voiding well  ID - Continue Zosyn (12/17- ) for reji-rectal abscess. follow up wound culture results. ID following.  Endo - No issues

## 2017-12-19 NOTE — PROGRESS NOTE ADULT - ASSESSMENT
32 M  PMH GERd, anxiety, metastatic anal cancer s/p exp lap with Hartmans procedure 10/23, post op course complicated by reji-rectal fluid collection which was drained (cultures growing E faecalis, E.faecium and Actinomyces) treated with cipro/flagyl at home, presenting from outpatient urology office with fever. He was being evaluated for right hydronephrosis and found to have fever so he was sent in. Repeat CT showed b/l hydro increase in per-rectal fluid collection with extension into left hemipelvis and s/p IR drainage 12/18  Also s/p Right nephrostomy on 12/15    Recommend:  Follow specimens for culture and sensitivities-Gram stain showing GPR which may be Actinomyces as in prior culture, GPC in chains which may be Enterococcus as in prior culture, GNRis new- likely e.coli from GI tract. These will be covered by Zosyn. GPC in clusters is also new- Staph which may be coag negative Staph or MSSA but concern for MRSA  C/w Zosyn-may add vanco for GPC in clusters

## 2017-12-19 NOTE — PROGRESS NOTE ADULT - SUBJECTIVE AND OBJECTIVE BOX
Team D Surgery Progress Note     Subjective/24hour Events: s/p drainage of rectal abscess by IR. Patient has been afebrile.    Vital Signs:  Vital Signs Last 24 Hrs  T(C): 36.4 (19 Dec 2017 08:13), Max: 37.1 (18 Dec 2017 14:54)  T(F): 97.5 (19 Dec 2017 08:13), Max: 98.8 (18 Dec 2017 14:54)  HR: 99 (19 Dec 2017 08:13) (96 - 115)  BP: 123/77 (19 Dec 2017 08:13) (105/65 - 125/71)  RR: 16 (19 Dec 2017 08:13) (16 - 17)  SpO2: 100% (19 Dec 2017 08:13) (99% - 100%)        I&O's Detail    18 Dec 2017 07:01  -  19 Dec 2017 07:00  --------------------------------------------------------  IN:  Total IN: 0 mL    OUT:    Bulb: 70 mL    Drain: 1025 mL    Voided: 200 mL  Total OUT: 1295 mL    Total NET: -1295 mL      19 Dec 2017 07:01  -  19 Dec 2017 12:40  --------------------------------------------------------  IN:  Total IN: 0 mL    OUT:    Drain: 50 mL  Total OUT: 50 mL    Total NET: -50 mL        MEDICATIONS  (STANDING):  enoxaparin Injectable 40 milliGRAM(s) SubCutaneous every 24 hours  piperacillin/tazobactam IVPB. 3.375 Gram(s) IV Intermittent every 8 hours  sodium chloride 0.9% lock flush 3 milliLiter(s) IV Push every 8 hours    MEDICATIONS  (PRN):  acetaminophen   Tablet. 650 milliGRAM(s) Oral every 6 hours PRN Mild Pain (1 - 3)  ALPRAZolam 1 milliGRAM(s) Oral daily PRN anxiety  oxyCODONE    IR 5 milliGRAM(s) Oral every 4 hours PRN Moderate Pain (4 - 6)        Physical Exam:  Gen: NAD, well-appearing  LS: unlabored breathing, on room air  Card: regular rate and rhythm  GI: abdomen is soft, nontender, nondistended, rectal drain in place  Ext: warm, well-perfused      Labs:    12-19    141  |  96<L>  |  17  ----------------------------<  121<H>  4.0   |  26  |  0.97    Ca    9.7      19 Dec 2017 06:24  Phos  4.6     12-19  Mg     2.5     12-19                        8.0    19.62 )-----------( 520      ( 19 Dec 2017 06:24 )             28.5     PT/INR - ( 18 Dec 2017 07:55 )   PT: 18.4 SEC;   INR: 1.64     PTT - ( 18 Dec 2017 07:55 )  PTT:32.8 SEC

## 2017-12-19 NOTE — PROGRESS NOTE ADULT - SUBJECTIVE AND OBJECTIVE BOX
Follow Up:  metastatic anal cancer s/p Hartmans 10/23/17, with recurrence of post operative reji rectal abscess, s/p IR drainage 12/18    Interval History/ROS: Tmax 100.9 F, s/p IR drainage 12/19    Allergies  No Known Allergies        ANTIMICROBIALS:  piperacillin/tazobactam IVPB. 3.375 every 8 hours      OTHER MEDS:  MEDICATIONS  (STANDING):  acetaminophen   Tablet. 650 every 6 hours PRN  ALPRAZolam 1 daily PRN  enoxaparin Injectable 40 every 24 hours  oxyCODONE    IR 5 every 4 hours PRN      Vital Signs Last 24 Hrs  T(C): 36.4 (19 Dec 2017 08:13), Max: 37.1 (18 Dec 2017 14:54)  T(F): 97.5 (19 Dec 2017 08:13), Max: 98.8 (18 Dec 2017 14:54)  HR: 99 (19 Dec 2017 08:13) (96 - 115)  BP: 123/77 (19 Dec 2017 08:13) (105/65 - 125/71)  BP(mean): --  RR: 16 (19 Dec 2017 08:13) (16 - 17)  SpO2: 100% (19 Dec 2017 08:13) (99% - 100%)    PHYSICAL EXAM:  General: non-toxic  HEAD/EYES: anicteric, PERRL  ENT:  supple  Cardiovascular:   S1, S2, right chest wall mediport  Respiratory:  clear bilaterally  GI:  soft, non-tender, normal bowel sounds  :  right NT tube  Musculoskeletal:  no synovitis  Neurologic:  grossly non-focal  Skin:  no rash  Lymph: no lymphadenopathy  Psychiatric:  appropriate affect  Vascular:  no phlebitis                                8.0    19.62 )-----------( 520      ( 19 Dec 2017 06:24 )             28.5       12-19    141  |  96<L>  |  17  ----------------------------<  121<H>  4.0   |  26  |  0.97    Ca    9.7      19 Dec 2017 06:24  Phos  4.6     12-19  Mg     2.5     12-19            MICROBIOLOGY:  v  OTHER  Culture - Surg Site Aerob/Anaer w/Gm St (12.18.17 @ 15:20)    Gram Stain Wound:   WBC^White Blood Cells  QNTY CELLS IN GRAM STAIN: MODERATE (3+)  GPCCH^Gram Pos Cocci in Chains  QUANTITY OF BACTERIA SEEN: MODERATE (3+)  GNR^Gram Neg Rods  QUANTITY OF BACTERIA SEEN: MODERATE (3+)  GPCCL^Gram Pos Cocci In Clusters  QUANTITY OF BACTERIA SEEN: FEW (2+)  GPR^Gram Positive Rods  QUANTITY OF BACTERIA SEEN: RARE (1+)    Specimen Source: OTHER          BLOOD VENOUS  12-17-17 --  --  --      BLOOD PERIPHERAL  12-17-17 --  --  --      KIDNEY - RIGHT  12-15-17 --  --  --      BLOOD PERIPHERAL  12-14-17 --  --  --      URINE MIDSTREAM  12-14-17 --  --  --      BLOOD VENOUS  12-14-17 --  --  --                RADIOLOGY:  < from: CT Abdomen and Pelvis w/ Oral Cont and w/ IV Cont (12.14.17 @ 14:29) >  IMPRESSION:    Interval increase in size of thick-walled fluid collection, anterior and   to the right of rectal stump. Interval removal of drainage catheter.   Another component of this collection extending into the left hemipelvis   and left lower abdomen has also increased in the interim.  New changes in the appendix which is markedly distended and fluid-filled.   This is consistent with acute appendicitis, in the right clinical setting.  Stable moderate right hydroureteronephrosis. Left hydroureteronephrosis   is new.  Metastatic disease to lungs and liver, progressed since prior exam.    < end of copied text >

## 2017-12-20 LAB
-  AMPICILLIN: SIGNIFICANT CHANGE UP
-  CIPROFLOXACIN: SIGNIFICANT CHANGE UP
-  TETRACYCLINE: SIGNIFICANT CHANGE UP
-  VANCOMYCIN: SIGNIFICANT CHANGE UP
BASE EXCESS BLDA CALC-SCNC: 2.2 MMOL/L — SIGNIFICANT CHANGE UP
BUN SERPL-MCNC: 13 MG/DL — SIGNIFICANT CHANGE UP (ref 7–23)
CA-I BLDA-SCNC: 1.18 MMOL/L — SIGNIFICANT CHANGE UP (ref 1.15–1.29)
CALCIUM SERPL-MCNC: 9.1 MG/DL — SIGNIFICANT CHANGE UP (ref 8.4–10.5)
CHLORIDE SERPL-SCNC: 95 MMOL/L — LOW (ref 98–107)
CO2 SERPL-SCNC: 25 MMOL/L — SIGNIFICANT CHANGE UP (ref 22–31)
CREAT SERPL-MCNC: 0.88 MG/DL — SIGNIFICANT CHANGE UP (ref 0.5–1.3)
CULTURE - SURGICAL SITE: SIGNIFICANT CHANGE UP
GLUCOSE BLDA-MCNC: 114 MG/DL — HIGH (ref 70–99)
GLUCOSE SERPL-MCNC: 120 MG/DL — HIGH (ref 70–99)
GRAM STN WND: SIGNIFICANT CHANGE UP
HCO3 BLDA-SCNC: 27 MMOL/L — HIGH (ref 22–26)
HCT VFR BLD CALC: 24.6 % — LOW (ref 39–50)
HCT VFR BLDA CALC: 22.8 % — LOW (ref 39–51)
HGB BLD-MCNC: 7.2 G/DL — LOW (ref 13–17)
HGB BLDA-MCNC: 7.3 G/DL — LOW (ref 13–17)
LACTATE BLDA-SCNC: 1.6 MMOL/L — SIGNIFICANT CHANGE UP (ref 0.5–2)
MCHC RBC-ENTMCNC: 22.3 PG — LOW (ref 27–34)
MCHC RBC-ENTMCNC: 29.3 % — LOW (ref 32–36)
MCV RBC AUTO: 76.2 FL — LOW (ref 80–100)
METHOD TYPE: SIGNIFICANT CHANGE UP
NRBC # FLD: 0 — SIGNIFICANT CHANGE UP
ORGANISM # SPEC MICROSCOPIC CNT: SIGNIFICANT CHANGE UP
PCO2 BLDA: 36 MMHG — SIGNIFICANT CHANGE UP (ref 35–48)
PH BLDA: 7.47 PH — HIGH (ref 7.35–7.45)
PLATELET # BLD AUTO: 489 K/UL — HIGH (ref 150–400)
PMV BLD: 9.9 FL — SIGNIFICANT CHANGE UP (ref 7–13)
PO2 BLDA: 87 MMHG — SIGNIFICANT CHANGE UP (ref 83–108)
POTASSIUM BLDA-SCNC: 3.3 MMOL/L — LOW (ref 3.4–4.5)
POTASSIUM SERPL-MCNC: 3.4 MMOL/L — LOW (ref 3.5–5.3)
POTASSIUM SERPL-SCNC: 3.4 MMOL/L — LOW (ref 3.5–5.3)
RBC # BLD: 3.23 M/UL — LOW (ref 4.2–5.8)
RBC # FLD: 16.1 % — HIGH (ref 10.3–14.5)
SAO2 % BLDA: 96.8 % — SIGNIFICANT CHANGE UP (ref 95–99)
SODIUM BLDA-SCNC: 133 MMOL/L — LOW (ref 136–146)
SODIUM SERPL-SCNC: 136 MMOL/L — SIGNIFICANT CHANGE UP (ref 135–145)
WBC # BLD: 14.53 K/UL — HIGH (ref 3.8–10.5)
WBC # FLD AUTO: 14.53 K/UL — HIGH (ref 3.8–10.5)

## 2017-12-20 PROCEDURE — 93970 EXTREMITY STUDY: CPT | Mod: 26

## 2017-12-20 PROCEDURE — 99232 SBSQ HOSP IP/OBS MODERATE 35: CPT

## 2017-12-20 PROCEDURE — 99232 SBSQ HOSP IP/OBS MODERATE 35: CPT | Mod: 24

## 2017-12-20 RX ORDER — POTASSIUM CHLORIDE 20 MEQ
40 PACKET (EA) ORAL ONCE
Qty: 0 | Refills: 0 | Status: COMPLETED | OUTPATIENT
Start: 2017-12-20 | End: 2017-12-20

## 2017-12-20 RX ADMIN — PIPERACILLIN AND TAZOBACTAM 25 GRAM(S): 4; .5 INJECTION, POWDER, LYOPHILIZED, FOR SOLUTION INTRAVENOUS at 06:05

## 2017-12-20 RX ADMIN — OXYCODONE HYDROCHLORIDE 10 MILLIGRAM(S): 5 TABLET ORAL at 12:11

## 2017-12-20 RX ADMIN — PIPERACILLIN AND TAZOBACTAM 25 GRAM(S): 4; .5 INJECTION, POWDER, LYOPHILIZED, FOR SOLUTION INTRAVENOUS at 21:44

## 2017-12-20 RX ADMIN — SODIUM CHLORIDE 3 MILLILITER(S): 9 INJECTION INTRAMUSCULAR; INTRAVENOUS; SUBCUTANEOUS at 13:47

## 2017-12-20 RX ADMIN — Medication 250 MILLIGRAM(S): at 06:05

## 2017-12-20 RX ADMIN — Medication 250 MILLIGRAM(S): at 17:36

## 2017-12-20 RX ADMIN — OXYCODONE HYDROCHLORIDE 10 MILLIGRAM(S): 5 TABLET ORAL at 20:21

## 2017-12-20 RX ADMIN — Medication 40 MILLIEQUIVALENT(S): at 10:07

## 2017-12-20 RX ADMIN — SODIUM CHLORIDE 3 MILLILITER(S): 9 INJECTION INTRAMUSCULAR; INTRAVENOUS; SUBCUTANEOUS at 21:42

## 2017-12-20 RX ADMIN — OXYCODONE HYDROCHLORIDE 10 MILLIGRAM(S): 5 TABLET ORAL at 05:35

## 2017-12-20 RX ADMIN — OXYCODONE HYDROCHLORIDE 10 MILLIGRAM(S): 5 TABLET ORAL at 19:39

## 2017-12-20 RX ADMIN — PIPERACILLIN AND TAZOBACTAM 25 GRAM(S): 4; .5 INJECTION, POWDER, LYOPHILIZED, FOR SOLUTION INTRAVENOUS at 13:46

## 2017-12-20 RX ADMIN — OXYCODONE HYDROCHLORIDE 10 MILLIGRAM(S): 5 TABLET ORAL at 13:00

## 2017-12-20 RX ADMIN — SODIUM CHLORIDE 3 MILLILITER(S): 9 INJECTION INTRAMUSCULAR; INTRAVENOUS; SUBCUTANEOUS at 06:05

## 2017-12-20 RX ADMIN — Medication 1 MILLIGRAM(S): at 00:31

## 2017-12-20 RX ADMIN — ENOXAPARIN SODIUM 40 MILLIGRAM(S): 100 INJECTION SUBCUTANEOUS at 19:55

## 2017-12-20 RX ADMIN — OXYCODONE HYDROCHLORIDE 10 MILLIGRAM(S): 5 TABLET ORAL at 04:50

## 2017-12-20 NOTE — PROGRESS NOTE ADULT - ASSESSMENT
32 year old male with metastatic rectal cancer, s/p rectal drainage of an abscess in 10/2017 and the drain was removed after a tube check revealed a collapsed cavity on 11/28/17.  s/p right nephrostomy tube insertion by IR for right hydronephrosis on 12/15/17. Patient continued to have fevers, CT on 12/14 showed an anterior and   to the right of rectal stump s/p drain placement by IR on 12/18.     Neuro - Tylenol, Oxycodone prn pain, Xanax prn anxiety  CV/Pulm - no issues  GI - Regular diet, rectal drain in place  Heme - Lovenox for VTE prophylaxis  Renal - voiding well  ID - Continue Zosyn (12/17- ) for reji-rectal abscess. follow up wound culture results. ID following.  Endo - No issues 32 year old male with metastatic rectal cancer, s/p rectal drainage of an abscess in 10/2017 and the drain was removed after a tube check revealed a collapsed cavity on 11/28/17.  s/p right nephrostomy tube insertion by IR for right hydronephrosis on 12/15/17. Patient continued to have fevers, CT on 12/14 showed an anterior and   to the right of rectal stump s/p drain placement by IR on 12/18.       Neuro - Tylenol, Oxycodone prn pain, Xanax prn anxiety  CV/Pulm - no issues  GI - Regular diet, rectal drain in place  Heme - Lovenox for VTE prophylaxis. Duplex US to evaluate for DVT given isolated Tachycardia with no source.   Renal - voiding well  ID - Continue Zosyn (12/17- ) for reji-rectal abscess. follow up wound culture results. ID following. Vanc trough 5 am.  Endo - No issues

## 2017-12-20 NOTE — PROGRESS NOTE ADULT - SUBJECTIVE AND OBJECTIVE BOX
Follow Up:  metastatic anal cancer s/p Hartmans 10/23/17, with recurrence of post operative reji rectal abscess, s/p IR drainage 12/18    Interval History/ROS:  feels OK; some pain at drain site.  drain fluid less purulent.  nephrostomy in place.    Allergies  No Known Allergies        piperacillin/tazobactam IVPB. 3.375 every 8 hours  vancomycin  IVPB    vancomycin  IVPB 1000 every 12 hours        OTHER MEDS:  MEDICATIONS  (STANDING):  acetaminophen   Tablet. 650 every 6 hours PRN  ALPRAZolam 1 daily PRN  enoxaparin Injectable 40 every 24 hours  oxyCODONE    IR 5 every 4 hours PRN      Vital Signs Last 24 Hrs  T(C): 36.4 (19 Dec 2017 08:13), Max: 37.1 (18 Dec 2017 14:54)  T(F): 97.5 (19 Dec 2017 08:13), Max: 98.8 (18 Dec 2017 14:54)  HR: 99 (19 Dec 2017 08:13) (96 - 115)  BP: 123/77 (19 Dec 2017 08:13) (105/65 - 125/71)  BP(mean): --  RR: 16 (19 Dec 2017 08:13) (16 - 17)  SpO2: 100% (19 Dec 2017 08:13) (99% - 100%)    PHYSICAL EXAM:  General: non-toxic  HEAD/EYES: anicteric, PERRL  ENT:  supple  Cardiovascular:   S1, S2, right chest wall mediport  Respiratory:  clear bilaterally  GI:  soft, non-tender, normal bowel sounds  :  right NT tube   drain with cloudy serous fluid  Musculoskeletal:  no synovitis  Neurologic:  grossly non-focal  Skin:  no rash  Lymph: no lymphadenopathy  Psychiatric:  appropriate affect  Vascular:  no phlebitis                                           7.2    14.53 )-----------( 489      ( 20 Dec 2017 06:30 )             24.6 12-20    136  |  95  |  13  ----------------------------<  120  3.4   |  25  |  0.88  Ca    9.1      20 Dec 2017 06:30Phos  4.6     12-19Mg     2.5     12-19              MICROBIOLOGY:  v  OTHER  Culture - Surg Site Aerob/Anaer w/Gm St (12.18.17 @ 15:20)    Gram Stain Wound:   WBC^White Blood Cells  QNTY CELLS IN GRAM STAIN: MODERATE (3+)  GPCCH^Gram Pos Cocci in Chains  QUANTITY OF BACTERIA SEEN: MODERATE (3+)  GNR^Gram Neg Rods  QUANTITY OF BACTERIA SEEN: MODERATE (3+)  GPCCL^Gram Pos Cocci In Clusters  QUANTITY OF BACTERIA SEEN: FEW (2+)  GPR^Gram Positive Rods  QUANTITY OF BACTERIA SEEN: RARE (1+)    Specimen Source: OTHER          BLOOD VENOUS  12-17-17 --  --  --      BLOOD PERIPHERAL  12-17-17 --  --  --      KIDNEY - RIGHT  12-15-17 --  --  --      BLOOD PERIPHERAL  12-14-17 --  --  --      URINE MIDSTREAM  12-14-17 --  --  --      BLOOD VENOUS  12-14-17 --  --  --                RADIOLOGY:  < from: CT Abdomen and Pelvis w/ Oral Cont and w/ IV Cont (12.14.17 @ 14:29) >  IMPRESSION:    Interval increase in size of thick-walled fluid collection, anterior and   to the right of rectal stump. Interval removal of drainage catheter.   Another component of this collection extending into the left hemipelvis   and left lower abdomen has also increased in the interim.  New changes in the appendix which is markedly distended and fluid-filled.   This is consistent with acute appendicitis, in the right clinical setting.  Stable moderate right hydroureteronephrosis. Left hydroureteronephrosis   is new.  Metastatic disease to lungs and liver, progressed since prior exam.    < end of copied text >

## 2017-12-20 NOTE — PROGRESS NOTE ADULT - ASSESSMENT
32 M  PMH GERd, anxiety, metastatic anal cancer s/p exp lap with Hartmans procedure 10/23, post op course complicated by reji-rectal fluid collection which was drained (cultures growing E faecalis, E.faecium and Actinomyces) treated with cipro/flagyl at home, presenting from outpatient urology office with fever. He was being evaluated for right hydronephrosis and found to have fever so he was sent in. Repeat CT showed b/l hydro increase in per-rectal fluid collection with extension into left hemipelvis and s/p IR drainage 12/18  Also s/p Right nephrostomy on 12/15  clinically improved.  afebrile and wbc downtrending    Recommend:  Follow specimens for culture and sensitivities-Gram stain showing GPR which may be Actinomyces as in prior culture, GPC in chains which may be Enterococcus as in prior culture, GNRis new- likely e.coli from GI tract. These will be covered by Zosyn. GPC in clusters is also new- S  Continue vanco and zosyn

## 2017-12-20 NOTE — PROGRESS NOTE ADULT - SUBJECTIVE AND OBJECTIVE BOX
No acute events overnight.  Tolerating regular diet.  Afebrile overnight.  Pain is controlled. No n/v.  Feels well.    Isolated Tachycardia to 124 this morning, asymptomatic    Objective    Vital signs  T(F): , Max: 99.8 (12-20-17 @ 05:58)  HR: 124 (12-20-17 @ 05:58)  BP: 137/71 (12-20-17 @ 05:58)  SpO2: 99% (12-20-17 @ 05:58)    Output   12-19 @ 07:01  -  12-20 @ 07:00  --------------------------------------------------------  IN: 0 mL / OUT: 1637.5 mL / NET: -1637.5 mL    Gen NAD  Abd soft, NT, ND  Ostomy with stool and gas.  Gluteal drain with small seropurulent drainage.    Labs  12-20 @ 06:30  WBC 14.53 / Hct 24.6  / SCr 0.88     12-19 @ 06:24  WBC 19.62 / Hct 28.5  / SCr 0.97     Culture - Surg Site Aerob/Anaer w/Gm St (12.18.17 @ 15:20)    Gram Stain Wound:   WBC^White Blood Cells  QNTY CELLS IN GRAM STAIN: MODERATE (3+)  GPCCH^Gram Pos Cocci in Chains  QUANTITY OF BACTERIA SEEN: MODERATE (3+)  GNR^Gram Neg Rods  QUANTITY OF BACTERIA SEEN: MODERATE (3+)  GPCCL^Gram Pos Cocci In Clusters  QUANTITY OF BACTERIA SEEN: FEW (2+)  GPR^Gram Positive Rods  QUANTITY OF BACTERIA SEEN: RARE (1+)    Specimen Source: OTHER No acute events overnight.  Tolerating regular diet.  Afebrile overnight.  Pain is controlled. No n/v.  Feels well.    Isolated Tachycardia to 124 this morning, asymptomatic    Objective    Vital signs  T(F): , Max: 99.8 (12-20-17 @ 05:58)  HR: 124 (12-20-17 @ 05:58)  BP: 137/71 (12-20-17 @ 05:58)  SpO2: 99% (12-20-17 @ 05:58)    Output   12-19 @ 07:01  -  12-20 @ 07:00  --------------------------------------------------------  IN: 0 mL / OUT: 1637.5 mL / NET: -1637.5 mL    Physical Exam:  Gen: NAD, well-appearing  LS: unlabored breathing, on room air  Card: regular rate and rhythm  GI: abdomen is soft, nontender, nondistended, rectal drain in place  Ext: warm, well-perfused    Labs  12-20 @ 06:30  WBC 14.53 / Hct 24.6  / SCr 0.88     12-19 @ 06:24  WBC 19.62 / Hct 28.5  / SCr 0.97     Culture - Surg Site Aerob/Anaer w/Gm St (12.18.17 @ 15:20)    Gram Stain Wound:   WBC^White Blood Cells  QNTY CELLS IN GRAM STAIN: MODERATE (3+)  GPCCH^Gram Pos Cocci in Chains  QUANTITY OF BACTERIA SEEN: MODERATE (3+)  GNR^Gram Neg Rods  QUANTITY OF BACTERIA SEEN: MODERATE (3+)  GPCCL^Gram Pos Cocci In Clusters  QUANTITY OF BACTERIA SEEN: FEW (2+)  GPR^Gram Positive Rods  QUANTITY OF BACTERIA SEEN: RARE (1+)    Specimen Source: OTHER

## 2017-12-21 LAB
BASOPHILS # BLD AUTO: 0.03 K/UL — SIGNIFICANT CHANGE UP (ref 0–0.2)
BASOPHILS NFR BLD AUTO: 0.2 % — SIGNIFICANT CHANGE UP (ref 0–2)
BUN SERPL-MCNC: 9 MG/DL — SIGNIFICANT CHANGE UP (ref 7–23)
CALCIUM SERPL-MCNC: 8.9 MG/DL — SIGNIFICANT CHANGE UP (ref 8.4–10.5)
CHLORIDE SERPL-SCNC: 93 MMOL/L — LOW (ref 98–107)
CO2 SERPL-SCNC: 25 MMOL/L — SIGNIFICANT CHANGE UP (ref 22–31)
CREAT SERPL-MCNC: 0.87 MG/DL — SIGNIFICANT CHANGE UP (ref 0.5–1.3)
CULTURE - SURGICAL SITE: SIGNIFICANT CHANGE UP
EOSINOPHIL # BLD AUTO: 0.05 K/UL — SIGNIFICANT CHANGE UP (ref 0–0.5)
EOSINOPHIL NFR BLD AUTO: 0.3 % — SIGNIFICANT CHANGE UP (ref 0–6)
GLUCOSE SERPL-MCNC: 111 MG/DL — HIGH (ref 70–99)
HCT VFR BLD CALC: 24.7 % — LOW (ref 39–50)
HCT VFR BLD CALC: 24.7 % — LOW (ref 39–50)
HGB BLD-MCNC: 7.3 G/DL — LOW (ref 13–17)
HGB BLD-MCNC: 7.3 G/DL — LOW (ref 13–17)
IMM GRANULOCYTES # BLD AUTO: 0.45 # — SIGNIFICANT CHANGE UP
IMM GRANULOCYTES NFR BLD AUTO: 2.5 % — HIGH (ref 0–1.5)
LYMPHOCYTES # BLD AUTO: 1.17 K/UL — SIGNIFICANT CHANGE UP (ref 1–3.3)
LYMPHOCYTES # BLD AUTO: 6.5 % — LOW (ref 13–44)
MAGNESIUM SERPL-MCNC: 1.6 MG/DL — SIGNIFICANT CHANGE UP (ref 1.6–2.6)
MCHC RBC-ENTMCNC: 22.3 PG — LOW (ref 27–34)
MCHC RBC-ENTMCNC: 22.3 PG — LOW (ref 27–34)
MCHC RBC-ENTMCNC: 29.6 % — LOW (ref 32–36)
MCHC RBC-ENTMCNC: 29.6 % — LOW (ref 32–36)
MCV RBC AUTO: 75.5 FL — LOW (ref 80–100)
MCV RBC AUTO: 75.5 FL — LOW (ref 80–100)
MONOCYTES # BLD AUTO: 1.3 K/UL — HIGH (ref 0–0.9)
MONOCYTES NFR BLD AUTO: 7.3 % — SIGNIFICANT CHANGE UP (ref 2–14)
NEUTROPHILS # BLD AUTO: 14.92 K/UL — HIGH (ref 1.8–7.4)
NEUTROPHILS NFR BLD AUTO: 83.2 % — HIGH (ref 43–77)
NRBC # FLD: 0 — SIGNIFICANT CHANGE UP
NRBC # FLD: 0 — SIGNIFICANT CHANGE UP
PHOSPHATE SERPL-MCNC: 4.1 MG/DL — SIGNIFICANT CHANGE UP (ref 2.5–4.5)
PLATELET # BLD AUTO: 530 K/UL — HIGH (ref 150–400)
PLATELET # BLD AUTO: 530 K/UL — HIGH (ref 150–400)
PMV BLD: 9.8 FL — SIGNIFICANT CHANGE UP (ref 7–13)
PMV BLD: 9.8 FL — SIGNIFICANT CHANGE UP (ref 7–13)
POTASSIUM SERPL-MCNC: 3.5 MMOL/L — SIGNIFICANT CHANGE UP (ref 3.5–5.3)
POTASSIUM SERPL-SCNC: 3.5 MMOL/L — SIGNIFICANT CHANGE UP (ref 3.5–5.3)
RBC # BLD: 3.27 M/UL — LOW (ref 4.2–5.8)
RBC # BLD: 3.27 M/UL — LOW (ref 4.2–5.8)
RBC # FLD: 15.9 % — HIGH (ref 10.3–14.5)
RBC # FLD: 15.9 % — HIGH (ref 10.3–14.5)
SODIUM SERPL-SCNC: 134 MMOL/L — LOW (ref 135–145)
SPECIMEN SOURCE: SIGNIFICANT CHANGE UP
VANCOMYCIN TROUGH SERPL-MCNC: 9.6 UG/ML — LOW (ref 10–20)
WBC # BLD: 17.59 K/UL — HIGH (ref 3.8–10.5)
WBC # BLD: 17.59 K/UL — HIGH (ref 3.8–10.5)
WBC # FLD AUTO: 17.59 K/UL — HIGH (ref 3.8–10.5)
WBC # FLD AUTO: 17.59 K/UL — HIGH (ref 3.8–10.5)

## 2017-12-21 PROCEDURE — 99232 SBSQ HOSP IP/OBS MODERATE 35: CPT | Mod: 24

## 2017-12-21 PROCEDURE — 74177 CT ABD & PELVIS W/CONTRAST: CPT | Mod: 26

## 2017-12-21 PROCEDURE — 99232 SBSQ HOSP IP/OBS MODERATE 35: CPT

## 2017-12-21 RX ORDER — VANCOMYCIN HCL 1 G
VIAL (EA) INTRAVENOUS
Qty: 0 | Refills: 0 | Status: DISCONTINUED | OUTPATIENT
Start: 2017-12-21 | End: 2017-12-21

## 2017-12-21 RX ORDER — MAGNESIUM SULFATE 500 MG/ML
2 VIAL (ML) INJECTION ONCE
Qty: 0 | Refills: 0 | Status: COMPLETED | OUTPATIENT
Start: 2017-12-21 | End: 2017-12-21

## 2017-12-21 RX ORDER — VANCOMYCIN HCL 1 G
1250 VIAL (EA) INTRAVENOUS EVERY 12 HOURS
Qty: 0 | Refills: 0 | Status: DISCONTINUED | OUTPATIENT
Start: 2017-12-21 | End: 2017-12-21

## 2017-12-21 RX ORDER — POTASSIUM CHLORIDE 20 MEQ
40 PACKET (EA) ORAL ONCE
Qty: 0 | Refills: 0 | Status: COMPLETED | OUTPATIENT
Start: 2017-12-21 | End: 2017-12-21

## 2017-12-21 RX ORDER — VANCOMYCIN HCL 1 G
1250 VIAL (EA) INTRAVENOUS ONCE
Qty: 0 | Refills: 0 | Status: COMPLETED | OUTPATIENT
Start: 2017-12-21 | End: 2017-12-21

## 2017-12-21 RX ADMIN — OXYCODONE HYDROCHLORIDE 10 MILLIGRAM(S): 5 TABLET ORAL at 23:29

## 2017-12-21 RX ADMIN — OXYCODONE HYDROCHLORIDE 10 MILLIGRAM(S): 5 TABLET ORAL at 10:53

## 2017-12-21 RX ADMIN — SODIUM CHLORIDE 3 MILLILITER(S): 9 INJECTION INTRAMUSCULAR; INTRAVENOUS; SUBCUTANEOUS at 13:30

## 2017-12-21 RX ADMIN — OXYCODONE HYDROCHLORIDE 10 MILLIGRAM(S): 5 TABLET ORAL at 17:33

## 2017-12-21 RX ADMIN — SODIUM CHLORIDE 3 MILLILITER(S): 9 INJECTION INTRAMUSCULAR; INTRAVENOUS; SUBCUTANEOUS at 05:25

## 2017-12-21 RX ADMIN — PIPERACILLIN AND TAZOBACTAM 25 GRAM(S): 4; .5 INJECTION, POWDER, LYOPHILIZED, FOR SOLUTION INTRAVENOUS at 06:57

## 2017-12-21 RX ADMIN — PIPERACILLIN AND TAZOBACTAM 25 GRAM(S): 4; .5 INJECTION, POWDER, LYOPHILIZED, FOR SOLUTION INTRAVENOUS at 13:30

## 2017-12-21 RX ADMIN — Medication 166.67 MILLIGRAM(S): at 05:21

## 2017-12-21 RX ADMIN — PIPERACILLIN AND TAZOBACTAM 25 GRAM(S): 4; .5 INJECTION, POWDER, LYOPHILIZED, FOR SOLUTION INTRAVENOUS at 21:10

## 2017-12-21 RX ADMIN — Medication 40 MILLIEQUIVALENT(S): at 09:45

## 2017-12-21 RX ADMIN — OXYCODONE HYDROCHLORIDE 10 MILLIGRAM(S): 5 TABLET ORAL at 18:00

## 2017-12-21 RX ADMIN — OXYCODONE HYDROCHLORIDE 10 MILLIGRAM(S): 5 TABLET ORAL at 11:30

## 2017-12-21 RX ADMIN — ENOXAPARIN SODIUM 40 MILLIGRAM(S): 100 INJECTION SUBCUTANEOUS at 21:10

## 2017-12-21 RX ADMIN — OXYCODONE HYDROCHLORIDE 10 MILLIGRAM(S): 5 TABLET ORAL at 02:45

## 2017-12-21 RX ADMIN — SODIUM CHLORIDE 3 MILLILITER(S): 9 INJECTION INTRAMUSCULAR; INTRAVENOUS; SUBCUTANEOUS at 21:10

## 2017-12-21 RX ADMIN — OXYCODONE HYDROCHLORIDE 10 MILLIGRAM(S): 5 TABLET ORAL at 02:03

## 2017-12-21 RX ADMIN — Medication 50 GRAM(S): at 10:51

## 2017-12-21 NOTE — PROGRESS NOTE ADULT - SUBJECTIVE AND OBJECTIVE BOX
Follow Up:  metastatic anal cancer s/p Hartmans 10/23/17, with recurrence of post operative reji rectal abscess, s/p IR drainage 12/18    Interval History/ROS:  feels well.  febrile again and wbc trending up.  no diarrhea.  blood cultures drawn.    Allergies  No Known Allergies        piperacillin/tazobactam IVPB. 3.375 every 8 hours  vancomycin  IVPB    vancomycin  IVPB 1000 every 12 hours        OTHER MEDS:  MEDICATIONS  (STANDING):  acetaminophen   Tablet. 650 every 6 hours PRN  ALPRAZolam 1 daily PRN  enoxaparin Injectable 40 every 24 hours  oxyCODONE    IR 5 every 4 hours PRN      Vital Signs Last 24 Hrs  T(F): 100.2 (12-21-17 @ 11:29), Max: 101.3 (12-21-17 @ 00:22)  HR: 124 (12-21-17 @ 11:29)  BP: 135/81 (12-21-17 @ 11:29)  RR: 16 (12-21-17 @ 11:29)  SpO2: 99% (12-21-17 @ 11:29) (98% - 100%)    PHYSICAL EXAM:  General: non-toxic  HEAD/EYES: anicteric, PERRL  ENT:  supple  Cardiovascular:   S1, S2, right chest wall mediport  Respiratory:  clear bilaterally  GI:  soft, non-tender, normal bowel sounds  :  right NT tube   drain with cloudy serous fluid  Musculoskeletal:  no synovitis  Neurologic:  grossly non-focal  Skin:  no rash  Lymph: no lymphadenopathy  Psychiatric:  appropriate affect  Vascular:  no phlebitis                                           7.3    17.59 )-----------( 530      ( 21 Dec 2017 03:00 )             24.7 12-21    134  |  93  |  9   ----------------------------<  111  3.5   |  25  |  0.87  Ca    8.9      21 Dec 2017 03:00Phos  4.1     12-21Mg     1.6     12-21                MICROBIOLOGY:  Culture - Surg Site Aerob/Anaer w/Gm St (12.18.17 @ 15:20)    Culture - Surgical Site:   MANY    Gram Stain Wound:   WBC White Blood Cells  QNTY CELLS IN GRAM STAIN: MODERATE (3+)  GPCCH^Gram Pos Cocci in Chains  QUANTITY OF BACTERIA SEEN: MODERATE (3+)  GNR^Gram Neg Rods  QUANTITY OF BACTERIA SEEN: MODERATE (3+)  GPCCL^Gram Pos Cocci In Clusters  QUANTITY OF BACTERIA SEEN: FEW (2+)  GPR^Gram Positive Rods  QUANTITY OF BACTERIA SEEN: RARE (1+)    -  Ampicillin: S <=2 GÓMEZ    -  Tetra/Doxy: S <=4 GÓMEZ    -  Vancomycin: S 2 GÓMEZ    -  Ciprofloxacin: R >2 GÓMEZ    Specimen Source: OTHER    Organism Identification: Enterococcus faecium  Bacteroides fragilis    Organism: Enterococcus faecium    Organism: Bacteroides fragilis    Method Type: MICROSCAN POS COMBO 34            BLOOD VENOUS  12-17-17 --  --  --      BLOOD PERIPHERAL  12-17-17 --  --  --      KIDNEY - RIGHT  12-15-17 --  --  --      BLOOD PERIPHERAL  12-14-17 --  --  --      URINE MIDSTREAM  12-14-17 --  --  --      BLOOD VENOUS  12-14-17 --  --  --                RADIOLOGY:  < from: CT Abdomen and Pelvis w/ Oral Cont and w/ IV Cont (12.14.17 @ 14:29) >  IMPRESSION:    Interval increase in size of thick-walled fluid collection, anterior and   to the right of rectal stump. Interval removal of drainage catheter.   Another component of this collection extending into the left hemipelvis   and left lower abdomen has also increased in the interim.  New changes in the appendix which is markedly distended and fluid-filled.   This is consistent with acute appendicitis, in the right clinical setting.  Stable moderate right hydroureteronephrosis. Left hydroureteronephrosis   is new.  Metastatic disease to lungs and liver, progressed since prior exam.    < end of copied text >

## 2017-12-21 NOTE — DIETITIAN INITIAL EVALUATION ADULT. - OTHER INFO
Pt states that his appetite has improved and he is eating well. He states that he lost 10 lbs due to illness and now is trying to regain it. Pt had no complaints nor difficulty chewing or swallowing.

## 2017-12-21 NOTE — PROGRESS NOTE ADULT - SUBJECTIVE AND OBJECTIVE BOX
Team D SURGERY PROGRESS NOTE        SUBJECTIVE: Pt seen at examined at bedside. Tachycardic to 126 and febrile to 101.3F Blood and urine cultures taken. Pain well controlled. OOB and ambulating Denies fever, CP, SOB, and NV.         Vital Signs Last 24 Hrs  T(C): 37.3 (21 Dec 2017 05:25), Max: 38.5 (21 Dec 2017 00:22)  T(F): 99.1 (21 Dec 2017 05:25), Max: 101.3 (21 Dec 2017 00:22)  HR: 120 (21 Dec 2017 05:25) (120 - 126)  BP: 124/72 (21 Dec 2017 05:25) (124/72 - 130/67)  BP(mean): --  RR: 16 (21 Dec 2017 05:25) (16 - 16)  SpO2: 98% (21 Dec 2017 05:25) (98% - 100%)    Physical Exam  General: awake, alert, NAD  Pulm: respirations unlabored, no increased WOB  Abdomen: soft, mildly distended, NT, incision c/d/i  Extremities: Grossly symmetric    I&O's Summary    20 Dec 2017 07:01  -  21 Dec 2017 07:00  --------------------------------------------------------  IN: 0 mL / OUT: 1610 mL / NET: -1610 mL      I&O's Detail    20 Dec 2017 07:01  -  21 Dec 2017 07:00  --------------------------------------------------------  IN:  Total IN: 0 mL    OUT:    Bulb: 20 mL    Drain: 890 mL    Voided: 700 mL  Total OUT: 1610 mL    Total NET: -1610 mL          MEDICATIONS  (STANDING):  enoxaparin Injectable 40 milliGRAM(s) SubCutaneous every 24 hours  piperacillin/tazobactam IVPB. 3.375 Gram(s) IV Intermittent every 8 hours  sodium chloride 0.9% lock flush 3 milliLiter(s) IV Push every 8 hours  vancomycin  IVPB 1250 milliGRAM(s) IV Intermittent every 12 hours  vancomycin  IVPB        MEDICATIONS  (PRN):  acetaminophen   Tablet. 650 milliGRAM(s) Oral every 6 hours PRN Mild Pain (1 - 3)  ALPRAZolam 1 milliGRAM(s) Oral daily PRN anxiety  oxyCODONE    IR 5 milliGRAM(s) Oral every 4 hours PRN Moderate Pain (4 - 6)  oxyCODONE    IR 10 milliGRAM(s) Oral every 6 hours PRN Severe Pain (7 - 10)      LABS:                        7.3    17.59 )-----------( 530      ( 21 Dec 2017 03:00 )             24.7     12-21    134<L>  |  93<L>  |  9   ----------------------------<  111<H>  3.5   |  25  |  0.87    Ca    8.9      21 Dec 2017 03:00  Phos  4.1     12-21  Mg     1.6     12-21            RADIOLOGY & ADDITIONAL STUDIES:  no new studies

## 2017-12-21 NOTE — CHART NOTE - NSCHARTNOTEFT_GEN_A_CORE
NUTRITION SERVICES                                                                                  MALNUTRITION ALERT     Attention Health Care Provider: Upon nutritional assessment by the Registered Dietitian your patient was determined to meet criteria / has evidence of the following diagnosis/diagnoses:    [ ] Mild Protein Calorie Malnutrition   [X ] Moderate Protein Calorie Malnutrition   [ ] Severe Protein Calorie Malnutrition   [ ] Unspecified Protein Calorie Malnutrition   [ ] Underweight / BMI <19  [ ] Morbid Obesity / BMI >40        By signing this assessment you are acknowledging the diagnosis/diagnoses.       PLAN OF CARE: Refer to Initial Dietitian Evaluation or Nutrition Follow-Up Documentation for Nutritional Recommendations.

## 2017-12-21 NOTE — PROGRESS NOTE ADULT - ASSESSMENT
32 M with metastatic anal cancer s/p exp lap with Hartmans procedure 10/23, post op course complicated by reji-rectal fluid collection which was drained (cultures growing E faecalis, E.faecium and Actinomyces) treated with cipro/flagyl at home, presenting from outpatient urology office with fever. He was being evaluated for right hydronephrosis and found to have fever so he was sent in. Repeat CT showed b/l hydro increase in per-rectal fluid collection with extension into left hemipelvis and s/p IR drainage 12/18; culture growing sensitive enterococcus and bacteroides.  Also s/p Right nephrostomy on 12/15  febrile last 24 hours and wbc trending up.  ? need for drain check, additional drainage.     Recommend:  d/c vanco  continue zosyn  follow repeat cultures  if continues to spike and/or WBC elevated would repeat CT

## 2017-12-21 NOTE — PROVIDER CONTACT NOTE (OTHER) - SITUATION
Pt had blood cultures done earlier tonight, pt request to have am labs & vanco troph drawn now so pt doesn't have to get stuck three times.

## 2017-12-21 NOTE — PROGRESS NOTE ADULT - ASSESSMENT
32 year old male with metastatic rectal cancer, s/p rectal drainage of an abscess in 10/2017 and the drain was removed after a tube check revealed a collapsed cavity on 11/28/17.  s/p right nephrostomy tube insertion by IR for right hydronephrosis on 12/15/17. Patient continued to have fevers, CT on 12/14 showed an anterior and   to the right of rectal stump s/p drain placement by IR on 12/18.       Neuro - Tylenol, Oxycodone prn pain, Xanax prn anxiety  CV/Pulm - no issues  GI - Regular diet, rectal drain in place  Heme - Lovenox for VTE prophylaxis. 12/20 Duplex US showed no evidence of DVT.  f/u cxs   Renal - voiding well  ID - Continue Zosyn (12/17- ) for reji-rectal abscess. follow up wound culture results. ID following. Vanc trough 5 am.  Endo - No issues

## 2017-12-22 LAB
APTT BLD: 27.9 SEC — SIGNIFICANT CHANGE UP (ref 27.5–37.4)
BACTERIA BLD CULT: SIGNIFICANT CHANGE UP
BACTERIA BLD CULT: SIGNIFICANT CHANGE UP
BACTERIA UR CULT: SIGNIFICANT CHANGE UP
BLD GP AB SCN SERPL QL: NEGATIVE — SIGNIFICANT CHANGE UP
BUN SERPL-MCNC: 11 MG/DL — SIGNIFICANT CHANGE UP (ref 7–23)
CALCIUM SERPL-MCNC: 9.1 MG/DL — SIGNIFICANT CHANGE UP (ref 8.4–10.5)
CHLORIDE SERPL-SCNC: 94 MMOL/L — LOW (ref 98–107)
CO2 SERPL-SCNC: 27 MMOL/L — SIGNIFICANT CHANGE UP (ref 22–31)
CREAT SERPL-MCNC: 1.02 MG/DL — SIGNIFICANT CHANGE UP (ref 0.5–1.3)
GLUCOSE SERPL-MCNC: 109 MG/DL — HIGH (ref 70–99)
HCT VFR BLD CALC: 22.2 % — LOW (ref 39–50)
HCT VFR BLD CALC: 23.3 % — LOW (ref 39–50)
HGB BLD-MCNC: 6.6 G/DL — CRITICAL LOW (ref 13–17)
HGB BLD-MCNC: 6.9 G/DL — CRITICAL LOW (ref 13–17)
INR BLD: 1.53 — HIGH (ref 0.88–1.17)
MAGNESIUM SERPL-MCNC: 2.1 MG/DL — SIGNIFICANT CHANGE UP (ref 1.6–2.6)
MCHC RBC-ENTMCNC: 22.3 PG — LOW (ref 27–34)
MCHC RBC-ENTMCNC: 22.5 PG — LOW (ref 27–34)
MCHC RBC-ENTMCNC: 29.6 % — LOW (ref 32–36)
MCHC RBC-ENTMCNC: 29.7 % — LOW (ref 32–36)
MCV RBC AUTO: 75.4 FL — LOW (ref 80–100)
MCV RBC AUTO: 75.8 FL — LOW (ref 80–100)
NRBC # FLD: 0 — SIGNIFICANT CHANGE UP
NRBC # FLD: 0 — SIGNIFICANT CHANGE UP
PHOSPHATE SERPL-MCNC: 4 MG/DL — SIGNIFICANT CHANGE UP (ref 2.5–4.5)
PLATELET # BLD AUTO: 565 K/UL — HIGH (ref 150–400)
PLATELET # BLD AUTO: 567 K/UL — HIGH (ref 150–400)
PMV BLD: 9.6 FL — SIGNIFICANT CHANGE UP (ref 7–13)
PMV BLD: 9.6 FL — SIGNIFICANT CHANGE UP (ref 7–13)
POTASSIUM SERPL-MCNC: 3.7 MMOL/L — SIGNIFICANT CHANGE UP (ref 3.5–5.3)
POTASSIUM SERPL-SCNC: 3.7 MMOL/L — SIGNIFICANT CHANGE UP (ref 3.5–5.3)
PROTHROM AB SERPL-ACNC: 17.7 SEC — HIGH (ref 9.8–13.1)
RBC # BLD: 2.93 M/UL — LOW (ref 4.2–5.8)
RBC # BLD: 3.09 M/UL — LOW (ref 4.2–5.8)
RBC # FLD: 16.3 % — HIGH (ref 10.3–14.5)
RBC # FLD: 16.3 % — HIGH (ref 10.3–14.5)
RH IG SCN BLD-IMP: POSITIVE — SIGNIFICANT CHANGE UP
SODIUM SERPL-SCNC: 136 MMOL/L — SIGNIFICANT CHANGE UP (ref 135–145)
SPECIMEN SOURCE: SIGNIFICANT CHANGE UP
WBC # BLD: 18.14 K/UL — HIGH (ref 3.8–10.5)
WBC # BLD: 19.04 K/UL — HIGH (ref 3.8–10.5)
WBC # FLD AUTO: 18.14 K/UL — HIGH (ref 3.8–10.5)
WBC # FLD AUTO: 19.04 K/UL — HIGH (ref 3.8–10.5)

## 2017-12-22 PROCEDURE — 76080 X-RAY EXAM OF FISTULA: CPT | Mod: 26

## 2017-12-22 PROCEDURE — 99232 SBSQ HOSP IP/OBS MODERATE 35: CPT | Mod: 24

## 2017-12-22 PROCEDURE — 99232 SBSQ HOSP IP/OBS MODERATE 35: CPT | Mod: GC

## 2017-12-22 PROCEDURE — 49424 ASSESS CYST CONTRAST INJECT: CPT

## 2017-12-22 RX ORDER — POTASSIUM CHLORIDE 20 MEQ
10 PACKET (EA) ORAL
Qty: 0 | Refills: 0 | Status: COMPLETED | OUTPATIENT
Start: 2017-12-22 | End: 2017-12-22

## 2017-12-22 RX ORDER — DEXTROSE MONOHYDRATE, SODIUM CHLORIDE, AND POTASSIUM CHLORIDE 50; .745; 4.5 G/1000ML; G/1000ML; G/1000ML
1000 INJECTION, SOLUTION INTRAVENOUS
Qty: 0 | Refills: 0 | Status: DISCONTINUED | OUTPATIENT
Start: 2017-12-22 | End: 2017-12-22

## 2017-12-22 RX ADMIN — PIPERACILLIN AND TAZOBACTAM 25 GRAM(S): 4; .5 INJECTION, POWDER, LYOPHILIZED, FOR SOLUTION INTRAVENOUS at 05:18

## 2017-12-22 RX ADMIN — PIPERACILLIN AND TAZOBACTAM 25 GRAM(S): 4; .5 INJECTION, POWDER, LYOPHILIZED, FOR SOLUTION INTRAVENOUS at 21:58

## 2017-12-22 RX ADMIN — OXYCODONE HYDROCHLORIDE 10 MILLIGRAM(S): 5 TABLET ORAL at 00:29

## 2017-12-22 RX ADMIN — PIPERACILLIN AND TAZOBACTAM 25 GRAM(S): 4; .5 INJECTION, POWDER, LYOPHILIZED, FOR SOLUTION INTRAVENOUS at 14:07

## 2017-12-22 RX ADMIN — Medication 100 MILLIEQUIVALENT(S): at 11:01

## 2017-12-22 RX ADMIN — OXYCODONE HYDROCHLORIDE 10 MILLIGRAM(S): 5 TABLET ORAL at 19:03

## 2017-12-22 RX ADMIN — ENOXAPARIN SODIUM 40 MILLIGRAM(S): 100 INJECTION SUBCUTANEOUS at 20:24

## 2017-12-22 RX ADMIN — OXYCODONE HYDROCHLORIDE 10 MILLIGRAM(S): 5 TABLET ORAL at 05:23

## 2017-12-22 RX ADMIN — SODIUM CHLORIDE 3 MILLILITER(S): 9 INJECTION INTRAMUSCULAR; INTRAVENOUS; SUBCUTANEOUS at 14:06

## 2017-12-22 RX ADMIN — SODIUM CHLORIDE 3 MILLILITER(S): 9 INJECTION INTRAMUSCULAR; INTRAVENOUS; SUBCUTANEOUS at 21:59

## 2017-12-22 RX ADMIN — OXYCODONE HYDROCHLORIDE 10 MILLIGRAM(S): 5 TABLET ORAL at 06:15

## 2017-12-22 RX ADMIN — Medication 100 MILLIEQUIVALENT(S): at 10:00

## 2017-12-22 RX ADMIN — OXYCODONE HYDROCHLORIDE 10 MILLIGRAM(S): 5 TABLET ORAL at 20:14

## 2017-12-22 RX ADMIN — DEXTROSE MONOHYDRATE, SODIUM CHLORIDE, AND POTASSIUM CHLORIDE 75 MILLILITER(S): 50; .745; 4.5 INJECTION, SOLUTION INTRAVENOUS at 06:19

## 2017-12-22 RX ADMIN — SODIUM CHLORIDE 3 MILLILITER(S): 9 INJECTION INTRAMUSCULAR; INTRAVENOUS; SUBCUTANEOUS at 05:18

## 2017-12-22 RX ADMIN — Medication 100 MILLIEQUIVALENT(S): at 12:32

## 2017-12-22 RX ADMIN — OXYCODONE HYDROCHLORIDE 5 MILLIGRAM(S): 5 TABLET ORAL at 20:13

## 2017-12-22 NOTE — PROVIDER CONTACT NOTE (MEDICATION) - SITUATION
Pt refusing blood transfusion. Stated that he wants to hold off on the blood until the next blood draw to see if his levels drop lower. Pt  educated on the reason the blood was ordered.

## 2017-12-22 NOTE — PROGRESS NOTE ADULT - SUBJECTIVE AND OBJECTIVE BOX
Follow Up:  reji rectal abscess, metastatic anal cancer    Interval History/ROS: low grade fever but feels well    Allergies  No Known Allergies        ANTIMICROBIALS:  piperacillin/tazobactam IVPB. 3.375 every 8 hours      OTHER MEDS:  MEDICATIONS  (STANDING):  acetaminophen   Tablet. 650 every 6 hours PRN  enoxaparin Injectable 40 every 24 hours  oxyCODONE    IR 5 every 4 hours PRN  oxyCODONE    IR 10 every 6 hours PRN      Vital Signs Last 24 Hrs  T(C): 37.7 (22 Dec 2017 08:35), Max: 38.2 (22 Dec 2017 00:54)  T(F): 99.8 (22 Dec 2017 08:35), Max: 100.7 (22 Dec 2017 00:54)  HR: 110 (22 Dec 2017 08:35) (110 - 124)  BP: 120/74 (22 Dec 2017 08:35) (120/74 - 135/81)  BP(mean): --  RR: 17 (22 Dec 2017 08:35) (16 - 18)  SpO2: 99% (22 Dec 2017 08:35) (97% - 99%)    PHYSICAL EXAM:  General: non-toxic  HEAD/EYES: anicteric, PERRL  ENT:  supple  Cardiovascular:   S1, S2  Respiratory:  clear bilaterally  GI:  soft, non-tender, colsotomy bag with stool normal bowel sounds  :  no CVA tenderness rt NT tube, STEPHANE drain  Musculoskeletal:  no synovitis  Neurologic:  grossly non-focal  Skin:  no rash  Lymph: no lymphadenopathy  Psychiatric:  appropriate affect  Vascular:  no phlebitis                                6.6    18.14 )-----------( 565      ( 22 Dec 2017 08:00 )             22.2       12-22    136  |  94<L>  |  11  ----------------------------<  109<H>  3.7   |  27  |  1.02    Ca    9.1      22 Dec 2017 06:08  Phos  4.0     12-22  Mg     2.1     12-22            MICROBIOLOGY:  v  NEPHROSTOMY - RIGHT  12-21-17 --  --  --      BLOOD PERIPHERAL  12-21-17 --  --  --      OTHER  12-18-17 --  --  Enterococcus faecium  Bacteroides fragilis  Culture - Surg Site Aerob/Anaer w/Gm St (12.18.17 @ 15:20)    -  Ampicillin: S <=2 GÓMEZ    Culture - Surgical Site:   MANY    Gram Stain Wound:   WBC White Blood Cells  QNTY CELLS IN GRAM STAIN: MODERATE (3+)  GPCCH^Gram Pos Cocci in Chains  QUANTITY OF BACTERIA SEEN: MODERATE (3+)  GNR^Gram Neg Rods  QUANTITY OF BACTERIA SEEN: MODERATE (3+)  GPCCL^Gram Pos Cocci In Clusters  QUANTITY OF BACTERIA SEEN: FEW (2+)  GPR^Gram Positive Rods  QUANTITY OF BACTERIA SEEN: RARE (1+)    -  Ciprofloxacin: R >2 GÓMEZ    -  Tetra/Doxy: S <=4 GÓMEZ    -  Vancomycin: S 2 GÓMEZ    Specimen Source: OTHER    Organism Identification: Enterococcus faecium  Bacteroides fragilis    Organism: Enterococcus faecium    Organism: Bacteroides fragilis    Method Type: MICROSCAN POS COMBO 34        BLOOD VENOUS  12-17-17 --  --  --      BLOOD PERIPHERAL  12-17-17 --  --  --      KIDNEY - RIGHT  12-15-17 --  --  --      BLOOD PERIPHERAL  12-14-17 --  --  --      URINE MIDSTREAM  12-14-17 --  --  --      BLOOD VENOUS  12-14-17 --  --  --                RADIOLOGY:  < from: CT Abdomen and Pelvis w/ Oral Cont and w/ IV Cont (12.21.17 @ 22:23) >  IMPRESSION:  Slight improvement to no significant change in the thick-walled   collection adjacent to the rectal stump, post drainage catheter   placement. A new tubular collection now identified, perhaps along the   course of a tract, with onecomponent in the right anterior abdominal   wall.  Slight interval increase in a few of the lesions in the right hepatic   lobe since the recent exam. Given sudden increase in size, hepatic   abscesses are considered.    < end of copied text >

## 2017-12-22 NOTE — PROGRESS NOTE ADULT - ASSESSMENT
32 year old male with metastatic rectal cancer, s/p rectal drainage of an abscess in 10/2017 and the drain was removed after a tube check revealed a collapsed cavity on 11/28/17.  s/p right nephrostomy tube insertion by IR for right hydronephrosis on 12/15/17. Patient continued to have fevers, CT on 12/14 showed an anterior and   to the right of rectal stump s/p drain placement by IR on 12/18.       Neuro - Tylenol, Oxycodone prn pain, Xanax prn anxiety  CV/Pulm - no issues  GI - Regular diet, rectal drain in place  Heme - Lovenox for VTE prophylaxis. 12/20 Duplex US showed no evidence of DVT. H/H drop to 6.6/22.2 - patient refusing transfusion and would like to see if blood count improves tomorrow morning   f/u cxs   Renal - voiding well  ID - Continue Zosyn (12/17- ) for reji-rectal abscess. WBC still elevated. ID following. Vanc trough 5 am. IR agreed to drain check/relocate. 12/21 blood and urine cx NGTD  Endo - No issues      [id number]

## 2017-12-22 NOTE — CHART NOTE - NSCHARTNOTEFT_GEN_A_CORE
Patient s/p IR tube repositioning.  Tolerated well.  Given regular diet and tolerating well.    Objective    Vital signs  T(F): , Max: 100.7 (12-22-17 @ 00:54)  HR: 94 (12-22-17 @ 16:49)  BP: 121/76 (12-22-17 @ 16:49)  SpO2: 99% (12-22-17 @ 16:49)  Wt(kg): --    Output     12-21 @ 07:01  -  12-22 @ 07:00  --------------------------------------------------------  IN: 0 mL / OUT: 2115 mL / NET: -2115 mL    12-22 @ 07:01  -  12-22 @ 18:58  --------------------------------------------------------  IN: 0 mL / OUT: 1090 mL / NET: -1090 mL    Gen NAD  Abd soft, NT, ND. Colostomy with stool and gas in bag  R gluteal drain with stitches, dressing c/d/i. STEPHANE drain has serosanguinous fluid  R NT c/d/i      Labs      12-22 @ 08:00    WBC 18.14 / Hct 22.2  / SCr --       12-22 @ 06:08    WBC 19.04 / Hct 23.3  / SCr 1.02     32 year old male hx rectal ca s/p neoadj chemo and abdelrahman's presents with reji-rectal collection, s/p IR drainage, and IR tube repositioning 12/22    - Regular diet  - F/u Cultures  - Continue Zosyn  - Check drain outputs    Jonathan Zhenlien  [id number]

## 2017-12-22 NOTE — PROGRESS NOTE ADULT - SUBJECTIVE AND OBJECTIVE BOX
Team D SURGERY PROGRESS NOTE    SUBJECTIVE: Pt seen at examined at bedside during morning rounds. Low grade fever and tachycardia overnight, although baseline is 100-110 bpm. Pain well controlled. +Flatus OOB and ambulating Denies  CP, SOB, and NV. Ostomy functioning         Vital Signs Last 24 Hrs  T(C): 37.7 (22 Dec 2017 08:35), Max: 38.2 (22 Dec 2017 00:54)  T(F): 99.8 (22 Dec 2017 08:35), Max: 100.7 (22 Dec 2017 00:54)  HR: 110 (22 Dec 2017 08:35) (110 - 120)  BP: 120/74 (22 Dec 2017 08:35) (120/74 - 131/77)  BP(mean): --  RR: 17 (22 Dec 2017 08:35) (17 - 18)  SpO2: 99% (22 Dec 2017 08:35) (97% - 99%)    Physical Exam  General: awake, alert, NAD  Pulm: respirations unlabored, no increased WOB  Abdomen: soft, mildly distended, NT, ostomy pink and collecting gas and stool  Extremities: Grossly symmetric, R transgluteal drain collecting purulent fluid     I&O's Summary    21 Dec 2017 07:01  -  22 Dec 2017 07:00  --------------------------------------------------------  IN: 0 mL / OUT: 2115 mL / NET: -2115 mL      I&O's Detail    21 Dec 2017 07:01  -  22 Dec 2017 07:00  --------------------------------------------------------  IN:  Total IN: 0 mL    OUT:    Bulb: 15 mL    Drain: 600 mL    Voided: 1500 mL  Total OUT: 2115 mL    Total NET: -2115 mL          MEDICATIONS  (STANDING):  dextrose 5% + sodium chloride 0.45% with potassium chloride 20 mEq/L 1000 milliLiter(s) (75 mL/Hr) IV Continuous <Continuous>  enoxaparin Injectable 40 milliGRAM(s) SubCutaneous every 24 hours  piperacillin/tazobactam IVPB. 3.375 Gram(s) IV Intermittent every 8 hours  potassium chloride  10 mEq/100 mL IVPB 10 milliEquivalent(s) IV Intermittent every 1 hour  sodium chloride 0.9% lock flush 3 milliLiter(s) IV Push every 8 hours    MEDICATIONS  (PRN):  acetaminophen   Tablet. 650 milliGRAM(s) Oral every 6 hours PRN Mild Pain (1 - 3)  oxyCODONE    IR 5 milliGRAM(s) Oral every 4 hours PRN Moderate Pain (4 - 6)  oxyCODONE    IR 10 milliGRAM(s) Oral every 6 hours PRN Severe Pain (7 - 10)      LABS:                        6.6    18.14 )-----------( 565      ( 22 Dec 2017 08:00 )             22.2     12-22    136  |  94<L>  |  11  ----------------------------<  109<H>  3.7   |  27  |  1.02    Ca    9.1      22 Dec 2017 06:08  Phos  4.0     12-22  Mg     2.1     12-22      PT/INR - ( 22 Dec 2017 10:02 )   PT: 17.7 SEC;   INR: 1.53          PTT - ( 22 Dec 2017 10:02 )  PTT:27.9 SEC      RADIOLOGY & ADDITIONAL STUDIES:    12.21

## 2017-12-22 NOTE — CHART NOTE - NSCHARTNOTEFT_GEN_A_CORE
Pre-Interventional Radiology Procedure Note    32y    Male    Procedure: tube check    Diagnosis/Indication: Patient is a 32y old  Male who presents with a chief complaint of fever, fluid collection in pelvis (14 Dec 2017 19:25)      Interventional Radiology Attending Physician: Salud    Ordering Attending Physician: Robin    PAST MEDICAL & SURGICAL HISTORY:  Anxiety: hx of panic attacks  Gastroesophageal reflux disease  Rectal cancer metastasized to liver  Status post Ryan procedure  Encounter for care related to vascular access port: right chest wall port placed 2017  History of hemorrhoidectomy       CBC Full  -  ( 22 Dec 2017 08:00 )  WBC Count : 18.14 K/uL  Hemoglobin : 6.6 g/dL  Hematocrit : 22.2 %  Platelet Count - Automated : 565 K/uL  Mean Cell Volume : 75.8 fL  Mean Cell Hemoglobin : 22.5 pg  Mean Cell Hemoglobin Concentration : 29.7 %  Auto Neutrophil # : x  Auto Lymphocyte # : x  Auto Monocyte # : x  Auto Eosinophil # : x  Auto Basophil # : x  Auto Neutrophil % : x  Auto Lymphocyte % : x  Auto Monocyte % : x  Auto Eosinophil % : x  Auto Basophil % : x    12-22    136  |  94<L>  |  11  ----------------------------<  109<H>  3.7   |  27  |  1.02    Ca    9.1      22 Dec 2017 06:08  Phos  4.0     12-22  Mg     2.1     12-22

## 2017-12-22 NOTE — PROGRESS NOTE ADULT - ASSESSMENT
32 M with metastatic anal cancer s/p exp lap with Hartmans procedure 10/23, post op course complicated by reji-rectal fluid collection which was drained (cultures growing E faecalis, E.faecium and Actinomyces) treated with cipro/flagyl at home, presenting from outpatient urology office with fever. He was being evaluated for right hydronephrosis and found to have fever so he was sent in. Repeat CT showed b/l hydro increase in per-rectal fluid collection with extension into left hemipelvis and s/p IR drainage 12/18; culture growing sensitive enterococcus and bacteroides.  Also s/p Right nephrostomy on 12/15  Still febrile but low grade, persistent leukocytosis. Repeat CT concerning for additional collection + ?liver abscesses    Recommend:  C/w zosyn  follow repeat cultures  Agree with IR eval for drainage of new collection + liver abscess

## 2017-12-23 LAB
BUN SERPL-MCNC: 12 MG/DL — SIGNIFICANT CHANGE UP (ref 7–23)
CALCIUM SERPL-MCNC: 9.1 MG/DL — SIGNIFICANT CHANGE UP (ref 8.4–10.5)
CHLORIDE SERPL-SCNC: 95 MMOL/L — LOW (ref 98–107)
CO2 SERPL-SCNC: 25 MMOL/L — SIGNIFICANT CHANGE UP (ref 22–31)
CREAT SERPL-MCNC: 0.96 MG/DL — SIGNIFICANT CHANGE UP (ref 0.5–1.3)
GLUCOSE SERPL-MCNC: 108 MG/DL — HIGH (ref 70–99)
HCT VFR BLD CALC: 23.5 % — LOW (ref 39–50)
HGB BLD-MCNC: 6.9 G/DL — CRITICAL LOW (ref 13–17)
MAGNESIUM SERPL-MCNC: 1.9 MG/DL — SIGNIFICANT CHANGE UP (ref 1.6–2.6)
MCHC RBC-ENTMCNC: 22.8 PG — LOW (ref 27–34)
MCHC RBC-ENTMCNC: 29.4 % — LOW (ref 32–36)
MCV RBC AUTO: 77.8 FL — LOW (ref 80–100)
NRBC # FLD: 0 — SIGNIFICANT CHANGE UP
PHOSPHATE SERPL-MCNC: 3.6 MG/DL — SIGNIFICANT CHANGE UP (ref 2.5–4.5)
PLATELET # BLD AUTO: 583 K/UL — HIGH (ref 150–400)
PMV BLD: 9.5 FL — SIGNIFICANT CHANGE UP (ref 7–13)
POTASSIUM SERPL-MCNC: 4 MMOL/L — SIGNIFICANT CHANGE UP (ref 3.5–5.3)
POTASSIUM SERPL-SCNC: 4 MMOL/L — SIGNIFICANT CHANGE UP (ref 3.5–5.3)
RBC # BLD: 3.02 M/UL — LOW (ref 4.2–5.8)
RBC # FLD: 16.4 % — HIGH (ref 10.3–14.5)
SODIUM SERPL-SCNC: 136 MMOL/L — SIGNIFICANT CHANGE UP (ref 135–145)
WBC # BLD: 14.64 K/UL — HIGH (ref 3.8–10.5)
WBC # FLD AUTO: 14.64 K/UL — HIGH (ref 3.8–10.5)

## 2017-12-23 PROCEDURE — 99232 SBSQ HOSP IP/OBS MODERATE 35: CPT

## 2017-12-23 RX ORDER — FERROUS SULFATE 325(65) MG
325 TABLET ORAL DAILY
Qty: 0 | Refills: 0 | Status: DISCONTINUED | OUTPATIENT
Start: 2017-12-23 | End: 2017-12-24

## 2017-12-23 RX ORDER — DOCUSATE SODIUM 100 MG
100 CAPSULE ORAL THREE TIMES A DAY
Qty: 0 | Refills: 0 | Status: DISCONTINUED | OUTPATIENT
Start: 2017-12-23 | End: 2017-12-24

## 2017-12-23 RX ADMIN — OXYCODONE HYDROCHLORIDE 10 MILLIGRAM(S): 5 TABLET ORAL at 08:06

## 2017-12-23 RX ADMIN — PIPERACILLIN AND TAZOBACTAM 25 GRAM(S): 4; .5 INJECTION, POWDER, LYOPHILIZED, FOR SOLUTION INTRAVENOUS at 15:20

## 2017-12-23 RX ADMIN — OXYCODONE HYDROCHLORIDE 10 MILLIGRAM(S): 5 TABLET ORAL at 21:54

## 2017-12-23 RX ADMIN — SODIUM CHLORIDE 3 MILLILITER(S): 9 INJECTION INTRAMUSCULAR; INTRAVENOUS; SUBCUTANEOUS at 05:09

## 2017-12-23 RX ADMIN — OXYCODONE HYDROCHLORIDE 10 MILLIGRAM(S): 5 TABLET ORAL at 17:11

## 2017-12-23 RX ADMIN — Medication 100 MILLIGRAM(S): at 15:20

## 2017-12-23 RX ADMIN — ENOXAPARIN SODIUM 40 MILLIGRAM(S): 100 INJECTION SUBCUTANEOUS at 21:10

## 2017-12-23 RX ADMIN — SODIUM CHLORIDE 3 MILLILITER(S): 9 INJECTION INTRAMUSCULAR; INTRAVENOUS; SUBCUTANEOUS at 14:49

## 2017-12-23 RX ADMIN — PIPERACILLIN AND TAZOBACTAM 25 GRAM(S): 4; .5 INJECTION, POWDER, LYOPHILIZED, FOR SOLUTION INTRAVENOUS at 21:10

## 2017-12-23 RX ADMIN — OXYCODONE HYDROCHLORIDE 10 MILLIGRAM(S): 5 TABLET ORAL at 02:30

## 2017-12-23 RX ADMIN — OXYCODONE HYDROCHLORIDE 10 MILLIGRAM(S): 5 TABLET ORAL at 15:20

## 2017-12-23 RX ADMIN — SODIUM CHLORIDE 3 MILLILITER(S): 9 INJECTION INTRAMUSCULAR; INTRAVENOUS; SUBCUTANEOUS at 21:10

## 2017-12-23 RX ADMIN — OXYCODONE HYDROCHLORIDE 10 MILLIGRAM(S): 5 TABLET ORAL at 01:49

## 2017-12-23 RX ADMIN — PIPERACILLIN AND TAZOBACTAM 25 GRAM(S): 4; .5 INJECTION, POWDER, LYOPHILIZED, FOR SOLUTION INTRAVENOUS at 05:08

## 2017-12-23 RX ADMIN — OXYCODONE HYDROCHLORIDE 10 MILLIGRAM(S): 5 TABLET ORAL at 11:02

## 2017-12-23 RX ADMIN — OXYCODONE HYDROCHLORIDE 10 MILLIGRAM(S): 5 TABLET ORAL at 21:24

## 2017-12-23 RX ADMIN — Medication 325 MILLIGRAM(S): at 15:37

## 2017-12-23 NOTE — PROGRESS NOTE ADULT - ASSESSMENT
32 year old male with metastatic rectal cancer, s/p rectal drainage of an abscess in 10/2017 and the drain was removed after a tube check revealed a collapsed cavity on 11/28/17.  s/p right nephrostomy tube insertion by IR for right hydronephrosis on 12/15/17. Patient continued to have fevers, CT on 12/14 showed a collection anterior and to the right of rectal stump s/p drain placement by IR on 12/18.       Neuro - Tylenol, Oxycodone prn pain, Xanax prn anxiety  CV/Pulm - no issues  GI - Regular diet, rectal drain in place  Heme - Lovenox for VTE prophylaxis. 12/20 Duplex US showed no evidence of DVT. H/H drop to (12/22) 6.6/22.2 - patient refusing transfusion. Will start iron supplement and continue to monitor H/H  f/u cxs   Renal - voiding well  ID - Continue Zosyn (12/17- ) for reji-rectal abscess. WBC downtrending. ID following. IR did drain check and relocated drain yesterday. 12/21 blood and urine cx NGTD. Afebrile for 12 hours   Endo - No issues      [id number]

## 2017-12-23 NOTE — PROGRESS NOTE ADULT - ASSESSMENT
32 year old male with metastatic rectal cancer with an abscess near the rectal stump s/p IR drain 12/18 organism growing sensitive enterococcus and bacteroides. Recurrent fever and leucocytosis prompted repeat CT 12/21/17 which revealed new collection and question of liver abscesses. s/p IR yesterday for tube check repositioning now with drainage.Rt nephrostomy placed 12/15 and functioning well.      Assessment:  -Perirectal abscess near rectal stump s/p IR drainage and repositioning  -Culture growing sensitive enterococcus and bacteroides.  -Liver collections?  -Leukocytosis improving  -Left nephrostomy tube  -Afebrile  -Nontoxic    Recommend:  -Continue Zosyn for now.  -F/U IR cxs.    Jay Coles MD  Pager [phone]  After 5pm/weekends call [phone]

## 2017-12-23 NOTE — PROGRESS NOTE ADULT - SUBJECTIVE AND OBJECTIVE BOX
Team D SURGERY PROGRESS NOTE      SUBJECTIVE: Pt seen at examined at bedside. AVSS. Tachycardia to 100s at baseline. No acute events overnight. Pain well controlled. OOB and ambulating. Denies fever, CP, SOB, and NV.         Vital Signs Last 24 Hrs  T(C): 37.6 (23 Dec 2017 07:56), Max: 37.7 (22 Dec 2017 15:33)  T(F): 99.6 (23 Dec 2017 07:56), Max: 99.8 (22 Dec 2017 15:33)  HR: 104 (23 Dec 2017 07:56) (94 - 116)  BP: 115/75 (23 Dec 2017 07:56) (115/75 - 123/74)  BP(mean): --  RR: 17 (23 Dec 2017 07:56) (17 - 18)  SpO2: 99% (23 Dec 2017 07:56) (98% - 99%)    Physical Exam  General: awake, alert, NAD  Pulm: respirations unlabored, no increased WOB  Abdomen: soft, mildly distended, NT, ostomy pink and collecting gas and stool  Extremities: Grossly symmetric, R transgluteal drain collecting purulent fluid     I&O's Summary    22 Dec 2017 07:01  -  23 Dec 2017 07:00  --------------------------------------------------------  IN: 0 mL / OUT: 1840 mL / NET: -1840 mL      I&O's Detail    22 Dec 2017 07:01  -  23 Dec 2017 07:00  --------------------------------------------------------  IN:  Total IN: 0 mL    OUT:    Bulb: 10 mL    Colostomy: 30 mL    Drain: 450 mL    Voided: 1350 mL  Total OUT: 1840 mL    Total NET: -1840 mL          MEDICATIONS  (STANDING):  enoxaparin Injectable 40 milliGRAM(s) SubCutaneous every 24 hours  piperacillin/tazobactam IVPB. 3.375 Gram(s) IV Intermittent every 8 hours  sodium chloride 0.9% lock flush 3 milliLiter(s) IV Push every 8 hours    MEDICATIONS  (PRN):  acetaminophen   Tablet. 650 milliGRAM(s) Oral every 6 hours PRN Mild Pain (1 - 3)  oxyCODONE    IR 5 milliGRAM(s) Oral every 4 hours PRN Moderate Pain (4 - 6)  oxyCODONE    IR 10 milliGRAM(s) Oral every 6 hours PRN Severe Pain (7 - 10)      LABS:                        6.9    14.64 )-----------( 583      ( 23 Dec 2017 06:24 )             23.5     12-23    136  |  95<L>  |  12  ----------------------------<  108<H>  4.0   |  25  |  0.96    Ca    9.1      23 Dec 2017 06:24  Phos  3.6     12-23  Mg     1.9     12-23      PT/INR - ( 22 Dec 2017 10:02 )   PT: 17.7 SEC;   INR: 1.53          PTT - ( 22 Dec 2017 10:02 )  PTT:27.9 SEC      RADIOLOGY & ADDITIONAL STUDIES:  12/22 tube study and repositioning with IR

## 2017-12-24 ENCOUNTER — TRANSCRIPTION ENCOUNTER (OUTPATIENT)
Age: 32
End: 2017-12-24

## 2017-12-24 VITALS
HEART RATE: 101 BPM | RESPIRATION RATE: 18 BRPM | SYSTOLIC BLOOD PRESSURE: 116 MMHG | TEMPERATURE: 98 F | OXYGEN SATURATION: 100 % | DIASTOLIC BLOOD PRESSURE: 80 MMHG

## 2017-12-24 LAB
BUN SERPL-MCNC: 11 MG/DL — SIGNIFICANT CHANGE UP (ref 7–23)
CALCIUM SERPL-MCNC: 8.9 MG/DL — SIGNIFICANT CHANGE UP (ref 8.4–10.5)
CHLORIDE SERPL-SCNC: 95 MMOL/L — LOW (ref 98–107)
CO2 SERPL-SCNC: 26 MMOL/L — SIGNIFICANT CHANGE UP (ref 22–31)
CREAT SERPL-MCNC: 0.85 MG/DL — SIGNIFICANT CHANGE UP (ref 0.5–1.3)
FERRITIN SERPL-MCNC: 964.9 NG/ML — HIGH (ref 30–400)
GLUCOSE SERPL-MCNC: 108 MG/DL — HIGH (ref 70–99)
HCT VFR BLD CALC: 23.8 % — LOW (ref 39–50)
HGB BLD-MCNC: 6.9 G/DL — CRITICAL LOW (ref 13–17)
IRON SATN MFR SERPL: 15 UG/DL — LOW (ref 45–165)
IRON SATN MFR SERPL: 161 UG/DL — SIGNIFICANT CHANGE UP (ref 155–535)
MAGNESIUM SERPL-MCNC: 1.8 MG/DL — SIGNIFICANT CHANGE UP (ref 1.6–2.6)
MCHC RBC-ENTMCNC: 22.7 PG — LOW (ref 27–34)
MCHC RBC-ENTMCNC: 29 % — LOW (ref 32–36)
MCV RBC AUTO: 78.3 FL — LOW (ref 80–100)
NRBC # FLD: 0 — SIGNIFICANT CHANGE UP
PHOSPHATE SERPL-MCNC: 4 MG/DL — SIGNIFICANT CHANGE UP (ref 2.5–4.5)
PLATELET # BLD AUTO: 617 K/UL — HIGH (ref 150–400)
PMV BLD: 9.4 FL — SIGNIFICANT CHANGE UP (ref 7–13)
POTASSIUM SERPL-MCNC: 4.3 MMOL/L — SIGNIFICANT CHANGE UP (ref 3.5–5.3)
POTASSIUM SERPL-SCNC: 4.3 MMOL/L — SIGNIFICANT CHANGE UP (ref 3.5–5.3)
RBC # BLD: 3.04 M/UL — LOW (ref 4.2–5.8)
RBC # FLD: 16.4 % — HIGH (ref 10.3–14.5)
SODIUM SERPL-SCNC: 136 MMOL/L — SIGNIFICANT CHANGE UP (ref 135–145)
UIBC SERPL-MCNC: 146 UG/DL — SIGNIFICANT CHANGE UP (ref 110–370)
WBC # BLD: 11.86 K/UL — HIGH (ref 3.8–10.5)
WBC # FLD AUTO: 11.86 K/UL — HIGH (ref 3.8–10.5)

## 2017-12-24 RX ORDER — ENOXAPARIN SODIUM 100 MG/ML
40 INJECTION SUBCUTANEOUS
Qty: 12 | Refills: 0 | OUTPATIENT
Start: 2017-12-24 | End: 2018-01-22

## 2017-12-24 RX ORDER — OXYCODONE HYDROCHLORIDE 5 MG/1
1 TABLET ORAL
Qty: 16 | Refills: 0 | OUTPATIENT
Start: 2017-12-24 | End: 2017-12-27

## 2017-12-24 RX ORDER — MOXIFLOXACIN HYDROCHLORIDE TABLETS, 400 MG 400 MG/1
1 TABLET, FILM COATED ORAL
Qty: 20 | Refills: 0 | OUTPATIENT
Start: 2017-12-24 | End: 2018-01-02

## 2017-12-24 RX ORDER — ACETAMINOPHEN 500 MG
2 TABLET ORAL
Qty: 0 | Refills: 0 | COMMUNITY
Start: 2017-12-24

## 2017-12-24 RX ORDER — DOCUSATE SODIUM 100 MG
1 CAPSULE ORAL
Qty: 0 | Refills: 0 | COMMUNITY
Start: 2017-12-24

## 2017-12-24 RX ADMIN — SODIUM CHLORIDE 3 MILLILITER(S): 9 INJECTION INTRAMUSCULAR; INTRAVENOUS; SUBCUTANEOUS at 05:06

## 2017-12-24 RX ADMIN — PIPERACILLIN AND TAZOBACTAM 25 GRAM(S): 4; .5 INJECTION, POWDER, LYOPHILIZED, FOR SOLUTION INTRAVENOUS at 05:06

## 2017-12-24 RX ADMIN — OXYCODONE HYDROCHLORIDE 10 MILLIGRAM(S): 5 TABLET ORAL at 04:59

## 2017-12-24 RX ADMIN — OXYCODONE HYDROCHLORIDE 10 MILLIGRAM(S): 5 TABLET ORAL at 04:29

## 2017-12-24 NOTE — DISCHARGE NOTE ADULT - MEDICATION SUMMARY - MEDICATIONS TO TAKE
I will START or STAY ON the medications listed below when I get home from the hospital:    oxyCODONE 5 mg oral tablet  -- 1 tab(s) by mouth every 6 hours, As Needed - for moderate painto severe pain MDD:4  -- Indication: For Post op pain    acetaminophen 325 mg oral tablet  -- 2 tab(s) by mouth every 6 hours, As needed, Mild Pain (1 - 3)  -- Indication: For Post op pain    enoxaparin 40 mg/0.4 mL injectable solution  -- 40 milligram(s) subcutaneously once a day   -- It is very important that you take or use this exactly as directed.  Do not skip doses or discontinue unless directed by your doctor.    -- Indication: For VTE ppx    Xanax 1 mg oral tablet  -- 1 tab(s) by mouth once a day, As Needed  -- Indication: For Anxiety    docusate sodium 100 mg oral capsule  -- 1 cap(s) by mouth 3 times a day  -- Indication: For constipation    Cipro 500 mg oral tablet  -- 1 tab(s) by mouth every 12 hours until complete   -- Avoid prolonged or excessive exposure to direct and/or artificial sunlight while taking this medication.  Check with your doctor before becoming pregnant.  Do not take dairy products, antacids, or iron preparations within one hour of this medication.  Finish all this medication unless otherwise directed by prescriber.  Medication should be taken with plenty of water.    -- Indication: For Theresa rectal infection I will START or STAY ON the medications listed below when I get home from the hospital:    acetaminophen 325 mg oral tablet  -- 2 tab(s) by mouth every 6 hours, As needed, Mild Pain (1 - 3)  -- Indication: For Post op pain    oxyCODONE 5 mg oral tablet  -- 1 tab(s) by mouth every 6 hours, As Needed - for moderate painto severe pain MDD:4  -- Indication: For Post op pain    enoxaparin 40 mg/0.4 mL injectable solution  -- 40 milligram(s) subcutaneously once a day   -- It is very important that you take or use this exactly as directed.  Do not skip doses or discontinue unless directed by your doctor.    -- Indication: For VTE ppx    Xanax 1 mg oral tablet  -- 1 tab(s) by mouth once a day, As Needed  -- Indication: For Anxiety    docusate sodium 100 mg oral capsule  -- 1 cap(s) by mouth 3 times a day  -- Indication: For constipation    Cipro 500 mg oral tablet  -- 1 tab(s) by mouth every 12 hours until complete   -- Avoid prolonged or excessive exposure to direct and/or artificial sunlight while taking this medication.  Check with your doctor before becoming pregnant.  Do not take dairy products, antacids, or iron preparations within one hour of this medication.  Finish all this medication unless otherwise directed by prescriber.  Medication should be taken with plenty of water.    -- Indication: For Theresa rectal infection

## 2017-12-24 NOTE — DISCHARGE NOTE ADULT - INSTRUCTIONS
Patient is hemodynamically stable, vitals are stable. patient is being discharge home with STEPHANE drain/ colostomy bag and Nephrostomy tube. patient is educated about STEPHANE drain and nephrostomy tube care.

## 2017-12-24 NOTE — CHART NOTE - NSCHARTNOTEFT_GEN_A_CORE
Patient discharged prior to seeing patient this morning. Discussed with primary team that patient should be discharged on augmentin 875/125 mg PO BID for a 14-day course pending repeat outpatient CT scan to evaluate for resolution of collections.

## 2017-12-24 NOTE — DISCHARGE NOTE ADULT - CARE PROVIDER_API CALL
Scottie Kelly (MD), Surgery  [address] [place] [address]  Phone: [phone]  Fax: [phone]

## 2017-12-24 NOTE — PROGRESS NOTE ADULT - PROVIDER SPECIALTY LIST ADULT
Infectious Disease
Surgery
Urology
Infectious Disease

## 2017-12-24 NOTE — DISCHARGE NOTE ADULT - PATIENT PORTAL LINK FT
“You can access the [employer] Patient Portal, offered by [facility], by registering with the following website: [email or web address]”

## 2017-12-24 NOTE — PROGRESS NOTE ADULT - SUBJECTIVE AND OBJECTIVE BOX
Team D SURGERY PROGRESS NOTE      SUBJECTIVE: Pt seen at examined at bedside. AVSS. No acute events overnight. Pain well controlled. +Flatus. OOB and ambulating Denies fever, CP, SOB, and NV.         Vital Signs Last 24 Hrs  T(C): 36.7 (24 Dec 2017 08:11), Max: 37.5 (23 Dec 2017 21:18)  T(F): 98.1 (24 Dec 2017 08:11), Max: 99.5 (23 Dec 2017 21:18)  HR: 101 (24 Dec 2017 08:11) (101 - 109)  BP: 116/80 (24 Dec 2017 08:11) (116/71 - 134/81)  BP(mean): --  RR: 18 (24 Dec 2017 08:11) (16 - 18)  SpO2: 100% (24 Dec 2017 08:11) (99% - 100%)    Physical Exam  General: awake, alert, NAD   Pulm: respirations unlabored, no increased WOB  Abdomen: soft, non distended, NT, R transgluteal drain collecting serosanguinous fluid   Extremities: Grossly symmetric    I&O's Summary    23 Dec 2017 07:01  -  24 Dec 2017 07:00  --------------------------------------------------------  IN: 510 mL / OUT: 1257 mL / NET: -747 mL      I&O's Detail    23 Dec 2017 07:01  -  24 Dec 2017 07:00  --------------------------------------------------------  IN:    Oral Fluid: 360 mL    Solution: 150 mL  Total IN: 510 mL    OUT:    Bulb: 30 mL    Colostomy: 2 mL    Drain: 1225 mL  Total OUT: 1257 mL    Total NET: -747 mL          MEDICATIONS  (STANDING):  docusate sodium 100 milliGRAM(s) Oral three times a day  enoxaparin Injectable 40 milliGRAM(s) SubCutaneous every 24 hours  ferrous    sulfate 325 milliGRAM(s) Oral daily  piperacillin/tazobactam IVPB. 3.375 Gram(s) IV Intermittent every 8 hours  sodium chloride 0.9% lock flush 3 milliLiter(s) IV Push every 8 hours    MEDICATIONS  (PRN):  acetaminophen   Tablet. 650 milliGRAM(s) Oral every 6 hours PRN Mild Pain (1 - 3)  oxyCODONE    IR 10 milliGRAM(s) Oral every 6 hours PRN Severe Pain (7 - 10)      LABS:                        6.9    11.86 )-----------( 617      ( 24 Dec 2017 05:38 )             23.8     12-24    136  |  95<L>  |  11  ----------------------------<  108<H>  4.3   |  26  |  0.85    Ca    8.9      24 Dec 2017 05:38  Phos  4.0     12-24  Mg     1.8     12-24      PT/INR - ( 22 Dec 2017 10:02 )   PT: 17.7 SEC;   INR: 1.53          PTT - ( 22 Dec 2017 10:02 )  PTT:27.9 SEC      RADIOLOGY & ADDITIONAL STUDIES:  no new studies

## 2017-12-24 NOTE — PROGRESS NOTE ADULT - ASSESSMENT
32 year old male with metastatic rectal cancer, s/p rectal drainage of an abscess in 10/2017 and the drain was removed after a tube check revealed a collapsed cavity on 11/28/17.  s/p right nephrostomy tube insertion by IR for right hydronephrosis on 12/15/17. Patient continued to have fevers, CT on 12/14 showed a collection anterior and to the right of rectal stump s/p drain placement by IR on 12/18.       Neuro - Tylenol, Oxycodone prn pain, Xanax prn anxiety  CV/Pulm - no issues  GI - Regular diet, rectal drain in place  Heme - Lovenox for VTE prophylaxis. 12/20 Duplex US showed no evidence of DVT. Hgb (12/24) 6.9 - patient refusing transfusion. Will start iron supplement and continue to monitor H/H  f/u cxs   Renal - voiding well  ID - will discharge on cipro for reji-rectal abscess. WBC downtrending. ID following. IR did drain check and relocated drain 12/22. 12/21 blood and urine cx NGTD. Afebrile for 24 hours   Endo - No issues  - discharge today      [id number]

## 2017-12-24 NOTE — DISCHARGE NOTE ADULT - CARE PLAN
Principal Discharge DX:	Pelvic abscess in male  Goal:	Resolution of symptoms and pain control  Instructions for follow-up, activity and diet:	WOUND CARE: STEPHANE drain will stay in place until follow up visit. Please bring daily log of drain output to follow up visit   BATHING: Please do not submerge wound underwater. You may shower and/or sponge bathe.  ACTIVITY: No heavy lifting anything more than 10-15lbs or straining. Otherwise, you may return to your usual level of physical activity. If you are taking narcotic pain medication (such as Percocet), do NOT drive a car, operate machinery or make important decisions.  DIET: Low fiber diet  NOTIFY YOUR SURGEON IF: You have any bleeding that does not stop, any pus draining from your wound, any fever (over 100.4 F) or chills, persistent nausea/vomiting with inability to tolerate food or liquids, persistent diarrhea, or if your pain is not controlled on your discharge pain medications.  FOLLOW-UP:  1. Please call to make a 1 week follow-up appointment with Dr. Kelly within one week of discharge   2. Please follow up with your primary care physician in one week regarding your hospitalization. Principal Discharge DX:	Pelvic abscess in male  Goal:	Resolution of symptoms and pain control  Instructions for follow-up, activity and diet:	WOUND CARE: STEPHANE drain will stay in place until follow up visit. Please bring daily log of drain output to follow up visit. Please complete 10 day course Ciprofloxacin. Continue ostomy care   BATHING: Please do not submerge wound underwater. You may shower and/or sponge bathe.  ACTIVITY: No heavy lifting anything more than 10-15lbs or straining. Otherwise, you may return to your usual level of physical activity. If you are taking narcotic pain medication (such as Percocet), do NOT drive a car, operate machinery or make important decisions.  DIET: Low fiber diet. Please continue iron supplement and add stool softener as needed   NOTIFY YOUR SURGEON IF: You have any bleeding that does not stop, any pus draining from your wound, any fever (over 100.4 F) or chills, persistent nausea/vomiting with inability to tolerate food or liquids, persistent diarrhea, or if your pain is not controlled on your discharge pain medications.  FOLLOW-UP:  1. Please call to make a 1 week follow-up appointment with Dr. Kelly within one week of discharge   2. Please follow up with your primary care physician in one week regarding your hospitalization.

## 2017-12-24 NOTE — DISCHARGE NOTE ADULT - HOSPITAL COURSE
33 yo M well known to the surgical oncology service presenting on 12/14 from outpatient urology office with fever. Patient has history significant for rectal cancer (unresectable, and metastatic to liver) s/p a diverting colostomy on 10/23/2017. Postoperatively, patient had a pelvic collection that was managed by STEPHANE surgical drains and an IR placed drain. On outpatient followup, he had a tube check that demonstrated resolution of the cavity around the IR drain, and it was removed. Denies pain, change in bowel habits, appetite, fevers or chills. He was noted to have hydronephrosis and was referred for outpatient urology evaluation. Since that time, patient has been doing well. He was seen in the office and found to be asymptomatically febrile to 102F and was sent to ED for further management. 12/14 CTAP showed "interval increase in size of thick-walled fluid collection, anterior and to the right of rectal stump. Interval removal of drainage catheter. Another component of this collection extending into the left hemipelvis and left lower abdomen has also increased in the interim. New changes in the appendix which is markedly distended and fluid-filled. This is consistent with acute appendicitis, in the right clinical setting. Stable moderate right hydroureteronephrosis. Left hydroureteronephrosis is new. Metastatic disease to lungs and liver, progressed since prior exam." 33 yo M well known to the surgical oncology service presenting on 12/14 from outpatient urology office with fever. Patient has history significant for rectal cancer (unresectable, and metastatic to liver) s/p a diverting colostomy on 10/23/2017. Postoperatively, patient had a pelvic collection that was managed by STEPHANE surgical drains and an IR placed drain. On outpatient followup, he had a tube check that demonstrated resolution of the cavity around the IR drain, and it was removed. Denies pain, change in bowel habits, appetite, fevers or chills. He was noted to have hydronephrosis and was referred for outpatient urology evaluation. Since that time, patient has been doing well. He was seen in the office and found to be asymptomatically febrile to 102F and was sent to ED for further management. 12/14 CTAP showed "interval increase in size of thick-walled fluid collection, anterior and to the right of rectal stump. Interval removal of drainage catheter. Another component of this collection extending into the left hemipelvis and left lower abdomen has also increased in the interim. New changes in the appendix which is markedly distended and fluid-filled. This is consistent with acute appendicitis, in the right clinical setting. Stable moderate right hydroureteronephrosis. Left hydroureteronephrosis is new. Metastatic disease to lungs and liver, progressed since prior exam." A right transgluteal drain was placed by IR on 12/18. A repeat CTAP on 12/21 showed a new reji rectal collection and the drain was repositioned on 12/22 with improvement of symptoms. Patient will be discharged home with STEPHANE drain and a 10 day course of Ciprofloxacin to follow up with Dr. Kelly in 1 week

## 2017-12-24 NOTE — PROVIDER CONTACT NOTE (OTHER) - RECOMMENDATIONS
Requested Provider to RN to draw labs now
IV tylenol.
Tylenol for fever, md to assess treatment for heart rate
similar hemoglobin values as 12/23/17
tylenol for fever, md to assess tachycardia
tylenol, IV antibiotics, and blood cultures

## 2017-12-24 NOTE — DISCHARGE NOTE ADULT - PLAN OF CARE
Resolution of symptoms and pain control WOUND CARE: STEPHANE drain will stay in place until follow up visit. Please bring daily log of drain output to follow up visit   BATHING: Please do not submerge wound underwater. You may shower and/or sponge bathe.  ACTIVITY: No heavy lifting anything more than 10-15lbs or straining. Otherwise, you may return to your usual level of physical activity. If you are taking narcotic pain medication (such as Percocet), do NOT drive a car, operate machinery or make important decisions.  DIET: Low fiber diet  NOTIFY YOUR SURGEON IF: You have any bleeding that does not stop, any pus draining from your wound, any fever (over 100.4 F) or chills, persistent nausea/vomiting with inability to tolerate food or liquids, persistent diarrhea, or if your pain is not controlled on your discharge pain medications.  FOLLOW-UP:  1. Please call to make a 1 week follow-up appointment with Dr. Kelly within one week of discharge   2. Please follow up with your primary care physician in one week regarding your hospitalization. WOUND CARE: STEPHANE drain will stay in place until follow up visit. Please bring daily log of drain output to follow up visit. Please complete 10 day course Ciprofloxacin. Continue ostomy care   BATHING: Please do not submerge wound underwater. You may shower and/or sponge bathe.  ACTIVITY: No heavy lifting anything more than 10-15lbs or straining. Otherwise, you may return to your usual level of physical activity. If you are taking narcotic pain medication (such as Percocet), do NOT drive a car, operate machinery or make important decisions.  DIET: Low fiber diet. Please continue iron supplement and add stool softener as needed   NOTIFY YOUR SURGEON IF: You have any bleeding that does not stop, any pus draining from your wound, any fever (over 100.4 F) or chills, persistent nausea/vomiting with inability to tolerate food or liquids, persistent diarrhea, or if your pain is not controlled on your discharge pain medications.  FOLLOW-UP:  1. Please call to make a 1 week follow-up appointment with Dr. Kelly within one week of discharge   2. Please follow up with your primary care physician in one week regarding your hospitalization.

## 2017-12-26 LAB
BACTERIA BLD CULT: SIGNIFICANT CHANGE UP
BACTERIA BLD CULT: SIGNIFICANT CHANGE UP

## 2017-12-29 ENCOUNTER — RX RENEWAL (OUTPATIENT)
Age: 32
End: 2017-12-29

## 2018-01-08 ENCOUNTER — RX RENEWAL (OUTPATIENT)
Age: 33
End: 2018-01-08

## 2018-01-09 ENCOUNTER — APPOINTMENT (OUTPATIENT)
Dept: INTERVENTIONAL RADIOLOGY/VASCULAR | Facility: CLINIC | Age: 33
End: 2018-01-09
Payer: COMMERCIAL

## 2018-01-09 ENCOUNTER — APPOINTMENT (OUTPATIENT)
Dept: NUCLEAR MEDICINE | Facility: HOSPITAL | Age: 33
End: 2018-01-09

## 2018-01-09 VITALS
TEMPERATURE: 99.5 F | DIASTOLIC BLOOD PRESSURE: 84 MMHG | HEIGHT: 66 IN | OXYGEN SATURATION: 99 % | HEART RATE: 153 BPM | WEIGHT: 150 LBS | SYSTOLIC BLOOD PRESSURE: 118 MMHG | BODY MASS INDEX: 24.11 KG/M2

## 2018-01-09 DIAGNOSIS — Z80.1 FAMILY HISTORY OF MALIGNANT NEOPLASM OF TRACHEA, BRONCHUS AND LUNG: ICD-10-CM

## 2018-01-09 DIAGNOSIS — R93.5 ABNORMAL FINDINGS ON DIAGNOSTIC IMAGING OF OTHER ABDOMINAL REGIONS, INCLUDING RETROPERITONEUM: ICD-10-CM

## 2018-01-09 DIAGNOSIS — Z86.39 PERSONAL HISTORY OF OTHER ENDOCRINE, NUTRITIONAL AND METABOLIC DISEASE: ICD-10-CM

## 2018-01-09 PROCEDURE — 99243 OFF/OP CNSLTJ NEW/EST LOW 30: CPT

## 2018-01-09 RX ORDER — CHLORHEXIDINE GLUCONATE 4 %
325 (65 FE) LIQUID (ML) TOPICAL
Refills: 0 | Status: ACTIVE | COMMUNITY

## 2018-01-09 RX ORDER — METHYLPREDNISOLONE 4 MG/1
4 TABLET ORAL
Qty: 1 | Refills: 0 | Status: DISCONTINUED | COMMUNITY
Start: 2017-12-14 | End: 2018-01-09

## 2018-01-09 RX ORDER — ENOXAPARIN SODIUM 40 MG/.4ML
40 INJECTION SUBCUTANEOUS
Refills: 0 | Status: ACTIVE | COMMUNITY

## 2018-01-09 RX ORDER — ACETAMINOPHEN 325 MG/1
325 TABLET ORAL
Refills: 0 | Status: ACTIVE | COMMUNITY

## 2018-01-11 NOTE — CHART NOTE - NSCHARTNOTEFT_GEN_A_CORE
Pt was admitted on 12/14 with early sepsis with fever and tachycardia secondary to a perirectal abscess drain with IR.  The abscess was related to the pelvic rectal cancer.

## 2018-01-14 LAB — FUNGUS SPEC QL CULT: SIGNIFICANT CHANGE UP

## 2018-01-16 LAB — FUNGUS SPEC QL CULT: SIGNIFICANT CHANGE UP

## 2018-01-17 ENCOUNTER — OUTPATIENT (OUTPATIENT)
Dept: OUTPATIENT SERVICES | Facility: HOSPITAL | Age: 33
LOS: 1 days | Discharge: HOME | End: 2018-01-17

## 2018-01-17 DIAGNOSIS — Z93.3 COLOSTOMY STATUS: Chronic | ICD-10-CM

## 2018-01-17 DIAGNOSIS — Z45.2 ENCOUNTER FOR ADJUSTMENT AND MANAGEMENT OF VASCULAR ACCESS DEVICE: Chronic | ICD-10-CM

## 2018-01-17 DIAGNOSIS — Z79.899 OTHER LONG TERM (CURRENT) DRUG THERAPY: ICD-10-CM

## 2018-01-17 DIAGNOSIS — C20 MALIGNANT NEOPLASM OF RECTUM: ICD-10-CM

## 2018-01-17 DIAGNOSIS — C18.9 MALIGNANT NEOPLASM OF COLON, UNSPECIFIED: ICD-10-CM

## 2018-01-17 DIAGNOSIS — Z98.890 OTHER SPECIFIED POSTPROCEDURAL STATES: Chronic | ICD-10-CM

## 2018-01-18 ENCOUNTER — APPOINTMENT (OUTPATIENT)
Dept: HEMATOLOGY ONCOLOGY | Facility: CLINIC | Age: 33
End: 2018-01-18
Payer: COMMERCIAL

## 2018-01-18 VITALS
SYSTOLIC BLOOD PRESSURE: 146 MMHG | WEIGHT: 141.98 LBS | DIASTOLIC BLOOD PRESSURE: 92 MMHG | TEMPERATURE: 100 F | RESPIRATION RATE: 19 BRPM | HEART RATE: 159 BPM | OXYGEN SATURATION: 100 % | BODY MASS INDEX: 22.92 KG/M2

## 2018-01-18 DIAGNOSIS — C20 MALIGNANT NEOPLASM OF RECTUM: ICD-10-CM

## 2018-01-18 DIAGNOSIS — C78.7 SECONDARY MALIGNANT NEOPLASM OF LIVER AND INTRAHEPATIC BILE DUCT: ICD-10-CM

## 2018-01-18 PROCEDURE — 99215 OFFICE O/P EST HI 40 MIN: CPT

## 2018-01-19 ENCOUNTER — APPOINTMENT (OUTPATIENT)
Dept: CT IMAGING | Facility: IMAGING CENTER | Age: 33
End: 2018-01-19
Payer: COMMERCIAL

## 2018-01-19 ENCOUNTER — OUTPATIENT (OUTPATIENT)
Dept: OUTPATIENT SERVICES | Facility: HOSPITAL | Age: 33
LOS: 1 days | End: 2018-01-19
Payer: COMMERCIAL

## 2018-01-19 DIAGNOSIS — C20 MALIGNANT NEOPLASM OF RECTUM: ICD-10-CM

## 2018-01-19 DIAGNOSIS — Z98.890 OTHER SPECIFIED POSTPROCEDURAL STATES: Chronic | ICD-10-CM

## 2018-01-19 DIAGNOSIS — Z45.2 ENCOUNTER FOR ADJUSTMENT AND MANAGEMENT OF VASCULAR ACCESS DEVICE: Chronic | ICD-10-CM

## 2018-01-19 DIAGNOSIS — Z93.3 COLOSTOMY STATUS: Chronic | ICD-10-CM

## 2018-01-19 PROCEDURE — 74178 CT ABD&PLV WO CNTR FLWD CNTR: CPT | Mod: 26

## 2018-01-19 PROCEDURE — 74178 CT ABD&PLV WO CNTR FLWD CNTR: CPT

## 2018-01-19 PROCEDURE — 82565 ASSAY OF CREATININE: CPT

## 2018-01-22 ENCOUNTER — RX RENEWAL (OUTPATIENT)
Age: 33
End: 2018-01-22

## 2018-01-23 RX ORDER — PANTOPRAZOLE 40 MG/1
40 TABLET, DELAYED RELEASE ORAL DAILY
Qty: 35 | Refills: 0 | Status: DISCONTINUED | COMMUNITY
Start: 2017-12-14 | End: 2018-01-23

## 2018-01-25 ENCOUNTER — OTHER (OUTPATIENT)
Age: 33
End: 2018-01-25

## 2018-01-26 ENCOUNTER — OUTPATIENT (OUTPATIENT)
Dept: OUTPATIENT SERVICES | Facility: HOSPITAL | Age: 33
LOS: 1 days | End: 2018-01-26
Payer: COMMERCIAL

## 2018-01-26 DIAGNOSIS — Z98.890 OTHER SPECIFIED POSTPROCEDURAL STATES: Chronic | ICD-10-CM

## 2018-01-26 DIAGNOSIS — K65.1 PERITONEAL ABSCESS: ICD-10-CM

## 2018-01-26 DIAGNOSIS — Z45.2 ENCOUNTER FOR ADJUSTMENT AND MANAGEMENT OF VASCULAR ACCESS DEVICE: Chronic | ICD-10-CM

## 2018-01-26 DIAGNOSIS — Z93.3 COLOSTOMY STATUS: Chronic | ICD-10-CM

## 2018-01-26 PROCEDURE — 49424 ASSESS CYST CONTRAST INJECT: CPT

## 2018-01-26 PROCEDURE — 76080 X-RAY EXAM OF FISTULA: CPT | Mod: 26

## 2018-01-26 PROCEDURE — 76080 X-RAY EXAM OF FISTULA: CPT

## 2018-01-30 RX ORDER — OXYCODONE AND ACETAMINOPHEN 10; 325 MG/1; MG/1
10-325 TABLET ORAL
Qty: 120 | Refills: 0 | Status: ACTIVE | COMMUNITY
Start: 2017-08-09 | End: 1900-01-01

## 2018-01-30 RX ORDER — HYDROMORPHONE HYDROCHLORIDE 2 MG/1
2 TABLET ORAL
Qty: 120 | Refills: 0 | Status: ACTIVE | COMMUNITY
Start: 2018-01-30 | End: 1900-01-01

## 2018-02-01 DIAGNOSIS — R18.8 OTHER ASCITES: ICD-10-CM

## 2018-02-01 DIAGNOSIS — Z43.4 ENCOUNTER FOR ATTENTION TO OTHER ARTIFICIAL OPENINGS OF DIGESTIVE TRACT: ICD-10-CM

## 2018-02-01 DIAGNOSIS — C20 MALIGNANT NEOPLASM OF RECTUM: ICD-10-CM

## 2018-02-01 PROBLEM — C78.7 LIVER METASTASES: Status: ACTIVE | Noted: 2017-07-07

## 2018-02-06 ENCOUNTER — INPATIENT (INPATIENT)
Facility: HOSPITAL | Age: 33
LOS: 18 days | DRG: 871 | End: 2018-02-25
Attending: FAMILY MEDICINE | Admitting: HOSPITALIST
Payer: SELF-PAY

## 2018-02-06 ENCOUNTER — OUTPATIENT (OUTPATIENT)
Dept: OUTPATIENT SERVICES | Facility: HOSPITAL | Age: 33
LOS: 1 days | End: 2018-02-06
Payer: COMMERCIAL

## 2018-02-06 VITALS
RESPIRATION RATE: 16 BRPM | OXYGEN SATURATION: 96 % | SYSTOLIC BLOOD PRESSURE: 115 MMHG | DIASTOLIC BLOOD PRESSURE: 85 MMHG | HEART RATE: 121 BPM | TEMPERATURE: 99 F

## 2018-02-06 DIAGNOSIS — Z98.890 OTHER SPECIFIED POSTPROCEDURAL STATES: Chronic | ICD-10-CM

## 2018-02-06 DIAGNOSIS — K72.00 ACUTE AND SUBACUTE HEPATIC FAILURE WITHOUT COMA: ICD-10-CM

## 2018-02-06 DIAGNOSIS — E87.1 HYPO-OSMOLALITY AND HYPONATREMIA: ICD-10-CM

## 2018-02-06 DIAGNOSIS — Z93.3 COLOSTOMY STATUS: Chronic | ICD-10-CM

## 2018-02-06 DIAGNOSIS — R74.0 NONSPECIFIC ELEVATION OF LEVELS OF TRANSAMINASE AND LACTIC ACID DEHYDROGENASE [LDH]: ICD-10-CM

## 2018-02-06 DIAGNOSIS — C20 MALIGNANT NEOPLASM OF RECTUM: ICD-10-CM

## 2018-02-06 DIAGNOSIS — D64.9 ANEMIA, UNSPECIFIED: ICD-10-CM

## 2018-02-06 DIAGNOSIS — K65.1 PERITONEAL ABSCESS: ICD-10-CM

## 2018-02-06 DIAGNOSIS — Z29.9 ENCOUNTER FOR PROPHYLACTIC MEASURES, UNSPECIFIED: ICD-10-CM

## 2018-02-06 DIAGNOSIS — Z45.2 ENCOUNTER FOR ADJUSTMENT AND MANAGEMENT OF VASCULAR ACCESS DEVICE: Chronic | ICD-10-CM

## 2018-02-06 DIAGNOSIS — D72.829 ELEVATED WHITE BLOOD CELL COUNT, UNSPECIFIED: ICD-10-CM

## 2018-02-06 DIAGNOSIS — E87.5 HYPERKALEMIA: ICD-10-CM

## 2018-02-06 LAB
ALBUMIN SERPL ELPH-MCNC: 2.4 G/DL — LOW (ref 3.3–5)
ALBUMIN SERPL ELPH-MCNC: 2.7 G/DL — LOW (ref 3.3–5)
ALP SERPL-CCNC: 364 U/L — HIGH (ref 40–120)
ALP SERPL-CCNC: 401 U/L — HIGH (ref 40–120)
ALT FLD-CCNC: 417 U/L RC — HIGH (ref 10–45)
ALT FLD-CCNC: 468 U/L RC — HIGH (ref 10–45)
AMMONIA BLD-MCNC: 68 UMOL/L — HIGH (ref 11–55)
ANION GAP SERPL CALC-SCNC: 18 MMOL/L — HIGH (ref 5–17)
ANION GAP SERPL CALC-SCNC: 21 MMOL/L — HIGH (ref 5–17)
ANION GAP SERPL CALC-SCNC: 23 MMOL/L — HIGH (ref 5–17)
APPEARANCE UR: ABNORMAL
APTT BLD: 28.9 SEC — SIGNIFICANT CHANGE UP (ref 27.5–37.4)
AST SERPL-CCNC: 861 U/L — HIGH (ref 10–40)
AST SERPL-CCNC: 992 U/L — HIGH (ref 10–40)
BACTERIA # UR AUTO: ABNORMAL /HPF
BASOPHILS # BLD AUTO: 0 K/UL — SIGNIFICANT CHANGE UP (ref 0–0.2)
BILIRUB SERPL-MCNC: 3.3 MG/DL — HIGH (ref 0.2–1.2)
BILIRUB SERPL-MCNC: 3.4 MG/DL — HIGH (ref 0.2–1.2)
BILIRUB UR-MCNC: ABNORMAL
BLD GP AB SCN SERPL QL: NEGATIVE — SIGNIFICANT CHANGE UP
BUN SERPL-MCNC: 27 MG/DL — HIGH (ref 7–23)
BUN SERPL-MCNC: 28 MG/DL — HIGH (ref 7–23)
BUN SERPL-MCNC: 29 MG/DL — HIGH (ref 7–23)
CALCIUM SERPL-MCNC: 8.6 MG/DL — SIGNIFICANT CHANGE UP (ref 8.4–10.5)
CALCIUM SERPL-MCNC: 8.9 MG/DL — SIGNIFICANT CHANGE UP (ref 8.4–10.5)
CALCIUM SERPL-MCNC: 9.3 MG/DL — SIGNIFICANT CHANGE UP (ref 8.4–10.5)
CHLORIDE SERPL-SCNC: 83 MMOL/L — LOW (ref 96–108)
CHLORIDE SERPL-SCNC: 88 MMOL/L — LOW (ref 96–108)
CHLORIDE SERPL-SCNC: 88 MMOL/L — LOW (ref 96–108)
CO2 SERPL-SCNC: 18 MMOL/L — LOW (ref 22–31)
CO2 SERPL-SCNC: 19 MMOL/L — LOW (ref 22–31)
CO2 SERPL-SCNC: 20 MMOL/L — LOW (ref 22–31)
COLOR SPEC: ABNORMAL
CREAT ?TM UR-MCNC: 161 MG/DL — SIGNIFICANT CHANGE UP
CREAT SERPL-MCNC: 0.83 MG/DL — SIGNIFICANT CHANGE UP (ref 0.5–1.3)
CREAT SERPL-MCNC: 0.85 MG/DL — SIGNIFICANT CHANGE UP (ref 0.5–1.3)
CREAT SERPL-MCNC: 0.93 MG/DL — SIGNIFICANT CHANGE UP (ref 0.5–1.3)
DIFF PNL FLD: ABNORMAL
EOSINOPHIL # BLD AUTO: 0 K/UL — SIGNIFICANT CHANGE UP (ref 0–0.5)
EPI CELLS # UR: SIGNIFICANT CHANGE UP /HPF
GAS PNL BLDV: SIGNIFICANT CHANGE UP
GAS PNL BLDV: SIGNIFICANT CHANGE UP
GLUCOSE SERPL-MCNC: 102 MG/DL — HIGH (ref 70–99)
GLUCOSE SERPL-MCNC: 77 MG/DL — SIGNIFICANT CHANGE UP (ref 70–99)
GLUCOSE SERPL-MCNC: 79 MG/DL — SIGNIFICANT CHANGE UP (ref 70–99)
GLUCOSE UR QL: NEGATIVE — SIGNIFICANT CHANGE UP
HCT VFR BLD CALC: 28.7 % — LOW (ref 39–50)
HCT VFR BLD CALC: 34.3 % — LOW (ref 39–50)
HGB BLD-MCNC: 10.5 G/DL — LOW (ref 13–17)
HGB BLD-MCNC: 9.1 G/DL — LOW (ref 13–17)
HYALINE CASTS # UR AUTO: ABNORMAL
INR BLD: 2.01 RATIO — HIGH (ref 0.88–1.16)
KETONES UR-MCNC: NEGATIVE — SIGNIFICANT CHANGE UP
LEUKOCYTE ESTERASE UR-ACNC: NEGATIVE — SIGNIFICANT CHANGE UP
LYMPHOCYTES # BLD AUTO: 0.8 K/UL — LOW (ref 1–3.3)
LYMPHOCYTES # BLD AUTO: 3 % — LOW (ref 13–44)
MAGNESIUM SERPL-MCNC: 2.6 MG/DL — SIGNIFICANT CHANGE UP (ref 1.6–2.6)
MCHC RBC-ENTMCNC: 23.4 PG — LOW (ref 27–34)
MCHC RBC-ENTMCNC: 24.4 PG — LOW (ref 27–34)
MCHC RBC-ENTMCNC: 30.5 GM/DL — LOW (ref 32–36)
MCHC RBC-ENTMCNC: 31.7 GM/DL — LOW (ref 32–36)
MCV RBC AUTO: 76.8 FL — LOW (ref 80–100)
MCV RBC AUTO: 77.1 FL — LOW (ref 80–100)
MONOCYTES # BLD AUTO: 1.7 K/UL — HIGH (ref 0–0.9)
MONOCYTES NFR BLD AUTO: 6 % — SIGNIFICANT CHANGE UP (ref 2–14)
NEUTROPHILS # BLD AUTO: 19.2 K/UL — HIGH (ref 1.8–7.4)
NEUTROPHILS NFR BLD AUTO: 86 % — HIGH (ref 43–77)
NITRITE UR-MCNC: NEGATIVE — SIGNIFICANT CHANGE UP
PH UR: 5.5 — SIGNIFICANT CHANGE UP (ref 5–8)
PHOSPHATE SERPL-MCNC: 3.5 MG/DL — SIGNIFICANT CHANGE UP (ref 2.5–4.5)
PLATELET # BLD AUTO: 375 K/UL — SIGNIFICANT CHANGE UP (ref 150–400)
PLATELET # BLD AUTO: 479 K/UL — HIGH (ref 150–400)
POTASSIUM SERPL-MCNC: 5.4 MMOL/L — HIGH (ref 3.5–5.3)
POTASSIUM SERPL-MCNC: 5.5 MMOL/L — HIGH (ref 3.5–5.3)
POTASSIUM SERPL-MCNC: 5.8 MMOL/L — HIGH (ref 3.5–5.3)
POTASSIUM SERPL-SCNC: 5.4 MMOL/L — HIGH (ref 3.5–5.3)
POTASSIUM SERPL-SCNC: 5.5 MMOL/L — HIGH (ref 3.5–5.3)
POTASSIUM SERPL-SCNC: 5.8 MMOL/L — HIGH (ref 3.5–5.3)
PROT SERPL-MCNC: 6 G/DL — SIGNIFICANT CHANGE UP (ref 6–8.3)
PROT SERPL-MCNC: 6.5 G/DL — SIGNIFICANT CHANGE UP (ref 6–8.3)
PROT UR-MCNC: 30 MG/DL
PROTHROM AB SERPL-ACNC: 22.2 SEC — HIGH (ref 9.8–12.7)
RBC # BLD: 3.73 M/UL — LOW (ref 4.2–5.8)
RBC # BLD: 4.47 M/UL — SIGNIFICANT CHANGE UP (ref 4.2–5.8)
RBC # FLD: 19 % — HIGH (ref 10.3–14.5)
RBC # FLD: 19.2 % — HIGH (ref 10.3–14.5)
RBC CASTS # UR COMP ASSIST: ABNORMAL /HPF (ref 0–2)
RH IG SCN BLD-IMP: POSITIVE — SIGNIFICANT CHANGE UP
SODIUM SERPL-SCNC: 125 MMOL/L — LOW (ref 135–145)
SODIUM SERPL-SCNC: 126 MMOL/L — LOW (ref 135–145)
SODIUM SERPL-SCNC: 127 MMOL/L — LOW (ref 135–145)
SODIUM UR-SCNC: <20 MMOL/L — SIGNIFICANT CHANGE UP
SP GR SPEC: 1.02 — SIGNIFICANT CHANGE UP (ref 1.01–1.02)
UROBILINOGEN FLD QL: 2 MG/DL
WBC # BLD: 21.7 K/UL — HIGH (ref 3.8–10.5)
WBC # BLD: 23.4 K/UL — HIGH (ref 3.8–10.5)
WBC # FLD AUTO: 21.7 K/UL — HIGH (ref 3.8–10.5)
WBC # FLD AUTO: 23.4 K/UL — HIGH (ref 3.8–10.5)
WBC UR QL: SIGNIFICANT CHANGE UP /HPF (ref 0–5)

## 2018-02-06 PROCEDURE — 74177 CT ABD & PELVIS W/CONTRAST: CPT | Mod: 26

## 2018-02-06 PROCEDURE — 99291 CRITICAL CARE FIRST HOUR: CPT

## 2018-02-06 PROCEDURE — 71045 X-RAY EXAM CHEST 1 VIEW: CPT | Mod: 26

## 2018-02-06 PROCEDURE — 86900 BLOOD TYPING SEROLOGIC ABO: CPT

## 2018-02-06 PROCEDURE — 86901 BLOOD TYPING SEROLOGIC RH(D): CPT

## 2018-02-06 PROCEDURE — 85027 COMPLETE CBC AUTOMATED: CPT

## 2018-02-06 PROCEDURE — 86850 RBC ANTIBODY SCREEN: CPT

## 2018-02-06 PROCEDURE — 99223 1ST HOSP IP/OBS HIGH 75: CPT | Mod: GC

## 2018-02-06 PROCEDURE — 80053 COMPREHEN METABOLIC PANEL: CPT

## 2018-02-06 RX ORDER — PIPERACILLIN AND TAZOBACTAM 4; .5 G/20ML; G/20ML
3.38 INJECTION, POWDER, LYOPHILIZED, FOR SOLUTION INTRAVENOUS EVERY 8 HOURS
Qty: 0 | Refills: 0 | Status: DISCONTINUED | OUTPATIENT
Start: 2018-02-06 | End: 2018-02-06

## 2018-02-06 RX ORDER — VANCOMYCIN HCL 1 G
1000 VIAL (EA) INTRAVENOUS EVERY 8 HOURS
Qty: 0 | Refills: 0 | Status: DISCONTINUED | OUTPATIENT
Start: 2018-02-07 | End: 2018-02-07

## 2018-02-06 RX ORDER — HYDROMORPHONE HYDROCHLORIDE 2 MG/ML
1 INJECTION INTRAMUSCULAR; INTRAVENOUS; SUBCUTANEOUS
Qty: 0 | Refills: 0 | COMMUNITY

## 2018-02-06 RX ORDER — MORPHINE SULFATE 50 MG/1
2 CAPSULE, EXTENDED RELEASE ORAL ONCE
Qty: 0 | Refills: 0 | Status: DISCONTINUED | OUTPATIENT
Start: 2018-02-06 | End: 2018-02-06

## 2018-02-06 RX ORDER — ALPRAZOLAM 0.25 MG
1 TABLET ORAL
Qty: 0 | Refills: 0 | COMMUNITY

## 2018-02-06 RX ORDER — ENOXAPARIN SODIUM 100 MG/ML
40 INJECTION SUBCUTANEOUS DAILY
Qty: 0 | Refills: 0 | Status: DISCONTINUED | OUTPATIENT
Start: 2018-02-06 | End: 2018-02-08

## 2018-02-06 RX ORDER — SODIUM CHLORIDE 9 MG/ML
500 INJECTION INTRAMUSCULAR; INTRAVENOUS; SUBCUTANEOUS ONCE
Qty: 0 | Refills: 0 | Status: COMPLETED | OUTPATIENT
Start: 2018-02-06 | End: 2018-02-06

## 2018-02-06 RX ORDER — HYDROMORPHONE HYDROCHLORIDE 2 MG/ML
2 INJECTION INTRAMUSCULAR; INTRAVENOUS; SUBCUTANEOUS EVERY 6 HOURS
Qty: 0 | Refills: 0 | Status: DISCONTINUED | OUTPATIENT
Start: 2018-02-06 | End: 2018-02-13

## 2018-02-06 RX ORDER — PIPERACILLIN AND TAZOBACTAM 4; .5 G/20ML; G/20ML
3.38 INJECTION, POWDER, LYOPHILIZED, FOR SOLUTION INTRAVENOUS EVERY 8 HOURS
Qty: 0 | Refills: 0 | Status: DISCONTINUED | OUTPATIENT
Start: 2018-02-06 | End: 2018-02-14

## 2018-02-06 RX ORDER — SODIUM CHLORIDE 9 MG/ML
1000 INJECTION INTRAMUSCULAR; INTRAVENOUS; SUBCUTANEOUS
Qty: 0 | Refills: 0 | Status: DISCONTINUED | OUTPATIENT
Start: 2018-02-06 | End: 2018-02-06

## 2018-02-06 RX ORDER — VANCOMYCIN HCL 1 G
1000 VIAL (EA) INTRAVENOUS ONCE
Qty: 0 | Refills: 0 | Status: COMPLETED | OUTPATIENT
Start: 2018-02-06 | End: 2018-02-06

## 2018-02-06 RX ORDER — FERROUS SULFATE 325(65) MG
1 TABLET ORAL
Qty: 0 | Refills: 0 | COMMUNITY

## 2018-02-06 RX ORDER — PIPERACILLIN AND TAZOBACTAM 4; .5 G/20ML; G/20ML
3.38 INJECTION, POWDER, LYOPHILIZED, FOR SOLUTION INTRAVENOUS ONCE
Qty: 0 | Refills: 0 | Status: COMPLETED | OUTPATIENT
Start: 2018-02-06 | End: 2018-02-06

## 2018-02-06 RX ORDER — PIPERACILLIN AND TAZOBACTAM 4; .5 G/20ML; G/20ML
3.38 INJECTION, POWDER, LYOPHILIZED, FOR SOLUTION INTRAVENOUS ONCE
Qty: 0 | Refills: 0 | Status: DISCONTINUED | OUTPATIENT
Start: 2018-02-06 | End: 2018-02-06

## 2018-02-06 RX ORDER — ALPRAZOLAM 0.25 MG
1 TABLET ORAL DAILY
Qty: 0 | Refills: 0 | Status: DISCONTINUED | OUTPATIENT
Start: 2018-02-06 | End: 2018-02-08

## 2018-02-06 RX ORDER — VANCOMYCIN HCL 1 G
VIAL (EA) INTRAVENOUS
Qty: 0 | Refills: 0 | Status: DISCONTINUED | OUTPATIENT
Start: 2018-02-06 | End: 2018-02-07

## 2018-02-06 RX ADMIN — SODIUM CHLORIDE 200 MILLILITER(S): 9 INJECTION INTRAMUSCULAR; INTRAVENOUS; SUBCUTANEOUS at 14:39

## 2018-02-06 RX ADMIN — MORPHINE SULFATE 2 MILLIGRAM(S): 50 CAPSULE, EXTENDED RELEASE ORAL at 18:18

## 2018-02-06 RX ADMIN — PIPERACILLIN AND TAZOBACTAM 25 GRAM(S): 4; .5 INJECTION, POWDER, LYOPHILIZED, FOR SOLUTION INTRAVENOUS at 23:16

## 2018-02-06 RX ADMIN — MORPHINE SULFATE 2 MILLIGRAM(S): 50 CAPSULE, EXTENDED RELEASE ORAL at 16:58

## 2018-02-06 RX ADMIN — SODIUM CHLORIDE 500 MILLILITER(S): 9 INJECTION INTRAMUSCULAR; INTRAVENOUS; SUBCUTANEOUS at 13:22

## 2018-02-06 RX ADMIN — MORPHINE SULFATE 2 MILLIGRAM(S): 50 CAPSULE, EXTENDED RELEASE ORAL at 19:06

## 2018-02-06 RX ADMIN — MORPHINE SULFATE 2 MILLIGRAM(S): 50 CAPSULE, EXTENDED RELEASE ORAL at 15:27

## 2018-02-06 RX ADMIN — SODIUM CHLORIDE 100 MILLILITER(S): 9 INJECTION INTRAMUSCULAR; INTRAVENOUS; SUBCUTANEOUS at 18:18

## 2018-02-06 RX ADMIN — Medication 250 MILLIGRAM(S): at 22:02

## 2018-02-06 RX ADMIN — PIPERACILLIN AND TAZOBACTAM 200 GRAM(S): 4; .5 INJECTION, POWDER, LYOPHILIZED, FOR SOLUTION INTRAVENOUS at 15:27

## 2018-02-06 NOTE — ED PROCEDURE NOTE - ATTENDING CONTRIBUTION TO CARE
Attending MD Beronica Celeste: Risks, benefit and alternatives of procedure explained to patient and patient demonstrated verbal understanding and consent.  I was present during the key portions of the procedure. Patient tolerated procedure well without complications

## 2018-02-06 NOTE — H&P ADULT - NSHPREVIEWOFSYSTEMS_GEN_ALL_CORE
REVIEW OF SYSTEMS    CONSTITUTIONAL:  Denies f nvd chills  HEENT:  Eyes:  Denies visual loss, blurred vision, double vision or scleral icterus. Ears, Nose, Throat:  Denies hearing loss, sneezing, congestion, runny nose or sore throat.  SKIN:  Denies rash or itching.  CARDIOVASCULAR:  Denies chest pain, PAYTON  RESPIRATORY:  Denies shortness of breath, cough or sputum.  GASTROINTESTINAL:  Denies anorexia, nausea, vomiting or diarrhea. No abdominal pain or blood.  GENITOURINARY:  Denies any hematuria, dysuria  NEUROLOGICAL:  Denies headache, dizziness, syncope, paralysis, ataxia, numbness or tingling in the extremities. No change in bowel or bladder control.  MUSCULOSKELETAL:  Denies muscle, back pain, joint pain or stiffness.  HEMATOLOGIC:  Denies anemia, bleeding or bruising.  LYMPHATICS:  Denies enlarged nodes.   PSYCHIATRIC:  Denies history of depression or anxiety.  ENDOCRINOLOGIC:  Denies reports of sweating, cold or heat intolerance. No polyuria or polydipsia.  ALLERGIES:  Denies history of asthma, hives, eczema or rhinitis.

## 2018-02-06 NOTE — ED PROVIDER NOTE - CARE PLAN
Principal Discharge DX:	Acute liver failure without hepatic coma  Secondary Diagnosis:	Hyponatremia  Secondary Diagnosis:	Lactic acidosis

## 2018-02-06 NOTE — H&P ADULT - PROBLEM SELECTOR PLAN 5
MELD score of 26.  Hepatology consultation in the AM. MELD score of 26. INR elevated, hypoalbumin noted.  Consider Hepatology consultation in the AM.

## 2018-02-06 NOTE — H&P ADULT - PROBLEM SELECTOR PLAN 2
C/w NS IVF  BMP Q12 Hrs. Monitor for over correction.  Encouraged PO intake.  May be secondary to tumor- related SIADH, pseudohyponatremia vs decreased effective blood volume secondary to liver failure.  If hyponatremia is not improving with IVF, will need to try volume restriction. C/w NS IVF  BMP Q12 Hrs. Monitor for over correction.  Fluid restriction of 1L /24 hrs.  May be secondary decreased effective blood volume secondary to liver failure.

## 2018-02-06 NOTE — ED PROVIDER NOTE - SHIFT CHANGE DETAILS
Received sign-out from Dr. Celeste regarding this patient awaiting only admission at this point. He appears comfortable. No additional acute issue. To be admitted.

## 2018-02-06 NOTE — ED ADULT NURSE NOTE - OBJECTIVE STATEMENT
32 y.o male pmh colon cancer with mets to the liver with colostomy bag and drain from right buttock for persistent draining abcsess sent to ED from IR for admission for elevated K and low sodium on the stat labs that were done this am prior to a procedure. pt A+Ox3 upon assessment, partner at bedside states that pt has not been in his normal mental state. more slow to respond with increased weakness and not eating and sleeping all day which is not pts norm. pt was at IR this am and STAT labs came back abnormal resulting in pts doc to send pt to ED for further work up and possible infection and an admission to hospital. pt denies any complaints of pain or discomfort upon assessment. repeat labs obtained, results pending, VS stable, pts HR tachy to the 120s which pt and family states is his baseline. lungs clear bilaterally. family remains at the bedside. Safety and fall precautions maintained. call bell at the bedside.

## 2018-02-06 NOTE — H&P ADULT - PROBLEM SELECTOR PLAN 4
iSTOP: [id number]  Continue with Dilaudid 2 mg Q6 PO PRN iSTOP: [id number]  Continue with Dilaudid 2 mg Q6 PO PRN  Will need Oncology c/s in AM.

## 2018-02-06 NOTE — H&P ADULT - PROBLEM SELECTOR PLAN 1
Patient has multiple potential sources of infection including his nephrostomy and rectal abscess, as well as ascites.  Will treat empirically with Zosyn. F/u UCx, BCx. May benefit from ID consultation in AM.  Currently Afebrile, monitor fever curve. Patient has multiple potential sources of infection including his nephrostomy and rectal abscess, as well as ascites.  Will treat empirically with Zosyn and vancomycin. F/u UCx, BCx. May benefit from ID consultation in AM. Consider paracentesis if condition does not improve.  Currently Afebrile, monitor fever curve.

## 2018-02-06 NOTE — H&P ADULT - NSHPPHYSICALEXAM_GEN_ALL_CORE
Vital Signs Last 24 Hrs  T(C): 36.4 (06 Feb 2018 20:20), Max: 37.3 (06 Feb 2018 11:45)  T(F): 97.6 (06 Feb 2018 20:20), Max: 99.1 (06 Feb 2018 11:45)  HR: 122 (06 Feb 2018 20:20) (115 - 122)  BP: 110/82 (06 Feb 2018 20:20) (110/82 - 123/83)  BP(mean): --  RR: 30 (06 Feb 2018 20:20) (16 - 30)  SpO2: 98% (06 Feb 2018 20:20) (96% - 98%)    PHYSICAL EXAM:    GENERAL: Comfortable, no acute distress   HEAD:  Normocephalic, atraumatic  EYES: EOMI, PERRLA  HEENT: Moist mucous membranes  NECK: Supple, No JVD  NERVOUS SYSTEM:  CN 2-12 intact b/l. Alert & Oriented X3, Motor Strength 4/5 B/L upper and lower extremities. Sensation intact B/L  CHEST/LUNG: Clear to auscultation bilaterally  HEART: Regular rate and rhythm, no murmur   ABDOMEN: Soft, Nontender, Nondistended, Bowel sounds present. Colostomy bag present.  EXTREMITIES:   No clubbing, cyanosis. +2 Pitting edema b/l.  MUSCULOSKELETAL: No muscle tenderness, no joint tenderness  SKIN: warm and dry, no rash

## 2018-02-06 NOTE — ED PROVIDER NOTE - OBJECTIVE STATEMENT
31 yo M well known to the surgical oncology service presenting on 12/14 from outpatient urology office with fever. Patient has history significant for rectal cancer (unresectable, and metastatic to liver) s/p a diverting colostomy on 10/23/2017. Postoperatively, patient had a pelvic collection that was managed by STEPHANE surgical drains and an IR placed drain. On outpatient followup, he had a tube check that demonstrated resolution of the cavity around the IR drain, and it was removed. Denies pain, change in bowel habits, appetite, fevers or chills. He was noted to have hydronephrosis and was referred for outpatient urology evaluation. Since that time, patient has been doing well. He was seen in the office and found to be asymptomatically febrile to 102F and was sent to ED for further management. 12/14 CTAP showed "interval increase in size of thick-walled fluid collection, anterior and to the right of rectal stump. Interval removal of drainage catheter. Another component of this collection extending into the left hemipelvis and left lower abdomen has also increased in the interim. New changes in the appendix which is markedly distended and fluid-filled. This is consistent with acute appendicitis, in the right clinical setting. Stable moderate right hydroureteronephrosis. Left hydroureteronephrosis is new. Metastatic disease to lungs and liver, progressed since prior exam." A right transgluteal drain was placed by IR on 12/18. A repeat CTAP on 12/21 showed a new reji rectal collection and the drain was repositioned on 12/22 with improvement of symptoms. Patient will be discharged home with STEPHANE drain and a 10 day course of Ciprofloxacin to follow up with Dr. Kelly in 1 week 31 yo M h/o rectal cancer (unresectable, and metastatic to liver) s/p a diverting colostomy on 10/23/2017 with IR placed drain for perirectal abscess presents to ED after fall x2 at home over past 2 days. Pt had abnormal lab results prior to IR drain replacement. 31 yo M h/o rectal cancer (unresectable, and metastatic to liver) s/p a diverting colostomy on 10/23/2017 with IR placed drain for perirectal abscess presents to ED after fall x2 at home over past 2 days. Pt had abnormal lab results prior to IR drain replacement and was sent to ED. Pt and family report 5 days of worsening gait instability, altered mental status, decreased POP intake. Denies fever, chills, N/V/D, CP, SOB. Perirectal drains are not producing output at this time.

## 2018-02-06 NOTE — H&P ADULT - NSHPLABSRESULTS_GEN_ALL_CORE
9.1    21.7  )-----------( 375      ( 06 Feb 2018 13:09 )             28.7       02-06    127<L>  |  88<L>  |  27<H>  ----------------------------<  77  5.8<H>   |  18<L>  |  0.83    Ca    8.6      06 Feb 2018 13:09    TPro  6.0  /  Alb  2.4<L>  /  TBili  3.3<H>  /  DBili  x   /  AST  861<H>  /  ALT  417<H>  /  AlkPhos  364<H>  02-06      Blood Gas Venous - Lactate (02.06.18 @ 16:55)    Blood Gas Venous - Lactate: 7.1 mmoL/L      < from: CT Abdomen and Pelvis w/ Oral Cont and w/ IV Cont (02.06.18 @ 16:27) >    IMPRESSION:      Innumerable metastatic hepatic lesions, increased in number and size   since 1/19/2018.     Bilateral pulmonary metastases.     Large volume ascites, increased since the prior study.     New foci of gas within the right collecting system and proximal right   ureter, associated with right nephrostomy tube. Correlate clinically with   recent intervention. No renal hydronephrosis.     Circumferential urinary bladder wall thickening as above.    Deep pelvic abscess with transgluteal drain in place, without significant   change since 1/19/2018.      < end of copied text >    < from: Xray Chest 1 View- PORTABLE-Urgent (02.06.18 @ 16:32) >    INTERPRETATION:  no emergent findings    < end of copied text >    EKG: Sinus tachycardia    Urinalysis + Microscopic Examination (02.06.18 @ 14:48)    Protein, Urine: 30 mg/dL    pH Urine: 5.5    Urobilinogen: 2 mg/dL    Specific Gravity: 1.022    Leukocyte Esterase Concentration: Negative    Nitrite: Negative    Bilirubin: Small    Urine Appearance: SL Turbid    Glucose Qualitative, Urine: Negative    Blood, Urine: Trace    Color: Gretel    Ketone - Urine: Negative    Red Blood Cell - Urine: 5-10 /HPF    White Blood Cell - Urine: 2-5 /HPF    Epithelial Cells: OCC /HPF    Hyaline Casts: 5-10    Bacteria: Few /HPF      Personally reviewed EKG, labs and imaging.

## 2018-02-06 NOTE — H&P ADULT - ATTENDING COMMENTS
agree with excellent PGY note with the following additions:  This is a 32 year old gentleman with history of rectal cancer which was unresectable on diagnosis 2017, s/p diverting colostomy on 10/2017 with recent admission 12/14-12/24 for perirectal abscess c/b s/p STEPHANE drain and R nephrostomy presenting from IR with abnormal labs. Patient notes that he has been noting increased ostomy output, also notes intermittent confusion and pain at site of STEPHANE drain. Notes poor POs due to poor appetite, though he has been drinking a lot of water on advise by his doctors. He also notes 1 week of new lower extremity swelling. He denies fevers, chills, abdominal pain, nausea, vomiting, itchiness. Denies increased or changed drainage from nephrostomy or STEPHANE drain.  Per patient, there has been some initial discussion of chemo, but this has been stalled due to complications from surgery.  Patient was on augmentin for 14 days after previous admission.      Rest of HPI per PGY-2 note.    In ED, Tmax  99.1, HR 120s (was in 100-120 range whole prior admission, discharged in 100s), /82, satting well on RA.    Exam notable for cachectic gentleman, NABS, SNTND, +fluid wave, R nephrostomy CDI, no tenderness overlying, R STEPHANE drain from R buttock CDI, mild TTP overlying, ostomy without significant drainage.      Labs, imaging, tracing personally reviewed.    Labs notable for leukocytosis to 21.7 (11 on previous discharge), Hgb 9.1 Plt 375, Na 126 (stable over 12 hours despite 500cc NS), K 5.4-5.6, bicarb 20, gap 18, BUN 29, Cr 0.93, Ca 8.9, Phos 3.5, Mg 2.6, VBG 7.43/33, lactate 7.1.    CXR clear.  CT A/P shows significantly worsened hepatic lesions, small bilateral pulmonary mets, large volume ascites increased from prior, gas foci R renal collecting system likely iatrogenic, no hydro, circumferential urinary bladder with wall thickening, deep pelvic abscess with transgluteal drain in place, without significant change.    A/P  This is a 32 year old gentleman with history of rectal cancer which was unresectable on diagnosis 2017, s/p diverting colostomy on 10/2017 with recent admission 12/14-12/24 for perirectal abscess c/b s/p STEPHANE drain and R nephrostomy presenting from IR with abnormal labs.    #Leukocytosis- unclear source.  DDx includes persistent deep pelvic abscess vs. SBP vs. nephrostomy related vs. C. Diff.    --culture all lines, including STEPHANE drain, nephrostomy, stool for culture and C. Diff, blood, urine  --consider diagnostic para  --continue vanc/zosyn for now  --tachycardia is likely multifactorial, suspect that patient is intravascularly dry because of hypoalbuminemia and significant third spacing.  As patient was persistently tachy throughout last admission, will allow rates to 120s, holding crystalloid as he is likely to third space this  --ID c/s in AM    #Hyponatremia- hypervolemic hyponatremia  --check urine lytes, FeNa  --for now, will hold further fluids, fluid restrict to 1L  --consider albumin PRN    #Hyperkalemia-- unclear etiology, suspect related to significant tumor burden and tissue breakdown  --kidney function intact  --Mg, Ph WNL  --will also send uric acid, LDH    #Liver failure-- patient with significantly worsening tumor burden  --continue to trend LFTs  --INR elevated, albumin low  --should address chemo with onc    #Encephalopathy--patient with signs of encephalopathy per history. Infectious vs. hepatic  --lactulose pending stool studies  --treating infection as above    #Stage IV Rectal CA  --onc c/s agree with excellent PGY note with the following additions:  This is a 32 year old gentleman with history of rectal cancer which was unresectable on diagnosis 2017, s/p diverting colostomy on 10/2017 with recent admission 12/14-12/24 for perirectal abscess c/b s/p STEPHANE drain and R nephrostomy presenting from IR with abnormal labs. Patient notes that he has been noting increased ostomy output, also notes intermittent confusion and pain at site of STEPHANE drain. Notes poor POs due to poor appetite, though he has been drinking a lot of water on advise by his doctors. He also notes 1 week of new lower extremity swelling. He denies fevers, chills, abdominal pain, nausea, vomiting, itchiness. Denies increased or changed drainage from nephrostomy or STEPHANE drain.  Per patient, there has been some initial discussion of chemo, but this has been stalled due to complications from surgery.  Patient was on augmentin for 14 days after previous admission.      Rest of HPI per PGY-2 note.    In ED, Tmax  99.1, HR 120s (was in 100-120 range whole prior admission, discharged in 100s), /82, satting well on RA.    Exam notable for cachectic gentleman, NABS, SNTND, +fluid wave, R nephrostomy CDI, no tenderness overlying, R STEPHANE drain from R buttock CDI, mild TTP overlying, ostomy without significant drainage.      Labs, imaging, tracing personally reviewed.    Labs notable for leukocytosis to 21.7 (11 on previous discharge), Hgb 9.1 Plt 375, Na 126 (stable over 12 hours despite 500cc NS), K 5.4-5.6, bicarb 20, gap 18, BUN 29, Cr 0.93, Ca 8.9, Phos 3.5, Mg 2.6, VBG 7.43/33, lactate 7.1.    CXR clear.  CT A/P shows significantly worsened hepatic lesions, small bilateral pulmonary mets, large volume ascites increased from prior, gas foci R renal collecting system likely iatrogenic, no hydro, circumferential urinary bladder with wall thickening, deep pelvic abscess with transgluteal drain in place, without significant change.    EKG sinus tach, no peaked T    A/P  This is a 32 year old gentleman with history of rectal cancer which was unresectable on diagnosis 2017, s/p diverting colostomy on 10/2017 with recent admission 12/14-12/24 for perirectal abscess c/b s/p STEPHANE drain and R nephrostomy presenting from IR with abnormal labs.    #Leukocytosis- unclear source.  DDx includes persistent deep pelvic abscess vs. SBP vs. nephrostomy related vs. C. Diff.    --culture all lines, including STEPHANE drain, nephrostomy, stool for culture and C. Diff, blood, urine  --consider diagnostic para  --continue vanc/zosyn for now  --tachycardia is likely multifactorial, suspect that patient is intravascularly dry because of hypoalbuminemia and significant third spacing.  As patient was persistently tachy throughout last admission, will allow rates to 120s, holding crystalloid as he is likely to third space this  --ID c/s in AM    #Hyponatremia- hypervolemic hyponatremia  --check urine lytes, FeNa  --for now, will hold further fluids, fluid restrict to 1L  --consider albumin PRN    #Hyperkalemia-- unclear etiology, suspect related to significant tumor burden and tissue breakdown  --kidney function intact  --Mg, Ph WNL  --will also send uric acid, LDH  --will give 5 regular insulin, amp D50. Tachycardia suggests that patient intravascularly dry, holding lasix.  Lactulose should also improve hyperkalemia.  No EKG changes.      #Liver failure-- patient with significantly worsening tumor burden  --continue to trend LFTs  --INR elevated, albumin low  --should address chemo with onc    #Encephalopathy--patient with signs of encephalopathy per history. Infectious vs. hepatic  --lactulose pending stool studies  --treating infection as above    #Stage IV Rectal CA  --onc c/s

## 2018-02-06 NOTE — ED PROVIDER NOTE - PHYSICAL EXAMINATION
A&O to person and place, pt appears jaundice and cachectic. Asterexis present b/l hands. b/l LE 4+ pitting edema. CN 2-12 grossly intact without focal neurologic defict. PERRLA, EOMI. Heart tachycardic no murmur. No JVD. No peripheral edema. Lungs tachypnic, CTA b/l no wheezes, rhonchi, rales. Abdomen soft nontender nondistended. Colostomy appears WNL. Peripheral pulses present and equal b/l. Head NCAT. Skin normal for race. A&O to person and place, pt appears jaundice and cachectic. Tachypneic. Asterexis present b/l hands. b/l LE 4+ pitting edema. CN 2-12 grossly intact without focal neurologic defict. PERRLA, EOMI. Heart tachycardic no murmur. No JVD. No peripheral edema. Lungs tachypnic, CTA b/l no wheezes, rhonchi, rales. Abdomen soft nontender nondistended. Colostomy appears WNL. Peripheral pulses present and equal b/l. Head NCAT. Skin normal for race.

## 2018-02-06 NOTE — H&P ADULT - ASSESSMENT
31 yo M h/o rectal cancer (unresectable, and metastatic to liver and lungs) s/p a diverting colostomy on 10/23/2017 with post-op course c/b perirectal abscess s/p IR placed STEPHANE drain and IR placed R nephrostomy pelvic collection/malignancy caused R hydronephrosis who presents from IR for hyponatremia, hyperkalemia and leukocytosis.

## 2018-02-06 NOTE — ED PROVIDER NOTE - CRITICAL CARE PROVIDED
noted  
consult w/ pt's family directly relating to pts condition/documentation/consultation with other physicians/additional history taking/direct patient care (not related to procedure)/interpretation of diagnostic studies

## 2018-02-06 NOTE — ED PROVIDER NOTE - MEDICAL DECISION MAKING DETAILS
Beronica Celeste MD - Attending Physician: Pt sent here from IR for abnormal labs. Pt cachectic, ill appearing, tachypneic. Hyponatremic, leukocytosis from todays labs. Concern for acute liver failure vs infection vs other. Labs, gentle IV fluids, CT abd, to admit

## 2018-02-06 NOTE — ED ADULT NURSE REASSESSMENT NOTE - NS ED NURSE REASSESS COMMENT FT1
Pt aaox4. Lying comfortable in stretcher. No acute distress at this time. Updated on plan of care. Taken to Restroom via wheel chair.

## 2018-02-06 NOTE — ED PROVIDER NOTE - ATTENDING CONTRIBUTION TO CARE
Beronica Celeste MD - Attending Physician: I have personally seen and examined this patient. I have discussed the case with the ACP. I have reviewed all pertinent clinical information, including history, physical exam, plan and the ACP’s note and agree except as noted. See MDM

## 2018-02-06 NOTE — H&P ADULT - PROBLEM SELECTOR PLAN 6
DVT: Lovenox  Diet: Reg Repeat BMP, as 5.8 is hemolyzed.  May be secondary to tumor lysis given the extensive tumor burden in his liver. Will send for Magnesium and Phos.

## 2018-02-06 NOTE — ED PROCEDURE NOTE - PROCEDURE ADDITIONAL DETAILS
Emergency Department Focused Ultrasound performed at patient's bedside for educational purposes. The study will have a follow up study performed or was performed in the direct supervision of an ultrasound trained attending.     Heterogenous appearing liver, small free fluid in abd. Will have f/u CT performed. Kidneys, Bladder appeared wnl

## 2018-02-06 NOTE — H&P ADULT - HISTORY OF PRESENT ILLNESS
In the ED:  Vital Signs Last 24 Hrs  T(C): 36.7 (06 Feb 2018 12:00), Max: 37.3 (06 Feb 2018 11:45)  T(F): 98 (06 Feb 2018 12:00), Max: 99.1 (06 Feb 2018 11:45)  HR: 115 (06 Feb 2018 19:37) (115 - 121)  BP: 120/91 (06 Feb 2018 19:37) (115/80 - 123/83)  BP(mean): --  RR: 17 (06 Feb 2018 19:37) (16 - 20)  SpO2: 98% (06 Feb 2018 19:37) (96% - 98%)    Was given Zosyn 1 x, 2L NS, BCx, UCx collected. CTAP performed. The patient is a 31 yo M h/o rectal cancer (unresectable, and metastatic to liver and lungs) s/p a diverting colostomy on 10/23/2017 with post-op course c/b perirectal abscess s/p IR placed STEPHANE drain and IR placed R nephrostomy pelvic collection/malignancy caused R hydronephrosis who presents from IR for hyponatremia, hyperkalemia and leukocytosis. Was at IR today to have STEPHANE drain manipulated. The patient states that he has a mild discomfort in his rectum which is unchanged from previous. Was on PO Cipro/flagyll and finished his course 2-3 weeks ago. Has not been on abx since. Denies any F nvd CP SOB cough rash sick contacts, recent travel dysuria hematuria. States that colostomy has not had any change in output. STEPHANE drain still draining, unchanged. R nephrostomy- draining clear yellow liquid. Has been drinking more water and has had decreased PO intake.    In the ED:  Vital Signs Last 24 Hrs  T(C): 36.7 (06 Feb 2018 12:00), Max: 37.3 (06 Feb 2018 11:45)  T(F): 98 (06 Feb 2018 12:00), Max: 99.1 (06 Feb 2018 11:45)  HR: 115 (06 Feb 2018 19:37) (115 - 121)  BP: 120/91 (06 Feb 2018 19:37) (115/80 - 123/83)  BP(mean): --  RR: 17 (06 Feb 2018 19:37) (16 - 20)  SpO2: 98% (06 Feb 2018 19:37) (96% - 98%)    Was given Zosyn 1 x, 2L NS, BCx, UCx collected. CTAP performed.

## 2018-02-07 LAB
ALBUMIN SERPL ELPH-MCNC: 2.4 G/DL — LOW (ref 3.3–5)
ALP SERPL-CCNC: 390 U/L — HIGH (ref 40–120)
ALT FLD-CCNC: 434 U/L — HIGH (ref 10–45)
ANION GAP SERPL CALC-SCNC: 19 MMOL/L — HIGH (ref 5–17)
ANION GAP SERPL CALC-SCNC: 21 MMOL/L — HIGH (ref 5–17)
APTT BLD: 30.7 SEC — SIGNIFICANT CHANGE UP (ref 27.5–37.4)
AST SERPL-CCNC: 717 U/L — HIGH (ref 10–40)
BILIRUB SERPL-MCNC: 4.1 MG/DL — HIGH (ref 0.2–1.2)
BLD GP AB SCN SERPL QL: NEGATIVE — SIGNIFICANT CHANGE UP
BUN SERPL-MCNC: 35 MG/DL — HIGH (ref 7–23)
BUN SERPL-MCNC: 37 MG/DL — HIGH (ref 7–23)
CALCIUM SERPL-MCNC: 8.9 MG/DL — SIGNIFICANT CHANGE UP (ref 8.4–10.5)
CALCIUM SERPL-MCNC: 9.3 MG/DL — SIGNIFICANT CHANGE UP (ref 8.4–10.5)
CHLORIDE SERPL-SCNC: 86 MMOL/L — LOW (ref 96–108)
CHLORIDE SERPL-SCNC: 88 MMOL/L — LOW (ref 96–108)
CO2 SERPL-SCNC: 18 MMOL/L — LOW (ref 22–31)
CO2 SERPL-SCNC: 20 MMOL/L — LOW (ref 22–31)
CREAT ?TM UR-MCNC: 86 MG/DL — SIGNIFICANT CHANGE UP
CREAT SERPL-MCNC: 1.04 MG/DL — SIGNIFICANT CHANGE UP (ref 0.5–1.3)
CREAT SERPL-MCNC: 1.08 MG/DL — SIGNIFICANT CHANGE UP (ref 0.5–1.3)
CULTURE RESULTS: NO GROWTH — SIGNIFICANT CHANGE UP
FERRITIN SERPL-MCNC: 2418 NG/ML — HIGH (ref 30–400)
GLUCOSE SERPL-MCNC: 77 MG/DL — SIGNIFICANT CHANGE UP (ref 70–99)
GLUCOSE SERPL-MCNC: 87 MG/DL — SIGNIFICANT CHANGE UP (ref 70–99)
HCT VFR BLD CALC: 34.3 % — LOW (ref 39–50)
HGB BLD-MCNC: 10 G/DL — LOW (ref 13–17)
INR BLD: 1.85 RATIO — HIGH (ref 0.88–1.16)
IRON SATN MFR SERPL: 17 % — SIGNIFICANT CHANGE UP (ref 16–55)
IRON SATN MFR SERPL: 29 UG/DL — LOW (ref 45–165)
LACTATE SERPL-SCNC: 6.1 MMOL/L — CRITICAL HIGH (ref 0.7–2)
LDH SERPL L TO P-CCNC: 7230 U/L — HIGH (ref 50–242)
MCHC RBC-ENTMCNC: 22 PG — LOW (ref 27–34)
MCHC RBC-ENTMCNC: 29.2 GM/DL — LOW (ref 32–36)
MCV RBC AUTO: 75.6 FL — LOW (ref 80–100)
PLATELET # BLD AUTO: 362 K/UL — SIGNIFICANT CHANGE UP (ref 150–400)
POTASSIUM SERPL-MCNC: 5.2 MMOL/L — SIGNIFICANT CHANGE UP (ref 3.5–5.3)
POTASSIUM SERPL-MCNC: 5.7 MMOL/L — HIGH (ref 3.5–5.3)
POTASSIUM SERPL-SCNC: 5.2 MMOL/L — SIGNIFICANT CHANGE UP (ref 3.5–5.3)
POTASSIUM SERPL-SCNC: 5.7 MMOL/L — HIGH (ref 3.5–5.3)
PROT SERPL-MCNC: 6.3 G/DL — SIGNIFICANT CHANGE UP (ref 6–8.3)
PROTHROM AB SERPL-ACNC: 21.2 SEC — HIGH (ref 10–13.1)
RBC # BLD: 4.54 M/UL — SIGNIFICANT CHANGE UP (ref 4.2–5.8)
RBC # FLD: 23 % — HIGH (ref 10.3–14.5)
RH IG SCN BLD-IMP: POSITIVE — SIGNIFICANT CHANGE UP
SODIUM SERPL-SCNC: 125 MMOL/L — LOW (ref 135–145)
SODIUM SERPL-SCNC: 127 MMOL/L — LOW (ref 135–145)
SODIUM UR-SCNC: 24 MMOL/L — SIGNIFICANT CHANGE UP
SPECIMEN SOURCE: SIGNIFICANT CHANGE UP
TIBC SERPL-MCNC: 170 UG/DL — LOW (ref 220–430)
TRANSFERRIN SERPL-MCNC: 133 MG/DL — LOW (ref 200–360)
UIBC SERPL-MCNC: 141 UG/DL — SIGNIFICANT CHANGE UP (ref 110–370)
URATE SERPL-MCNC: 10.9 MG/DL — HIGH (ref 3.4–8.8)
WBC # BLD: 19.63 K/UL — HIGH (ref 3.8–10.5)
WBC # FLD AUTO: 19.63 K/UL — HIGH (ref 3.8–10.5)

## 2018-02-07 PROCEDURE — 99233 SBSQ HOSP IP/OBS HIGH 50: CPT | Mod: GC

## 2018-02-07 PROCEDURE — 99223 1ST HOSP IP/OBS HIGH 75: CPT

## 2018-02-07 PROCEDURE — 99254 IP/OBS CNSLTJ NEW/EST MOD 60: CPT | Mod: GC

## 2018-02-07 RX ORDER — INSULIN HUMAN 100 [IU]/ML
5 INJECTION, SOLUTION SUBCUTANEOUS ONCE
Qty: 0 | Refills: 0 | Status: COMPLETED | OUTPATIENT
Start: 2018-02-07 | End: 2018-02-07

## 2018-02-07 RX ORDER — SODIUM POLYSTYRENE SULFONATE 4.1 MEQ/G
15 POWDER, FOR SUSPENSION ORAL ONCE
Qty: 0 | Refills: 0 | Status: COMPLETED | OUTPATIENT
Start: 2018-02-07 | End: 2018-02-07

## 2018-02-07 RX ORDER — DEXTROSE 50 % IN WATER 50 %
50 SYRINGE (ML) INTRAVENOUS ONCE
Qty: 0 | Refills: 0 | Status: COMPLETED | OUTPATIENT
Start: 2018-02-07 | End: 2018-02-07

## 2018-02-07 RX ORDER — LACTULOSE 10 G/15ML
10 SOLUTION ORAL
Qty: 0 | Refills: 0 | Status: DISCONTINUED | OUTPATIENT
Start: 2018-02-07 | End: 2018-02-19

## 2018-02-07 RX ORDER — VANCOMYCIN HCL 1 G
VIAL (EA) INTRAVENOUS
Qty: 0 | Refills: 0 | Status: DISCONTINUED | OUTPATIENT
Start: 2018-02-07 | End: 2018-02-08

## 2018-02-07 RX ORDER — SODIUM CHLORIDE 9 MG/ML
1000 INJECTION INTRAMUSCULAR; INTRAVENOUS; SUBCUTANEOUS
Qty: 0 | Refills: 0 | Status: DISCONTINUED | OUTPATIENT
Start: 2018-02-07 | End: 2018-02-08

## 2018-02-07 RX ORDER — VANCOMYCIN HCL 1 G
1000 VIAL (EA) INTRAVENOUS ONCE
Qty: 0 | Refills: 0 | Status: COMPLETED | OUTPATIENT
Start: 2018-02-07 | End: 2018-02-07

## 2018-02-07 RX ORDER — VANCOMYCIN HCL 1 G
1000 VIAL (EA) INTRAVENOUS EVERY 12 HOURS
Qty: 0 | Refills: 0 | Status: DISCONTINUED | OUTPATIENT
Start: 2018-02-08 | End: 2018-02-08

## 2018-02-07 RX ORDER — SODIUM CHLORIDE 9 MG/ML
1 INJECTION INTRAMUSCULAR; INTRAVENOUS; SUBCUTANEOUS ONCE
Qty: 0 | Refills: 0 | Status: COMPLETED | OUTPATIENT
Start: 2018-02-07 | End: 2018-02-07

## 2018-02-07 RX ADMIN — SODIUM CHLORIDE 150 MILLILITER(S): 9 INJECTION INTRAMUSCULAR; INTRAVENOUS; SUBCUTANEOUS at 11:46

## 2018-02-07 RX ADMIN — SODIUM CHLORIDE 1 GRAM(S): 9 INJECTION INTRAMUSCULAR; INTRAVENOUS; SUBCUTANEOUS at 13:49

## 2018-02-07 RX ADMIN — Medication 50 MILLILITER(S): at 06:34

## 2018-02-07 RX ADMIN — SODIUM POLYSTYRENE SULFONATE 15 GRAM(S): 4.1 POWDER, FOR SUSPENSION ORAL at 15:58

## 2018-02-07 RX ADMIN — INSULIN HUMAN 5 UNIT(S): 100 INJECTION, SOLUTION SUBCUTANEOUS at 06:33

## 2018-02-07 RX ADMIN — LACTULOSE 10 GRAM(S): 10 SOLUTION ORAL at 17:56

## 2018-02-07 RX ADMIN — ENOXAPARIN SODIUM 40 MILLIGRAM(S): 100 INJECTION SUBCUTANEOUS at 13:53

## 2018-02-07 RX ADMIN — PIPERACILLIN AND TAZOBACTAM 25 GRAM(S): 4; .5 INJECTION, POWDER, LYOPHILIZED, FOR SOLUTION INTRAVENOUS at 18:17

## 2018-02-07 RX ADMIN — Medication 250 MILLIGRAM(S): at 05:38

## 2018-02-07 RX ADMIN — Medication 250 MILLIGRAM(S): at 13:48

## 2018-02-07 RX ADMIN — HYDROMORPHONE HYDROCHLORIDE 2 MILLIGRAM(S): 2 INJECTION INTRAMUSCULAR; INTRAVENOUS; SUBCUTANEOUS at 17:51

## 2018-02-07 RX ADMIN — HYDROMORPHONE HYDROCHLORIDE 2 MILLIGRAM(S): 2 INJECTION INTRAMUSCULAR; INTRAVENOUS; SUBCUTANEOUS at 18:51

## 2018-02-07 RX ADMIN — PIPERACILLIN AND TAZOBACTAM 25 GRAM(S): 4; .5 INJECTION, POWDER, LYOPHILIZED, FOR SOLUTION INTRAVENOUS at 06:34

## 2018-02-07 RX ADMIN — LACTULOSE 10 GRAM(S): 10 SOLUTION ORAL at 06:34

## 2018-02-07 RX ADMIN — SODIUM CHLORIDE 150 MILLILITER(S): 9 INJECTION INTRAMUSCULAR; INTRAVENOUS; SUBCUTANEOUS at 17:57

## 2018-02-07 NOTE — PROGRESS NOTE ADULT - PROBLEM SELECTOR PLAN 3
- Patient with known metastasis of rectal cancer to the liver. - Patient with known metastasis of rectal cancer to the liver.  - GI consulted today for role of ERCP in setting of elevated transaminases/? hepatic encephalopathy.

## 2018-02-07 NOTE — CONSULT NOTE ADULT - ATTENDING COMMENTS
Pt seen in ED 2/7
32 year old male rectal cancer (unresectable and metastatic to liver and lungs) s/p a diverting colostomy on 10/23/17 with post-operative course complicated by perirectal abscess requiring IR drainage, R nephrostomy tube for R Hydronephrosis who presented from IR for hyponatremia, hyperkalemia and worsening leukocytosis. Recently treated for bacteroides, enterococcus reji-rectal abscess with drainage. Now returns with LE edema, lab abnormalities including elevated lactate and leukocytosis. Note has ascites, perirectal abscess, elevated bilirubin, and ? gas in R collecting system--possible source of infection, although alternatively may be due to progression of underlying malignancy (and non-infected). Overall, leukocytosis, malignancy, abnormal imaging.  - Zosyn 3.375g q 8  - Vancomycin 1g q 12 (Monitor troughs, prior to 4th dose)  - Would obtain GI consult/diagnostic paracentesis  - Would have IR drain and nephrostomy eval to reji-rectal drain  - F/U pending cultures  - Malignancy per primary team    Joey Lozada MD  Pager [phone]  After 5pm and on weekends call [phone]
[phone]
As above    Metastatic rectal cancer with extensive tumor burden causing jaundice.  There is no evidence of drainable biliary obstruction.  No role for ERCP.

## 2018-02-07 NOTE — PROGRESS NOTE ADULT - PROBLEM SELECTOR PLAN 1
- Patient has multiple potential sources of infection including his nephrostomy and rectal abscess, as well as ascites.  - c/w Zosyn and vancomycin. F/u UCx, BCx.   - ID consulted  - Currently Afebrile, monitor fever curve. - Patient has multiple potential sources of infection including his nephrostomy and rectal abscess, as well as ascites.  - c/w Zosyn and vancomycin. F/u UCx, BCx.   - ID consulted and agree w/ current abx regimen. Also recommending diagnostic paracentesis and GI involvement for role of ERCP in setting of elevated transaminases/? hepatic encephalopathy.  - Currently Afebrile, monitor fever curve.

## 2018-02-07 NOTE — CONSULT NOTE ADULT - ASSESSMENT
This is the tragic case of Mr. Sara Valverde, a 31 yo gentleman with widely metastatic rectal cancer presenting with falls and worsening debility, found to have elevated liver enzymes due to innumerable hepatic metastatic lesions. There is no biliary ductal dilation and no significant obstructive component to patient's elevated bilirubin and as such there is no role for ERCP. Further care per oncology (for cancer), ID and surgery (for reji-rectal abscess) and palliative care (for comfort). Agree with lactulose and xifaxan for possible component of hepatic encephalopathy. Diagnostic paracentesis can be considered as part of infectious work-up in this gentleman with ascites. This is the tragic case of Mr. Sara Valverde, a 33 yo gentleman with widely metastatic rectal cancer presenting with falls and worsening debility, found to have leukocytosis, type B lactic acidosis likely due to cancer and elevated liver enzymes due to innumerable hepatic metastatic lesions. There is no biliary ductal dilation and no significant obstructive component to patient's elevated bilirubin and as such there is no role for ERCP. Further care per oncology (for cancer), ID and surgery (for reji-rectal abscess) and palliative care (for comfort). Agree with lactulose and xifaxan for possible component of hepatic encephalopathy. Diagnostic paracentesis can be considered as part of infectious work-up in this gentleman with ascites. This is the tragic case of Mr. Sara Valverde, a 33 yo gentleman with widely metastatic rectal adenocarcinoma presenting with falls and worsening debility, found to have leukocytosis, type B lactic acidosis likely due to cancer and elevated liver enzymes due to innumerable hepatic metastatic lesions. There is no biliary ductal dilation and no significant obstructive component to patient's elevated bilirubin and as such there is no role for ERCP.     Further recs:    - Agree with lactulose and xifaxan for possible component of hepatic encephalopathy.   - Diagnostic paracentesis (to be done under US guidance by IR given very complex intra-abdominal anatomy/patholgy) can be considered as part of infectious work-up in this gentleman with ascites.   - Would also consider CT head to rule out cerebral masses or intracranial bleeding given falls at home.   - Further care per oncology (for cancer), ID and surgery (for reji-rectal abscess) and palliative care (for comfort).   - Siblings and first degree relatives should be evaluated for colonoscopy as early as age 20

## 2018-02-07 NOTE — CONSULT NOTE ADULT - SUBJECTIVE AND OBJECTIVE BOX
HPI:    32 year old male rectal cancer (unresectable and metastatic to liver and lungs) s/p a diverting colostomy on 10/23/17 with post-operative course complicated by perirectal abscess requiring IR drainage, R nephrostomy tube for R Hydronephrosis who presented from IR for hyponatremia, hyperkalemia and worsening leukocytosis. Of note, most recently admitted 17-17 for fever and found to have increase in size of collection near his rectal stump. Had transgluteal drain placed on , treated with Zosyn during the admission and discharged on Augmentin for 14 more days. She had a CT Abdomen/Pelvis on  to reevaluate the rectal stump collection and it was minimally changed in size so she was planned for IR drain upsize / exchange on 18. Sent in for evaluation for abnormal labwork. Patient notes mild discomfort around his rectum which was unchanged from previous. Denies chills or fevers. Denies cough or shortness of breath.     On presentation afebrile but tachycardic to > 120. WBC on admit 21.7 with 2% bands. , , T bili 3.4. Lactic acid of 7.4. U/A with 5-10 WBC. CT abdomen/pelvis with innumerable metastatic hepatic lesions (increased in size and number), new foci of gas within the right collecting system and proximal right ureter. CXR clear. Started on Vanco/Zosyn. Afebrile after admission.     PAST MEDICAL & SURGICAL HISTORY:  Anxiety: hx of panic attacks  Gastroesophageal reflux disease  Rectal cancer metastasized to liver  Status post Ryan procedure  Encounter for care related to vascular access port: right chest wall port placed 2017  History of hemorrhoidectomy      Allergies  No Known Allergies        ANTIMICROBIALS:  piperacillin/tazobactam IVPB. 3.375 every 8 hours  vancomycin  IVPB    vancomycin  IVPB 1000 every 8 hours      OTHER MEDS: MEDICATIONS  (STANDING):  ALPRAZolam 1 daily PRN  enoxaparin Injectable 40 daily  HYDROmorphone   Tablet 2 every 6 hours PRN  lactulose Syrup 10 two times a day      SOCIAL HISTORY:  [ ] etoh [ ] tobacco [ ] former smoker [ ] IVDU    FAMILY HISTORY:  Family history of type 2 diabetes mellitus in mother (Mother)      REVIEW OF SYSTEMS  [  ] ROS unobtainable because:    [  ] All other systems negative except as noted below:	    Constitutional:  [ ] fever [ ] weight loss  Skin:  [ ] rash [ ] phlebitis	  Eyes: [ ] icterus [ ] inflammation	  ENMT: [ ] discharge [ ] thrush [ ] ulcers [ ] exudates  Respiratory: [ ] dyspnea [ ] hemoptysis [ ] cough [ ] sputum	  Cardiovascular:  [ ] chest pain [ ] palpitations [ ] edema	  Gastrointestinal:  [ ] nausea [ ] vomiting [ ] diarrhea [ ] constipation [ ] pain	  Genitourinary:  [ ] dysuria [ ] frequency [ ] hematuria [ ] discharge [ ] flank pain  Musculoskeletal:  [ ] myalgias [ ] arthralgias [ ] arthritis	  Neurological:  [ ] headache [ ] seizures	  Psychiatric:  [ ] anxiety [ ] depression	  Hematology/Lymphatics:  [ ] lymphadenopathy  Endocrine:  [ ] adrenal [ ] thyroid  Allergic/Immunologic:	 [ ] transplant [ ] seasonal    Vital Signs Last 24 Hrs  T(F): 97.7 (18 @ 07:27), Max: 99.1 (18 @ 11:45)    Vital Signs Last 24 Hrs  HR: 124 (18 @ 07:27) (110 - 124)  BP: 121/94 (18 @ 07:27) (107/71 - 123/83)  RR: 18 (18 @ 07:27)  SpO2: 99% (18 @ 07:27) (96% - 99%)  Wt(kg): --    PHYSICAL EXAM:  General: non-toxic  HEAD/EYES: anicteric, PERRL  ENT:  supple  Cardiovascular:   S1, S2  Respiratory:  clear bilaterally  GI:  soft, non-tender, normal bowel sounds  :  no CVA tenderness   Musculoskeletal:  no synovitis  Neurologic:  grossly non-focal  Skin:  no rash  Lymph: no lymphadenopathy  Psychiatric:  appropriate affect  Vascular:  no phlebitis                                9.1    21.7  )-----------( 375      ( 2018 13:09 )             28.7       02-    126<L>  |  88<L>  |  29<H>  ----------------------------<  79  5.5<H>   |  20<L>  |  0.93    Ca    8.9      2018 20:51  Phos  3.5     -06  Mg     2.6         TPro  6.0  /  Alb  2.4<L>  /  TBili  3.3<H>  /  DBili  x   /  AST  861<H>  /  ALT  417<H>  /  AlkPhos  364<H>        Urinalysis Basic - ( 2018 14:48 )    Color: Gretel / Appearance: SL Turbid / S.022 / pH: x  Gluc: x / Ketone: Negative  / Bili: Small / Urobili: 2 mg/dL   Blood: x / Protein: 30 mg/dL / Nitrite: Negative   Leuk Esterase: Negative / RBC: 5-10 /HPF / WBC 2-5 /HPF   Sq Epi: x / Non Sq Epi: OCC /HPF / Bacteria: Few /HPF    MICROBIOLOGY:    None for review    RADIOLOGY:  EXAM:  CT ABDOMEN AND PELVIS OC IC                        PROCEDURE DATE:  2018    Innumerable metastatic hepatic lesions, increased in number and size since 2018.   Bilateral pulmonary metastases.   Large volume ascites, increased since the prior study.   New foci of gas within the right collecting system and proximal right ureter, associated with right nephrostomy tube. Correlate clinically with recent intervention. No renal hydronephrosis.   Circumferential urinary bladder wall thickening as above.  Deep pelvic abscess with transgluteal drain in place, without significant change since 2018.    EXAM:  XR CHEST PORTABLE URGENT 1V                        PROCEDURE DATE:  2018    Clear lungs. HPI:    32 year old male rectal cancer (unresectable and metastatic to liver and lungs) s/p a diverting colostomy on 10/23/17 with post-operative course complicated by perirectal abscess requiring IR drainage, R nephrostomy tube for R Hydronephrosis who presented from IR for hyponatremia, hyperkalemia and worsening leukocytosis. Of note, most recently admitted 17-17 for fever and found to have increase in size of collection near his rectal stump. Had transgluteal drain placed on , treated with Zosyn during the admission and discharged on Augmentin for 14 more days. She had a CT Abdomen/Pelvis on  to reevaluate the rectal stump collection and it was minimally changed in size so she was planned for IR drain upsize / exchange on 18. Sent in for evaluation for abnormal labwork. Per the uyng's report  has had confusion over the past week which has been progressive as well as falls (last of which was the day prior to presentation). Also worsening LE edema and ascites over the past week.  Denies chills or fevers. Denies cough or shortness of breath. Denies dysuria, urinary frequency or flank pain. Denies any recent manipulation or exchange of the nephrostomy. No N/V or change in stool output.     On presentation afebrile but tachycardic to > 120. WBC on admit 21.7 with 2% bands. , , T bili 3.4. Lactic acid of 7.4. U/A with 5-10 WBC. CT abdomen/pelvis with innumerable metastatic hepatic lesions (increased in size and number), new foci of gas within the right collecting system and proximal right ureter. CXR clear. Started on Vanco/Zosyn. Afebrile after admission.     PAST MEDICAL & SURGICAL HISTORY:  Anxiety: hx of panic attacks  Gastroesophageal reflux disease  Rectal cancer metastasized to liver  Status post Ryan procedure  Encounter for care related to vascular access port: right chest wall port placed 2017  History of hemorrhoidectomy      Allergies  No Known Allergies        ANTIMICROBIALS:  piperacillin/tazobactam IVPB. 3.375 every 8 hours  vancomycin  IVPB    vancomycin  IVPB 1000 every 8 hours      OTHER MEDS: MEDICATIONS  (STANDING):  ALPRAZolam 1 daily PRN  enoxaparin Injectable 40 daily  HYDROmorphone   Tablet 2 every 6 hours PRN  lactulose Syrup 10 two times a day      SOCIAL HISTORY:  No smoking or etoh use. No recreational drug use. No recent travel. Lives at home with his Fiance. No sick contacts.     FAMILY HISTORY:  Family history of type 2 diabetes mellitus in mother (Mother)      REVIEW OF SYSTEMS  [  ] ROS unobtainable because:    [ x ] All other systems negative except as noted below:	    Constitutional:  [ ] fever [ ] weight loss  Skin:  [ ] rash [ ] phlebitis	  Eyes: [ ] icterus [ ] inflammation	  ENMT: [ ] discharge [ ] thrush [ ] ulcers [ ] exudates  Respiratory: [ ] dyspnea [ ] hemoptysis [ ] cough [ ] sputum	  Cardiovascular:  [ ] chest pain [ ] palpitations [ X] Bilateral LE edema	  Gastrointestinal:  [ ] nausea [ ] vomiting [ ] diarrhea [ ] constipation [ ] pain	  Genitourinary:  [ ] dysuria [ ] frequency [ ] hematuria [ ] discharge [ ] flank pain  Musculoskeletal:  [ ] myalgias [ ] arthralgias [ ] arthritis  Neurological:  [ ] headache [ ] seizures +encephalopathy  Psychiatric:  [ ] anxiety [ ] depression	  Hematology/Lymphatics:  [ ] lymphadenopathy  Endocrine:  [ ] adrenal [ ] thyroid  Allergic/Immunologic:	 [ ] transplant [ ] seasonal    Vital Signs Last 24 Hrs  T(F): 97.7 (18 @ 07:27), Max: 99.1 (18 @ 11:45)    Vital Signs Last 24 Hrs  HR: 124 (18 @ 07:27) (110 - 124)  BP: 121/94 (18 @ 07:27) (107/71 - 123/83)  RR: 18 (18 @ 07:27)  SpO2: 99% (18 @ 07:27) (96% - 99%)  Wt(kg): --    PHYSICAL EXAMINATION:  General: AAO2X (self and place), NAD  HEENT: PERRL, EOMI, No subconjunctival hemorrhages, Oropharynx Clear, MMM  Neck: Supple, No MARCIANO  Cardiac: RRR, No M/R/G  Resp: CTAB, No Wh/Rh/Ra  Abdomen: +LLQ Ostomy (with soft stool), Distended, NBS, NT/ND, No HSM, No rigidity or guarding  MSK: 2-3+ B/L LE edema. No stigmata of IE. No evidence of phlebitis. No evidence of synovitis.  Back: No CVA Tenderness, +Right Nephrostomy Tube (No surrounding erythema, drainage or tenderness to palpation), Did not examine gluteal drain as patient was in the emergency department hallway.  Skin: No rashes or lesions. Skin is warm and dry to the touch.   Neuro: AAO2X (self and place). CN 2-12 Grossly intact. Moves all four extremities spontaneously.  Psych: Calm, Pleasant, Cooperative                        9.1    21.7  )-----------( 375      ( 2018 13:09 )             28.7       02-06    126<L>  |  88<L>  |  29<H>  ----------------------------<  79  5.5<H>   |  20<L>  |  0.93    Ca    8.9      2018 20:51  Phos  3.5     -  Mg     2.6     -    TPro  6.0  /  Alb  2.4<L>  /  TBili  3.3<H>  /  DBili  x   /  AST  861<H>  /  ALT  417<H>  /  AlkPhos  364<H>  -      Urinalysis Basic - ( 2018 14:48 )    Color: Gretel / Appearance: SL Turbid / S.022 / pH: x  Gluc: x / Ketone: Negative  / Bili: Small / Urobili: 2 mg/dL   Blood: x / Protein: 30 mg/dL / Nitrite: Negative   Leuk Esterase: Negative / RBC: 5-10 /HPF / WBC 2-5 /HPF   Sq Epi: x / Non Sq Epi: OCC /HPF / Bacteria: Few /HPF    MICROBIOLOGY:    None for review    RADIOLOGY:  EXAM:  CT ABDOMEN AND PELVIS OC IC                        PROCEDURE DATE:  2018    Innumerable metastatic hepatic lesions, increased in number and size since 2018.   Bilateral pulmonary metastases.   Large volume ascites, increased since the prior study.   New foci of gas within the right collecting system and proximal right ureter, associated with right nephrostomy tube. Correlate clinically with recent intervention. No renal hydronephrosis.   Circumferential urinary bladder wall thickening as above.  Deep pelvic abscess with transgluteal drain in place, without significant change since 2018.    EXAM:  XR CHEST PORTABLE URGENT 1V                        PROCEDURE DATE:  2018    Clear lungs. HPI: 32 year old male rectal cancer (unresectable and metastatic to liver and lungs) s/p a diverting colostomy on 10/23/17 with post-operative course complicated by perirectal abscess requiring IR drainage, R nephrostomy tube for R Hydronephrosis who presented from IR for hyponatremia, hyperkalemia and worsening leukocytosis. Of note, most recently admitted 17-17 for fever and found to have increase in size of collection near his rectal stump. Had transgluteal drain placed on , treated with Zosyn during the admission and discharged on Augmentin for 14 more days. She had a CT Abdomen/Pelvis on  to reevaluate the rectal stump collection and it was minimally changed in size so she was planned for IR drain upsize / exchange on 18. Sent in for evaluation for abnormal labwork. Per the yung's report  has had confusion over the past week which has been progressive as well as falls (last of which was the day prior to presentation). Also worsening LE edema and ascites over the past week.  Denies chills or fevers. Denies cough or shortness of breath. Denies dysuria, urinary frequency or flank pain. Denies any recent manipulation or exchange of the nephrostomy. No N/V or change in stool output.     On presentation afebrile but tachycardic to > 120. WBC on admit 21.7 with 2% bands. , , T bili 3.4. Lactic acid of 7.4. U/A with 5-10 WBC. CT abdomen/pelvis with innumerable metastatic hepatic lesions (increased in size and number), new foci of gas within the right collecting system and proximal right ureter. CXR clear. Started on Vanco/Zosyn. Afebrile after admission.     PAST MEDICAL & SURGICAL HISTORY:  Anxiety: hx of panic attacks  Gastroesophageal reflux disease  Rectal cancer metastasized to liver  Status post Ryan procedure  Encounter for care related to vascular access port: right chest wall port placed 2017  History of hemorrhoidectomy    Allergies  No Known Allergies    ANTIMICROBIALS:  piperacillin/tazobactam IVPB. 3.375 every 8 hours  vancomycin  IVPB    vancomycin  IVPB 1000 every 8 hours    OTHER MEDS: MEDICATIONS  (STANDING):  ALPRAZolam 1 daily PRN  enoxaparin Injectable 40 daily  HYDROmorphone   Tablet 2 every 6 hours PRN  lactulose Syrup 10 two times a day    SOCIAL HISTORY:  No smoking or etoh use. No recreational drug use. No recent travel. Lives at home with his Fiance. No sick contacts.     FAMILY HISTORY:  Family history of type 2 diabetes mellitus in mother (Mother)    REVIEW OF SYSTEMS  [  ] ROS unobtainable because:    [ x ] All other systems negative except as noted below:	    Constitutional:  [ ] fever [ ] weight loss  Skin:  [ ] rash [ ] phlebitis	  Eyes: [ ] icterus [ ] inflammation	  ENMT: [ ] discharge [ ] thrush [ ] ulcers [ ] exudates  Respiratory: [ ] dyspnea [ ] hemoptysis [ ] cough [ ] sputum	  Cardiovascular:  [ ] chest pain [ ] palpitations [ X] Bilateral LE edema	  Gastrointestinal:  [ ] nausea [ ] vomiting [ ] diarrhea [ ] constipation [ ] pain	  Genitourinary:  [ ] dysuria [ ] frequency [ ] hematuria [ ] discharge [ ] flank pain  Musculoskeletal:  [ ] myalgias [ ] arthralgias [ ] arthritis  Neurological:  [ ] headache [ ] seizures +encephalopathy  Psychiatric:  [ ] anxiety [ ] depression	  Hematology/Lymphatics:  [ ] lymphadenopathy  Endocrine:  [ ] adrenal [ ] thyroid  Allergic/Immunologic:	 [ ] transplant [ ] seasonal    Vital Signs Last 24 Hrs  T(F): 97.7 (18 @ 07:27), Max: 99.1 (18 @ 11:45)    Vital Signs Last 24 Hrs  HR: 124 (18 @ 07:27) (110 - 124)  BP: 121/94 (18 @ 07:27) (107/71 - 123/83)  RR: 18 (18 @ 07:27)  SpO2: 99% (18 @ 07:27) (96% - 99%)  Wt(kg): --    PHYSICAL EXAMINATION:  General: AAO2X (self and place), NAD  HEENT: PERRL, EOMI, No subconjunctival hemorrhages, Oropharynx Clear, MMM  Neck: Supple, No MARCIANO  Cardiac: RRR, No M/R/G  Resp: CTAB, No Wh/Rh/Ra  Abdomen: +LLQ Ostomy (with soft stool), Distended, NBS, NT/ND, No HSM, No rigidity or guarding  MSK: 2-3+ B/L LE edema. No stigmata of IE. No evidence of phlebitis. No evidence of synovitis.  Back: No CVA Tenderness, +Right Nephrostomy Tube (No surrounding erythema, drainage or tenderness to palpation), Did not examine gluteal drain as patient was in the emergency department hallway.  Skin: No rashes or lesions. Skin is warm and dry to the touch.   Neuro: AAO2X (self and place). CN 2-12 Grossly intact. Moves all four extremities spontaneously.  Psych: Calm, Pleasant, Cooperative                        9.1    21.7  )-----------( 375      ( 2018 13:09 )             28.7     02-06    126<L>  |  88<L>  |  29<H>  ----------------------------<  79  5.5<H>   |  20<L>  |  0.93    Ca    8.9      2018 20:51  Phos  3.5     -  Mg     2.6     -    TPro  6.0  /  Alb  2.4<L>  /  TBili  3.3<H>  /  DBili  x   /  AST  861<H>  /  ALT  417<H>  /  AlkPhos  364<H>  -    Urinalysis Basic - ( 2018 14:48 )    Color: Gretel / Appearance: SL Turbid / S.022 / pH: x  Gluc: x / Ketone: Negative  / Bili: Small / Urobili: 2 mg/dL   Blood: x / Protein: 30 mg/dL / Nitrite: Negative   Leuk Esterase: Negative / RBC: 5-10 /HPF / WBC 2-5 /HPF   Sq Epi: x / Non Sq Epi: OCC /HPF / Bacteria: Few /HPF    MICROBIOLOGY:    None for review    RADIOLOGY:  EXAM:  CT ABDOMEN AND PELVIS OC IC                        PROCEDURE DATE:  2018    Innumerable metastatic hepatic lesions, increased in number and size since 2018.   Bilateral pulmonary metastases.   Large volume ascites, increased since the prior study.   New foci of gas within the right collecting system and proximal right ureter, associated with right nephrostomy tube. Correlate clinically with recent intervention. No renal hydronephrosis.   Circumferential urinary bladder wall thickening as above.  Deep pelvic abscess with transgluteal drain in place, without significant change since 2018.    EXAM:  XR CHEST PORTABLE URGENT 1V                        PROCEDURE DATE:  2018    Clear lungs.

## 2018-02-07 NOTE — PROGRESS NOTE ADULT - PROBLEM SELECTOR PLAN 4
iSTOP: [id number]  Continue with Dilaudid 2 mg Q6 PO PRN  - Surgical oncology consulted and felt no active intervention indicated.  - Will contact outpt Oncologist Dr. Billingsley today   - IR consulted for management of STEPHANE drain and R nephrostomy iSTOP: [id number]  Continue with Dilaudid 2 mg Q6 PO PRN  - Surgical oncology consulted and felt no active intervention indicated.  - Med Onc consulted and will follow pt.  - IR consulted for management of STEPHANE drain and R nephrostomy

## 2018-02-07 NOTE — CONSULT NOTE ADULT - SUBJECTIVE AND OBJECTIVE BOX
31yo M known to Dr. Kelly with a history of metastatic rectal cancer (mets to liver; unresectable) s/p diverting colostomy (10/2017), post-op c/b recurrent rectal abscess s/p multiple drainage procedures, now sent in from IR for abnormal labs. Pt was at IR today for tube study to evaluate drains for placement and obstruction. Pre-procedure labs were sent and showed hyponatremia, hyperkalemia, and leukocytosis, as well as elevated LFTs. Pt has had mild discomfort in his abdomen for the last five days, including 3 days of abd distension which has now resolved. Denies f/c, cp/sob. Colostomy working well. He reports his abdominal drains have minimal output (empties them once a day; both serosanguinous). However, has had decreased PO intake in the last five days. He reports having a couple of mechanical falls at home as well as transient moments of confusion (pt's partner described how pt sometimes forgets things and not making sense when speaking).    In the ED:  Vital Signs Last 24 Hrs  T(C): 36.7 (2018 12:00), Max: 37.3 (2018 11:45)  T(F): 98 (2018 12:00), Max: 99.1 (2018 11:45)  HR: 115 (2018 19:37) (115 - 121)  BP: 120/91 (2018 19:37) (115/80 - 123/83)  BP(mean): --  RR: 17 (2018 19:37) (16 - 20)  SpO2: 98% (2018 19:37) (96% - 98%)    PMH  Anxiety  Gastroesophageal reflux disease  Rectal cancer metastasized to liver    PSH  Status post Ryan procedure  History of hemorrhoidectomy    Allergies  No Known Allergies    Physical Exam  T(C): 36.4 (18 @ 20:20), Max: 37.3 (18 @ 11:45)  HR: 122 (18 @ 20:20) (115 - 122)  BP: 110/82 (18 @ 20:20) (110/82 - 123/83)  RR: 30 (18 @ 20:20) (16 - 30)  SpO2: 98% (18 @ 20:20) (96% - 98%)  Wt(kg): --  Tmax: T(C): , Max: 37.3 (18 @ 11:45)  Wt(kg): --    Gen: NAD  HEENT: normocephalic, atraumatic, no scleral icterus  Abd: Soft, ND, NTP, midline scar healing well, LLQ colostomy with formed stool in bag, no rebound, no guarding, no palpable organomegaly/masses  Ext: warm, no edema      Labs:                        9.1    21.7  )-----------( 375      ( 2018 13:09 )             28.7         126<L>  |  88<L>  |  29<H>  ----------------------------<  79  5.5<H>   |  20<L>  |  0.93    Ca    8.9      2018 20:51  Phos  3.5       Mg     2.6         TPro  6.0  /  Alb  2.4<L>  /  TBili  3.3<H>  /  DBili  x   /  AST  861<H>  /  ALT  417<H>  /  AlkPhos  364<H>      PT/INR - ( 2018 14:43 )   PT: 22.2 sec;   INR: 2.01 ratio         PTT - ( 2018 14:43 )  PTT:28.9 sec  Urinalysis Basic - ( 2018 14:48 )    Color: Gretel / Appearance: SL Turbid / S.022 / pH: x  Gluc: x / Ketone: Negative  / Bili: Small / Urobili: 2 mg/dL   Blood: x / Protein: 30 mg/dL / Nitrite: Negative   Leuk Esterase: Negative / RBC: 5-10 /HPF / WBC 2-5 /HPF   Sq Epi: x / Non Sq Epi: OCC /HPF / Bacteria: Few /HPF      Imaging    < from: CT Abdomen and Pelvis w/ Oral Cont and w/ IV Cont (18 @ 16:27) >  IMPRESSION:      Innumerable metastatic hepatic lesions, increased in number and size   since 2018.     Bilateral pulmonary metastases.     Large volume ascites, increased since the prior study.     New foci of gas within the right collecting system and proximal right   ureter, associated with right nephrostomy tube. Correlate clinically with   recent intervention. No renal hydronephrosis.     Circumferential urinary bladder wall thickening as above.    Deep pelvic abscess with transgluteal drain in place, without significant   change since 2018.    < end of copied text >

## 2018-02-07 NOTE — PROGRESS NOTE ADULT - PROBLEM SELECTOR PLAN 2
- C/w NS IVF  - BMP Q12 Hrs. Monitor for over correction.  - Fluid restriction of 1L /24 hrs.  - May be secondary decreased effective blood volume secondary to liver failure. Will give salt tab and reassess - C/w NS IVF  - BMP Q12 Hrs. Monitor for over correction.  - Continue IVF  - May be secondary decreased effective blood volume secondary to liver failure. Will give salt tab and reassess

## 2018-02-07 NOTE — CONSULT NOTE ADULT - SUBJECTIVE AND OBJECTIVE BOX
The patient is a 31 yo M h/o metastatic rectal cancer (unresectable, and metastatic to liver and lungs) s/p a diverting colostomy on 10/23/2017 with post-op course c/b perirectal abscess s/p IR placed STEPHANE drain and IR placed R nephrostomy pelvic collection/malignancy caused R hydronephrosis who presents from IR for hyponatremia, hyperkalemia and leukocytosis. Pt is not currently on chemotherapy. Was at IR yesterday to have STEPHANE drain manipulated. The patient states that he has a mild discomfort in his rectum which is unchanged from previous. Was on PO Cipro/flagyll and finished his course 2-3 weeks ago. Has not been on abx since. Denies any F nvd CP SOB cough rash sick contacts, recent travel dysuria hematuria. States that colostomy has not had any change in output. STEPHANE drain still draining, unchanged. R nephrostomy- draining clear yellow liquid. Has been drinking more water and has had decreased PO intake. Per sister at bedside over the last week, pt has been sleeping a lot, not eating and generally weak.     In the ED:  Vital Signs Last 24 Hrs  T(C): 36.7 (06 Feb 2018 12:00), Max: 37.3 (06 Feb 2018 11:45)  T(F): 98 (06 Feb 2018 12:00), Max: 99.1 (06 Feb 2018 11:45)  HR: 115 (06 Feb 2018 19:37) (115 - 121)  BP: 120/91 (06 Feb 2018 19:37) (115/80 - 123/83)  BP(mean): --  RR: 17 (06 Feb 2018 19:37) (16 - 20)  SpO2: 98% (06 Feb 2018 19:37) (96% - 98%)    Was given Zosyn 1 x, 2L NS, BCx, UCx collected. CTAP performed which showed large volume ascites, stable abscess and progression of liver mets.      ROS: as above       PAST MEDICAL & SURGICAL HISTORY:  Anxiety: hx of panic attacks  Gastroesophageal reflux disease  Rectal cancer metastasized to liver  Status post Ryan procedure  Encounter for care related to vascular access port: right chest wall port placed 2017  History of hemorrhoidectomy      SOCIAL HISTORY: engaged, lives with fiance, dad and step-mother, no tobacco, alcohol use    FAMILY HISTORY:  Family history of type 2 diabetes mellitus in mother (Mother)      MEDICATIONS  (STANDING):  enoxaparin Injectable 40 milliGRAM(s) SubCutaneous daily  lactulose Syrup 10 Gram(s) Oral two times a day  piperacillin/tazobactam IVPB. 3.375 Gram(s) IV Intermittent every 8 hours  sodium chloride 0.9%. 1000 milliLiter(s) (150 mL/Hr) IV Continuous <Continuous>  sodium polystyrene sulfonate Suspension 15 Gram(s) Oral once  vancomycin  IVPB        MEDICATIONS  (PRN):  ALPRAZolam 1 milliGRAM(s) Oral daily PRN Anxiety  HYDROmorphone   Tablet 2 milliGRAM(s) Oral every 6 hours PRN Severe Pain (7 - 10)      Allergies    No Known Allergies    Intolerances        Vital Signs Last 24 Hrs  T(C): 36.3 (07 Feb 2018 15:30), Max: 36.5 (07 Feb 2018 05:04)  T(F): 97.3 (07 Feb 2018 15:30), Max: 97.7 (07 Feb 2018 05:04)  HR: 120 (07 Feb 2018 15:30) (110 - 124)  BP: 120/84 (07 Feb 2018 15:30) (107/71 - 123/83)  BP(mean): --  RR: 18 (07 Feb 2018 15:30) (16 - 30)  SpO2: 97% (07 Feb 2018 15:30) (97% - 99%)    PHYSICAL EXAM  General: adult in NAD  HEENT: clear oropharynx, anicteric sclera, pink conjunctiva  Neck: supple  CV: normal S1/S2 with no murmur rubs or gallops  Lungs: positive air movement b/l ant lungs,clear to auscultation, no wheezes, no rales  Abdomen: soft non-tender non-distended, no hepatosplenomegaly  Ext: no clubbing cyanosis or edema  Skin: no rashes and no petechiae  Neuro: alert and oriented X 4, no focal deficits      LABS:                          10.0   19.63 )-----------( 362      ( 07 Feb 2018 07:25 )             34.3         Mean Cell Volume : 75.6 fl  Mean Cell Hemoglobin : 22.0 pg  Mean Cell Hemoglobin Concentration : 29.2 gm/dL  Auto Neutrophil # : x  Auto Lymphocyte # : x  Auto Monocyte # : x  Auto Eosinophil # : x  Auto Basophil # : x  Auto Neutrophil % : x  Auto Lymphocyte % : x  Auto Monocyte % : x  Auto Eosinophil % : x  Auto Basophil % : x      Serial CBC's  02-07 @ 07:25  Hct-34.3 / Hgb-10.0 / Plat-362 / RBC-4.54 / WBC-19.63  Serial CBC's  02-06 @ 13:09  Hct-28.7 / Hgb-9.1 / Plat-375 / RBC-3.73 / WBC-21.7  Serial CBC's  02-06 @ 09:49  Hct-34.3 / Hgb-10.5 / Plat-479 / RBC-4.47 / WBC-23.4      02-07    125<L>  |  86<L>  |  37<H>  ----------------------------<  77  5.7<H>   |  18<L>  |  1.04    Ca    9.3      07 Feb 2018 07:21  Phos  3.5     02-06  Mg     2.6     02-06    TPro  6.3  /  Alb  2.4<L>  /  TBili  4.1<H>  /  DBili  x   /  AST  717<H>  /  ALT  434<H>  /  AlkPhos  390<H>  02-07      PT/INR - ( 07 Feb 2018 07:25 )   PT: 21.2 sec;   INR: 1.85 ratio         PTT - ( 07 Feb 2018 07:25 )  PTT:30.7 sec    Ferritin, Serum: 2418.0 ng/mL (02-07 @ 07:24)  Iron - Total Binding Capacity.: 170 ug/dL (02-07 @ 07:21)              RADIOLOGY & ADDITIONAL STUDIES: The patient is a 33 yo M h/o metastatic rectal cancer (unresectable, and metastatic to liver and lungs) s/p a diverting colostomy on 10/23/2017 with post-op course c/b perirectal abscess s/p IR placed STEPHANE drain and IR placed R nephrostomy pelvic collection/malignancy caused R hydronephrosis who presents from IR for hyponatremia, hyperkalemia and leukocytosis. Pt is not currently on chemotherapy. Was at IR yesterday to have STEPHANE drain manipulated. The patient states that he has a mild discomfort in his rectum which is unchanged from previous. Was on PO Cipro/flagyll and finished his course 2-3 weeks ago. Has not been on abx since. Denies any F nvd CP SOB cough rash sick contacts, recent travel dysuria hematuria. States that colostomy has not had any change in output. STEPHANE drain still draining, unchanged. R nephrostomy- draining clear yellow liquid. Has been drinking more water and has had decreased PO intake. Per sister at bedside over the last week, pt has been sleeping a lot, not eating and generally weak.     In the ED:  Vital Signs Last 24 Hrs  T(C): 36.7 (06 Feb 2018 12:00), Max: 37.3 (06 Feb 2018 11:45)  T(F): 98 (06 Feb 2018 12:00), Max: 99.1 (06 Feb 2018 11:45)  HR: 115 (06 Feb 2018 19:37) (115 - 121)  BP: 120/91 (06 Feb 2018 19:37) (115/80 - 123/83)  BP(mean): --  RR: 17 (06 Feb 2018 19:37) (16 - 20)  SpO2: 98% (06 Feb 2018 19:37) (96% - 98%)    Was given Zosyn 1 x, 2L NS, BCx, UCx collected. CTAP performed which showed large volume ascites, stable abscess and progression of liver mets.      ROS: as above       PAST MEDICAL & SURGICAL HISTORY:  Anxiety: hx of panic attacks  Gastroesophageal reflux disease  Rectal cancer metastasized to liver  Status post Ryan procedure  Encounter for care related to vascular access port: right chest wall port placed 2017  History of hemorrhoidectomy      SOCIAL HISTORY: engaged, lives with fiance, dad and step-mother, no tobacco, alcohol use    FAMILY HISTORY:  Family history of type 2 diabetes mellitus in mother (Mother)      MEDICATIONS  (STANDING):  enoxaparin Injectable 40 milliGRAM(s) SubCutaneous daily  lactulose Syrup 10 Gram(s) Oral two times a day  piperacillin/tazobactam IVPB. 3.375 Gram(s) IV Intermittent every 8 hours  sodium chloride 0.9%. 1000 milliLiter(s) (150 mL/Hr) IV Continuous <Continuous>  sodium polystyrene sulfonate Suspension 15 Gram(s) Oral once  vancomycin  IVPB        MEDICATIONS  (PRN):  ALPRAZolam 1 milliGRAM(s) Oral daily PRN Anxiety  HYDROmorphone   Tablet 2 milliGRAM(s) Oral every 6 hours PRN Severe Pain (7 - 10)      Allergies    No Known Allergies    Intolerances        Vital Signs Last 24 Hrs  T(C): 36.3 (07 Feb 2018 15:30), Max: 36.5 (07 Feb 2018 05:04)  T(F): 97.3 (07 Feb 2018 15:30), Max: 97.7 (07 Feb 2018 05:04)  HR: 120 (07 Feb 2018 15:30) (110 - 124)  BP: 120/84 (07 Feb 2018 15:30) (107/71 - 123/83)  BP(mean): --  RR: 18 (07 Feb 2018 15:30) (16 - 30)  SpO2: 97% (07 Feb 2018 15:30) (97% - 99%)    PHYSICAL EXAM  General: adult in NAD, chronically ill appearing   HEENT: dry mucous membranes, icteric sclera   Neck: supple  CV: normal S1/S2 with no murmur rubs or gallops  Lungs: positive air movement b/l ant lungs, clear to auscultation, no wheezes, no rales  Abdomen: + distended, NT  Ext: + edema b/l  Skin: no rashes and no petechiae  Neuro: alert and oriented X 4, no focal deficits      LABS:                          10.0   19.63 )-----------( 362      ( 07 Feb 2018 07:25 )             34.3         Mean Cell Volume : 75.6 fl  Mean Cell Hemoglobin : 22.0 pg  Mean Cell Hemoglobin Concentration : 29.2 gm/dL  Auto Neutrophil # : x  Auto Lymphocyte # : x  Auto Monocyte # : x  Auto Eosinophil # : x  Auto Basophil # : x  Auto Neutrophil % : x  Auto Lymphocyte % : x  Auto Monocyte % : x  Auto Eosinophil % : x  Auto Basophil % : x      Serial CBC's  02-07 @ 07:25  Hct-34.3 / Hgb-10.0 / Plat-362 / RBC-4.54 / WBC-19.63  Serial CBC's  02-06 @ 13:09  Hct-28.7 / Hgb-9.1 / Plat-375 / RBC-3.73 / WBC-21.7  Serial CBC's  02-06 @ 09:49  Hct-34.3 / Hgb-10.5 / Plat-479 / RBC-4.47 / WBC-23.4      02-07    125<L>  |  86<L>  |  37<H>  ----------------------------<  77  5.7<H>   |  18<L>  |  1.04    Ca    9.3      07 Feb 2018 07:21  Phos  3.5     02-06  Mg     2.6     02-06    TPro  6.3  /  Alb  2.4<L>  /  TBili  4.1<H>  /  DBili  x   /  AST  717<H>  /  ALT  434<H>  /  AlkPhos  390<H>  02-07      PT/INR - ( 07 Feb 2018 07:25 )   PT: 21.2 sec;   INR: 1.85 ratio         PTT - ( 07 Feb 2018 07:25 )  PTT:30.7 sec    Ferritin, Serum: 2418.0 ng/mL (02-07 @ 07:24)  Iron - Total Binding Capacity.: 170 ug/dL (02-07 @ 07:21)              RADIOLOGY & ADDITIONAL STUDIES:

## 2018-02-07 NOTE — PROGRESS NOTE ADULT - PROBLEM SELECTOR PLAN 5
- MELD score of 26. INR elevated, hypoalbuminemia noted.  - - MELD score of 26. INR elevated, hypoalbuminemia noted.  - f/u GI recs.

## 2018-02-07 NOTE — CHART NOTE - NSCHARTNOTEFT_GEN_A_CORE
Discussed with Interventional Radiology patient's care as consult placed this am. At this time, no IR procedure planned for today. Possible procedure tomorrow morning, however, will depend on patient's electrolytes/creatinine.     Additionally, patient is pending repeat stat BMP and lactate since 2 pm. Discussed with the RN,  multiple unsuccessful attempts were made and now patient is pending transport to floors. Will discuss with the receiving floor and escalate care for stat labs.     Neetu Baca MD   PGY 2   Team 1   [phone]

## 2018-02-07 NOTE — PROGRESS NOTE ADULT - SUBJECTIVE AND OBJECTIVE BOX
Patient is a 32y old  Male who presents with a chief complaint of Hyponatremia, Hyperkalemia, Leukocytosis (2018 19:44)      SUBJECTIVE / OVERNIGHT EVENTS: Pt seen and examined. Reports feeling well however pt is mildly obtunded throuhgouht encounter- says he is extremely tired. AAOx2 during intitial conversation. Denies any fevers, chills, n/v, CP, SOB, abd pain, or dysuria.       MEDICATIONS  (STANDING):  enoxaparin Injectable 40 milliGRAM(s) SubCutaneous daily  lactulose Syrup 10 Gram(s) Oral two times a day  piperacillin/tazobactam IVPB. 3.375 Gram(s) IV Intermittent every 8 hours  sodium chloride 1 Gram(s) Oral once  vancomycin  IVPB      vancomycin  IVPB 1000 milliGRAM(s) IV Intermittent every 8 hours    MEDICATIONS  (PRN):  ALPRAZolam 1 milliGRAM(s) Oral daily PRN Anxiety  HYDROmorphone   Tablet 2 milliGRAM(s) Oral every 6 hours PRN Severe Pain (7 - 10)      CAPILLARY BLOOD GLUCOSE        I&O's Summary      T(C): 36.5 (18 @ 07:27), Max: 37.3 (18 @ 11:45)  HR: 124 (18 @ 07:27) (110 - 124)  BP: 121/94 (18 @ 07:27) (107/71 - 123/83)  RR: 18 (18 @ 07:27) (16 - 30)  SpO2: 99% (18 @ 07:27) (96% - 99%)      PHYSICAL EXAM:    	GENERAL: Comfortable, no acute distress   	HEAD:  Normocephalic, atraumatic  	EYES: EOMI, PERRLA  	HEENT: Moist mucous membranes  	NECK: Supple, No JVD  	NERVOUS SYSTEM:  CN 2-12 intact b/l. Alert & Oriented X3, Motor Strength 4/5 B/L upper and lower extremities. Sensation intact B/L  	CHEST/LUNG: Clear to auscultation bilaterally  	HEART: Regular rate and rhythm, no murmur   	ABDOMEN: Soft, Nontender, Nondistended, Bowel sounds present. Colostomy bag present.  	EXTREMITIES:   No clubbing, cyanosis. +2 Pitting edema b/l.  	MUSCULOSKELETAL: No muscle tenderness, no joint tenderness  SKIN: warm and dry, no rash      LABS:                        9.1    21.7  )-----------( 375      ( 2018 13:09 )             28.7     WBC Trend: 21.7<--, 23.4<--  02-06    126<L>  |  88<L>  |  29<H>  ----------------------------<  79  5.5<H>   |  20<L>  |  0.93    Ca    8.9      2018 20:51  Phos  3.5     02-  Mg     2.6     -06    TPro  6.0  /  Alb  2.4<L>  /  TBili  3.3<H>  /  DBili  x   /  AST  861<H>  /  ALT  417<H>  /  AlkPhos  364<H>      Creatinine Trend: 0.93<--, 0.83<--, 0.85<--  PT/INR - ( 2018 07:25 )   PT: 21.2 sec;   INR: 1.85 ratio         PTT - ( 2018 07:25 )  PTT:30.7 sec      Urinalysis Basic - ( 2018 14:48 )    Color: Gretel / Appearance: SL Turbid / S.022 / pH: x  Gluc: x / Ketone: Negative  / Bili: Small / Urobili: 2 mg/dL   Blood: x / Protein: 30 mg/dL / Nitrite: Negative   Leuk Esterase: Negative / RBC: 5-10 /HPF / WBC 2-5 /HPF   Sq Epi: x / Non Sq Epi: OCC /HPF / Bacteria: Few /HPF          RADIOLOGY & ADDITIONAL TESTS:    Imaging Personally Reviewed:    Consultant(s) Notes Reviewed:      Care Discussed with Consultants/Other Providers:

## 2018-02-07 NOTE — CONSULT NOTE ADULT - SUBJECTIVE AND OBJECTIVE BOX
Chief Complaint:  Patient is a 32y old  Male who presents with a chief complaint of Hyponatremia, Hyperkalemia, Leukocytosis (2018 19:44)      HPI: 33 yo man with known metastatic rectal cancer presenting to the hospital for mechnical fall at home with gait instability and worsening mental status. He was noted to have elevated liver enzymes and leukocytosis. Advanced endoscopy GI team was consulted to assess utility of ERCP for this gentleman.     Allergies:  No Known Allergies      Home Medications:    Hospital Medications:  ALPRAZolam 1 milliGRAM(s) Oral daily PRN  enoxaparin Injectable 40 milliGRAM(s) SubCutaneous daily  HYDROmorphone   Tablet 2 milliGRAM(s) Oral every 6 hours PRN  lactulose Syrup 10 Gram(s) Oral two times a day  piperacillin/tazobactam IVPB. 3.375 Gram(s) IV Intermittent every 8 hours  sodium chloride 0.9%. 1000 milliLiter(s) IV Continuous <Continuous>  vancomycin  IVPB          PMHX/PSHX:  Anxiety  Gastroesophageal reflux disease  Rectal cancer metastasized to liver  Status post Ryan procedure  Encounter for care related to vascular access port  History of hemorrhoidectomy      Family history:  Family history of type 2 diabetes mellitus in mother (Mother)      Social History:     ROS:     General:  (+) wt loss, fevers, chills, night sweats, fatigue   Eyes:  Good vision, no reported pain  ENT:  No sore throat, pain, runny nose  CV:  No chest pain, SOB, palpitations, orthopnea  Resp:  No cough, SOB, wheezing  GI:  See HPI  :  No pain, bleeding, incontinence, nocturia  Muscle:  No pain, weakness  Neuro:  No weakness, tingling, memory problems  Psych:  No fatigue, insomnia, mood problems, depression  Endocrine:  No cold/heat intolerance  Heme:  No petechiae, ecchymosis, easy bruising  Skin:  No rash, edema      PHYSICAL EXAM:     GENERAL: NAD  HEENT: sclera anicteric  CHEST: CTAB  HEART:  RRR, no MRG, no edema  ABDOMEN:  ostomy, soft, (+) masses, (+) hepatomegaly  EXTREMITIES:  no cyanosis, or edema  SKIN:  No rash  NEURO:  Alert, orientedx3     Vital Signs:  Vital Signs Last 24 Hrs  T(C): 36.3 (2018 17:58), Max: 36.5 (2018 05:04)  T(F): 97.4 (2018 17:58), Max: 97.7 (2018 05:04)  HR: 119 (2018 17:58) (110 - 124)  BP: 114/82 (2018 17:58) (107/71 - 121/94)  RR: 18 (2018 17:58) (18 - 18)  SpO2: 99% (2018 17:58) (97% - 99%)  Daily Height in cm: 198.12 (2018 18:24)    Daily     LABS:                        10.0   19.63 )-----------( 362      ( 2018 07:25 )             34.3     02-07    127<L>  |  88<L>  |  35<H>  ----------------------------<  87  5.2   |  20<L>  |  1.08    Ca    8.9      2018 16:43  Phos  3.5     02-  Mg     2.6     02-06    TPro  6.3  /  Alb  2.4<L>  /  TBili  4.1<H>  /  DBili  x   /  AST  717<H>  /  ALT  434<H>  /  AlkPhos  390<H>  07    LIVER FUNCTIONS - ( 2018 07:21 )  Alb: 2.4 g/dL / Pro: 6.3 g/dL / ALK PHOS: 390 U/L / ALT: 434 U/L / AST: 717 U/L / GGT: x           PT/INR - ( 2018 07:25 )   PT: 21.2 sec;   INR: 1.85 ratio         PTT - ( 2018 07:25 )  PTT:30.7 sec  Urinalysis Basic - ( 2018 14:48 )    Color: Gretel / Appearance: SL Turbid / S.022 / pH: x  Gluc: x / Ketone: Negative  / Bili: Small / Urobili: 2 mg/dL   Blood: x / Protein: 30 mg/dL / Nitrite: Negative   Leuk Esterase: Negative / RBC: 5-10 /HPF / WBC 2-5 /HPF   Sq Epi: x / Non Sq Epi: OCC /HPF / Bacteria: Few /HPF          Imaging:  < from: CT Abdomen and Pelvis w/ Oral Cont and w/ IV Cont (18 @ 16:27) >  LIVER: Hepatomegaly with innumerable metastatic lesions which appear   increased in number and size from 2018.    BILE DUCTS: Normal caliber.  GALLBLADDER: Within normal limits.  SPLEEN: Within normal limits.  PANCREAS: Within normal limits.    < end of copied text >  < from: CT Abdomen and Pelvis w/ Oral Cont and w/ IV Cont (18 @ 16:27) >  IMPRESSION:      Innumerable metastatic hepatic lesions, increased in number and size   since 2018.     Bilateral pulmonary metastases.     Large volume ascites, increased since the prior study.     New foci of gas within the right collecting system and proximal right   ureter, associated with right nephrostomy tube. Correlate clinically with   recent intervention. No renal hydronephrosis.     Circumferential urinary bladder wall thickening as above.    Deep pelvic abscess with transgluteal drain in place, without significant   change since 2018.    < end of copied text > Chief Complaint:  Patient is a 32y old  Male who presents with a chief complaint of Hyponatremia, Hyperkalemia, Leukocytosis (2018 19:44)      HPI: 33 yo man with known metastatic rectal cancer presenting to the hospital for mechnical fall at home with gait instability and worsening mental status. He was noted to have elevated liver enzymes and leukocytosis. Advanced endoscopy GI team was consulted to assess utility of ERCP for this gentleman.     Allergies:  No Known Allergies      Home Medications:    Hospital Medications:  ALPRAZolam 1 milliGRAM(s) Oral daily PRN  enoxaparin Injectable 40 milliGRAM(s) SubCutaneous daily  HYDROmorphone   Tablet 2 milliGRAM(s) Oral every 6 hours PRN  lactulose Syrup 10 Gram(s) Oral two times a day  piperacillin/tazobactam IVPB. 3.375 Gram(s) IV Intermittent every 8 hours  sodium chloride 0.9%. 1000 milliLiter(s) IV Continuous <Continuous>  vancomycin  IVPB          PMHX/PSHX:  Anxiety  Gastroesophageal reflux disease  Rectal cancer metastasized to liver  Status post Ryan procedure  Encounter for care related to vascular access port  History of hemorrhoidectomy      Family history:  Family history of type 2 diabetes mellitus in mother (Mother)      Social History:     ROS:     General:  (+) wt loss, fevers, chills, night sweats, fatigue   Eyes:  Good vision, no reported pain  ENT:  No sore throat, pain, runny nose  CV:  No chest pain, SOB, palpitations, orthopnea  Resp:  No cough, SOB, wheezing  GI:  See HPI  :  No pain, bleeding, incontinence, nocturia  Muscle:  No pain, weakness  Neuro:  No weakness, tingling, memory problems  Psych:  No fatigue, insomnia, mood problems, depression  Endocrine:  No cold/heat intolerance  Heme:  No petechiae, ecchymosis, easy bruising  Skin:  No rash, edema      PHYSICAL EXAM:     GENERAL: cachectic  HEENT: sclera anicteric  CHEST: CTAB  HEART:  RRR, no MRG, no edema  ABDOMEN:  ostomy, mildly distended, soft, (+) mass, (+) hepatomegaly  EXTREMITIES:  1+ LE edema  SKIN:  No rash  NEURO:  Alert, orientedx2, mild asterexis     Vital Signs:  Vital Signs Last 24 Hrs  T(C): 36.3 (2018 17:58), Max: 36.5 (2018 05:04)  T(F): 97.4 (2018 17:58), Max: 97.7 (2018 05:04)  HR: 119 (2018 17:58) (110 - 124)  BP: 114/82 (2018 17:58) (107/71 - 121/94)  RR: 18 (2018 17:58) (18 - 18)  SpO2: 99% (2018 17:58) (97% - 99%)  Daily Height in cm: 198.12 (2018 18:24)    Daily     LABS:                        10.0   19.63 )-----------( 362      ( 2018 07:25 )             34.3     02-07    127<L>  |  88<L>  |  35<H>  ----------------------------<  87  5.2   |  20<L>  |  1.08    Ca    8.9      2018 16:43  Phos  3.5     02-06  Mg     2.6     02-06    TPro  6.3  /  Alb  2.4<L>  /  TBili  4.1<H>  /  DBili  x   /  AST  717<H>  /  ALT  434<H>  /  AlkPhos  390<H>  02-07    LIVER FUNCTIONS - ( 2018 07:21 )  Alb: 2.4 g/dL / Pro: 6.3 g/dL / ALK PHOS: 390 U/L / ALT: 434 U/L / AST: 717 U/L / GGT: x           PT/INR - ( 2018 07:25 )   PT: 21.2 sec;   INR: 1.85 ratio         PTT - ( 2018 07:25 )  PTT:30.7 sec  Urinalysis Basic - ( 2018 14:48 )    Color: Gretel / Appearance: SL Turbid / S.022 / pH: x  Gluc: x / Ketone: Negative  / Bili: Small / Urobili: 2 mg/dL   Blood: x / Protein: 30 mg/dL / Nitrite: Negative   Leuk Esterase: Negative / RBC: 5-10 /HPF / WBC 2-5 /HPF   Sq Epi: x / Non Sq Epi: OCC /HPF / Bacteria: Few /HPF          Imaging:  < from: CT Abdomen and Pelvis w/ Oral Cont and w/ IV Cont (18 @ 16:27) >  LIVER: Hepatomegaly with innumerable metastatic lesions which appear   increased in number and size from 2018.    BILE DUCTS: Normal caliber.  GALLBLADDER: Within normal limits.  SPLEEN: Within normal limits.  PANCREAS: Within normal limits.    < end of copied text >  < from: CT Abdomen and Pelvis w/ Oral Cont and w/ IV Cont (18 @ 16:27) >  IMPRESSION:      Innumerable metastatic hepatic lesions, increased in number and size   since 2018.     Bilateral pulmonary metastases.     Large volume ascites, increased since the prior study.     New foci of gas within the right collecting system and proximal right   ureter, associated with right nephrostomy tube. Correlate clinically with   recent intervention. No renal hydronephrosis.     Circumferential urinary bladder wall thickening as above.    Deep pelvic abscess with transgluteal drain in place, without significant   change since 2018.    < end of copied text > Chief Complaint:  Patient is a 32y old  Male who presents with a chief complaint of Hyponatremia, Hyperkalemia, Leukocytosis (2018 19:44)      HPI: 31 yo man with known metastatic rectal adenocarcinoma presenting to the hospital for mechanical fall at home with gait instability and worsening mental status. He was noted to have elevated liver enzymes and leukocytosis. Advanced endoscopy GI team was consulted to assess utility of ERCP for this gentleman.     Allergies:  No Known Allergies      Home Medications:    Hospital Medications:  ALPRAZolam 1 milliGRAM(s) Oral daily PRN  enoxaparin Injectable 40 milliGRAM(s) SubCutaneous daily  HYDROmorphone   Tablet 2 milliGRAM(s) Oral every 6 hours PRN  lactulose Syrup 10 Gram(s) Oral two times a day  piperacillin/tazobactam IVPB. 3.375 Gram(s) IV Intermittent every 8 hours  sodium chloride 0.9%. 1000 milliLiter(s) IV Continuous <Continuous>  vancomycin  IVPB          PMHX/PSHX:  Anxiety  Gastroesophageal reflux disease  Rectal cancer metastasized to liver  Status post Ryan procedure  Encounter for care related to vascular access port  History of hemorrhoidectomy      Family history:  Family history of type 2 diabetes mellitus in mother (Mother)      Social History:     ROS:     General:  (+) wt loss, fevers, chills, night sweats, fatigue   Eyes:  Good vision, no reported pain  ENT:  No sore throat, pain, runny nose  CV:  No chest pain, SOB, palpitations, orthopnea, (+) LE edema  Resp:  No cough, SOB, wheezing  GI:  See HPI  :  No pain, bleeding, incontinence, nocturia  Muscle:  No pain, weakness  Neuro:  No weakness, tingling, memory problems  Psych:  No fatigue, insomnia, mood problems, depression  Endocrine:  No cold/heat intolerance  Heme:  No petechiae, ecchymosis, easy bruising  Skin:  No rash, edema      PHYSICAL EXAM:     GENERAL: cachectic  HEENT: sclera anicteric  CHEST: CTAB  HEART:  RRR, no MRG, no edema  ABDOMEN:  ostomy, mildly distended, soft, (+) mass, (+) hepatomegaly  EXTREMITIES:  1+ LE edema  SKIN:  No rash  NEURO:  Alert, orientedx2, mild asterexis     Vital Signs:  Vital Signs Last 24 Hrs  T(C): 36.3 (2018 17:58), Max: 36.5 (2018 05:04)  T(F): 97.4 (2018 17:58), Max: 97.7 (2018 05:04)  HR: 119 (2018 17:58) (110 - 124)  BP: 114/82 (2018 17:58) (107/71 - 121/94)  RR: 18 (2018 17:58) (18 - 18)  SpO2: 99% (2018 17:58) (97% - 99%)  Daily Height in cm: 198.12 (2018 18:24)    Daily     LABS:                        10.0   19.63 )-----------( 362      ( 2018 07:25 )             34.3     02-07    127<L>  |  88<L>  |  35<H>  ----------------------------<  87  5.2   |  20<L>  |  1.08    Ca    8.9      2018 16:43  Phos  3.5     02-06  Mg     2.6     02-06    TPro  6.3  /  Alb  2.4<L>  /  TBili  4.1<H>  /  DBili  x   /  AST  717<H>  /  ALT  434<H>  /  AlkPhos  390<H>  02-07    LIVER FUNCTIONS - ( 2018 07:21 )  Alb: 2.4 g/dL / Pro: 6.3 g/dL / ALK PHOS: 390 U/L / ALT: 434 U/L / AST: 717 U/L / GGT: x           PT/INR - ( 2018 07:25 )   PT: 21.2 sec;   INR: 1.85 ratio         PTT - ( 2018 07:25 )  PTT:30.7 sec  Urinalysis Basic - ( 2018 14:48 )    Color: Gretel / Appearance: SL Turbid / S.022 / pH: x  Gluc: x / Ketone: Negative  / Bili: Small / Urobili: 2 mg/dL   Blood: x / Protein: 30 mg/dL / Nitrite: Negative   Leuk Esterase: Negative / RBC: 5-10 /HPF / WBC 2-5 /HPF   Sq Epi: x / Non Sq Epi: OCC /HPF / Bacteria: Few /HPF          Imaging:  < from: CT Abdomen and Pelvis w/ Oral Cont and w/ IV Cont (18 @ 16:27) >  LIVER: Hepatomegaly with innumerable metastatic lesions which appear   increased in number and size from 2018.    BILE DUCTS: Normal caliber.  GALLBLADDER: Within normal limits.  SPLEEN: Within normal limits.  PANCREAS: Within normal limits.    < end of copied text >  < from: CT Abdomen and Pelvis w/ Oral Cont and w/ IV Cont (18 @ 16:27) >  IMPRESSION:      Innumerable metastatic hepatic lesions, increased in number and size   since 2018.     Bilateral pulmonary metastases.     Large volume ascites, increased since the prior study.     New foci of gas within the right collecting system and proximal right   ureter, associated with right nephrostomy tube. Correlate clinically with   recent intervention. No renal hydronephrosis.     Circumferential urinary bladder wall thickening as above.    Deep pelvic abscess with transgluteal drain in place, without significant   change since 2018.    < end of copied text >

## 2018-02-07 NOTE — CONSULT NOTE ADULT - ASSESSMENT
32 M w/ metastatic rectal ca currently not on systemic therapy due to h/o pelvic abscess now admitted with metabolic derangements noted to have progression of disease and subsequent liver dysfunction

## 2018-02-07 NOTE — CONSULT NOTE ADULT - ASSESSMENT
Assessment  32 year old male rectal cancer (unresectable and metastatic to liver and lungs) s/p a diverting colostomy on 10/23/17 with post-operative course complicated by perirectal abscess requiring IR drainage, R nephrostomy tube for R Hydronephrosis who presented from IR for hyponatremia, hyperkalemia and worsening leukocytosis. On presentation afebrile but tachycardic to > 120. WBC on admit 21.7 with 2% bands. , , T bili 3.4. Lactic acid of 7.4. U/A with 5-10 WBC. CT abdomen/pelvis with innumerable metastatic hepatic lesions (increased in size and number), new foci of gas within the right collecting system and proximal right ureter. CXR clear. Started on Vanco/Zosyn. Multiple possible foci for infection. Possible spontaneous bacterial peritonitis given large volume ascites. Possible UTI/Pyelo given thickened bladder and foci of air in right collecting system. Possible cholangitis (although less likely) given transaminitis (although more likely explained by increased metastatic burden). Agree with continuing Vanco/Zosyn but would obtain diagnostic paracentesis to help evaluate for SBP. Would recommend GI input with regards to role for ERCP (or if transaminitis more likely explained by metastatic burden). Recommend IR input with regard to repositioning the drain for the perirectal abscess.     Recommendations:  --Continue Zosyn 3.375 mg IV Q8H  --Recommend decreasing Vancomycin to 1g IV Q12H. Trough prior to fourth dose.  --Recommend diagnostic paracentesis  --Recommend GI consult  --F/U IR Recommendations with regards to tube repositioning  --F/U Blood and Urine Cultures.

## 2018-02-08 ENCOUNTER — APPOINTMENT (OUTPATIENT)
Dept: ULTRASOUND IMAGING | Facility: IMAGING CENTER | Age: 33
End: 2018-02-08

## 2018-02-08 ENCOUNTER — RESULT REVIEW (OUTPATIENT)
Age: 33
End: 2018-02-08

## 2018-02-08 LAB
ALBUMIN FLD-MCNC: 0.7 G/DL — SIGNIFICANT CHANGE UP
ALBUMIN SERPL ELPH-MCNC: 2.4 G/DL — LOW (ref 3.3–5)
ALP SERPL-CCNC: 323 U/L — HIGH (ref 40–120)
ALT FLD-CCNC: 401 U/L RC — HIGH (ref 10–45)
ANION GAP SERPL CALC-SCNC: 19 MMOL/L — HIGH (ref 5–17)
AST SERPL-CCNC: 581 U/L — HIGH (ref 10–40)
B PERT IGG+IGM PNL SER: ABNORMAL
BILIRUB SERPL-MCNC: 5.3 MG/DL — HIGH (ref 0.2–1.2)
BUN SERPL-MCNC: 36 MG/DL — HIGH (ref 7–23)
CALCIUM SERPL-MCNC: 8.9 MG/DL — SIGNIFICANT CHANGE UP (ref 8.4–10.5)
CHLORIDE SERPL-SCNC: 90 MMOL/L — LOW (ref 96–108)
CO2 SERPL-SCNC: 20 MMOL/L — LOW (ref 22–31)
COLOR FLD: YELLOW — SIGNIFICANT CHANGE UP
CREAT SERPL-MCNC: 1.13 MG/DL — SIGNIFICANT CHANGE UP (ref 0.5–1.3)
CULTURE RESULTS: NO GROWTH — SIGNIFICANT CHANGE UP
FLUID INTAKE SUBSTANCE CLASS: SIGNIFICANT CHANGE UP
FLUID SEGMENTED GRANULOCYTES: 60 % — SIGNIFICANT CHANGE UP
GLUCOSE FLD-MCNC: 103 MG/DL — SIGNIFICANT CHANGE UP
GLUCOSE SERPL-MCNC: 79 MG/DL — SIGNIFICANT CHANGE UP (ref 70–99)
GRAM STN FLD: SIGNIFICANT CHANGE UP
HCT VFR BLD CALC: 34.5 % — LOW (ref 39–50)
HGB BLD-MCNC: 10.6 G/DL — LOW (ref 13–17)
LACTATE SERPL-SCNC: 6.4 MMOL/L — CRITICAL HIGH (ref 0.7–2)
LDH SERPL L TO P-CCNC: 1657 U/L — SIGNIFICANT CHANGE UP
LYMPHOCYTES # FLD: 30 % — SIGNIFICANT CHANGE UP
MAGNESIUM SERPL-MCNC: 2.9 MG/DL — HIGH (ref 1.6–2.6)
MCHC RBC-ENTMCNC: 24.1 PG — LOW (ref 27–34)
MCHC RBC-ENTMCNC: 30.7 GM/DL — LOW (ref 32–36)
MCV RBC AUTO: 78.5 FL — LOW (ref 80–100)
MESOTHL CELL # FLD: 10 % — SIGNIFICANT CHANGE UP
NIGHT BLUE STAIN TISS: SIGNIFICANT CHANGE UP
PHOSPHATE SERPL-MCNC: 3.7 MG/DL — SIGNIFICANT CHANGE UP (ref 2.5–4.5)
PLATELET # BLD AUTO: 295 K/UL — SIGNIFICANT CHANGE UP (ref 150–400)
POTASSIUM SERPL-MCNC: 5 MMOL/L — SIGNIFICANT CHANGE UP (ref 3.5–5.3)
POTASSIUM SERPL-SCNC: 5 MMOL/L — SIGNIFICANT CHANGE UP (ref 3.5–5.3)
PROT FLD-MCNC: 2 G/DL — SIGNIFICANT CHANGE UP
PROT SERPL-MCNC: 6.3 G/DL — SIGNIFICANT CHANGE UP (ref 6–8.3)
RBC # BLD: 4.4 M/UL — SIGNIFICANT CHANGE UP (ref 4.2–5.8)
RBC # FLD: 21.1 % — HIGH (ref 10.3–14.5)
RCV VOL RI: 1330 /UL — HIGH (ref 0–5)
SODIUM SERPL-SCNC: 129 MMOL/L — LOW (ref 135–145)
SPECIMEN SOURCE: SIGNIFICANT CHANGE UP
TOTAL NUCLEATED CELL COUNT, BODY FLUID: 42 /UL — HIGH (ref 0–5)
TUBE TYPE: SIGNIFICANT CHANGE UP
WBC # BLD: 19.7 K/UL — HIGH (ref 3.8–10.5)
WBC # FLD AUTO: 19.7 K/UL — HIGH (ref 3.8–10.5)

## 2018-02-08 PROCEDURE — 88112 CYTOPATH CELL ENHANCE TECH: CPT | Mod: 26

## 2018-02-08 PROCEDURE — 76080 X-RAY EXAM OF FISTULA: CPT | Mod: 26

## 2018-02-08 PROCEDURE — 93970 EXTREMITY STUDY: CPT | Mod: 26

## 2018-02-08 PROCEDURE — 76705 ECHO EXAM OF ABDOMEN: CPT | Mod: 26

## 2018-02-08 PROCEDURE — 99232 SBSQ HOSP IP/OBS MODERATE 35: CPT

## 2018-02-08 PROCEDURE — 49083 ABD PARACENTESIS W/IMAGING: CPT

## 2018-02-08 PROCEDURE — 99222 1ST HOSP IP/OBS MODERATE 55: CPT | Mod: GC

## 2018-02-08 PROCEDURE — 50435 EXCHANGE NEPHROSTOMY CATH: CPT | Mod: RT

## 2018-02-08 PROCEDURE — 99233 SBSQ HOSP IP/OBS HIGH 50: CPT | Mod: GC

## 2018-02-08 PROCEDURE — 49424 ASSESS CYST CONTRAST INJECT: CPT

## 2018-02-08 PROCEDURE — 88305 TISSUE EXAM BY PATHOLOGIST: CPT | Mod: 26

## 2018-02-08 RX ORDER — SODIUM CHLORIDE 9 MG/ML
1000 INJECTION INTRAMUSCULAR; INTRAVENOUS; SUBCUTANEOUS
Qty: 0 | Refills: 0 | Status: COMPLETED | OUTPATIENT
Start: 2018-02-08 | End: 2018-02-08

## 2018-02-08 RX ORDER — ENOXAPARIN SODIUM 100 MG/ML
40 INJECTION SUBCUTANEOUS DAILY
Qty: 0 | Refills: 0 | Status: DISCONTINUED | OUTPATIENT
Start: 2018-02-08 | End: 2018-02-25

## 2018-02-08 RX ORDER — ALPRAZOLAM 0.25 MG
0.5 TABLET ORAL
Qty: 0 | Refills: 0 | Status: DISCONTINUED | OUTPATIENT
Start: 2018-02-08 | End: 2018-02-15

## 2018-02-08 RX ADMIN — HYDROMORPHONE HYDROCHLORIDE 2 MILLIGRAM(S): 2 INJECTION INTRAMUSCULAR; INTRAVENOUS; SUBCUTANEOUS at 17:34

## 2018-02-08 RX ADMIN — Medication 250 MILLIGRAM(S): at 07:27

## 2018-02-08 RX ADMIN — LACTULOSE 10 GRAM(S): 10 SOLUTION ORAL at 17:34

## 2018-02-08 RX ADMIN — PIPERACILLIN AND TAZOBACTAM 25 GRAM(S): 4; .5 INJECTION, POWDER, LYOPHILIZED, FOR SOLUTION INTRAVENOUS at 09:35

## 2018-02-08 RX ADMIN — PIPERACILLIN AND TAZOBACTAM 25 GRAM(S): 4; .5 INJECTION, POWDER, LYOPHILIZED, FOR SOLUTION INTRAVENOUS at 03:26

## 2018-02-08 RX ADMIN — PIPERACILLIN AND TAZOBACTAM 25 GRAM(S): 4; .5 INJECTION, POWDER, LYOPHILIZED, FOR SOLUTION INTRAVENOUS at 17:44

## 2018-02-08 RX ADMIN — ENOXAPARIN SODIUM 40 MILLIGRAM(S): 100 INJECTION SUBCUTANEOUS at 17:36

## 2018-02-08 NOTE — PROGRESS NOTE ADULT - PROBLEM SELECTOR PLAN 4
- Patient with known metastasis of rectal cancer to the liver.  - GI consulted and are recommending diagnostic paracentesis w/ IR. Will f/u w/ IR today following tube checks  - c/w Lactulose in setting of possible hepatic encephalopathy

## 2018-02-08 NOTE — PROGRESS NOTE ADULT - PROBLEM SELECTOR PLAN 2
- Patient has multiple potential sources of infection including his nephrostomy and rectal abscess, as well as ascites.  - c/w Zosyn and vancomycin. F/u UCx, BCx.   - ID consulted and agree w/ current abx regimen. Also recommending diagnostic paracentesis.   - Currently Afebrile, monitor fever curve. - Patient has multiple potential sources of infection including his NT, rectal abscess, and mild to moderate ascites.  - c/w Zosyn and vancomycin. Bcx and Ucx NGTD from 2/6.   - ID consulted and agree w/ current abx. regimen. Also recommending diagnostic paracentesis. Will f/u results  - Currently Afebrile, monitor fever curve. - Patient has multiple potential sources of infection including his NT, rectal abscess, and mild to moderate ascites.  - c/w zosyn d/c Vanc per ID. Bcx and Ucx NGTD from 2/6.   - ID recommending diagnostic paracentesis. Will f/u results  - Currently Afebrile, monitor fever curve.

## 2018-02-08 NOTE — PROGRESS NOTE ADULT - ASSESSMENT
32 M w/ metastatic rectal ca currently not on systemic therapy due to h/o pelvic abscess now admitted with metabolic derangements noted to have progression of disease and liver failure

## 2018-02-08 NOTE — PROGRESS NOTE ADULT - PROBLEM SELECTOR PLAN 1
End stage metastatic colon cancer with progressive rise in Tbili. Suspect liver failure 2/2 tumor infiltration. Agree to continue treating for possible infection or for any other reversible causes.   s/p diagnostic paracentesis. May benefit from therapeutic para prior to d/c   I sat down and discussed extent of disease with yung, sister and patient. We discussed that he has stage IV cancer and the goals of disease directed therapy is to control the cancer, preserve QOL and prolong life span. I explained why we are unable to treat his cancer with either chemotherapy or liver directed therapy. We discussed focusing on his symptoms, reversing any other possible causes of liver failure and then discussing next steps. At this point,  expressed that he was anxious and tired. Therefore, we stopped the conversation.   Outside the room, I spoke with yung (who is also HCP) and discussed with him that unfortunately prognosis is poor. He asked for a time frame and I told him weeks. We spoke about comfort care, home with hospice as an option.   Palliative to see pt tomorrow.   xanax 0.5 mg BID PRN  pain control

## 2018-02-08 NOTE — PROGRESS NOTE ADULT - ASSESSMENT
33 yo M h/o rectal cancer (unresectable, and metastatic to liver and lungs) s/p a diverting colostomy on 10/23/2017 with post-op course c/b perirectal abscess s/p IR placed STEPHANE drain and IR placed R nephrostomy pelvic collection/malignancy caused R hydronephrosis who presents from IR for hyponatremia, hyperkalemia and leukocytosis.

## 2018-02-08 NOTE — PROGRESS NOTE ADULT - SUBJECTIVE AND OBJECTIVE BOX
Interventional Radiology Brief- Operative Note    Procedure:    Right nephrostomy tube check and exchange  Perirectal tube check  Diagnostic paracentesis    Operators: Brian Sarkar; Ruben Espinoza    Anesthesia (type): Local    Contrast: 20cc    EBL: 0cc    Findings/Follow up Plan of Care:    Follow up paracentesis samples.    Specimens Removed:    420 cc clear yellow fluid    Implants: 8.5 Tamazight right nephrostomy tube; Perirectal drain left in place.    Complications: None    Condition/Disposition: Floor    Please call Interventional Radiology [phone] with any questions, concerns, or issues. Interventional Radiology Brief- Operative Note    Procedure:    Right nephrostomy tube check and exchange  Perirectal tube check  Diagnostic paracentesis    Operators: Brian Sarkar; Ruben Espinoza    Anesthesia (type): Local    Contrast: 20cc    EBL: 0cc    Findings/Follow up Plan of Care:    Right nephrostomy tube functioning normally, routine exchange.  Perirectal drain contrast infusion demonstrated fistulous communication to the rectum.  Follow up paracentesis samples.    Specimens Removed:    420 cc clear yellow fluid    Implants: 8.5 Stateless right nephrostomy tube; Perirectal drain left in place.    Complications: None    Condition/Disposition: Floor    Please call Interventional Radiology [phone] with any questions, concerns, or issues.

## 2018-02-08 NOTE — PROGRESS NOTE ADULT - SUBJECTIVE AND OBJECTIVE BOX
Interventional Radiology  Pre-Procedure Note    32 year old male with metastatic rectal cancer.  Patient presented 2 days ago as outpatient with metabolic encephalopathy, subsequently admitted for medical management.  Referred for rectal tube check, nephrostomy tube check possible exchange, and diagnostic paracentesis to rule out SBP.      PAST MEDICAL & SURGICAL HISTORY:  Anxiety: hx of panic attacks  Gastroesophageal reflux disease  Rectal cancer metastasized to liver  Status post Ryan procedure  Encounter for care related to vascular access port: right chest wall port placed 2017  History of hemorrhoidectomy    Family history of type 2 diabetes mellitus in mother (Mother)    Allergies: No Known Allergies      Current Medications: ALPRAZolam 1 milliGRAM(s) Oral daily PRN  HYDROmorphone   Tablet 2 milliGRAM(s) Oral every 6 hours PRN  lactulose Syrup 10 Gram(s) Oral two times a day  piperacillin/tazobactam IVPB. 3.375 Gram(s) IV Intermittent every 8 hours  sodium chloride 0.9%. 1000 milliLiter(s) IV Continuous <Continuous>  vancomycin  IVPB 1000 milliGRAM(s) IV Intermittent every 12 hours  vancomycin  IVPB          Labs:                         10.6   19.7  )-----------( 295      ( 08 Feb 2018 06:58 )             34.5       02-08    129<L>  |  90<L>  |  36<H>  ----------------------------<  79  5.0   |  20<L>  |  1.13    Ca    8.9      08 Feb 2018 06:58  Phos  3.7     02-08  Mg     2.9     02-08    TPro  6.3  /  Alb  2.4<L>  /  TBili  5.3<H>  /  DBili  x   /  AST  581<H>  /  ALT  401<H>  /  AlkPhos  323<H>  02-08      HCG:     Blood Bank: 02-07-18  O  --  Positive    Assessment/Plan:   32 year old male with metastatic rectal cancer and metabolic derangement.  Question SBP.    Referred for rectal tube check, nephrostomy tube check possible exchange, and diagnostic paracentesis.    Procedure/ risks/ benefits/ goals/ alternatives were explained to patient and fiance who is also health care proxy. All questions answered. Informed content obtained from patient. Consent placed in chart.

## 2018-02-08 NOTE — PROGRESS NOTE ADULT - SUBJECTIVE AND OBJECTIVE BOX
CC: F/U for ? perirectal abscess    Saw/spoke to patient. No fevers, no chills. No cough/sob, no abd pain, no diarrhea, no other new complaints.    Allergies  No Known Allergies    ANTIMICROBIALS:  piperacillin/tazobactam IVPB. 3.375 every 8 hours  vancomycin  IVPB 1000 every 12 hours  vancomycin  IVPB      PE:    Vital Signs Last 24 Hrs  T(C): 36.4 (2018 06:03), Max: 37 (2018 21:49)  T(F): 97.6 (2018 06:03), Max: 98.6 (2018 21:49)  HR: 116 (2018 06:03) (113 - 120)  BP: 114/83 (2018 06:03) (109/73 - 120/84)  BP(mean): --  RR: 18 (2018 06:03) (18 - 18)  SpO2: 100% (2018 06:03) (97% - 100%)    Gen: AOx3, NAD, non-toxic, pleasant  CV: S1+S2 normal, no murmurs, nontachycardic  Resp: Clear bilat, no resp distress, no crackles/wheezes  Abd: Soft, nontender, +BS  Ext: No LE edema, no wounds; gluteal drain in place  : Nephrostomy tube    LABS:                        10.6   19.7  )-----------( 295      ( 2018 06:58 )             34.5     02-08    129<L>  |  90<L>  |  36<H>  ----------------------------<  79  5.0   |  20<L>  |  1.13    Ca    8.9      2018 06:58  Phos  3.7     02-08  Mg     2.9     02-08    TPro  6.3  /  Alb  2.4<L>  /  TBili  5.3<H>  /  DBili  x   /  AST  581<H>  /  ALT  401<H>  /  AlkPhos  323<H>  02-08    Urinalysis Basic - ( 2018 14:48 )    Color: Gretel / Appearance: SL Turbid / S.022 / pH: x  Gluc: x / Ketone: Negative  / Bili: Small / Urobili: 2 mg/dL   Blood: x / Protein: 30 mg/dL / Nitrite: Negative   Leuk Esterase: Negative / RBC: 5-10 /HPF / WBC 2-5 /HPF   Sq Epi: x / Non Sq Epi: OCC /HPF / Bacteria: Few /HPF    MICROBIOLOGY:     Ascites 42 nucleated cells    .Urine Nephrostomy - Right  18   No growth    .Urine Clean Catch (Midstream)  18   No growth    .Blood Blood  18   No growth to date.      RADIOLOGY:     USG:    FINDINGS:  Liver metastases are again noted.    There is mild to moderate ascites.    IMPRESSION:  Mild to moderate ascites.

## 2018-02-08 NOTE — PHYSICAL THERAPY INITIAL EVALUATION ADULT - ADDITIONAL COMMENTS
Patient lives at home with fiance. Has 2 flights to enter the home. Patient is independent in ADL's and ambulation using RW, occasional assist with IADLs.

## 2018-02-08 NOTE — DISCHARGE NOTE ADULT - PATIENT PORTAL LINK FT
You can access the [facility] Patient Portal, offered by [facility], by registering with the following website: [email or web address]

## 2018-02-08 NOTE — PROGRESS NOTE ADULT - SUBJECTIVE AND OBJECTIVE BOX
Patient is a 32y old  Male who presents with a chief complaint of metastatic rectal cancer (2018 19:44)      SUBJECTIVE / OVERNIGHT EVENTS: No acute events over night. Pt seen and examined- reports feeling better but says he is fatigued from lack of sleep in the hospital. Pt was overwhelmed because he and his partner felt they were speaking to "too many specialists" yesterday and wanted some clarification regarding the plan. It was discussed today at bedside that pt would be going to IR for NT and rectal tube checks. Pt was also informed that palliative care was consulted to better manage his pain in setting of tumor burden.     Overall, pt is more alert and oriented today. He denies any fevers, chills, n/v, CP, sob, abd pain, or dysuria.     MEDICATIONS  (STANDING):  lactulose Syrup 10 Gram(s) Oral two times a day  piperacillin/tazobactam IVPB. 3.375 Gram(s) IV Intermittent every 8 hours  sodium chloride 0.9%. 1000 milliLiter(s) (150 mL/Hr) IV Continuous <Continuous>  vancomycin  IVPB 1000 milliGRAM(s) IV Intermittent every 12 hours  vancomycin  IVPB        MEDICATIONS  (PRN):  ALPRAZolam 1 milliGRAM(s) Oral daily PRN Anxiety  HYDROmorphone   Tablet 2 milliGRAM(s) Oral every 6 hours PRN Severe Pain (7 - 10)      CAPILLARY BLOOD GLUCOSE        I&O's Summary    2018 07:01  -  2018 07:00  --------------------------------------------------------  IN: 1320 mL / OUT: 650 mL / NET: 670 mL        T(C): 36.4 (18 @ 06:03), Max: 37 (18 @ 21:49)  HR: 116 (18 @ 06:03) (113 - 120)  BP: 114/83 (18 @ 06:03) (109/73 - 120/84)  RR: 18 (18 @ 06:03) (18 - 18)  SpO2: 100% (18 @ 06:03) (97% - 100%)    PHYSICAL EXAM:     GENERAL: cachectic  HEENT: sclera anicteric  CHEST: CTAB  HEART:  RRR, no MRG, no edema  ABDOMEN:  ostomy, mildly distended, soft, (+) mass, (+) hepatomegaly  EXTREMITIES:  1+ LE edema  SKIN:  No rash  NEURO:  Alert, orientedx2, mild asterexis         LABS:                        10.6   19.7  )-----------( 295      ( 2018 06:58 )             34.5     WBC Trend: 19.7<--, 19.63<--, 21.7<--  02-08    129<L>  |  90<L>  |  36<H>  ----------------------------<  79  5.0   |  20<L>  |  1.13    Ca    8.9      2018 06:58  Phos  3.7     02-08  Mg     2.9     02-08    TPro  6.3  /  Alb  2.4<L>  /  TBili  5.3<H>  /  DBili  x   /  AST  581<H>  /  ALT  401<H>  /  AlkPhos  323<H>  02-08    Creatinine Trend: 1.13<--, 1.08<--, 1.04<--, 0.93<--, 0.83<--, 0.85<--  PT/INR - ( 2018 07:25 )   PT: 21.2 sec;   INR: 1.85 ratio         PTT - ( 2018 07:25 )  PTT:30.7 sec      Urinalysis Basic - ( 2018 14:48 )    Color: Gretel / Appearance: SL Turbid / S.022 / pH: x  Gluc: x / Ketone: Negative  / Bili: Small / Urobili: 2 mg/dL   Blood: x / Protein: 30 mg/dL / Nitrite: Negative   Leuk Esterase: Negative / RBC: 5-10 /HPF / WBC 2-5 /HPF   Sq Epi: x / Non Sq Epi: OCC /HPF / Bacteria: Few /HPF          RADIOLOGY & ADDITIONAL TESTS:    Imaging Personally Reviewed:    Consultant(s) Notes Reviewed:      Care Discussed with Consultants/Other Providers: Patient is a 32y old  Male who presents with a chief complaint of metastatic rectal cancer (2018 19:44)      SUBJECTIVE / OVERNIGHT EVENTS: No acute events over night. Pt seen and examined- reports feeling better but says he is fatigued from lack of sleep in the hospital. Pt was overwhelmed because he and his partner felt they were speaking to "too many specialists" yesterday and wanted some clarification regarding the plan. It was discussed today at bedside that pt would be going to IR for NT/rectal tube checks and possible paracentesis. Pt was also informed that palliative care was consulted to better manage his pain in setting of tumor burden.     Overall, pt is more alert and oriented today. He denies any fevers, chills, n/v, CP, sob, abd pain, or dysuria.     MEDICATIONS  (STANDING):  lactulose Syrup 10 Gram(s) Oral two times a day  piperacillin/tazobactam IVPB. 3.375 Gram(s) IV Intermittent every 8 hours  sodium chloride 0.9%. 1000 milliLiter(s) (150 mL/Hr) IV Continuous <Continuous>  vancomycin  IVPB 1000 milliGRAM(s) IV Intermittent every 12 hours  vancomycin  IVPB        MEDICATIONS  (PRN):  ALPRAZolam 1 milliGRAM(s) Oral daily PRN Anxiety  HYDROmorphone   Tablet 2 milliGRAM(s) Oral every 6 hours PRN Severe Pain (7 - 10)      CAPILLARY BLOOD GLUCOSE        I&O's Summary    2018 07:01  -  2018 07:00  --------------------------------------------------------  IN: 1320 mL / OUT: 650 mL / NET: 670 mL        T(C): 36.4 (18 @ 06:03), Max: 37 (18 @ 21:49)  HR: 116 (18 @ 06:03) (113 - 120)  BP: 114/83 (18 @ 06:03) (109/73 - 120/84)  RR: 18 (18 @ 06:03) (18 - 18)  SpO2: 100% (02-08-18 @ 06:03) (97% - 100%)    PHYSICAL EXAM:     GENERAL: cachectic  HEENT: sclera anicteric  CHEST: CTAB  HEART:  RRR, no MRG, no edema  ABDOMEN:  ostomy, mildly distended, soft, (+) mass, (+) hepatomegaly  EXTREMITIES:  1+ LE edema  SKIN:  No rash  NEURO:  Alert, orientedx2, mild asterexis         LABS:                        10.6   19.7  )-----------( 295      ( 2018 06:58 )             34.5     WBC Trend: 19.7<--, 19.63<--, 21.7<--  02-08    129<L>  |  90<L>  |  36<H>  ----------------------------<  79  5.0   |  20<L>  |  1.13    Ca    8.9      2018 06:58  Phos  3.7     02-08  Mg     2.9     02-08    TPro  6.3  /  Alb  2.4<L>  /  TBili  5.3<H>  /  DBili  x   /  AST  581<H>  /  ALT  401<H>  /  AlkPhos  323<H>  02-08    Creatinine Trend: 1.13<--, 1.08<--, 1.04<--, 0.93<--, 0.83<--, 0.85<--  PT/INR - ( 2018 07:25 )   PT: 21.2 sec;   INR: 1.85 ratio         PTT - ( 2018 07:25 )  PTT:30.7 sec      Urinalysis Basic - ( 2018 14:48 )    Color: Gretel / Appearance: SL Turbid / S.022 / pH: x  Gluc: x / Ketone: Negative  / Bili: Small / Urobili: 2 mg/dL   Blood: x / Protein: 30 mg/dL / Nitrite: Negative   Leuk Esterase: Negative / RBC: 5-10 /HPF / WBC 2-5 /HPF   Sq Epi: x / Non Sq Epi: OCC /HPF / Bacteria: Few /HPF          RADIOLOGY & ADDITIONAL TESTS:    Imaging Personally Reviewed:    Consultant(s) Notes Reviewed:      Care Discussed with Consultants/Other Providers:

## 2018-02-08 NOTE — PROGRESS NOTE ADULT - PROBLEM SELECTOR PLAN 5
- MELD score of 26. INR elevated, hypoalbuminemia noted.  - f/u GI recs. - MELD score of 26. INR elevated, hypoalbuminemia noted.  - Liver enzymes downtrending slightly since admission.   - f/u GI recs.

## 2018-02-08 NOTE — PROGRESS NOTE ADULT - PROBLEM SELECTOR PLAN 3
- C/w NS IVF  - BMP Q12 Hrs. Monitor for over correction.  - Continue IVF  - May be secondary decreased effective blood volume secondary to liver failure. Will give salt tab and reassess

## 2018-02-08 NOTE — PROGRESS NOTE ADULT - SUBJECTIVE AND OBJECTIVE BOX
Chief Complaint: metastatic colon ca    INTERVAL HPI/OVERNIGHT EVENTS: c/o fatigue, unable to ambulate independently. Denies pain. + MONTES. Fiance at bedside. c/o feeling "cloudy"    MEDICATIONS  (STANDING):  enoxaparin Injectable 40 milliGRAM(s) SubCutaneous daily  lactulose Syrup 10 Gram(s) Oral two times a day  piperacillin/tazobactam IVPB. 3.375 Gram(s) IV Intermittent every 8 hours  sodium chloride 0.9%. 1000 milliLiter(s) (150 mL/Hr) IV Continuous <Continuous>    MEDICATIONS  (PRN):  ALPRAZolam 0.5 milliGRAM(s) Oral two times a day PRN anxiety  HYDROmorphone   Tablet 2 milliGRAM(s) Oral every 6 hours PRN Severe Pain (7 - 10)      Allergies    No Known Allergies    Intolerances        ROS: as above     Vital Signs Last 24 Hrs  T(C): 36.5 (08 Feb 2018 13:42), Max: 37 (07 Feb 2018 21:49)  T(F): 97.7 (08 Feb 2018 13:42), Max: 98.6 (07 Feb 2018 21:49)  HR: 110 (08 Feb 2018 13:42) (110 - 116)  BP: 111/65 (08 Feb 2018 13:42) (109/73 - 114/83)  BP(mean): --  RR: 18 (08 Feb 2018 13:42) (18 - 18)  SpO2: 100% (08 Feb 2018 13:42) (98% - 100%)    Physical Exam:   AAO x 3, NAD, chronically ill appearing   icteric sclera   RRR S1S2  decr BS b/l  soft distended + ostomy   + edema b/l LE     LABS:                        10.6   19.7  )-----------( 295      ( 08 Feb 2018 06:58 )             34.5     02-08    129<L>  |  90<L>  |  36<H>  ----------------------------<  79  5.0   |  20<L>  |  1.13    Ca    8.9      08 Feb 2018 06:58  Phos  3.7     02-08  Mg     2.9     02-08    TPro  6.3  /  Alb  2.4<L>  /  TBili  5.3<H>  /  DBili  x   /  AST  581<H>  /  ALT  401<H>  /  AlkPhos  323<H>  02-08    PT/INR - ( 07 Feb 2018 07:25 )   PT: 21.2 sec;   INR: 1.85 ratio         PTT - ( 07 Feb 2018 07:25 )  PTT:30.7 sec

## 2018-02-08 NOTE — PROGRESS NOTE ADULT - ASSESSMENT
32 year old male rectal cancer (unresectable and metastatic to liver and lungs) s/p a diverting colostomy on 10/23/17 with post-operative course complicated by perirectal abscess requiring IR drainage, R nephrostomy tube for R Hydronephrosis who presented from IR for hyponatremia, hyperkalemia and worsening leukocytosis. Recently treated for bacteroides, enterococcus reji-rectal abscess with drainage. Now returns with LE edema, lab abnormalities including elevated lactate and leukocytosis. UCX Neg, Ascitic fluid negative for peritonitis, BCX NGTD. Appears stable. Workup for infection in progress. Overall, leukocytosis, malignancy, abnormal imaging.  - Zosyn 3.375g q 8  - DC Vanco  - Appreciate GI eval  - F/U pending cultures  - Malignancy per primary team    Joey Lozada MD  Pager [phone]  After 5pm and on weekends call [phone]

## 2018-02-08 NOTE — PROGRESS NOTE ADULT - PROBLEM SELECTOR PLAN 7
Microcytic anemia  f/u Iron studies DVT: Holding lovenox in setting of NT, rectal tube, and paracentesis today.  Diet: Reg, NPO past midnight for possible IR procedure today  Dispo: PT DVT: Holding lovenox in setting of NT, rectal tube, and paracentesis today.  Diet: Reg,   Dispo: Palliative care to see pt tmw AM. Onc had GOC discussion with pt partner and discussed poor prognosis. Will cont to follow and discuss w/ palliative if pt would be suitable for transfer to hospice vs. PCU

## 2018-02-08 NOTE — PROGRESS NOTE ADULT - PROBLEM SELECTOR PLAN 1
- Palliative care consulted today. Will f/u recs  - Pt not reporting significant abdominal pain- For now, will c/w Dilaudid 2 mg Q6 PO PRN  - Surgical oncology consulted and felt no active intervention indicated.  - Med Onc consulted and will have family meeting with patient to discuss options. Will f/u if Onc would recommend CT head in setting of metastatic disease/ deteriorating mental status.   - Pt will have NT and rectal tube check today with IR  - Per IR fellow, paracentesis may be difficult study today and may need formal consult as U/S read came back later than expected. Will f/u w/ IR. - Palliative care consulted today for pain management. Will f/u recs  - Pt not reporting significant abdominal pain- For now, will c/w Dilaudid 2 mg Q6 PO PRN  - Surgical oncology consulted and felt no active intervention indicated.  - Med Onc consulted and will have family meeting with patient to discuss options. Will f/u if Onc would recommend CT head in setting of metastatic disease/ deteriorating mental status.   - Pt will have NT/rectal tube check today and possibly paracentesis today given mild to moderate ascites seen on  abdominal ultrasound (2/7) per IR fellow and NP. Will f/u results - Palliative care consulted today for pain management. Will f/u recs  - Pt not reporting significant abdominal pain- For now, will c/w Dilaudid 2 mg Q6 PO PRN  - Surgical oncology consulted and felt no active intervention indicated.  - Med Onc consulted and will have family meeting with patient to discuss options. Will f/u if Onc would recommend CT head in setting of metastatic disease/ deteriorating mental status to r/o brain mets.   - Pt will have NT/rectal tube check and paracentesis today given mild to moderate ascites seen on  abdominal ultrasound (2/7) per IR fellow and NP. Will f/u results - Palliative care consulted today for pain management. Will f/u recs  - Pt not reporting significant abdominal pain- For now, will c/w Dilaudid 2 mg Q6 PO PRN  - Surgical oncology consulted and felt no active intervention indicated.  - Med Onc consulted and will have family meeting with patient to discuss options.   - Pt will have NT/rectal tube check and paracentesis today given mild to moderate ascites seen on  abdominal ultrasound (2/7) per IR fellow and NP. Will f/u results

## 2018-02-09 DIAGNOSIS — Z51.5 ENCOUNTER FOR PALLIATIVE CARE: ICD-10-CM

## 2018-02-09 LAB
ANION GAP SERPL CALC-SCNC: 20 MMOL/L — HIGH (ref 5–17)
APTT BLD: 31.4 SEC — SIGNIFICANT CHANGE UP (ref 27.5–37.4)
BUN SERPL-MCNC: 41 MG/DL — HIGH (ref 7–23)
CALCIUM SERPL-MCNC: 8.9 MG/DL — SIGNIFICANT CHANGE UP (ref 8.4–10.5)
CHLORIDE SERPL-SCNC: 89 MMOL/L — LOW (ref 96–108)
CO2 SERPL-SCNC: 19 MMOL/L — LOW (ref 22–31)
CREAT SERPL-MCNC: 1.18 MG/DL — SIGNIFICANT CHANGE UP (ref 0.5–1.3)
GLUCOSE SERPL-MCNC: 86 MG/DL — SIGNIFICANT CHANGE UP (ref 70–99)
HCT VFR BLD CALC: 35.2 % — LOW (ref 39–50)
HGB BLD-MCNC: 10.4 G/DL — LOW (ref 13–17)
INR BLD: 1.72 RATIO — HIGH (ref 0.88–1.16)
LDH SERPL L TO P-CCNC: 5640 U/L — HIGH (ref 50–242)
MAGNESIUM SERPL-MCNC: 3 MG/DL — HIGH (ref 1.6–2.6)
MCHC RBC-ENTMCNC: 22.7 PG — LOW (ref 27–34)
MCHC RBC-ENTMCNC: 29.5 GM/DL — LOW (ref 32–36)
MCV RBC AUTO: 76.9 FL — LOW (ref 80–100)
PHOSPHATE SERPL-MCNC: 3.6 MG/DL — SIGNIFICANT CHANGE UP (ref 2.5–4.5)
PLATELET # BLD AUTO: 279 K/UL — SIGNIFICANT CHANGE UP (ref 150–400)
POTASSIUM SERPL-MCNC: 4.7 MMOL/L — SIGNIFICANT CHANGE UP (ref 3.5–5.3)
POTASSIUM SERPL-SCNC: 4.7 MMOL/L — SIGNIFICANT CHANGE UP (ref 3.5–5.3)
PROTHROM AB SERPL-ACNC: 19.6 SEC — HIGH (ref 10–13.1)
RBC # BLD: 4.58 M/UL — SIGNIFICANT CHANGE UP (ref 4.2–5.8)
RBC # FLD: 23.7 % — HIGH (ref 10.3–14.5)
SODIUM SERPL-SCNC: 128 MMOL/L — LOW (ref 135–145)
WBC # BLD: 15.63 K/UL — HIGH (ref 3.8–10.5)
WBC # FLD AUTO: 15.63 K/UL — HIGH (ref 3.8–10.5)

## 2018-02-09 PROCEDURE — 99233 SBSQ HOSP IP/OBS HIGH 50: CPT | Mod: GC

## 2018-02-09 PROCEDURE — 99232 SBSQ HOSP IP/OBS MODERATE 35: CPT

## 2018-02-09 PROCEDURE — 99223 1ST HOSP IP/OBS HIGH 75: CPT

## 2018-02-09 PROCEDURE — 99231 SBSQ HOSP IP/OBS SF/LOW 25: CPT

## 2018-02-09 RX ADMIN — SODIUM CHLORIDE 150 MILLILITER(S): 9 INJECTION INTRAMUSCULAR; INTRAVENOUS; SUBCUTANEOUS at 10:03

## 2018-02-09 RX ADMIN — PIPERACILLIN AND TAZOBACTAM 25 GRAM(S): 4; .5 INJECTION, POWDER, LYOPHILIZED, FOR SOLUTION INTRAVENOUS at 10:02

## 2018-02-09 RX ADMIN — HYDROMORPHONE HYDROCHLORIDE 2 MILLIGRAM(S): 2 INJECTION INTRAMUSCULAR; INTRAVENOUS; SUBCUTANEOUS at 21:16

## 2018-02-09 RX ADMIN — HYDROMORPHONE HYDROCHLORIDE 2 MILLIGRAM(S): 2 INJECTION INTRAMUSCULAR; INTRAVENOUS; SUBCUTANEOUS at 10:30

## 2018-02-09 RX ADMIN — HYDROMORPHONE HYDROCHLORIDE 2 MILLIGRAM(S): 2 INJECTION INTRAMUSCULAR; INTRAVENOUS; SUBCUTANEOUS at 10:00

## 2018-02-09 RX ADMIN — LACTULOSE 10 GRAM(S): 10 SOLUTION ORAL at 06:31

## 2018-02-09 RX ADMIN — PIPERACILLIN AND TAZOBACTAM 25 GRAM(S): 4; .5 INJECTION, POWDER, LYOPHILIZED, FOR SOLUTION INTRAVENOUS at 02:31

## 2018-02-09 RX ADMIN — LACTULOSE 10 GRAM(S): 10 SOLUTION ORAL at 18:01

## 2018-02-09 RX ADMIN — ENOXAPARIN SODIUM 40 MILLIGRAM(S): 100 INJECTION SUBCUTANEOUS at 12:40

## 2018-02-09 RX ADMIN — PIPERACILLIN AND TAZOBACTAM 25 GRAM(S): 4; .5 INJECTION, POWDER, LYOPHILIZED, FOR SOLUTION INTRAVENOUS at 18:02

## 2018-02-09 RX ADMIN — HYDROMORPHONE HYDROCHLORIDE 2 MILLIGRAM(S): 2 INJECTION INTRAMUSCULAR; INTRAVENOUS; SUBCUTANEOUS at 21:45

## 2018-02-09 NOTE — PROGRESS NOTE ADULT - PROBLEM SELECTOR PLAN 2
- Patient has multiple potential sources of infection including his NT, rectal abscess, and mild to moderate ascites.  - c/w zosyn d/c Vanc per ID. Bcx and Ucx NGTD from 2/6.   - paracentesis showing transudative effusion. Seg count less than 250, so no SBP.  - Currently Afebrile, monitor fever curve.

## 2018-02-09 NOTE — DIETITIAN INITIAL EVALUATION ADULT. - NS AS NUTRI INTERV MEDICAL AND FOOD SUPPLEMENTS
Cresencio bringing Ensure Original from home - encouraged pt to drink the Ensure Enlive instead as it is higher in nutrition contents than Ensure Original. Pt tolerating fluids better at this time, open to trying Prosource also to mix into his beverages

## 2018-02-09 NOTE — DIETITIAN INITIAL EVALUATION ADULT. - PROBLEM SELECTOR PLAN 2
C/w NS IVF  BMP Q12 Hrs. Monitor for over correction.  Fluid restriction of 1L /24 hrs.  May be secondary decreased effective blood volume secondary to liver failure.

## 2018-02-09 NOTE — DIETITIAN INITIAL EVALUATION ADULT. - PROBLEM SELECTOR PLAN 6
Repeat BMP, as 5.8 is hemolyzed.  May be secondary to tumor lysis given the extensive tumor burden in his liver. Will send for Magnesium and Phos.

## 2018-02-09 NOTE — PROGRESS NOTE ADULT - PROBLEM SELECTOR PLAN 4
- Patient with known metastasis of rectal cancer to the liver.  - paracentesis not showing SBP, showing transudative effusion.  - c/w Lactulose in setting of possible hepatic encephalopathy

## 2018-02-09 NOTE — PROGRESS NOTE ADULT - PROBLEM SELECTOR PLAN 3
- BMP Q12 Hrs. Monitor for over correction.  - May be secondary decreased effective blood volume secondary to liver failure. will fluid restrict pt to 1 L per day.

## 2018-02-09 NOTE — CONSULT NOTE ADULT - SUBJECTIVE AND OBJECTIVE BOX
HPI:  The patient is a 33 yo M h/o rectal cancer (unresectable, and metastatic to liver and lungs) s/p a diverting colostomy on 10/23/2017 with post-op course c/b perirectal abscess s/p IR placed STEPHANE drain and IR placed R nephrostomy pelvic collection/malignancy caused R hydronephrosis who presents from IR for hyponatremia, hyperkalemia and leukocytosis. Was at IR today to have STEPHANE drain manipulated. The patient states that he has a mild discomfort in his rectum which is unchanged from previous. Was on PO Cipro/flagyll and finished his course 2-3 weeks ago. Has not been on abx since. Denies any F nvd CP SOB cough rash sick contacts, recent travel dysuria hematuria. States that colostomy has not had any change in output. STEPHANE drain still draining, unchanged. R nephrostomy- draining clear yellow liquid. Has been drinking more water and has had decreased PO intake.    In the ED:  Vital Signs Last 24 Hrs  T(C): 36.7 (06 Feb 2018 12:00), Max: 37.3 (06 Feb 2018 11:45)  T(F): 98 (06 Feb 2018 12:00), Max: 99.1 (06 Feb 2018 11:45)  HR: 115 (06 Feb 2018 19:37) (115 - 121)  BP: 120/91 (06 Feb 2018 19:37) (115/80 - 123/83)  BP(mean): --  RR: 17 (06 Feb 2018 19:37) (16 - 20)  SpO2: 98% (06 Feb 2018 19:37) (96% - 98%)    Was given Zosyn 1 x, 2L NS, BCx, UCx collected. CTAP performed. (06 Feb 2018 19:44)    Patient with progression of disease and was told by oncology he may not benefit from further chemo. Palliative care called for goals of care and support.       PERTINENT PM/SXH:   Anxiety  Gastroesophageal reflux disease  Rectal cancer metastasized to liver    Status post Ryan procedure  Encounter for care related to vascular access port  History of hemorrhoidectomy    SOCIAL HISTORY:   Significant other/partner:  [x ] YES  [ ] NO               Children:  [ ] YES  [ x] NO                   Sabianist/Spirituality: spiritual but not Sikh  Substance hx:  [ ] YES   [ x] NO                   Tobacco hx:  [ ] YES  [x ] NO                       Alcohol hx: [ ] YES  [x ] NO         Home Opioid hx:  [ ] YES  [ x] NO   Living Situation: [ x] Home  [ ] Long term care  [ ] Rehab [ ] Other    FAMILY HISTORY:  Family history of type 2 diabetes mellitus in mother (Mother)    [x ] Family history non-contributory     BASELINE (I)ADLs (prior to admission):  [place]: [ ] total  [x ] moderate [ ] dependent    ADVANCE DIRECTIVES:    DNR   MOLST  [ ] YES [x ] NO                      [ ] Completed  Health Care Proxy [x ] YES  [ ] NO   [ ] Completed  Living Will  [ ] YES [x ] NO             [ ] Surrogate  [ x] HCP  [ ] Guardian:         Blaine Dupont                                                         Phone#:    Allergies    No Known Allergies    Intolerances        MEDICATIONS  (STANDING):  enoxaparin Injectable 40 milliGRAM(s) SubCutaneous daily  lactulose Syrup 10 Gram(s) Oral two times a day  piperacillin/tazobactam IVPB. 3.375 Gram(s) IV Intermittent every 8 hours    MEDICATIONS  (PRN):  ALPRAZolam 0.5 milliGRAM(s) Oral two times a day PRN anxiety  HYDROmorphone   Tablet 2 milliGRAM(s) Oral every 6 hours PRN Severe Pain (7 - 10)      PRESENT SYMPTOMS:  Source: [x ] Patient   [ ] Family   [ ] Team     Pain:                        [ x] No [ ] Yes             [ ] Mild [ ] Moderate [ ] Severe    Onset -  Location -  Duration -  Character -  Alleviating/Aggravating -  Radiation -  Timing -      Dyspnea:                [x ] No [ ] Yes             [ ] Mild [ ] Moderate [ ] Severe    Anxiety:                  [ x] No [ ] Yes             [ ] Mild [ ] Moderate [ ] Severe    Fatigue:                  [ ] No [ x] Yes             [x ] Mild [ ] Moderate [ ] Severe    Nausea:                  [x ] No [ ] Yes             [ ] Mild [ ] Moderate [ ] Severe    Loss of appetite:   [x ] No [ ] Yes             [ ] Mild [ ] Moderate [ ] Severe    Constipation:        [x ] No [ ] Yes             [ ] Mild [ ] Moderate [ ] Severe    Other Symptoms:  [ x] All other review of systems negative   [ ] Unable to obtain due to poor mentation     Karnofsky Performance Score/Palliative Performance Status Version 2:     50    %    PHYSICAL EXAM:  Vital Signs Last 24 Hrs  T(C): 36.4 (08 Feb 2018 21:26), Max: 36.4 (08 Feb 2018 21:26)  T(F): 97.6 (08 Feb 2018 21:26), Max: 97.6 (08 Feb 2018 21:26)  HR: 110 (08 Feb 2018 21:26) (110 - 110)  BP: 109/77 (08 Feb 2018 21:26) (109/77 - 109/77)  BP(mean): --  RR: 17 (08 Feb 2018 21:26) (17 - 17)  SpO2: 98% (08 Feb 2018 21:26) (98% - 98%) I&O's Summary    08 Feb 2018 07:01  -  09 Feb 2018 07:00  --------------------------------------------------------  IN: 580 mL / OUT: 240 mL / NET: 340 mL    09 Feb 2018 07:01  -  09 Feb 2018 15:08  --------------------------------------------------------  IN: 120 mL / OUT: 0 mL / NET: 120 mL        General:  [x ] Alert  [x ] Oriented x 3     [ ] Lethargic  [ ] Agitated   [ ] Cachexia   [ ] Unarousable  [ ] Verbal  [ ] Non-Verbal    HEENT:  [ x] Normal   [ ] Dry mouth   [ ] ET Tube    [ ] Trach  [ ] Oral lesions    Lungs:   [x ] Clear [ ] Tachypnea  [ ] Audible excessive secretions   [ ] Rhonchi        [ ] Right [ ] Left [ ] Bilateral  [ ] Crackles        [ ] Right [ ] Left [ ] Bilateral  [ ] Wheezing     [ ] Right [ ] Left [ ] Bilateral    Cardiovascular:  [x ] Regular [ ] Irregular [ ] Tachycardia   [ ] Bradycardia  [ ] Murmur [ ] Other    Abdomen: [x ] Soft  [x ] Distended   [x ] +BS  [ ] Non tender [ ] Tender  [ ]PEG   [ ]OGT/ NGT   Last BM:       Genitourinary: [x ] Normal [ ] Incontinent   [ ] Oliguria/Anuria   [ ] Anderson    Musculoskeletal:  [ ] Normal   [x ] Weakness  [ ] Bedbound/Wheelchair bound [ ] Edema    Neurological: [x ] No focal deficits  [ ] Cognitive impairment  [ ] Dysphagia [ ] Dysarthria [ ] Paresis [ ] Other     Skin: [ ] Normal   [ ] Pressure ulcer(s)                  [ ] Rash +jaundice    LABS:                        10.4   15.63 )-----------( 279      ( 09 Feb 2018 07:49 )             35.2     02-09    128<L>  |  89<L>  |  41<H>  ----------------------------<  86  4.7   |  19<L>  |  1.18    Ca    8.9      09 Feb 2018 07:57  Phos  3.6     02-09  Mg     3.0     02-09    TPro  6.3  /  Alb  2.4<L>  /  TBili  5.3<H>  /  DBili  x   /  AST  581<H>  /  ALT  401<H>  /  AlkPhos  323<H>  02-08    PT/INR - ( 09 Feb 2018 07:49 )   PT: 19.6 sec;   INR: 1.72 ratio         PTT - ( 09 Feb 2018 07:49 )  PTT:31.4 sec      Protein Calorie Malnutrition: [ x] Mild [ ] Moderate [ ] Severe    Oral Intake: [ ] Unable/mouth care only [ ] Minimal [ ] Moderate [x ] Full Capability  Diet: [ ] NPO [ ] Tube feeds [ ] TPN [ ] Other     RADIOLOGY & ADDITIONAL STUDIES: reviewed    REFERRALS:   [x ] Chaplaincy  [ ] Hospice  [ ] Child Life  [x ] Social Work  [ ] Case management [ ] Holistic Therapy

## 2018-02-09 NOTE — DIETITIAN INITIAL EVALUATION ADULT. - OTHER INFO
Nutrition consult received for assessment. Pt reports UBW of 150 pounds, noticed visual wt loss, but unable to quantify how much wt has been lost. Per previous RD notes, pt wt was 166 pounds (10/31/2017) and 150 pounds (12/21/2017). Pt's current dosing wt is 160 pounds, however, noted with large volume ascites. Suspect current dosing wt is not a correct representation of pt's current wt 2/2 fluid retention and pt visually looks cachectic/malnourished. Pt with poor appetite and po intakes of meals, drank 1/2 cup of Ensure Enlive per fiance, flowsheet reviewed. Fiance also bringing food from home, but pt 'barely eating'. Pt with colostomy - noted per H&P pt reported increased ostomy output PTA, but pt reports no changes in output at time of visit. Per flowsheet 200cc (2/8) and 30cc output thus far today. No other GI distress reported. Pt denies chewing/swallowing difficulties. NKFA. PTA took multivitamin, iron, and 'wt gain powder' unable to recall the name this time.

## 2018-02-09 NOTE — PROGRESS NOTE ADULT - PROBLEM SELECTOR PLAN 5
- MELD score of 26. INR elevated, hypoalbuminemia noted.  - Liver enzymes downtrending slightly since admission.

## 2018-02-09 NOTE — DIETITIAN INITIAL EVALUATION ADULT. - ENERGY NEEDS
Height: 66 inches (not 6'6" per chart), Weight: 150 pounds (reported UBW - but suspect current actual wt may be less)  BMI: 24 kg/m2 IBW: 142 pounds (+/-10%)  Pertinent Info: Per chart, 31 y/o male with stage 4 metastatic rectal cancer to liver and lungs, s/p Reggie w/ colostomy c/b perirectal abscess s/p STEPHANE drain (10/23/2017), right hydronephrosis s/p nephrostomy, admitted with hyponatremia, hyperkalemia, leukocytosis during STEPHANE drain manipulation, ? fall at home, poor po intakes/appetite and increased water intake, worsening liver function 2/2 tumor burden, large volume ascites s/p diagnostic paracentesis 420cc removed (2/7), poor prognosis and palliative consult for comfort care/possible hospice pending.  +3 bilateral legs edema, no pressure ulcers noted at this time.

## 2018-02-09 NOTE — CONSULT NOTE ADULT - PROBLEM SELECTOR RECOMMENDATION 4
Patient is full code. Patient not ready to discuss palliative options. Met with yung separately, who became tearful and states he understands patient's poor prognosis. Rolandograeme would like to arrange a family meeting with patient's sister and parents, who are , but expressed concerns as well that there are "family dynamics." We discussed yung's main concern which is that he worries he is no longer able to care for patient at home without additional support, but patient does not wish to have extra help. I offered  and  to speak to patient to address his underlying existential distress, which yung agreed with. Patient is not currently ready to discuss hospice. Will plan for a family meeting next week with patient, yung, and family to further address GOC. Psychosocial support provided.

## 2018-02-09 NOTE — PROGRESS NOTE ADULT - PROBLEM SELECTOR PLAN 1
- Palliative care consulted for pain management. Will f/u recs  - Pt not reporting significant abdominal pain- For now, will c/w Dilaudid 2 mg Q6 PO PRN  - Surgical oncology consulted and felt no active intervention indicated.  - Med Onc consulted, per recs, f/u palliative discussion.   - Pt s/p NT/rectal tube check and s/p paracentesis. fluid analysis showing SAAG > 1.1, consistent with transudative effusion.

## 2018-02-09 NOTE — PROGRESS NOTE ADULT - PROBLEM SELECTOR PLAN 7
DVT: Holding lovenox in setting of NT, rectal tube.  Diet: Reg,   Dispo: Palliative care to see pt today. Onc had GOC discussion with pt partner and discussed poor prognosis. Will cont to follow and discuss w/ palliative if pt would be suitable for transfer to hospice vs. PCU

## 2018-02-09 NOTE — CONSULT NOTE ADULT - ASSESSMENT
33 yo M h/o rectal cancer (unresectable, and metastatic to liver and lungs) s/p a diverting colostomy on 10/23/2017 with post-op course c/b perirectal abscess s/p IR placed STEPHANE drain and IR placed R nephrostomy pelvic collection/malignancy caused R hydronephrosis who presents from IR for hyponatremia, hyperkalemia and leukocytosis. Palliative care called for goals of care.

## 2018-02-09 NOTE — DIETITIAN INITIAL EVALUATION ADULT. - NS AS NUTRI INTERV MEALS SNACK
Pt barely eating the meals, recommend to keep current Regular diet to allow more variety available for pt to choose from. Per H&P, pt was recommended for 1 liter fluid restriction for 24 hrs for hyponatremia, but pending palliative consult, will discuss with Team 1. Reviewed alternate menu options and menu ordering procedure. Provide food preferences within therapeutic diet when requested - pt has none to provide today.

## 2018-02-09 NOTE — CHART NOTE - NSCHARTNOTEFT_GEN_A_CORE
Upon Nutritional Assessment by the Registered Dietitian your patient was determined to meet criteria / has evidence of the following diagnosis/diagnoses:          [ ]  Mild Protein Calorie Malnutrition        [ ]  Moderate Protein Calorie Malnutrition        [x ] Severe Protein Calorie Malnutrition        [ ] Unspecified Protein Calorie Malnutrition        [ ] Underweight / BMI <19        [ ] Morbid Obesity / BMI > 40      Findings as based on:  [ x] Comprehensive nutrition assessment   [ ] Nutrition Focused Physical Exam  [x ] Other: pt with metastatic cancer w/poor po intakes >7 days, cachectic with fluid retention - masking suspected wt loss, poor po intakes        Nutrition Plan/Recommendations:  Recommend to continue with current Regular diet and Ensure Enlive 2 x day, Prosource 1 x day, fluids restriction per team/MD        PROVIDER Section:     By signing this assessment you are acknowledging and agree with the diagnosis/diagnoses assigned by the Registered Dietitian    Comments:

## 2018-02-09 NOTE — DIETITIAN INITIAL EVALUATION ADULT. - ORAL INTAKE PTA
poor/Pt states appetite and po intakes had significant decline since 1 week ago, barely having couple of spoonful of meals and drinking more water. Pt also drank 1-2 Ensure Original a day

## 2018-02-09 NOTE — PROGRESS NOTE ADULT - ASSESSMENT
32 year old male rectal cancer (unresectable and metastatic to liver and lungs) s/p a diverting colostomy on 10/23/17 with post-operative course complicated by perirectal abscess requiring IR drainage, R nephrostomy tube for R Hydronephrosis who presented from IR for hyponatremia, hyperkalemia and worsening leukocytosis. Recently treated for bacteroides, enterococcus reji-rectal abscess with drainage. Now returns with LE edema, lab abnormalities including elevated lactate and leukocytosis. UCX Neg, Ascitic fluid negative for peritonitis, BCX NGTD. Appears stable. Workup for infection in progress. Overall, leukocytosis, malignancy, abnormal imaging.  - Zosyn 3.375g q 8  - F/U pending cultures  - Malignancy management per primary team

## 2018-02-09 NOTE — DIETITIAN INITIAL EVALUATION ADULT. - PROBLEM SELECTOR PLAN 1
Patient has multiple potential sources of infection including his nephrostomy and rectal abscess, as well as ascites.  Will treat empirically with Zosyn and vancomycin. F/u UCx, BCx. May benefit from ID consultation in AM. Consider paracentesis if condition does not improve.  Currently Afebrile, monitor fever curve.

## 2018-02-09 NOTE — PROGRESS NOTE ADULT - SUBJECTIVE AND OBJECTIVE BOX
CONTACT INFO  Haroon Willoughby MD PGY 1  Pager: NS- [phone], [facility]- [phone]    Mon-Fri: pager covered by day team 7am-7pm;   ***Academic conferences M-F 8am-9am & 12pm-1pm- page ONLY if URGENT or if Consultant  /Roxy: see chart, primary physician assigned available 7am-12pm  Sat/[place] Coverage 12pm-7pm: NS- page [phone] for Team1-4, [facility]- pager forwarded to covering Resident  For Night coverage 7pm-7am: NS- page [phone] Team1-3, page [phone] Team4 & Care Model    Patient is a 32y old  Male who presents with a chief complaint of Hyponatremia, Hyperkalemia, Leukocytosis (08 Feb 2018 14:16)    SUBJECTIVE / OVERNIGHT EVENTS: denies any cp, sob, n/v, c/d, urinary complaints. +penile swelling. s/p paracentesis yesterday draining 420 cc clear yellow fluid.     MEDICATIONS  (STANDING):  enoxaparin Injectable 40 milliGRAM(s) SubCutaneous daily  lactulose Syrup 10 Gram(s) Oral two times a day  piperacillin/tazobactam IVPB. 3.375 Gram(s) IV Intermittent every 8 hours    MEDICATIONS  (PRN):  ALPRAZolam 0.5 milliGRAM(s) Oral two times a day PRN anxiety  HYDROmorphone   Tablet 2 milliGRAM(s) Oral every 6 hours PRN Severe Pain (7 - 10)      Vital Signs Last 24 Hrs  T(C): 36.4 (08 Feb 2018 21:26), Max: 36.4 (08 Feb 2018 21:26)  T(F): 97.6 (08 Feb 2018 21:26), Max: 97.6 (08 Feb 2018 21:26)  HR: 110 (08 Feb 2018 21:26) (110 - 110)  BP: 109/77 (08 Feb 2018 21:26) (109/77 - 109/77)  BP(mean): --  RR: 17 (08 Feb 2018 21:26) (17 - 17)  SpO2: 98% (08 Feb 2018 21:26) (98% - 98%)  CAPILLARY BLOOD GLUCOSE        I&O's Summary    08 Feb 2018 07:01  -  09 Feb 2018 07:00  --------------------------------------------------------  IN: 580 mL / OUT: 240 mL / NET: 340 mL    09 Feb 2018 07:01  -  09 Feb 2018 14:28  --------------------------------------------------------  IN: 120 mL / OUT: 0 mL / NET: 120 mL          PHYSICAL EXAM    GENERAL: cachectic  HEENT: sclera anicteric  CHEST: CTAB  HEART:  RRR, no MRG, no edema  ABDOMEN:  ostomy, mildly distended, soft, (+) mass, (+) hepatomegaly. paracentesis site c/d/i.  EXTREMITIES:  2+ LE edema  SKIN:  No rash  NEURO:  Alert, orientedx3,    LABS:                        10.4   15.63 )-----------( 279      ( 09 Feb 2018 07:49 )             35.2     02-09    128<L>  |  89<L>  |  41<H>  ----------------------------<  86  4.7   |  19<L>  |  1.18    Ca    8.9      09 Feb 2018 07:57  Phos  3.6     02-09  Mg     3.0     02-09    TPro  6.3  /  Alb  2.4<L>  /  TBili  5.3<H>  /  DBili  x   /  AST  581<H>  /  ALT  401<H>  /  AlkPhos  323<H>  02-08    PT/INR - ( 09 Feb 2018 07:49 )   PT: 19.6 sec;   INR: 1.72 ratio         PTT - ( 09 Feb 2018 07:49 )  PTT:31.4 sec          Culture - Fungal, Body Fluid (collected 08 Feb 2018 18:18)  Source: .Body Fluid Peritoneal Fluid  Preliminary Report (09 Feb 2018 06:26):    Testing in progress    Culture - Body Fluid with Gram Stain (collected 08 Feb 2018 18:18)  Source: .Body Fluid Peritoneal Fluid  Gram Stain (08 Feb 2018 22:49):    polymorphonuclear leukocytes seen    No organisms seen    by cytocentrifuge    Culture - Acid Fast - Body Fluid w/Smear (collected 08 Feb 2018 18:18)  Source: .Body Fluid Peritoneal Fluid    Culture - Urine (collected 07 Feb 2018 13:38)  Source: .Urine Nephrostomy - Right  Final Report (08 Feb 2018 13:21):    No growth    Culture - Urine (collected 06 Feb 2018 16:24)  Source: .Urine Clean Catch (Midstream)  Final Report (07 Feb 2018 19:47):    No growth    Culture - Blood (collected 06 Feb 2018 16:05)  Source: .Blood Blood  Preliminary Report (07 Feb 2018 17:01):    No growth to date.    Culture - Blood (collected 06 Feb 2018 16:05)  Source: .Blood Blood  Preliminary Report (07 Feb 2018 17:01):    No growth to date.      RADIOLOGY & ADDITIONAL TESTS:    Imaging Personally Reviewed:  Consultant(s) Notes Reviewed:  Infectious disease, interventional radiology  Care Discussed with Consultants/Other Providers:

## 2018-02-09 NOTE — DIETITIAN INITIAL EVALUATION ADULT. - PT NOT SOURCE
per chart, pt is waxing and waning, AAOx2, lethargic at visit, limited participation during interview

## 2018-02-09 NOTE — PROGRESS NOTE ADULT - SUBJECTIVE AND OBJECTIVE BOX
Interventional Radiology Follow- Up Note      32y Male with PMHX of rectal cancer s/p right PCN exchange and perirectal tube evaluation and Paracentesis on  2/8   in Interventional Radiology.      Vitals: T(F): 97.6 (02-08-18 @ 21:26), Max: 97.7 (02-08-18 @ 13:42)  HR: 110 (02-08-18 @ 21:26) (110 - 110)  BP: 109/77 (02-08-18 @ 21:26) (109/77 - 111/65)  RR: 17 (02-08-18 @ 21:26) (17 - 18)  SpO2: 98% (02-08-18 @ 21:26) (98% - 100%)  Wt(kg): --    LABS:                        10.4   15.63 )-----------( 279      ( 09 Feb 2018 07:49 )             35.2     02-08    129<L>  |  90<L>  |  36<H>  ----------------------------<  79  5.0   |  20<L>  |  1.13    Ca    8.9      08 Feb 2018 06:58  Phos  3.7     02-08  Mg     2.9     02-08    TPro  6.3  /  Alb  2.4<L>  /  TBili  5.3<H>  /  DBili  x   /  AST  581<H>  /  ALT  401<H>  /  AlkPhos  323<H>  02-08    PT/INR - ( 09 Feb 2018 07:49 )   PT: 19.6 sec;   INR: 1.72 ratio         PTT - ( 09 Feb 2018 07:49 )  PTT:31.4 sec    output : right nephtube with 60cc/24hrs and rectal tube with 50cc/12hrs    PHYSICAL EXAM:  General:  in NAD  abdomen: s/p paracentesis dressing c/d/i. right nephtube bag and rectal abscess drain bag empty at this time. Soft. NT/ND         Impression: 32y Male with PMHX of rectal ca s/p s/p right PCN exchange and perirectal tube evaluation and Paracentesis on  2/8.    Plan:  -continue to monitor out put    -Flush drain per doctor orders  -trend vitals, labs  -will discuss with IR attending     Please call IR at extension [phone] or [phone] with any questions, concerns, or issues regarding above.

## 2018-02-09 NOTE — PROGRESS NOTE ADULT - SUBJECTIVE AND OBJECTIVE BOX
Follow Up: CC: F/U for ? perirectal abscess    Interval History/ROS: No chills or fevers. No cough or shortness of breath. No N/V/D. He does have some penile swelling which is new but with no associated pain. Having some difficulty initiating urinary stream but denied dysuria.     Allergies  No Known Allergies        ANTIMICROBIALS:  piperacillin/tazobactam IVPB. 3.375 every 8 hours      OTHER MEDS:  MEDICATIONS  (STANDING):  ALPRAZolam 0.5 two times a day PRN  enoxaparin Injectable 40 daily  HYDROmorphone   Tablet 2 every 6 hours PRN  lactulose Syrup 10 two times a day      Vital Signs Last 24 Hrs  T(C): 36.4 (08 Feb 2018 21:26), Max: 36.5 (08 Feb 2018 13:42)  T(F): 97.6 (08 Feb 2018 21:26), Max: 97.7 (08 Feb 2018 13:42)  HR: 110 (08 Feb 2018 21:26) (110 - 110)  BP: 109/77 (08 Feb 2018 21:26) (109/77 - 111/65)  BP(mean): --  RR: 17 (08 Feb 2018 21:26) (17 - 18)  SpO2: 98% (08 Feb 2018 21:26) (98% - 100%)    PHYSICAL EXAMINATION:  General: Alert and Awake, NAD  HEENT: PERRL, EOMI, No subconjunctival hemorrhages, Oropharynx Clear, MMM  Neck: Supple, No MARCIANO  Cardiac: RRR, No M/R/G  Resp: CTAB, No Wh/Rh/Ra  Abdomen: +LLQ Ostomy (with soft stool), Distended, NBS, NT/ND, No HSM, No rigidity or guarding  MSK: 2-3+ B/L LE edema. No stigmata of IE. No evidence of phlebitis. No evidence of synovitis.  Back: gluteal drain in place  : No lorenzo, penile edema present; no surrounding tissue erythema or urethral drainage  Skin: No rashes or lesions. Skin is warm and dry to the touch.   Neuro: Alert and Awake. CN 2-12 Grossly intact. Moves all four extremities spontaneously.  Psych: Calm, Pleasant, Cooperative                        10.4   15.63 )-----------( 279      ( 09 Feb 2018 07:49 )             35.2       02-09    128<L>  |  89<L>  |  41<H>  ----------------------------<  86  4.7   |  19<L>  |  1.18    Ca    8.9      09 Feb 2018 07:57  Phos  3.6     02-09  Mg     3.0     02-09    TPro  6.3  /  Alb  2.4<L>  /  TBili  5.3<H>  /  DBili  x   /  AST  581<H>  /  ALT  401<H>  /  AlkPhos  323<H>  02-08      MICROBIOLOGY:  Vancomycin Level, Trough: 23.0 ug/mL (02-08-18 @ 18:30)  v  .Body Fluid Peritoneal Fluid  02-08-18   Testing in progress  --    polymorphonuclear leukocytes seen  No organisms seen  by cytocentrifuge      .Urine Nephrostomy - Right  02-07-18   No growth  --  --      .Urine Clean Catch (Midstream)  02-06-18   No growth  --  --      .Blood Blood  02-06-18   No growth to date.  --  --    RADIOLOGY:  EXAM:  DUPLEX SCAN EXT VEINS LOWER BI                        PROCEDURE DATE:  02/08/2018    No evidence of bilateral lower extremity deep venous thrombosis.    EXAM:  US ABDOMEN LIMITED                        PROCEDURE DATE:  02/08/2018    Mild to moderate ascites. Follow Up: CC: F/U for ? perirectal abscess    Interval History/ROS: No chills or fevers. No cough or shortness of breath. No N/V/D. He does have some penile swelling which is new but with no associated pain. Having some difficulty initiating urinary stream but denied dysuria.     Allergies  No Known Allergies        ANTIMICROBIALS:  piperacillin/tazobactam IVPB. 3.375 every 8 hours      OTHER MEDS:  MEDICATIONS  (STANDING):  ALPRAZolam 0.5 two times a day PRN  enoxaparin Injectable 40 daily  HYDROmorphone   Tablet 2 every 6 hours PRN  lactulose Syrup 10 two times a day      Vital Signs Last 24 Hrs  T(C): 36.4 (08 Feb 2018 21:26), Max: 36.5 (08 Feb 2018 13:42)  T(F): 97.6 (08 Feb 2018 21:26), Max: 97.7 (08 Feb 2018 13:42)  HR: 110 (08 Feb 2018 21:26) (110 - 110)  BP: 109/77 (08 Feb 2018 21:26) (109/77 - 111/65)  BP(mean): --  RR: 17 (08 Feb 2018 21:26) (17 - 18)  SpO2: 98% (08 Feb 2018 21:26) (98% - 100%)    PHYSICAL EXAMINATION:  General: Alert and Awake, NAD  HEENT: PERRL, EOMI, No subconjunctival hemorrhages, Oropharynx Clear, MMM  Neck: Supple, No MARCIANO  Cardiac: RRR, No M/R/G  Resp: CTAB, No Wh/Rh/Ra  Abdomen: +LLQ Ostomy (with soft stool), Distended, NBS, NT/ND, No HSM, No rigidity or guarding  MSK: 2-3+ B/L LE edema. No stigmata of IE. No evidence of phlebitis. No evidence of synovitis.  Back: gluteal drain in place  : No lorenzo, penile edema present; no surrounding tissue erythema or urethral drainage  Skin: No rashes or lesions. Skin is warm and dry to the touch.   Neuro: Alert and Awake. CN 2-12 Grossly intact. Moves all four extremities spontaneously.  Psych: Calm, Pleasant, Cooperative                        10.4   15.63 )-----------( 279      ( 09 Feb 2018 07:49 )             35.2       02-09    128<L>  |  89<L>  |  41<H>  ----------------------------<  86  4.7   |  19<L>  |  1.18    Ca    8.9      09 Feb 2018 07:57  Phos  3.6     02-09  Mg     3.0     02-09    TPro  6.3  /  Alb  2.4<L>  /  TBili  5.3<H>  /  DBili  x   /  AST  581<H>  /  ALT  401<H>  /  AlkPhos  323<H>  02-08      MICROBIOLOGY:  Vancomycin Level, Trough: 23.0 ug/mL (02-08-18 @ 18:30)    .Body Fluid Peritoneal Fluid  02-08-18   Testing in progress    polymorphonuclear leukocytes seen  No organisms seen  by cytocentrifuge    .Urine Nephrostomy - Right  02-07-18   No growth    .Urine Clean Catch (Midstream)  02-06-18   No growth    .Blood Blood  02-06-18   No growth to date.     RADIOLOGY:  EXAM:  DUPLEX SCAN EXT VEINS LOWER BI                        PROCEDURE DATE:  02/08/2018    No evidence of bilateral lower extremity deep venous thrombosis.    EXAM:  US ABDOMEN LIMITED                        PROCEDURE DATE:  02/08/2018    Mild to moderate ascites.

## 2018-02-10 LAB
ANION GAP SERPL CALC-SCNC: 24 MMOL/L — HIGH (ref 5–17)
BUN SERPL-MCNC: 41 MG/DL — HIGH (ref 7–23)
CALCIUM SERPL-MCNC: 8.9 MG/DL — SIGNIFICANT CHANGE UP (ref 8.4–10.5)
CHLORIDE SERPL-SCNC: 88 MMOL/L — LOW (ref 96–108)
CO2 SERPL-SCNC: 16 MMOL/L — LOW (ref 22–31)
CREAT SERPL-MCNC: 1.18 MG/DL — SIGNIFICANT CHANGE UP (ref 0.5–1.3)
GLUCOSE SERPL-MCNC: 86 MG/DL — SIGNIFICANT CHANGE UP (ref 70–99)
HCT VFR BLD CALC: 36.4 % — LOW (ref 39–50)
HGB BLD-MCNC: 10.4 G/DL — LOW (ref 13–17)
MAGNESIUM SERPL-MCNC: 3 MG/DL — HIGH (ref 1.6–2.6)
MCHC RBC-ENTMCNC: 22.1 PG — LOW (ref 27–34)
MCHC RBC-ENTMCNC: 28.6 GM/DL — LOW (ref 32–36)
MCV RBC AUTO: 77.4 FL — LOW (ref 80–100)
PHOSPHATE SERPL-MCNC: 3.6 MG/DL — SIGNIFICANT CHANGE UP (ref 2.5–4.5)
PLATELET # BLD AUTO: 225 K/UL — SIGNIFICANT CHANGE UP (ref 150–400)
POTASSIUM SERPL-MCNC: 4.9 MMOL/L — SIGNIFICANT CHANGE UP (ref 3.5–5.3)
POTASSIUM SERPL-SCNC: 4.9 MMOL/L — SIGNIFICANT CHANGE UP (ref 3.5–5.3)
RBC # BLD: 4.7 M/UL — SIGNIFICANT CHANGE UP (ref 4.2–5.8)
RBC # FLD: 24.5 % — HIGH (ref 10.3–14.5)
SODIUM SERPL-SCNC: 128 MMOL/L — LOW (ref 135–145)
WBC # BLD: 19.23 K/UL — HIGH (ref 3.8–10.5)
WBC # FLD AUTO: 19.23 K/UL — HIGH (ref 3.8–10.5)

## 2018-02-10 PROCEDURE — 99233 SBSQ HOSP IP/OBS HIGH 50: CPT | Mod: GC

## 2018-02-10 RX ORDER — FLUTICASONE PROPIONATE 50 MCG
1 SPRAY, SUSPENSION NASAL
Qty: 0 | Refills: 0 | Status: DISCONTINUED | OUTPATIENT
Start: 2018-02-10 | End: 2018-02-25

## 2018-02-10 RX ORDER — IBUPROFEN 200 MG
600 TABLET ORAL EVERY 6 HOURS
Qty: 0 | Refills: 0 | Status: DISCONTINUED | OUTPATIENT
Start: 2018-02-10 | End: 2018-02-13

## 2018-02-10 RX ADMIN — PIPERACILLIN AND TAZOBACTAM 25 GRAM(S): 4; .5 INJECTION, POWDER, LYOPHILIZED, FOR SOLUTION INTRAVENOUS at 14:48

## 2018-02-10 RX ADMIN — ENOXAPARIN SODIUM 40 MILLIGRAM(S): 100 INJECTION SUBCUTANEOUS at 14:48

## 2018-02-10 RX ADMIN — Medication 1 SPRAY(S): at 18:38

## 2018-02-10 RX ADMIN — HYDROMORPHONE HYDROCHLORIDE 2 MILLIGRAM(S): 2 INJECTION INTRAMUSCULAR; INTRAVENOUS; SUBCUTANEOUS at 22:03

## 2018-02-10 RX ADMIN — LACTULOSE 10 GRAM(S): 10 SOLUTION ORAL at 06:10

## 2018-02-10 RX ADMIN — PIPERACILLIN AND TAZOBACTAM 25 GRAM(S): 4; .5 INJECTION, POWDER, LYOPHILIZED, FOR SOLUTION INTRAVENOUS at 02:18

## 2018-02-10 RX ADMIN — PIPERACILLIN AND TAZOBACTAM 25 GRAM(S): 4; .5 INJECTION, POWDER, LYOPHILIZED, FOR SOLUTION INTRAVENOUS at 22:04

## 2018-02-10 RX ADMIN — HYDROMORPHONE HYDROCHLORIDE 2 MILLIGRAM(S): 2 INJECTION INTRAMUSCULAR; INTRAVENOUS; SUBCUTANEOUS at 20:18

## 2018-02-10 RX ADMIN — LACTULOSE 10 GRAM(S): 10 SOLUTION ORAL at 18:37

## 2018-02-10 NOTE — PROGRESS NOTE ADULT - PROBLEM SELECTOR PLAN 2
- Patient has multiple potential sources of infection including his NT, rectal abscess, and mild to moderate ascites.  - c/w zosyn d/c Vanc per ID. Bcx and Ucx NGTD from 2/6.   - Paracentesis showing transudative effusion. Seg count less than 250, no concern for SBP.  - Currently Afebrile, monitor fever curve.

## 2018-02-10 NOTE — PROGRESS NOTE ADULT - PROBLEM SELECTOR PLAN 7
DVT: Holding lovenox in setting of NT, rectal tube.  Diet: Reg,   Dispo: Palliative care will have family meeting early next week with pt and family. They will discuss options including hospice vs. PCU and GOC during this admission. Pt and fiance aware of poor prognosis at this time. Will cont to offer support throughout this difficult time.

## 2018-02-10 NOTE — PROGRESS NOTE ADULT - SUBJECTIVE AND OBJECTIVE BOX
Patient is a 32y old  Male who presents with a chief complaint of metastatic rectal cancer (08 Feb 2018 14:16)      SUBJECTIVE / OVERNIGHT EVENTS: Pt seen and examined, reports feeling fatigued and "relatively the same". No acute events reported over night. Denies any fevers, chills, n/v, CP, SOB, or abd pain.     MEDICATIONS  (STANDING):  enoxaparin Injectable 40 milliGRAM(s) SubCutaneous daily  lactulose Syrup 10 Gram(s) Oral two times a day  piperacillin/tazobactam IVPB. 3.375 Gram(s) IV Intermittent every 8 hours    MEDICATIONS  (PRN):  ALPRAZolam 0.5 milliGRAM(s) Oral two times a day PRN anxiety  HYDROmorphone   Tablet 2 milliGRAM(s) Oral every 6 hours PRN Severe Pain (7 - 10)      CAPILLARY BLOOD GLUCOSE        I&O's Summary    09 Feb 2018 07:01  -  10 Feb 2018 07:00  --------------------------------------------------------  IN: 840 mL / OUT: 367 mL / NET: 473 mL        T(C): 36.7 (02-10-18 @ 03:37), Max: 36.7 (02-09-18 @ 19:47)  HR: 108 (02-10-18 @ 06:53) (108 - 115)  BP: 110/75 (02-10-18 @ 03:37) (108/77 - 110/78)  RR: 18 (02-10-18 @ 03:37) (17 - 18)  SpO2: 97% (02-10-18 @ 03:37) (97% - 99%)      GENERAL: cachectic  HEENT: sclera anicteric  CHEST: CTAB  HEART:  RRR, no MRG, no edema  ABDOMEN:  ostomy, mildly distended, soft, (+) mass, (+) hepatomegaly. paracentesis site c/d/i.  EXTREMITIES:  2+ LE edema  SKIN:  No rash  NEURO:  Alert, orientedx3,        LABS:                        10.4   15.63 )-----------( 279      ( 09 Feb 2018 07:49 )             35.2     WBC Trend: 15.63<--, 19.7<--, 19.63<--  02-09    128<L>  |  89<L>  |  41<H>  ----------------------------<  86  4.7   |  19<L>  |  1.18    Ca    8.9      09 Feb 2018 07:57  Phos  3.6     02-09  Mg     3.0     02-09      Creatinine Trend: 1.18<--, 1.13<--, 1.08<--, 1.04<--, 0.93<--, 0.83<--  PT/INR - ( 09 Feb 2018 07:49 )   PT: 19.6 sec;   INR: 1.72 ratio         PTT - ( 09 Feb 2018 07:49 )  PTT:31.4 sec            RADIOLOGY & ADDITIONAL TESTS:    Imaging Personally Reviewed:    Consultant(s) Notes Reviewed:      Care Discussed with Consultants/Other Providers:

## 2018-02-10 NOTE — PROGRESS NOTE ADULT - PROBLEM SELECTOR PLAN 1
- Palliative care consulted and will have family meeting to discuss GOC/ potential for transfer to hospice. Pt not ready to discuss palliative discussions.  - Pt not reporting significant abdominal pain- For now, will c/w Dilaudid 2 mg Q6 PO PRN  - Surgical oncology consulted and felt no active intervention indicated.  - Med Onc consulted, per recs, f/u palliative discussion.   - Pt s/p NT/rectal tube check and paracentesis. SAAG > 1.1, consistent with transudative effusion.

## 2018-02-10 NOTE — PROGRESS NOTE ADULT - PROBLEM SELECTOR PLAN 3
- Will obtain labs every other day and evaluate Na levels.  - Hyponatremia likely 2/2 decreased effective blood volume in setting of liver failure.   - c/w fluid restriction of 1 L per day.

## 2018-02-11 LAB
CULTURE RESULTS: SIGNIFICANT CHANGE UP
CULTURE RESULTS: SIGNIFICANT CHANGE UP
SPECIMEN SOURCE: SIGNIFICANT CHANGE UP
SPECIMEN SOURCE: SIGNIFICANT CHANGE UP

## 2018-02-11 PROCEDURE — 99233 SBSQ HOSP IP/OBS HIGH 50: CPT | Mod: GC

## 2018-02-11 RX ADMIN — PIPERACILLIN AND TAZOBACTAM 25 GRAM(S): 4; .5 INJECTION, POWDER, LYOPHILIZED, FOR SOLUTION INTRAVENOUS at 13:36

## 2018-02-11 RX ADMIN — Medication 600 MILLIGRAM(S): at 07:22

## 2018-02-11 RX ADMIN — HYDROMORPHONE HYDROCHLORIDE 2 MILLIGRAM(S): 2 INJECTION INTRAMUSCULAR; INTRAVENOUS; SUBCUTANEOUS at 18:20

## 2018-02-11 RX ADMIN — ENOXAPARIN SODIUM 40 MILLIGRAM(S): 100 INJECTION SUBCUTANEOUS at 13:36

## 2018-02-11 RX ADMIN — PIPERACILLIN AND TAZOBACTAM 25 GRAM(S): 4; .5 INJECTION, POWDER, LYOPHILIZED, FOR SOLUTION INTRAVENOUS at 22:42

## 2018-02-11 RX ADMIN — Medication 600 MILLIGRAM(S): at 22:43

## 2018-02-11 RX ADMIN — LACTULOSE 10 GRAM(S): 10 SOLUTION ORAL at 06:41

## 2018-02-11 RX ADMIN — Medication 1 SPRAY(S): at 06:42

## 2018-02-11 RX ADMIN — Medication 600 MILLIGRAM(S): at 23:15

## 2018-02-11 RX ADMIN — HYDROMORPHONE HYDROCHLORIDE 2 MILLIGRAM(S): 2 INJECTION INTRAMUSCULAR; INTRAVENOUS; SUBCUTANEOUS at 17:44

## 2018-02-11 RX ADMIN — Medication 1 SPRAY(S): at 17:44

## 2018-02-11 RX ADMIN — PIPERACILLIN AND TAZOBACTAM 25 GRAM(S): 4; .5 INJECTION, POWDER, LYOPHILIZED, FOR SOLUTION INTRAVENOUS at 06:41

## 2018-02-11 RX ADMIN — Medication 600 MILLIGRAM(S): at 00:41

## 2018-02-11 RX ADMIN — LACTULOSE 10 GRAM(S): 10 SOLUTION ORAL at 17:43

## 2018-02-11 NOTE — PROGRESS NOTE ADULT - SUBJECTIVE AND OBJECTIVE BOX
Patient is a 32y old  Male who presents with a chief complaint of Metastatic rectal cancer (08 Feb 2018 14:16)      SUBJECTIVE / OVERNIGHT EVENTS: No acute events over night. Pt seen and examined-reports some nasal congestion for which he was given flonase w/ symptomatic improvement. Denies any fevers, chills, n/v, CP, SOB, abd pain, dysuria, or melena.     MEDICATIONS  (STANDING):  enoxaparin Injectable 40 milliGRAM(s) SubCutaneous daily  fluticasone propionate 50 MICROgram(s)/spray Nasal Spray 1 Spray(s) Both Nostrils two times a day  lactulose Syrup 10 Gram(s) Oral two times a day  piperacillin/tazobactam IVPB. 3.375 Gram(s) IV Intermittent every 8 hours    MEDICATIONS  (PRN):  ALPRAZolam 0.5 milliGRAM(s) Oral two times a day PRN anxiety  HYDROmorphone   Tablet 2 milliGRAM(s) Oral every 6 hours PRN Severe Pain (7 - 10)  ibuprofen  Tablet 600 milliGRAM(s) Oral every 6 hours PRN moderate pain (4-6)      CAPILLARY BLOOD GLUCOSE        I&O's Summary    10 Feb 2018 07:01  -  11 Feb 2018 07:00  --------------------------------------------------------  IN: 360 mL / OUT: 150 mL / NET: 210 mL        T(C): 36.7 (02-11-18 @ 05:42), Max: 36.9 (02-10-18 @ 20:52)  HR: 115 (02-11-18 @ 05:42) (115 - 118)  BP: 108/67 (02-11-18 @ 05:42) (102/72 - 108/67)  RR: 18 (02-11-18 @ 05:42) (18 - 20)  SpO2: 96% (02-11-18 @ 05:42) (96% - 96%)    GENERAL: cachectic  HEENT: sclera anicteric  CHEST: CTAB  HEART:  RRR, no MRG, no edema  ABDOMEN:  ostomy, mildly distended, soft, (+) mass, (+) hepatomegaly. paracentesis site c/d/i.  EXTREMITIES:  2+ LE edema  SKIN:  No rash  NEURO:  Alert, orientedx3,        LABS:                        10.4   19.23 )-----------( 225      ( 10 Feb 2018 09:19 )             36.4     WBC Trend: 19.23<--, 15.63<--, 19.7<--  02-10    128<L>  |  88<L>  |  41<H>  ----------------------------<  86  4.9   |  16<L>  |  1.18    Ca    8.9      10 Feb 2018 09:19  Phos  3.6     02-10  Mg     3.0     02-10      Creatinine Trend: 1.18<--, 1.18<--, 1.13<--, 1.08<--, 1.04<--, 0.93<--              RADIOLOGY & ADDITIONAL TESTS:    Imaging Personally Reviewed:    Consultant(s) Notes Reviewed:      Care Discussed with Consultants/Other Providers:

## 2018-02-12 DIAGNOSIS — E46 UNSPECIFIED PROTEIN-CALORIE MALNUTRITION: ICD-10-CM

## 2018-02-12 LAB
ANION GAP SERPL CALC-SCNC: 23 MMOL/L — HIGH (ref 5–17)
BUN SERPL-MCNC: 65 MG/DL — HIGH (ref 7–23)
CALCIUM SERPL-MCNC: 8.4 MG/DL — SIGNIFICANT CHANGE UP (ref 8.4–10.5)
CHLORIDE SERPL-SCNC: 86 MMOL/L — LOW (ref 96–108)
CO2 SERPL-SCNC: 16 MMOL/L — LOW (ref 22–31)
CREAT ?TM UR-MCNC: 105 MG/DL — SIGNIFICANT CHANGE UP
CREAT SERPL-MCNC: 2.22 MG/DL — HIGH (ref 0.5–1.3)
GAS PNL BLDV: SIGNIFICANT CHANGE UP
GLUCOSE SERPL-MCNC: 92 MG/DL — SIGNIFICANT CHANGE UP (ref 70–99)
NON-GYNECOLOGICAL CYTOLOGY STUDY: SIGNIFICANT CHANGE UP
OSMOLALITY UR: 304 MOS/KG — SIGNIFICANT CHANGE UP (ref 300–900)
POTASSIUM SERPL-MCNC: 5.4 MMOL/L — HIGH (ref 3.5–5.3)
POTASSIUM SERPL-SCNC: 5.4 MMOL/L — HIGH (ref 3.5–5.3)
SODIUM SERPL-SCNC: 125 MMOL/L — LOW (ref 135–145)
SODIUM UR-SCNC: 49 MMOL/L — SIGNIFICANT CHANGE UP

## 2018-02-12 PROCEDURE — 99232 SBSQ HOSP IP/OBS MODERATE 35: CPT

## 2018-02-12 PROCEDURE — 99497 ADVNCD CARE PLAN 30 MIN: CPT

## 2018-02-12 PROCEDURE — 99233 SBSQ HOSP IP/OBS HIGH 50: CPT

## 2018-02-12 PROCEDURE — 99233 SBSQ HOSP IP/OBS HIGH 50: CPT | Mod: GC

## 2018-02-12 RX ORDER — SODIUM CHLORIDE 9 MG/ML
1000 INJECTION INTRAMUSCULAR; INTRAVENOUS; SUBCUTANEOUS
Qty: 0 | Refills: 0 | Status: DISCONTINUED | OUTPATIENT
Start: 2018-02-12 | End: 2018-02-15

## 2018-02-12 RX ADMIN — HYDROMORPHONE HYDROCHLORIDE 2 MILLIGRAM(S): 2 INJECTION INTRAMUSCULAR; INTRAVENOUS; SUBCUTANEOUS at 11:16

## 2018-02-12 RX ADMIN — LACTULOSE 10 GRAM(S): 10 SOLUTION ORAL at 05:51

## 2018-02-12 RX ADMIN — Medication 1 SPRAY(S): at 05:51

## 2018-02-12 RX ADMIN — HYDROMORPHONE HYDROCHLORIDE 2 MILLIGRAM(S): 2 INJECTION INTRAMUSCULAR; INTRAVENOUS; SUBCUTANEOUS at 09:00

## 2018-02-12 RX ADMIN — PIPERACILLIN AND TAZOBACTAM 25 GRAM(S): 4; .5 INJECTION, POWDER, LYOPHILIZED, FOR SOLUTION INTRAVENOUS at 21:47

## 2018-02-12 RX ADMIN — SODIUM CHLORIDE 75 MILLILITER(S): 9 INJECTION INTRAMUSCULAR; INTRAVENOUS; SUBCUTANEOUS at 21:47

## 2018-02-12 RX ADMIN — LACTULOSE 10 GRAM(S): 10 SOLUTION ORAL at 18:27

## 2018-02-12 RX ADMIN — PIPERACILLIN AND TAZOBACTAM 25 GRAM(S): 4; .5 INJECTION, POWDER, LYOPHILIZED, FOR SOLUTION INTRAVENOUS at 15:22

## 2018-02-12 RX ADMIN — PIPERACILLIN AND TAZOBACTAM 25 GRAM(S): 4; .5 INJECTION, POWDER, LYOPHILIZED, FOR SOLUTION INTRAVENOUS at 05:52

## 2018-02-12 RX ADMIN — SODIUM CHLORIDE 75 MILLILITER(S): 9 INJECTION INTRAMUSCULAR; INTRAVENOUS; SUBCUTANEOUS at 18:29

## 2018-02-12 RX ADMIN — Medication 1 SPRAY(S): at 18:28

## 2018-02-12 RX ADMIN — ENOXAPARIN SODIUM 40 MILLIGRAM(S): 100 INJECTION SUBCUTANEOUS at 15:22

## 2018-02-12 NOTE — PROGRESS NOTE ADULT - ASSESSMENT
31 yo M h/o rectal cancer (unresectable, and metastatic to liver and lungs) s/p a diverting colostomy on 10/23/2017 with post-op course c/b perirectal abscess s/p IR placed STEPHANE drain and IR placed R nephrostomy pelvic collection/malignancy caused R hydronephrosis who presents from IR for hyponatremia, hyperkalemia and leukocytosis. 33 yo M h/o rectal cancer (unresectable, and metastatic to liver and lungs) s/p diverting colostomy on (10/17) with post-op course c/b perirectal abscess s/p STEPHANE drain and R nephrostomy w/ pelvic collection/malignancy caused R hydronephrosis who presents from IR for hyponatremia, hyperkalemia and leukocytosis.

## 2018-02-12 NOTE — PROGRESS NOTE ADULT - ASSESSMENT
32 year old male rectal cancer (unresectable and metastatic to liver and lungs) s/p a diverting colostomy on 10/23/17 with post-operative course complicated by perirectal abscess requiring IR drainage, R nephrostomy tube for R Hydronephrosis who presented from IR for hyponatremia, hyperkalemia and worsening leukocytosis. Recently treated for bacteroides, enterococcus reji-rectal abscess with drainage. Now returns with LE edema, lab abnormalities including elevated lactate and leukocytosis. UCX Neg, Ascitic fluid negative for peritonitis, BCX NGTD. Appears stable. Seems unlikely peritonitis, less likely cholangitis. Possible non-infection, but cover for concern of reji-rectal abscess. Remains well. Overall, leukocytosis, malignancy, abnormal imaging.  - Zosyn 3.375g q 8 while inpatient  - Likely would transition to PO abx when DC planning; no PICC presently  - Appreciate GI eval  - F/U pending cultures  - Malignancy/GOC per primary team    Joey Lozada MD  Pager [phone]  After 5pm and on weekends call [phone]

## 2018-02-12 NOTE — PROGRESS NOTE ADULT - PROBLEM SELECTOR PLAN 3
- Will obtain labs every other day and evaluate Na levels.  - Hyponatremia likely 2/2 decreased effective blood volume in setting of liver failure.   - c/w fluid restriction of 1 L per day. - Will obtain labs every other day and evaluate Na levels.  - Hyponatremia likely 2/2 decreased effective blood volume in setting of liver failure.   - c/w fluid restriction of 1 L per day.  - FeNa 0.4% indicative of Prerenal etiology likely in setting of liver failure/poor po intake.

## 2018-02-12 NOTE — PROGRESS NOTE ADULT - PROBLEM SELECTOR PLAN 1
- Palliative care consulted and will have family meeting to discuss GOC today.  - Pt not reporting significant abdominal pain- For now, will c/w Dilaudid 2 mg Q6 PO PRN  - Surgical oncology consulted and felt no active intervention indicated.  - Med Onc consulted, per recs, f/u palliative discussion.   - Pt s/p NT/rectal tube check and paracentesis. SAAG > 1.1, consistent with transudative effusion.

## 2018-02-12 NOTE — PROGRESS NOTE ADULT - PROBLEM SELECTOR PLAN 4
- Patient with known metastasis of rectal cancer to the liver.  - paracentesis not showing SBP, showing transudative effusion.  - c/w Lactulose in setting of possible hepatic encephalopathy - Patient with known metastasis of rectal cancer to the liver.  - Paracentesis shows no evidence of SBP, showing transudative effusion.  - c/w Lactulose in setting of possible hepatic encephalopathy

## 2018-02-12 NOTE — PROGRESS NOTE ADULT - SUBJECTIVE AND OBJECTIVE BOX
Chief Complaint: metastatic colorectal ca     INTERVAL HPI/OVERNIGHT EVENTS: More alert today. Fiance at bedside. Denies any c/o. Still unable to ambulate independently. Pt seen with palliative attending.     MEDICATIONS  (STANDING):  enoxaparin Injectable 40 milliGRAM(s) SubCutaneous daily  fluticasone propionate 50 MICROgram(s)/spray Nasal Spray 1 Spray(s) Both Nostrils two times a day  lactulose Syrup 10 Gram(s) Oral two times a day  piperacillin/tazobactam IVPB. 3.375 Gram(s) IV Intermittent every 8 hours  sodium chloride 0.9%. 1000 milliLiter(s) (75 mL/Hr) IV Continuous <Continuous>    MEDICATIONS  (PRN):  ALPRAZolam 0.5 milliGRAM(s) Oral two times a day PRN anxiety  HYDROmorphone   Tablet 2 milliGRAM(s) Oral every 6 hours PRN Severe Pain (7 - 10)  ibuprofen  Tablet 600 milliGRAM(s) Oral every 6 hours PRN moderate pain (4-6)      Allergies    No Known Allergies    Intolerances        ROS: as above     Vital Signs Last 24 Hrs  T(C): 36.5 (12 Feb 2018 20:23), Max: 36.6 (12 Feb 2018 14:25)  T(F): 97.7 (12 Feb 2018 20:23), Max: 97.9 (12 Feb 2018 14:25)  HR: 107 (12 Feb 2018 20:23) (103 - 107)  BP: 92/61 (12 Feb 2018 20:23) (92/61 - 97/61)  BP(mean): --  RR: 18 (12 Feb 2018 20:23) (18 - 18)  SpO2: 97% (12 Feb 2018 20:23) (97% - 98%)    Physical Exam:   AAO x 3, chronically ill appearing   + cachectic   + jaundice   RRR S1S2  CTA b/l   soft NTNDBS+ + ostomy   + edema b/l     LABS:

## 2018-02-12 NOTE — PROGRESS NOTE ADULT - PROBLEM SELECTOR PLAN 1
End stage metastatic colon cancer with progressive rise in Tbili. Suspect liver failure 2/2 tumor infiltration. No evidence of reversible causes.  We discussed discharge planning with pt and his Fiance. Recommended hospice services upon discharge to prevent returning back to the hospital. Pt agreeable to meet with hospice liaison today/tomorrow.   Palliative to make referral and call pt's sister Jyothi  Pt hopes to be able to leave the hospital and [place] [facility] [place] which we support at this time.

## 2018-02-12 NOTE — PROGRESS NOTE ADULT - PROBLEM SELECTOR PLAN 7
DVT: Holding lovenox in setting of NT, rectal tube.  Diet: Reg,   Dispo: Will f/u w/ palliative care status of family meeting. They will discuss options including hospice vs. PCU and GOC during this admission. Pt and fiance aware of poor prognosis at this time. Will cont to offer support throughout this difficult time. DVT: Lovenox  Diet: Reg,   Dispo: Will f/u w/ palliative care after family meeting. They will discuss options including hospice vs. PCU and GOC during this admission. Pt and fiance aware of poor prognosis at this time. Will cont to offer support throughout this difficult time.

## 2018-02-12 NOTE — PROGRESS NOTE ADULT - PROBLEM SELECTOR PLAN 4
Patient is full code. Met with patient, his fiance and oncology attending Dr. Cruz. Discussed with patient that we are continuing to remain hopeful for him, however, we also do recommend more support at home. We discussed home hospice, and that if patient were to get stronger, he can rescind hospice. Both are in agreement. They requested we share this information with patient's sister as she has been updating patient's sister. Patient is full code. Met with patient, his fiance and oncology attending Dr. Cruz. Discussed with patient that we are continuing to remain hopeful for him, however, we also do recommend more support at home. We discussed home hospice, and that if patient were to get stronger, he can rescind hospice. Both are in agreement. They requested we share this information with patient's sister as she has been updating patient's parents. Message left with Jyothi. Home hospice referral made. Will continue to follow.

## 2018-02-12 NOTE — PROGRESS NOTE ADULT - SUBJECTIVE AND OBJECTIVE BOX
CC: F/U for ? reji rectal abscess    Saw/spoke to patient. Patient feels well. No fevers, no chills, no new complaints. Still with output from drain and nephrostomy.    Allergies  No Known Allergies    ANTIMICROBIALS:  piperacillin/tazobactam IVPB. 3.375 every 8 hours    PE:    Vital Signs Last 24 Hrs  T(C): 36.6 (12 Feb 2018 14:25), Max: 36.6 (12 Feb 2018 14:25)  T(F): 97.9 (12 Feb 2018 14:25), Max: 97.9 (12 Feb 2018 14:25)  HR: 103 (12 Feb 2018 14:46) (103 - 113)  BP: 97/61 (12 Feb 2018 14:46) (95/67 - 108/70)  BP(mean): --  RR: 18 (12 Feb 2018 14:25) (17 - 18)  SpO2: 98% (12 Feb 2018 14:25) (94% - 98%)    Gen: AOx3, NAD, non-toxic, pleasant  CV: S1+S2 normal, no murmurs, nontachycardic  Resp: Clear bilat, no resp distress, no crackles/wheezes  Abd: Soft, nontender, +BS  Ext: No LE edema, no wounds  : Nephrostomy tube; Perirectal drain    LABS:    No new available    MICROBIOLOGY:    .Stool Feces  02-10-18   No enteric pathogens to date: Final culture pending     .Body Fluid Peritoneal Fluid  02-08-18   No growth to date.   polymorphonuclear leukocytes seen  No organisms seen  by cytocentrifuge    .Urine Nephrostomy - Right  02-07-18   No growth    .Urine Clean Catch (Midstream)  02-06-18   No growth      .Blood Blood  02-06-18   No growth at 5 days.      RADIOLOGY:    No new available

## 2018-02-12 NOTE — CHART NOTE - NSCHARTNOTEFT_GEN_A_CORE
Source: Patient [ x]    Family [x ]     other [x ] Medical records, father and fiance at bedside, RN, Team 1; Pt seen for Malnutrition follow up. Per chart, 33 y/o male with stage 4 metastatic rectal cancer to liver and lungs, s/p Reggie w/ colostomy c/b perirectal abscess s/p STEPHANE drain (10/23/2017), right hydronephrosis s/p nephrostomy, admitted with hyponatremia, hyperkalemia, leukocytosis during STEPHANE drain manipulation, ? fall at home, poor po intakes/appetite and increased water intake, worsening liver function 2/2 tumor burden, large volume ascites s/p diagnostic paracentesis 420cc removed (2/7), palliative following.     Diet : Regular Ensure Enlive 2 x day and ProSource 1 x day; Noted provider to RN for 1 liter fluid restriction, but not included in active diet order. Per Team 1, fluid restriction status undecided at this time, team to review and add restriction to actual diet order if fluid restriction is needed.  Discussed with RN      Patient reports [ ] nausea  [ ] vomiting [ ] diarrhea [ ] constipation  [ ]chewing problems [ ] swallowing issues  [ ] other: Pt denies GI distress, feeling much better today. Pt reports good ostomy output and consistency WDL, reports emptying 2-3 x day. Noted 10cc (2/11) documented per nursing flowsheet     PO intake:  < 50% [ ] 50-75% [x ]   % [x ]  other :     Source for PO intake [ x] Patient [x ] family [ x] chart [ ] staff [ ] other: Pt reports much improved appetite/po intakes, reports ~ % po intakes of meals, drinking 2 Ensure Enlive mixed with Prosource. Noted 10-70% po intakes per flowsheet.     Enteral /Parenteral Nutrition:       Current Weight: Weight (kg): 73 (02-07 @ 18:24)  % Weight Change    Pertinent Medications: MEDICATIONS  (STANDING):  enoxaparin Injectable 40 milliGRAM(s) SubCutaneous daily  fluticasone propionate 50 MICROgram(s)/spray Nasal Spray 1 Spray(s) Both Nostrils two times a day  lactulose Syrup 10 Gram(s) Oral two times a day  piperacillin/tazobactam IVPB. 3.375 Gram(s) IV Intermittent every 8 hours    MEDICATIONS  (PRN):  ALPRAZolam 0.5 milliGRAM(s) Oral two times a day PRN anxiety  HYDROmorphone   Tablet 2 milliGRAM(s) Oral every 6 hours PRN Severe Pain (7 - 10)  ibuprofen  Tablet 600 milliGRAM(s) Oral every 6 hours PRN moderate pain (4-6)    Pertinent Labs:  (2/10) Sodium 128, BUN 41, Magnesium 3    Skin: + 3 bilateral ankles/legs edema no pressure injuries    Estimated Needs:   [ x] no change since previous assessment  [ ] recalculated:       Previous Nutrition Diagnosis:     [ ] Inadequate Energy Intake [ ]Inadequate Oral Intake [ ] Excessive Energy Intake     [ ] Underweight [ ] Increased Nutrient Needs [ ] Overweight/Obesity     [ ] Altered GI Function [ ] Unintended Weight Loss [ ] Food & Nutrition Related Knowledge Deficit [x ] Malnutrition - severe         Nutrition Diagnosis is [x ] ongoing - being addressed with improving po intakes and po supplements.          New Nutrition Diagnosis: [x ] not applicable      Recommend    [x ] Change Diet To: Per discussion with Team 1 - MD to confirm fluid restriction status.     [x ] Nutrition Supplement: Continue with Ensure Enlive 2 x day and Prosource 1 x day    [ ] Nutrition Support    [ ] Other:        Monitoring and Evaluation:     [x ] PO intake [x ] Tolerance to diet prescription [ x] weights [ ] follow up per protocol    [ ] other:

## 2018-02-12 NOTE — PROGRESS NOTE ADULT - SUBJECTIVE AND OBJECTIVE BOX
INTERVAL HPI/OVERNIGHT EVENTS: Patient sitting up in bed, states he feels better today. Denies complaints.     Allergies    No Known Allergies    Intolerances        ADVANCE DIRECTIVES:    DNR: [ x] NO [ ] YES (Date) MOLST [ x] NO [ ] YES (Date)    MEDICATIONS  (STANDING):  enoxaparin Injectable 40 milliGRAM(s) SubCutaneous daily  fluticasone propionate 50 MICROgram(s)/spray Nasal Spray 1 Spray(s) Both Nostrils two times a day  lactulose Syrup 10 Gram(s) Oral two times a day  piperacillin/tazobactam IVPB. 3.375 Gram(s) IV Intermittent every 8 hours    MEDICATIONS  (PRN):  ALPRAZolam 0.5 milliGRAM(s) Oral two times a day PRN anxiety  HYDROmorphone   Tablet 2 milliGRAM(s) Oral every 6 hours PRN Severe Pain (7 - 10)  ibuprofen  Tablet 600 milliGRAM(s) Oral every 6 hours PRN moderate pain (4-6)      PRESENT SYMPTOMS:  Source: [x ] Patient   [ ] Family   [ ] Team     Pain:                        [ x] No [ ] Yes             [ ] Mild [ ] Moderate [ ] Severe    Onset -  Location -  Duration -  Character -  Alleviating/Aggravating -  Radiation -  Timing -    Dyspnea:                [x ] No [ ] Yes             [ ] Mild [ ] Moderate [ ] Severe    Anxiety:                  [x ] No [ ] Yes             [ ] Mild [ ] Moderate [ ] Severe    Fatigue:                  [ ] No [x ] Yes             [x ] Mild [ ] Moderate [ ] Severe    Nausea:                  [ x] No [ ] Yes             [ ] Mild [ ] Moderate [ ] Severe    Loss of appetite:   [ x] No [ ] Yes             [ ] Mild [ ] Moderate [ ] Severe    Constipation:        [x ] No [ ] Yes             [ ] Mild [ ] Moderate [ ] Severe    Other Symptoms:  [x ] All other review of systems negative   [ ] Unable to obtain due to poor mentation     Karnofsky Performance Score/Palliative Performance Status Version 2:         %    PHYSICAL EXAM:  Vital Signs Last 24 Hrs  T(C): 36.4 (12 Feb 2018 04:05), Max: 37 (11 Feb 2018 13:30)  T(F): 97.5 (12 Feb 2018 04:05), Max: 98.6 (11 Feb 2018 13:30)  HR: 106 (12 Feb 2018 04:05) (106 - 113)  BP: 95/67 (12 Feb 2018 04:05) (95/67 - 108/70)  BP(mean): --  RR: 18 (12 Feb 2018 04:05) (17 - 18)  SpO2: 97% (12 Feb 2018 04:05) (94% - 97%) I&O's Summary    11 Feb 2018 07:01  -  12 Feb 2018 07:00  --------------------------------------------------------  IN: 1250 mL / OUT: 575 mL / NET: 675 mL    12 Feb 2018 07:01  -  12 Feb 2018 13:22  --------------------------------------------------------  IN: 120 mL / OUT: 0 mL / NET: 120 mL        General:  [x ] Alert  [x ] Oriented x  3    [ ] Lethargic  [ ] Agitated   [x ] Cachexia   [ ] Unarousable  [x ] Verbal  [ ] Non-Verbal    HEENT:  [x ] Normal   [ ] Dry mouth   [ ] ET Tube    [ ] Trach  [ ] Oral lesions    Lungs:   [x ] Clear [ ] Tachypnea  [ ] Audible excessive secretions   [ ] Rhonchi        [ ] Right [ ] Left [ ] Bilateral  [ ] Crackles        [ ] Right [ ] Left [ ] Bilateral  [ ] Wheezing     [ ] Right [ ] Left [ ] Bilateral    Cardiovascular:  [x ] Regular [ ] Irregular [ ] Tachycardia   [ ] Bradycardia  [ ] Murmur [ ] Other    Abdomen: [ x] Soft  [ ] Distended   [x ] +BS  [ ] Non tender [ ] Tender  [ ]PEG   [ ]OGT/ NGT   Last BM:       Genitourinary: [x ] Normal [ ] Incontinent   [ ] Oliguria/Anuria   [ ] Anderson    Musculoskeletal:  [ ] Normal   [x ] Weakness  [ ] Bedbound/Wheelchair bound [ ] Edema    Neurological: [x ] No focal deficits  [ ] Cognitive impairment  [ ] Dysphagia [ ] Dysarthria [ ] Paresis [ ] Other     Skin: [ x] Normal   [ ] Pressure ulcer(s)                  [ ] Rash    LABS: reviewed      Oral Intake: [ ] Unable/mouth care only [ ] Minimal [ ] Moderate [ x] Full Capability  Diet: [ ] NPO [ ] Tube feeds [ ] TPN [ ] Other     RADIOLOGY & ADDITIONAL STUDIES: reviewed    REFERRALS:   [ ] Chaplaincy  [ ] Hospice  [ ] Child Life  [ ] Social Work  [ ] Case management [ ] Holistic Therapy

## 2018-02-12 NOTE — PROGRESS NOTE ADULT - SUBJECTIVE AND OBJECTIVE BOX
Patient is a 32y old  Male who presents with a chief complaint of Metastatic rectal cancer       SUBJECTIVE / OVERNIGHT EVENTS: No acute events over night. Pt seen and examined- reports feeling fatigued and weak this AM. Denies any fevers, chills, n/v, CP, SOB, abd pain, dysuria, or melena.     MEDICATIONS  (STANDING):  enoxaparin Injectable 40 milliGRAM(s) SubCutaneous daily  fluticasone propionate 50 MICROgram(s)/spray Nasal Spray 1 Spray(s) Both Nostrils two times a day  lactulose Syrup 10 Gram(s) Oral two times a day  piperacillin/tazobactam IVPB. 3.375 Gram(s) IV Intermittent every 8 hours    MEDICATIONS  (PRN):  ALPRAZolam 0.5 milliGRAM(s) Oral two times a day PRN anxiety  HYDROmorphone   Tablet 2 milliGRAM(s) Oral every 6 hours PRN Severe Pain (7 - 10)  ibuprofen  Tablet 600 milliGRAM(s) Oral every 6 hours PRN moderate pain (4-6)      CAPILLARY BLOOD GLUCOSE        I&O's Summary    10 Feb 2018 07:01  -  11 Feb 2018 07:00  --------------------------------------------------------  IN: 360 mL / OUT: 150 mL / NET: 210 mL        T(C): 36.7 (02-11-18 @ 05:42), Max: 36.9 (02-10-18 @ 20:52)  HR: 115 (02-11-18 @ 05:42) (115 - 118)  BP: 108/67 (02-11-18 @ 05:42) (102/72 - 108/67)  RR: 18 (02-11-18 @ 05:42) (18 - 20)  SpO2: 96% (02-11-18 @ 05:42) (96% - 96%)    GENERAL: cachectic  HEENT: sclera anicteric  CHEST: CTAB  HEART:  RRR, no MRG, no edema  ABDOMEN:  ostomy, mildly distended, soft, (+) mass, (+) hepatomegaly. paracentesis site c/d/i.  EXTREMITIES:  2+ LE edema  SKIN:  No rash  NEURO:  Alert, orientedx3,                                10.4   19.23 )-----------( 225      ( 10 Feb 2018 09:19 )             36.4       02-10    128<L>  |  88<L>  |  41<H>  ----------------------------<  86  4.9   |  16<L>  |  1.18    Ca    8.9      10 Feb 2018 09:19  Phos  3.6     02-10  Mg     3.0     02-10                        Lactate Trend  02-08 @ 06:58 Lactate:6.4   02-07 @ 16:43 Lactate:6.1             CAPILLARY BLOOD GLUCOSE            Culture Results:   No enteric pathogens to date: Final culture pending (02-10 @ 03:09)  Culture Results:   No growth to date. (02-08 @ 18:18)  Culture Results:   Testing in progress (02-08 @ 18:18)  Culture Results:   No growth (02-07 @ 13:38)  Culture Results:   No growth (02-06 @ 16:24)  Culture Results:   No growth at 5 days. (02-06 @ 16:05)  Culture Results:   No growth at 5 days. (02-06 @ 16:05)

## 2018-02-12 NOTE — PROGRESS NOTE ADULT - PROBLEM SELECTOR PLAN 1
Patient currently not a candidate for further DMT. He wishes to get stronger and [place] [facility] [place] He wants to remain hopeful for more treatment and avoid coming back to the hospital.

## 2018-02-12 NOTE — PROGRESS NOTE ADULT - ASSESSMENT
31 yo M h/o rectal cancer (unresectable, and metastatic to liver and lungs) s/p a diverting colostomy on 10/23/2017 with post-op course c/b perirectal abscess s/p IR placed STEPHANE drain and IR placed R nephrostomy pelvic collection/malignancy caused R hydronephrosis who presents from IR for hyponatremia, hyperkalemia and leukocytosis. Palliative care called for goals of care.

## 2018-02-13 DIAGNOSIS — N17.9 ACUTE KIDNEY FAILURE, UNSPECIFIED: ICD-10-CM

## 2018-02-13 DIAGNOSIS — Z71.89 OTHER SPECIFIED COUNSELING: ICD-10-CM

## 2018-02-13 DIAGNOSIS — N13.9 OBSTRUCTIVE AND REFLUX UROPATHY, UNSPECIFIED: ICD-10-CM

## 2018-02-13 DIAGNOSIS — K74.60 UNSPECIFIED CIRRHOSIS OF LIVER: ICD-10-CM

## 2018-02-13 LAB
ALBUMIN SERPL ELPH-MCNC: 2.3 G/DL — LOW (ref 3.3–5)
ALP SERPL-CCNC: 311 U/L — HIGH (ref 40–120)
ALT FLD-CCNC: 500 U/L RC — HIGH (ref 10–45)
ANION GAP SERPL CALC-SCNC: 24 MMOL/L — HIGH (ref 5–17)
ANION GAP SERPL CALC-SCNC: 25 MMOL/L — HIGH (ref 5–17)
ANION GAP SERPL CALC-SCNC: 26 MMOL/L — HIGH (ref 5–17)
APTT BLD: 32.6 SEC — SIGNIFICANT CHANGE UP (ref 27.5–37.4)
AST SERPL-CCNC: 1132 U/L — HIGH (ref 10–40)
BASOPHILS # BLD AUTO: 0 K/UL — SIGNIFICANT CHANGE UP (ref 0–0.2)
BASOPHILS NFR BLD AUTO: 0 % — SIGNIFICANT CHANGE UP (ref 0–2)
BILIRUB SERPL-MCNC: 12 MG/DL — HIGH (ref 0.2–1.2)
BUN SERPL-MCNC: 73 MG/DL — HIGH (ref 7–23)
BUN SERPL-MCNC: 73 MG/DL — HIGH (ref 7–23)
BUN SERPL-MCNC: 78 MG/DL — HIGH (ref 7–23)
CALCIUM SERPL-MCNC: 8.4 MG/DL — SIGNIFICANT CHANGE UP (ref 8.4–10.5)
CALCIUM SERPL-MCNC: 8.6 MG/DL — SIGNIFICANT CHANGE UP (ref 8.4–10.5)
CALCIUM SERPL-MCNC: 8.7 MG/DL — SIGNIFICANT CHANGE UP (ref 8.4–10.5)
CHLORIDE SERPL-SCNC: 87 MMOL/L — LOW (ref 96–108)
CO2 SERPL-SCNC: 14 MMOL/L — LOW (ref 22–31)
CO2 SERPL-SCNC: 14 MMOL/L — LOW (ref 22–31)
CO2 SERPL-SCNC: 16 MMOL/L — LOW (ref 22–31)
CREAT SERPL-MCNC: 2.37 MG/DL — HIGH (ref 0.5–1.3)
CREAT SERPL-MCNC: 2.39 MG/DL — HIGH (ref 0.5–1.3)
CREAT SERPL-MCNC: 2.47 MG/DL — HIGH (ref 0.5–1.3)
CULTURE RESULTS: SIGNIFICANT CHANGE UP
CULTURE RESULTS: SIGNIFICANT CHANGE UP
EOSINOPHIL # BLD AUTO: 0 K/UL — SIGNIFICANT CHANGE UP (ref 0–0.5)
EOSINOPHIL NFR BLD AUTO: 0 % — SIGNIFICANT CHANGE UP (ref 0–6)
GLUCOSE BLDC GLUCOMTR-MCNC: 180 MG/DL — HIGH (ref 70–99)
GLUCOSE BLDC GLUCOMTR-MCNC: 97 MG/DL — SIGNIFICANT CHANGE UP (ref 70–99)
GLUCOSE SERPL-MCNC: 122 MG/DL — HIGH (ref 70–99)
GLUCOSE SERPL-MCNC: 83 MG/DL — SIGNIFICANT CHANGE UP (ref 70–99)
GLUCOSE SERPL-MCNC: 96 MG/DL — SIGNIFICANT CHANGE UP (ref 70–99)
HCT VFR BLD CALC: 39.9 % — SIGNIFICANT CHANGE UP (ref 39–50)
HGB BLD-MCNC: 11.7 G/DL — LOW (ref 13–17)
INR BLD: 1.65 RATIO — HIGH (ref 0.88–1.16)
LYMPHOCYTES # BLD AUTO: 0.7 K/UL — LOW (ref 1–3.3)
LYMPHOCYTES # BLD AUTO: 1 % — LOW (ref 13–44)
MCHC RBC-ENTMCNC: 23.9 PG — LOW (ref 27–34)
MCHC RBC-ENTMCNC: 29.4 GM/DL — LOW (ref 32–36)
MCV RBC AUTO: 81.3 FL — SIGNIFICANT CHANGE UP (ref 80–100)
MONOCYTES # BLD AUTO: 1.3 K/UL — HIGH (ref 0–0.9)
MONOCYTES NFR BLD AUTO: 11 % — SIGNIFICANT CHANGE UP (ref 2–14)
NEUTROPHILS # BLD AUTO: 14.3 K/UL — HIGH (ref 1.8–7.4)
NEUTROPHILS NFR BLD AUTO: 73 % — SIGNIFICANT CHANGE UP (ref 43–77)
PLATELET # BLD AUTO: 168 K/UL — SIGNIFICANT CHANGE UP (ref 150–400)
POTASSIUM SERPL-MCNC: 5 MMOL/L — SIGNIFICANT CHANGE UP (ref 3.5–5.3)
POTASSIUM SERPL-MCNC: 5.3 MMOL/L — SIGNIFICANT CHANGE UP (ref 3.5–5.3)
POTASSIUM SERPL-MCNC: 6 MMOL/L — HIGH (ref 3.5–5.3)
POTASSIUM SERPL-SCNC: 5 MMOL/L — SIGNIFICANT CHANGE UP (ref 3.5–5.3)
POTASSIUM SERPL-SCNC: 5.3 MMOL/L — SIGNIFICANT CHANGE UP (ref 3.5–5.3)
POTASSIUM SERPL-SCNC: 6 MMOL/L — HIGH (ref 3.5–5.3)
PROT SERPL-MCNC: 5.6 G/DL — LOW (ref 6–8.3)
PROTHROM AB SERPL-ACNC: 18.2 SEC — HIGH (ref 9.8–12.7)
RBC # BLD: 4.9 M/UL — SIGNIFICANT CHANGE UP (ref 4.2–5.8)
RBC # FLD: 23.4 % — HIGH (ref 10.3–14.5)
SODIUM SERPL-SCNC: 126 MMOL/L — LOW (ref 135–145)
SODIUM SERPL-SCNC: 127 MMOL/L — LOW (ref 135–145)
SODIUM SERPL-SCNC: 127 MMOL/L — LOW (ref 135–145)
SPECIMEN SOURCE: SIGNIFICANT CHANGE UP
SPECIMEN SOURCE: SIGNIFICANT CHANGE UP
WBC # BLD: 16.3 K/UL — HIGH (ref 3.8–10.5)
WBC # FLD AUTO: 16.3 K/UL — HIGH (ref 3.8–10.5)

## 2018-02-13 PROCEDURE — 76770 US EXAM ABDO BACK WALL COMP: CPT | Mod: 26

## 2018-02-13 PROCEDURE — 99233 SBSQ HOSP IP/OBS HIGH 50: CPT

## 2018-02-13 PROCEDURE — 99497 ADVNCD CARE PLAN 30 MIN: CPT | Mod: GC

## 2018-02-13 PROCEDURE — 93010 ELECTROCARDIOGRAM REPORT: CPT

## 2018-02-13 PROCEDURE — 99232 SBSQ HOSP IP/OBS MODERATE 35: CPT

## 2018-02-13 PROCEDURE — 99233 SBSQ HOSP IP/OBS HIGH 50: CPT | Mod: GC

## 2018-02-13 RX ORDER — DEXTROSE 50 % IN WATER 50 %
50 SYRINGE (ML) INTRAVENOUS ONCE
Qty: 0 | Refills: 0 | Status: COMPLETED | OUTPATIENT
Start: 2018-02-13 | End: 2018-02-13

## 2018-02-13 RX ORDER — ALBUTEROL 90 UG/1
2.5 AEROSOL, METERED ORAL ONCE
Qty: 0 | Refills: 0 | Status: COMPLETED | OUTPATIENT
Start: 2018-02-13 | End: 2018-02-13

## 2018-02-13 RX ORDER — DEXTROSE 50 % IN WATER 50 %
100 SYRINGE (ML) INTRAVENOUS ONCE
Qty: 0 | Refills: 0 | Status: DISCONTINUED | OUTPATIENT
Start: 2018-02-13 | End: 2018-02-13

## 2018-02-13 RX ORDER — INSULIN HUMAN 100 [IU]/ML
10 INJECTION, SOLUTION SUBCUTANEOUS ONCE
Qty: 0 | Refills: 0 | Status: COMPLETED | OUTPATIENT
Start: 2018-02-13 | End: 2018-02-13

## 2018-02-13 RX ORDER — SODIUM CHLORIDE 9 MG/ML
2 INJECTION INTRAMUSCULAR; INTRAVENOUS; SUBCUTANEOUS THREE TIMES A DAY
Qty: 0 | Refills: 0 | Status: DISCONTINUED | OUTPATIENT
Start: 2018-02-13 | End: 2018-02-16

## 2018-02-13 RX ORDER — CALCIUM GLUCONATE 100 MG/ML
2 VIAL (ML) INTRAVENOUS ONCE
Qty: 0 | Refills: 0 | Status: COMPLETED | OUTPATIENT
Start: 2018-02-13 | End: 2018-02-13

## 2018-02-13 RX ORDER — INSULIN HUMAN 100 [IU]/ML
10 INJECTION, SOLUTION SUBCUTANEOUS ONCE
Qty: 0 | Refills: 0 | Status: DISCONTINUED | OUTPATIENT
Start: 2018-02-13 | End: 2018-02-13

## 2018-02-13 RX ORDER — DEXTROSE 50 % IN WATER 50 %
50 SYRINGE (ML) INTRAVENOUS ONCE
Qty: 0 | Refills: 0 | Status: DISCONTINUED | OUTPATIENT
Start: 2018-02-13 | End: 2018-02-13

## 2018-02-13 RX ORDER — SODIUM CHLORIDE 9 MG/ML
1 INJECTION INTRAMUSCULAR; INTRAVENOUS; SUBCUTANEOUS THREE TIMES A DAY
Qty: 0 | Refills: 0 | Status: DISCONTINUED | OUTPATIENT
Start: 2018-02-13 | End: 2018-02-13

## 2018-02-13 RX ADMIN — ENOXAPARIN SODIUM 40 MILLIGRAM(S): 100 INJECTION SUBCUTANEOUS at 18:25

## 2018-02-13 RX ADMIN — Medication 50 MILLILITER(S): at 11:19

## 2018-02-13 RX ADMIN — INSULIN HUMAN 10 UNIT(S): 100 INJECTION, SOLUTION SUBCUTANEOUS at 11:26

## 2018-02-13 RX ADMIN — Medication 400 GRAM(S): at 21:09

## 2018-02-13 RX ADMIN — PIPERACILLIN AND TAZOBACTAM 25 GRAM(S): 4; .5 INJECTION, POWDER, LYOPHILIZED, FOR SOLUTION INTRAVENOUS at 05:32

## 2018-02-13 RX ADMIN — Medication 0.5 MILLIGRAM(S): at 05:17

## 2018-02-13 RX ADMIN — INSULIN HUMAN 10 UNIT(S): 100 INJECTION, SOLUTION SUBCUTANEOUS at 19:56

## 2018-02-13 RX ADMIN — SODIUM CHLORIDE 2 GRAM(S): 9 INJECTION INTRAMUSCULAR; INTRAVENOUS; SUBCUTANEOUS at 22:30

## 2018-02-13 RX ADMIN — Medication 1 SPRAY(S): at 05:11

## 2018-02-13 RX ADMIN — Medication 50 MILLILITER(S): at 19:56

## 2018-02-13 RX ADMIN — LACTULOSE 10 GRAM(S): 10 SOLUTION ORAL at 05:11

## 2018-02-13 RX ADMIN — ALBUTEROL 2.5 MILLIGRAM(S): 90 AEROSOL, METERED ORAL at 11:19

## 2018-02-13 RX ADMIN — PIPERACILLIN AND TAZOBACTAM 25 GRAM(S): 4; .5 INJECTION, POWDER, LYOPHILIZED, FOR SOLUTION INTRAVENOUS at 13:37

## 2018-02-13 RX ADMIN — ALBUTEROL 2.5 MILLIGRAM(S): 90 AEROSOL, METERED ORAL at 11:10

## 2018-02-13 RX ADMIN — Medication 200 GRAM(S): at 11:27

## 2018-02-13 RX ADMIN — LACTULOSE 10 GRAM(S): 10 SOLUTION ORAL at 18:25

## 2018-02-13 RX ADMIN — SODIUM CHLORIDE 1 GRAM(S): 9 INJECTION INTRAMUSCULAR; INTRAVENOUS; SUBCUTANEOUS at 05:12

## 2018-02-13 RX ADMIN — SODIUM CHLORIDE 2 GRAM(S): 9 INJECTION INTRAMUSCULAR; INTRAVENOUS; SUBCUTANEOUS at 13:37

## 2018-02-13 RX ADMIN — HYDROMORPHONE HYDROCHLORIDE 2 MILLIGRAM(S): 2 INJECTION INTRAMUSCULAR; INTRAVENOUS; SUBCUTANEOUS at 18:25

## 2018-02-13 RX ADMIN — Medication 1 SPRAY(S): at 18:25

## 2018-02-13 RX ADMIN — PIPERACILLIN AND TAZOBACTAM 25 GRAM(S): 4; .5 INJECTION, POWDER, LYOPHILIZED, FOR SOLUTION INTRAVENOUS at 22:30

## 2018-02-13 RX ADMIN — ALBUTEROL 2.5 MILLIGRAM(S): 90 AEROSOL, METERED ORAL at 11:27

## 2018-02-13 NOTE — PROGRESS NOTE ADULT - PROBLEM SELECTOR PLAN 6
Hyponatremia likely 2/2 decreased effective blood volume in setting of liver failure.   - c/w salt tabs

## 2018-02-13 NOTE — PROGRESS NOTE ADULT - ASSESSMENT
32 year old male rectal cancer (unresectable and metastatic to liver and lungs) s/p a diverting colostomy on 10/23/17 with post-operative course complicated by perirectal abscess requiring IR drainage, R nephrostomy tube for R Hydronephrosis who presented from IR for hyponatremia, hyperkalemia and worsening leukocytosis. Recently treated for bacteroides, enterococcus reji-rectal abscess with drainage. Now returns with LE edema, lab abnormalities including elevated lactate and leukocytosis. UCX Neg, Ascitic fluid negative for peritonitis, BCX NGTD. Appears stable. Seems unlikely peritonitis, less likely cholangitis. Possible non-infection, but cover for concern of reji-rectal abscess. Remains well, slightly fatigued today. Overall, leukocytosis, malignancy, abnormal imaging.  - Zosyn 3.375g q 8 while inpatient  - Likely would transition to PO abx when DC planning  - Malignancy/GOC per primary team  - Appreciate palliative eval--GOC/hospice planning    Joey Lozada MD  Pager [phone]  After 5pm and on weekends call [phone]

## 2018-02-13 NOTE — PROGRESS NOTE ADULT - PROBLEM SELECTOR PLAN 4
Patient is DNR/ DNI. Discussed at length with patient's fiance and sister that patient now in MIKI with worsening hepatic failure. Discussed that patient may continue to decline with worsening mental status. We discussed the possibility of focusing on comfort and symptoms in the PCU, which the family is amenable to. Will make referrals for holistic therapy and pet therapy for patient, as he is having some "anxiety" about not being home yet. Patient's fiance states that he would like to have a spiritual wedding take place. Will reach out to chaplains, patient and family centered care, and nursing manager to arrange. Updated primary team.

## 2018-02-13 NOTE — PROGRESS NOTE ADULT - ASSESSMENT
33 yo M h/o rectal cancer (unresectable, and metastatic to liver and lungs) s/p diverting colostomy on (10/17) with post-op course c/b perirectal abscess s/p STEPHANE drain and R nephrostomy w/ pelvic collection/malignancy caused R hydronephrosis complicated by decompensated cirrhosis, and hyperkalemia and MIKI

## 2018-02-13 NOTE — PROGRESS NOTE ADULT - PROBLEM SELECTOR PLAN 7
Stable microcytic anemia, likely due to anemia or chronic disease and rectal bleeding from colorectal Ca

## 2018-02-13 NOTE — PROGRESS NOTE ADULT - PROBLEM SELECTOR PLAN 1
s/p  CA-gluconate, duonebs, insulin/d5.  -cannot give kayexelate in setting of rectal CA  -Renal and IR consulted to evaluate for reversoible cause.  US renal without obstruction noted.  -without finding reversibe cause of MIKI cannot remove K and just temporizing measures or moving intracellular.  If no resolution, can be terminal event.

## 2018-02-13 NOTE — CONSULT NOTE ADULT - SUBJECTIVE AND OBJECTIVE BOX
Note Author: Seth Burden, Medicine PGY 2  [facility] Pager: [phone]  [facility] Pager: [phone]  *** Nephrology Attending attestation to follow     Patient is a 32y old  Male who presents with a chief complaint of Hyponatremia, Hyperkalemia, Leukocytosis (08 Feb 2018 14:16)      SUBJECTIVE / OVERNIGHT EVENTS:    Pt's creatinine incr'd from 1.2 to 2.2,   UOP from Feb 11 to Feb 12 was 500cc, from Feb 12 to Feb 13 was 0cc w/ 100cc from nephrostomy,   Continues to be tachycardic w/ soft but stable BPs, afebrile.     MEDICATIONS  (STANDING):  enoxaparin Injectable 40 milliGRAM(s) SubCutaneous daily  fluticasone propionate 50 MICROgram(s)/spray Nasal Spray 1 Spray(s) Both Nostrils two times a day  lactulose Syrup 10 Gram(s) Oral two times a day  piperacillin/tazobactam IVPB. 3.375 Gram(s) IV Intermittent every 8 hours  sodium chloride 2 Gram(s) Oral three times a day  sodium chloride 0.9%. 1000 milliLiter(s) (75 mL/Hr) IV Continuous <Continuous>    MEDICATIONS  (PRN):  ALPRAZolam 0.5 milliGRAM(s) Oral two times a day PRN anxiety  HYDROmorphone   Tablet 2 milliGRAM(s) Oral every 6 hours PRN Severe Pain (7 - 10)    CAPILLARY BLOOD GLUCOSE    I&O's Summary    12 Feb 2018 07:01  -  13 Feb 2018 07:00  --------------------------------------------------------  IN: 910 mL / OUT: 110 mL / NET: 800 mL        Vital Signs Last 24 Hrs  T(C): 36.4 (13 Feb 2018 03:55), Max: 36.6 (12 Feb 2018 14:25)  T(F): 97.5 (13 Feb 2018 03:55), Max: 97.9 (12 Feb 2018 14:25)  HR: 108 (13 Feb 2018 03:55) (103 - 108)  BP: 102/66 (13 Feb 2018 03:55) (92/61 - 102/66)  BP(mean): --  RR: 18 (13 Feb 2018 03:55) (18 - 18)  SpO2: 95% (13 Feb 2018 03:55) (95% - 98%)      LABS:                        11.7   16.3  )-----------( 168      ( 13 Feb 2018 07:00 )             39.9       02-12    125<L>  |  86<L>  |  65<H>  ----------------------------<  92  5.4<H>   |  16<L>  |  2.22<H>    Ca    8.4      12 Feb 2018 23:11        PT/INR - ( 13 Feb 2018 07:00 )   PT: 18.2 sec;   INR: 1.65 ratio         PTT - ( 13 Feb 2018 07:00 )  PTT:32.6 sec          13:19 - VBG - pH: 7.31  | pCO2: 38    | pO2: 54    | Lactate: 7.4            EKG:   CXR: Note Author: Seth Burden, Medicine PGY 2  [facility] Pager: [phone]  [facility] Pager: [phone]  *** Nephrology Attending attestation to follow     Patient is a 32y old  Male who presents with a chief complaint of Hyponatremia, Hyperkalemia, Leukocytosis (08 Feb 2018 14:16)      SUBJECTIVE / OVERNIGHT EVENTS:    Pt reports declining UOP, no development of abdom or suprapubic discomfort or TTP, have small void this AM, no dysuria or hematuria though urine appeared dark.   Pt's creatinine incr'd from 1.2 to 2.2,   UOP from Feb 11 to Feb 12 was 500cc, from Feb 12 to Feb 13 UOP was 0cc w/ 100cc from nephrostomy,   Continues to be tachycardic w/ soft but stable BPs, afebrile.     MEDICATIONS  (STANDING):  enoxaparin Injectable 40 milliGRAM(s) SubCutaneous daily  fluticasone propionate 50 MICROgram(s)/spray Nasal Spray 1 Spray(s) Both Nostrils two times a day  lactulose Syrup 10 Gram(s) Oral two times a day  piperacillin/tazobactam IVPB. 3.375 Gram(s) IV Intermittent every 8 hours  sodium chloride 2 Gram(s) Oral three times a day  sodium chloride 0.9%. 1000 milliLiter(s) (75 mL/Hr) IV Continuous <Continuous>    MEDICATIONS  (PRN):  ALPRAZolam 0.5 milliGRAM(s) Oral two times a day PRN anxiety  HYDROmorphone   Tablet 2 milliGRAM(s) Oral every 6 hours PRN Severe Pain (7 - 10)    CAPILLARY BLOOD GLUCOSE    I&O's Summary    12 Feb 2018 07:01  -  13 Feb 2018 07:00  --------------------------------------------------------  IN: 910 mL / OUT: 110 mL / NET: 800 mL    Vital Signs Last 24 Hrs  T(C): 36.4 (13 Feb 2018 03:55), Max: 36.6 (12 Feb 2018 14:25)  T(F): 97.5 (13 Feb 2018 03:55), Max: 97.9 (12 Feb 2018 14:25)  HR: 108 (13 Feb 2018 03:55) (103 - 108)  BP: 102/66 (13 Feb 2018 03:55) (92/61 - 102/66)  BP(mean): --  RR: 18 (13 Feb 2018 03:55) (18 - 18)  SpO2: 95% (13 Feb 2018 03:55) (95% - 98%)    PHYSICAL EXAM:  General: NAD, well-developed  Eyes: EOMI, (+) scleral icteris  Neck: Supple, No JVD  Chest/Lung: Clear to auscultation bilaterally; No wheezes  Heart: (+) tachycardic; No murmurs, rubs, or gallops.   Abdome: Soft, Nontender, (+) mild/mod distension, (+) L ostomy bag in place, (+) R nephrostomy in place with both sites dry  Extremities: 3-4+ lower extremity edema   Psych: AAOx3  Neurology: non-focal, strength and sensation grossly intact UE and LE BL. No spinal TTP  Skin: (+) jaundiced    LABS:                        11.7   16.3  )-----------( 168      ( 13 Feb 2018 07:00 )             39.9       02-12    125<L>  |  86<L>  |  65<H>  ----------------------------<  92  5.4<H>   |  16<L>  |  2.22<H>    Ca    8.4      12 Feb 2018 23:11        PT/INR - ( 13 Feb 2018 07:00 )   PT: 18.2 sec;   INR: 1.65 ratio         PTT - ( 13 Feb 2018 07:00 )  PTT:32.6 sec          13:19 - VBG - pH: 7.31  | pCO2: 38    | pO2: 54    | Lactate: 7.4            EKG:   CXR: Note Author: Seth Burden, Medicine PGY 2  [facility] Pager: [phone]  [facility] Pager: [phone]  *** Nephrology Attending attestation to follow     Patient is a 32y old  Male who presents with a chief complaint of Hyponatremia, Hyperkalemia, Leukocytosis (08 Feb 2018 14:16)      SUBJECTIVE / OVERNIGHT EVENTS:    Pt reports declining UOP, no development of abdom or suprapubic discomfort or TTP, have small void this AM, no dysuria or hematuria though urine appeared dark.   Pt's creatinine incr'd from 1.2 to 2.2,   UOP from Feb 11 to Feb 12 was 500cc, from Feb 12 to Feb 13 UOP was 0cc w/ 100cc from nephrostomy,   Continues to be tachycardic w/ soft but stable BPs, afebrile.     MEDICATIONS  (STANDING):  enoxaparin Injectable 40 milliGRAM(s) SubCutaneous daily  fluticasone propionate 50 MICROgram(s)/spray Nasal Spray 1 Spray(s) Both Nostrils two times a day  lactulose Syrup 10 Gram(s) Oral two times a day  piperacillin/tazobactam IVPB. 3.375 Gram(s) IV Intermittent every 8 hours  sodium chloride 2 Gram(s) Oral three times a day  sodium chloride 0.9%. 1000 milliLiter(s) (75 mL/Hr) IV Continuous <Continuous>    MEDICATIONS  (PRN):  ALPRAZolam 0.5 milliGRAM(s) Oral two times a day PRN anxiety  HYDROmorphone   Tablet 2 milliGRAM(s) Oral every 6 hours PRN Severe Pain (7 - 10)    CAPILLARY BLOOD GLUCOSE    I&O's Summary    12 Feb 2018 07:01  -  13 Feb 2018 07:00  --------------------------------------------------------  IN: 910 mL / OUT: 110 mL / NET: 800 mL    Vital Signs Last 24 Hrs  T(C): 36.4 (13 Feb 2018 03:55), Max: 36.6 (12 Feb 2018 14:25)  T(F): 97.5 (13 Feb 2018 03:55), Max: 97.9 (12 Feb 2018 14:25)  HR: 108 (13 Feb 2018 03:55) (103 - 108)  BP: 102/66 (13 Feb 2018 03:55) (92/61 - 102/66)  BP(mean): --  RR: 18 (13 Feb 2018 03:55) (18 - 18)  SpO2: 95% (13 Feb 2018 03:55) (95% - 98%)    PHYSICAL EXAM:  General: NAD, well-developed  Eyes: EOMI, (+) scleral icteris  Neck: Supple, No JVD  Chest/Lung: Clear to auscultation bilaterally; No wheezes  Heart: (+) tachycardic; No murmurs, rubs, or gallops.   Abdome: Soft, Nontender, (+) mild/mod distension, (+) L ostomy bag in place, (+) R nephrostomy in place with both sites dry  Extremities: 3-4+ lower extremity edema   Psych: AAOx3  Neurology: non-focal, strength and sensation grossly intact UE and LE BL. No spinal TTP  Skin: (+) jaundiced    LABS:                        11.7   16.3  )-----------( 168      ( 13 Feb 2018 07:00 )             39.9       02-12    125<L>  |  86<L>  |  65<H>  ----------------------------<  92  5.4<H>   |  16<L>  |  2.22<H>    Ca    8.4      12 Feb 2018 23:11        PT/INR - ( 13 Feb 2018 07:00 )   PT: 18.2 sec;   INR: 1.65 ratio         PTT - ( 13 Feb 2018 07:00 )  PTT:32.6 sec    13:19 - VBG - pH: 7.31  | pCO2: 38    | pO2: 54    | Lactate: 7.4

## 2018-02-13 NOTE — PROGRESS NOTE ADULT - PROBLEM SELECTOR PLAN 7
face to face time with patient and fiance 20 minutes.  -goal is comfort.  -DNR made yesterday by R1, confirmed today.  -patient expected to further decompensate over next 24-48 hours, likely time course to terminal event days at this time given rise in LFT's.  -if symptoms worsen, would go to PCU.

## 2018-02-13 NOTE — PROGRESS NOTE ADULT - SUBJECTIVE AND OBJECTIVE BOX
Patient is a 32y old  Male who presents with a chief complaint of Metastatic rectal cancer       SUBJECTIVE / OVERNIGHT EVENTS: This AM patient with doubling of creaitnine, K=6 and bilirubin jump to 12.   Initiated therapy for hyperkalemia.  Discussed with palliative care findings.  Discussed with patient and fiance that will attempt to find reversible causing of renal dysfunction however liver dysfunction in setting of metastatic disease and is not reversible.  Face-to face time with aptient and fiance 15 minutes.  Understand prognosis, understand timeline for terminal event likely hastened.  Want spiritual wedding, that was arranged.      MEDICATIONS  (STANDING):  enoxaparin Injectable 40 milliGRAM(s) SubCutaneous daily  fluticasone propionate 50 MICROgram(s)/spray Nasal Spray 1 Spray(s) Both Nostrils two times a day  lactulose Syrup 10 Gram(s) Oral two times a day  piperacillin/tazobactam IVPB. 3.375 Gram(s) IV Intermittent every 8 hours    MEDICATIONS  (PRN):  ALPRAZolam 0.5 milliGRAM(s) Oral two times a day PRN anxiety  HYDROmorphone   Tablet 2 milliGRAM(s) Oral every 6 hours PRN Severe Pain (7 - 10)  ibuprofen  Tablet 600 milliGRAM(s) Oral every 6 hours PRN moderate pain (4-6)      CAPILLARY BLOOD GLUCOSE        Vital Signs Last 24 Hrs  T(C): 36.3 (13 Feb 2018 20:53), Max: 36.4 (13 Feb 2018 03:55)  T(F): 97.4 (13 Feb 2018 20:53), Max: 97.5 (13 Feb 2018 03:55)  HR: 102 (13 Feb 2018 20:53) (102 - 108)  BP: 100/66 (13 Feb 2018 20:53) (98/65 - 102/66)  BP(mean): --  RR: 17 (13 Feb 2018 20:53) (17 - 18)  SpO2: 100% (13 Feb 2018 20:53) (95% - 100%)    PHYSICAL EXAM:  GENERAL: cachectic lethargic in chair.  HEENT: sclera anicteric, temporal wasting  CHEST: CTAB  HEART:  RRR, no MRG,   ABDOMEN:  ostomy site c/d/i.  Patient with distended abdomen, +fluid wave., , (+) hepatomegaly.   EXTREMITIES:  3+ LE edema  SKIN:  No rash  NEURO:  Alert, orientedx3,                                                 11.7   16.3  )-----------( 168      ( 13 Feb 2018 07:00 )             39.9   02-13    126<L>  |  87<L>  |  73<H>  ----------------------------<  96  5.3   |  14<L>  |  2.47<H>    Ca    8.6      13 Feb 2018 18:06    TPro  5.6<L>  /  Alb  2.3<L>  /  TBili  12.0<H>  /  DBili  x   /  AST  1132<H>  /  ALT  500<H>  /  AlkPhos  311<H>  02-13                CAPILLARY BLOOD GLUCOSE            Culture Results:   No enteric pathogens to date: Final culture pending (02-10 @ 03:09)  Culture Results:   No growth to date. (02-08 @ 18:18)  Culture Results:   Testing in progress (02-08 @ 18:18)  Culture Results:   No growth (02-07 @ 13:38)  Culture Results:   No growth (02-06 @ 16:24)  Culture Results:   No growth at 5 days. (02-06 @ 16:05)  Culture Results:   No growth at 5 days. (02-06 @ 16:05)

## 2018-02-13 NOTE — CONSULT NOTE ADULT - PROBLEM SELECTOR RECOMMENDATION 2
Secondary to mets from cancer. Ongoing medical management as per primary team.
- plan as above for MIKI.   - uro consult and follow up recs.

## 2018-02-13 NOTE — CHART NOTE - NSCHARTNOTEFT_GEN_A_CORE
Pt seen earlier today.    Discussed extent of disease with pt and significant other.  Agree with plan for Pall care/Hospice.

## 2018-02-13 NOTE — PROGRESS NOTE ADULT - SUBJECTIVE AND OBJECTIVE BOX
INTERVAL HPI/OVERNIGHT EVENTS: Patient feels fatigued, states pain is controlled. Fiance and sister at bedside.     Allergies    No Known Allergies    Intolerances        ADVANCE DIRECTIVES:    DNR: [ x] NO [ ] YES (Date) MOLST [ x] NO [ ] YES (Date)    MEDICATIONS  (STANDING):  enoxaparin Injectable 40 milliGRAM(s) SubCutaneous daily  fluticasone propionate 50 MICROgram(s)/spray Nasal Spray 1 Spray(s) Both Nostrils two times a day  lactulose Syrup 10 Gram(s) Oral two times a day  piperacillin/tazobactam IVPB. 3.375 Gram(s) IV Intermittent every 8 hours    MEDICATIONS  (PRN):  ALPRAZolam 0.5 milliGRAM(s) Oral two times a day PRN anxiety  HYDROmorphone   Tablet 2 milliGRAM(s) Oral every 6 hours PRN Severe Pain (7 - 10)  ibuprofen  Tablet 600 milliGRAM(s) Oral every 6 hours PRN moderate pain (4-6)      PRESENT SYMPTOMS:  Source: [x ] Patient   [ ] Family   [ ] Team     Pain:                        [ x] No [ ] Yes             [ ] Mild [ ] Moderate [ ] Severe    Onset -  Location -  Duration -  Character -  Alleviating/Aggravating -  Radiation -  Timing -    Dyspnea:                [x ] No [ ] Yes             [ ] Mild [ ] Moderate [ ] Severe    Anxiety:                  [x ] No [ ] Yes             [ ] Mild [ ] Moderate [ ] Severe    Fatigue:                  [ ] No [x ] Yes             [x ] Mild [ ] Moderate [ ] Severe    Nausea:                  [ x] No [ ] Yes             [ ] Mild [ ] Moderate [ ] Severe    Loss of appetite:   [ x] No [ ] Yes             [ ] Mild [ ] Moderate [ ] Severe    Constipation:        [x ] No [ ] Yes             [ ] Mild [ ] Moderate [ ] Severe    Other Symptoms:  [x ] All other review of systems negative   [ ] Unable to obtain due to poor mentation     Karnofsky Performance Score/Palliative Performance Status Version 2:     40    %    PHYSICAL EXAM:  Vital Signs Last 24 Hrs  T(C): 36.4 (12 Feb 2018 04:05), Max: 37 (11 Feb 2018 13:30)  T(F): 97.5 (12 Feb 2018 04:05), Max: 98.6 (11 Feb 2018 13:30)  HR: 106 (12 Feb 2018 04:05) (106 - 113)  BP: 95/67 (12 Feb 2018 04:05) (95/67 - 108/70)  BP(mean): --  RR: 18 (12 Feb 2018 04:05) (17 - 18)  SpO2: 97% (12 Feb 2018 04:05) (94% - 97%) I&O's Summary    11 Feb 2018 07:01  -  12 Feb 2018 07:00  --------------------------------------------------------  IN: 1250 mL / OUT: 575 mL / NET: 675 mL    12 Feb 2018 07:01  -  12 Feb 2018 13:22  --------------------------------------------------------  IN: 120 mL / OUT: 0 mL / NET: 120 mL        General:  [x ] Alert  [x ] Oriented x  3    [ ] Lethargic  [ ] Agitated   [x ] Cachexia   [ ] Unarousable  [x ] Verbal  [ ] Non-Verbal    HEENT:  [x ] Normal   [ ] Dry mouth   [ ] ET Tube    [ ] Trach  [ ] Oral lesions    Lungs:   [x ] Clear [ ] Tachypnea  [ ] Audible excessive secretions   [ ] Rhonchi        [ ] Right [ ] Left [ ] Bilateral  [ ] Crackles        [ ] Right [ ] Left [ ] Bilateral  [ ] Wheezing     [ ] Right [ ] Left [ ] Bilateral    Cardiovascular:  [x ] Regular [ ] Irregular [ ] Tachycardia   [ ] Bradycardia  [ ] Murmur [ ] Other    Abdomen: [ x] Soft  [ ] Distended   [x ] +BS  [ ] Non tender [ ] Tender  [ ]PEG   [ ]OGT/ NGT   Last BM:       Genitourinary: [x ] Normal [ ] Incontinent   [ ] Oliguria/Anuria   [ ] Anderson    Musculoskeletal:  [ ] Normal   [x ] Weakness  [ ] Bedbound/Wheelchair bound [ ] Edema    Neurological: [x ] No focal deficits  [ ] Cognitive impairment  [ ] Dysphagia [ ] Dysarthria [ ] Paresis [ ] Other     Skin: [ x] Normal   [ ] Pressure ulcer(s)                  [ ] Rash    LABS: reviewed      Oral Intake: [ ] Unable/mouth care only [ ] Minimal [ ] Moderate [ x] Full Capability  Diet: [ ] NPO [ ] Tube feeds [ ] TPN [ ] Other     RADIOLOGY & ADDITIONAL STUDIES: reviewed    REFERRALS:   [x ] Chaplaincy  [ ] Hospice  [ ] Child Life  [x ] Social Work  [ ] Case management [ ] Holistic Therapy

## 2018-02-13 NOTE — PROGRESS NOTE ADULT - PROBLEM SELECTOR PLAN 1
Patient currently not a candidate for further DMT. Patient now with MIKI and worsening hepatic dysfunction.

## 2018-02-13 NOTE — PROGRESS NOTE ADULT - PROBLEM SELECTOR PLAN 2
Likely due to metastatic disease and hepatic failure  - No response to fluids  - Renal ultrasound without evidence of hydronephrosis or obstruction  - Nephrostomy tube flushed by IR, no further interventions  - Nephrology following

## 2018-02-13 NOTE — PROGRESS NOTE ADULT - SUBJECTIVE AND OBJECTIVE BOX
Interventional Radiology Follow- Up Note      This is a 32y Male with PMH of rectal CA with liver mets s/p abdelrahman procedure c/b perirectal abscess collection. Pt underwent drainage of perirectal collection on 12/18 and had last tube check on 12/8. Pt clinical course has been c/b right hydronephrosis s/p right PCN on 12/15 with Dr. Holloway @ [facility]. Nephrostomy was exchange on 2/8 with Dr. Sarkar. IR contacted to evaluate pt 2/2 decreased output from NT, 100cc/24hr. Pt has not self voided x1day, no documented urine output.     Pt seen and examined at bedside. right NT was flushed with 10cc NS w/o difficulty. + back flow in tubing noted. no leakage at site.     PAST MEDICAL & SURGICAL HISTORY:  Anxiety: hx of panic attacks  Gastroesophageal reflux disease  Rectal cancer metastasized to liver  Status post Abdelrahman procedure  Encounter for care related to vascular access port: right chest wall port placed 2017  History of hemorrhoidectomy      Allergies: No Known Allergies      LABS:                        11.7   16.3  )-----------( 168      ( 13 Feb 2018 07:00 )             39.9     02-13    127<L>  |  87<L>  |  73<H>  ----------------------------<  83  6.0<H>   |  14<L>  |  2.37<H>    Ca    8.4      13 Feb 2018 07:00    TPro  5.6<L>  /  Alb  2.3<L>  /  TBili  12.0<H>  /  DBili  x   /  AST  1132<H>  /  ALT  500<H>  /  AlkPhos  311<H>  02-13    PT/INR - ( 13 Feb 2018 07:00 )   PT: 18.2 sec;   INR: 1.65 ratio         PTT - ( 13 Feb 2018 07:00 )  PTT:32.6 sec       Vitals: T(F): 97.5 (02-13-18 @ 03:55), Max: 97.9 (02-12-18 @ 14:25)  HR: 106 (02-13-18 @ 11:32) (103 - 108)  BP: 98/65 (02-13-18 @ 11:32) (92/61 - 102/66)  RR: 18 (02-13-18 @ 11:32) (18 - 18)  SpO2: 100% (02-13-18 @ 11:32) (95% - 100%)    PHYSICAL EXAM:  Gen: NAD, Awake and alert  abd: see above. softly distended. NTTP.     A/P: 32y Male s/p perirectal abscess tube check and right NT exchange on 2/8/18, seen and evaluated at bedside for decreased NT output.  -Right NT functioning well, given rise in Cr from 1.18-->2.37 and decreased output/ not voiding would recommend renal US.   -continue to monitor output    -flush drain per doctor orders  -Medicine team paged, awaiting call back  -trend vitals, labs  -Above d/w Dr. Sarkar     Please call IR at extension [phone] with any questions, concerns, or issues regarding above.

## 2018-02-13 NOTE — PROGRESS NOTE ADULT - SUBJECTIVE AND OBJECTIVE BOX
CC: F/U ? Perirectal abscess    Saw/spoke to patient. No fevers, no chills. Overall well. Slightly sleepy this AM.    Allergies  No Known Allergies    ANTIMICROBIALS:  piperacillin/tazobactam IVPB. 3.375 every 8 hours    OTHER MEDS:  ALPRAZolam 0.5 milliGRAM(s) Oral two times a day PRN  enoxaparin Injectable 40 milliGRAM(s) SubCutaneous daily  fluticasone propionate 50 MICROgram(s)/spray Nasal Spray 1 Spray(s) Both Nostrils two times a day  HYDROmorphone   Tablet 2 milliGRAM(s) Oral every 6 hours PRN  lactulose Syrup 10 Gram(s) Oral two times a day  sodium chloride 2 Gram(s) Oral three times a day  sodium chloride 0.9%. 1000 milliLiter(s) IV Continuous <Continuous>    PE:    Vital Signs Last 24 Hrs  T(C): 36.4 (13 Feb 2018 03:55), Max: 36.5 (12 Feb 2018 20:23)  T(F): 97.5 (13 Feb 2018 03:55), Max: 97.7 (12 Feb 2018 20:23)  HR: 106 (13 Feb 2018 11:32) (106 - 108)  BP: 98/65 (13 Feb 2018 11:32) (92/61 - 102/66)  BP(mean): --  RR: 18 (13 Feb 2018 11:32) (18 - 18)  SpO2: 100% (13 Feb 2018 11:32) (95% - 100%)    Gen: AOx3, NAD, non-toxic, pleasant  CV: S1+S2 normal, no murmurs, low grade tachycardia  Resp: Clear bilat, no resp distress, no crackles/wheezes  Abd: Soft, nontender, +BS  Ext: No LE edema, no wounds  : Nephrostomy drain; perirectal abscess drain    LABS:                        11.7   16.3  )-----------( 168      ( 13 Feb 2018 07:00 )             39.9     02-13    127<L>  |  87<L>  |  78<H>  ----------------------------<  122<H>  5.0   |  16<L>  |  2.39<H>    Ca    8.7      13 Feb 2018 13:08    TPro  5.6<L>  /  Alb  2.3<L>  /  TBili  12.0<H>  /  DBili  x   /  AST  1132<H>  /  ALT  500<H>  /  AlkPhos  311<H>  02-13    MICROBIOLOGY:    .Stool Feces  02-10-18   No enteric pathogens isolated.  (Stool culture examined for Salmonella,  Shigella, Campylobacter, Aeromonas, Plesiomonas,  Vibrio, E.coli O157 and Yersinia)  --  --    .Body Fluid Peritoneal Fluid  02-08-18   No growth at 5 days  --    polymorphonuclear leukocytes seen  No organisms seen  by cytocentrifuge    .Urine Nephrostomy - Right  02-07-18   No growth     .Urine Clean Catch (Midstream)  02-06-18   No growth      .Blood Blood  02-06-18   No growth at 5 days.     RADIOLOGY:    No new available

## 2018-02-13 NOTE — CONSULT NOTE ADULT - PROBLEM SELECTOR RECOMMENDATION 9
Oncology note appreciated. Patient himself not ready to discuss GOC with palliative as he is still processing information, but discussed at length with patient's fiance. He understands patient's guarded prognosis but still is hopeful patient will be a candidate for treatment.
Very unfortunate case given pt's age, aggressive nature of disease. Overall very poor prognosis. Given hepatic dysfunction (likely from disease), declining performance status, pt is unlikely to benefit (and may even derive harm) from disease modifying therapy.   Pt was seen in the ER, in the middle of the hallway. He identified his mother and fiance as HCP (both were not at bedside). I discussed with sister and pt that we would plan to meet once pt was in a room to discuss all options. I encouraged his family to be present for the meeting.   At this point recommend addressing any reversible causes. R/o infection, correcting electrolyte imbalances, considering paracentesis.   Palliative consult will be helpful once I have a chance to at least begin discussion with family and pt. Will d/w out pt oncologist.   Will continue to follow.
- Pt's creatinine incr'd from 1.2 yest to 2.2 today  - UOP from Feb 11 to Feb 12 was 500cc, from Feb 12 to Feb 13 UOP was 0cc w/ 100cc from nephrostomy tube (NT)  - given acute drop in UOP, likely represents obstruction despite nephrostomy tube in setting of metastatic rectal cancer  - would check abdom US to eval for hydronephrosis, would likely need urology consult to address NT and eval for obstruction  - cannot rule out oliguric renal failure, however above explanation appears more likely.  - urine lytes yest show FeNa at 0.44 c/w pre-renal dz, however minimal UOP since then, could consider repeating but reasonable to follow up imaging studies first to eval obstruction.    - trend creatinine, avoid nephrotoxins, renally dose meds

## 2018-02-13 NOTE — CONSULT NOTE ADULT - PROBLEM SELECTOR RECOMMENDATION 3
On abx as per primary team.
- 5.4 yest, now at 6.0, now s/p temporizing measure w/ insulin and albuterol  - would check BMP q8hrs, monitor EKGs, if obstruction present and unable to intervene, may consider offering HD, however with poor prognosis and pt DNR, would first have further goals of care discussion

## 2018-02-13 NOTE — PROGRESS NOTE ADULT - SUBJECTIVE AND OBJECTIVE BOX
Patient is a 32y old  Male who presents with a chief complaint of Hyponatremia, Hyperkalemia, Leukocytosis (08 Feb 2018 14:16)      SUBJECTIVE / OVERNIGHT EVENTS: Pt more encephalopathic this AM but still AAOx3. Denies fever, chills, chest pain, dyspnea, abdominal pain. Says his rectal bleeding is worse than usual. Labs overnight last night significant for nearly doubled creatinine from 1.18 to 2.22, K 5.4, AST 1132, , Bili 12 all markedly increased. Minimal urine output from nephrostomy tube. Labs repeated in AM with Cr 2.39 and K 6.0, treated with calcium gluconate, dextrose/insulin, and albuterol.     MEDICATIONS  (STANDING):  calcium gluconate IVPB 2 Gram(s) IV Intermittent once  enoxaparin Injectable 40 milliGRAM(s) SubCutaneous daily  fluticasone propionate 50 MICROgram(s)/spray Nasal Spray 1 Spray(s) Both Nostrils two times a day  lactulose Syrup 10 Gram(s) Oral two times a day  piperacillin/tazobactam IVPB. 3.375 Gram(s) IV Intermittent every 8 hours  sodium chloride 2 Gram(s) Oral three times a day  sodium chloride 0.9%. 1000 milliLiter(s) (75 mL/Hr) IV Continuous <Continuous>    MEDICATIONS  (PRN):  ALPRAZolam 0.5 milliGRAM(s) Oral two times a day PRN anxiety      Vital Signs Last 24 Hrs  T(C): 36.3 (13 Feb 2018 20:53), Max: 36.4 (13 Feb 2018 03:55)  T(F): 97.4 (13 Feb 2018 20:53), Max: 97.5 (13 Feb 2018 03:55)  HR: 102 (13 Feb 2018 20:53) (102 - 108)  BP: 100/66 (13 Feb 2018 20:53) (98/65 - 102/66)  BP(mean): --  RR: 17 (13 Feb 2018 20:53) (17 - 18)  SpO2: 100% (13 Feb 2018 20:53) (95% - 100%)  CAPILLARY BLOOD GLUCOSE      POCT Blood Glucose.: 180 mg/dL (13 Feb 2018 11:50)  POCT Blood Glucose.: 97 mg/dL (13 Feb 2018 11:08)    I&O's Summary    12 Feb 2018 07:01  -  13 Feb 2018 07:00  --------------------------------------------------------  IN: 910 mL / OUT: 110 mL / NET: 800 mL    13 Feb 2018 07:01  -  13 Feb 2018 21:04  --------------------------------------------------------  IN: 240 mL / OUT: 400 mL / NET: -160 mL        PHYSICAL EXAM:  GENERAL: NAD, appears jaundiced and more drowsy   HEAD:  Atraumatic, Normocephalic  EYES: Scleral icterus, EOMI, PERRLA  NECK: Supple, No JVD  CHEST/LUNG: Clear to auscultation bilaterally; No wheeze  HEART: Regular rate and rhythm; No murmurs, rubs, or gallops  ABDOMEN: Soft, Nontender, Nondistended, Ostomy   EXTREMITIES: 2+ pedal edema, no LE tenderness  PSYCH: AAOx3 but encephalopathic  NEUROLOGY: non-focal  SKIN: jaundiced, warm and dry    LABS:                        11.7   16.3  )-----------( 168      ( 13 Feb 2018 07:00 )             39.9     02-13    126<L>  |  87<L>  |  73<H>  ----------------------------<  96  5.3   |  14<L>  |  2.47<H>    Ca    8.6      13 Feb 2018 18:06    TPro  5.6<L>  /  Alb  2.3<L>  /  TBili  12.0<H>  /  DBili  x   /  AST  1132<H>  /  ALT  500<H>  /  AlkPhos  311<H>  02-13    PT/INR - ( 13 Feb 2018 07:00 )   PT: 18.2 sec;   INR: 1.65 ratio         PTT - ( 13 Feb 2018 07:00 )  PTT:32.6 sec          RADIOLOGY & ADDITIONAL TESTS:    Imaging Personally Reviewed: Renal ultrasound no hydronephrosis    Consultant(s) Notes Reviewed:  Palliative Care, ID, Nephrology    Care Discussed with Consultants/Other Providers:

## 2018-02-13 NOTE — CONSULT NOTE ADULT - ASSESSMENT
33 yo M h/o rectal cancer (unresectable, and metastatic to liver and lungs) s/p diverting colostomy on (10/17) with post-op course c/b perirectal abscess s/p STEPHANE drain and R nephrostomy w/ pelvic collection/malignancy caused R hydronephrosis who presents from IR for hyponatremia, hyperkalemia and leukocytosis, now w/ new MIKI for which Nephrology was consulted, also relative hyperKalemia, w/ lack of UOP concerning for obstruction. 33 yo M h/o rectal cancer (unresectable, and metastatic to liver and lungs) s/p diverting colostomy on (10/17) with post-op course c/b perirectal abscess s/p STEPHANE drain and R nephrostomy w/ pelvic collection/malignancy caused R hydronephrosis who presents from IR for hyponatremia, hyperkalemia and leukocytosis, now w/ new MIIK for which Nephrology was consulted, also relative hyperKalemia, w/ acute decline in UOP concerning for obstruction.

## 2018-02-13 NOTE — PROGRESS NOTE ADULT - ASSESSMENT
33 yo M h/o rectal cancer (unresectable, and metastatic to liver and lungs) s/p diverting colostomy on (10/17) with post-op course c/b perirectal abscess s/p STEPHANE drain and R nephrostomy w/ pelvic collection/malignancy caused R hydronephrosis who presents from IR for hyponatremia, hyperkalemia and leukocytosis. Pt now with hepatic failure due to metastatic disease to the liver with marked MIKI, hyperbilirubinemia, and transaminitis.

## 2018-02-13 NOTE — PROGRESS NOTE ADULT - PROBLEM SELECTOR PLAN 1
Patient now DNR/DNI, continuing current medical management but with focus on comfort care.  - Palliative care following, arranged marriage to fiance in hospital per patient wishes as pt aware of poor prognosis  - Plan for transfer to PCU when bed available  - Surgical oncology consulted, no intervention indicated  - Med Onc consulted, palliative care recommended   - Pt s/p NT/rectal tube check and paracentesis. SAAG > 1.1, consistent with transudative effusion.

## 2018-02-13 NOTE — CONSULT NOTE ADULT - PROBLEM SELECTOR RECOMMENDATION 4
- hypotonic hypervolemic hyponatremia in setting of metastatic cancer - hypotonic hypervolemic hyponatremia in setting of metastatic cancer  - reasonable to fluid restrict, monitor Na q12 hrs, has some lethargy presumed 2/2 xanax rather than hypoNa as not acute.

## 2018-02-13 NOTE — PROGRESS NOTE ADULT - PROBLEM SELECTOR PLAN 4
In setting of MIKI  - Check BMP q12  - Calcium gluconate, dextrose, IV insulin, and albuterol given when K 6.0 this AM  - Ca gluconate, dextrose and insulin given for K 5.3 this PM  -

## 2018-02-13 NOTE — PROGRESS NOTE ADULT - PROBLEM SELECTOR PLAN 5
Patient has multiple potential sources of infection including his NT, rectal abscess, and mild to moderate ascites.  - c/w zosyn d/c Vanc per ID. Bcx and Ucx NGTD from 2/6.   - Paracentesis showing transudative effusion. Seg count less than 250  - Currently Afebrile, monitor fever curve.

## 2018-02-14 LAB
ALBUMIN SERPL ELPH-MCNC: 2 G/DL — LOW (ref 3.3–5)
ALP SERPL-CCNC: 333 U/L — HIGH (ref 40–120)
ALT FLD-CCNC: 394 U/L — HIGH (ref 10–45)
ANION GAP SERPL CALC-SCNC: 24 MMOL/L — HIGH (ref 5–17)
ANION GAP SERPL CALC-SCNC: 25 MMOL/L — HIGH (ref 5–17)
AST SERPL-CCNC: 515 U/L — HIGH (ref 10–40)
BILIRUB SERPL-MCNC: 12.7 MG/DL — HIGH (ref 0.2–1.2)
BUN SERPL-MCNC: 80 MG/DL — HIGH (ref 7–23)
BUN SERPL-MCNC: 88 MG/DL — HIGH (ref 7–23)
CALCIUM SERPL-MCNC: 8.8 MG/DL — SIGNIFICANT CHANGE UP (ref 8.4–10.5)
CALCIUM SERPL-MCNC: 8.9 MG/DL — SIGNIFICANT CHANGE UP (ref 8.4–10.5)
CHLORIDE SERPL-SCNC: 88 MMOL/L — LOW (ref 96–108)
CHLORIDE SERPL-SCNC: 91 MMOL/L — LOW (ref 96–108)
CO2 SERPL-SCNC: 14 MMOL/L — LOW (ref 22–31)
CO2 SERPL-SCNC: 14 MMOL/L — LOW (ref 22–31)
CREAT SERPL-MCNC: 2.65 MG/DL — HIGH (ref 0.5–1.3)
CREAT SERPL-MCNC: 2.75 MG/DL — HIGH (ref 0.5–1.3)
GLUCOSE BLDC GLUCOMTR-MCNC: 91 MG/DL — SIGNIFICANT CHANGE UP (ref 70–99)
GLUCOSE SERPL-MCNC: 134 MG/DL — HIGH (ref 70–99)
GLUCOSE SERPL-MCNC: 94 MG/DL — SIGNIFICANT CHANGE UP (ref 70–99)
HCT VFR BLD CALC: 36 % — LOW (ref 39–50)
HGB BLD-MCNC: 10.7 G/DL — LOW (ref 13–17)
INR BLD: 1.64 RATIO — HIGH (ref 0.88–1.16)
MAGNESIUM SERPL-MCNC: 3.4 MG/DL — HIGH (ref 1.6–2.6)
MCHC RBC-ENTMCNC: 23.1 PG — LOW (ref 27–34)
MCHC RBC-ENTMCNC: 29.7 GM/DL — LOW (ref 32–36)
MCV RBC AUTO: 77.6 FL — LOW (ref 80–100)
PHOSPHATE SERPL-MCNC: 6.5 MG/DL — HIGH (ref 2.5–4.5)
POTASSIUM SERPL-MCNC: 4.9 MMOL/L — SIGNIFICANT CHANGE UP (ref 3.5–5.3)
POTASSIUM SERPL-MCNC: 5.8 MMOL/L — HIGH (ref 3.5–5.3)
POTASSIUM SERPL-SCNC: 4.9 MMOL/L — SIGNIFICANT CHANGE UP (ref 3.5–5.3)
POTASSIUM SERPL-SCNC: 5.8 MMOL/L — HIGH (ref 3.5–5.3)
PROT SERPL-MCNC: 5.7 G/DL — LOW (ref 6–8.3)
PROTHROM AB SERPL-ACNC: 18.7 SEC — HIGH (ref 10–13.1)
RBC # BLD: 4.64 M/UL — SIGNIFICANT CHANGE UP (ref 4.2–5.8)
RBC # FLD: 26.8 % — HIGH (ref 10.3–14.5)
SODIUM SERPL-SCNC: 126 MMOL/L — LOW (ref 135–145)
SODIUM SERPL-SCNC: 130 MMOL/L — LOW (ref 135–145)
WBC # BLD: 19.47 K/UL — HIGH (ref 3.8–10.5)
WBC # FLD AUTO: 19.47 K/UL — HIGH (ref 3.8–10.5)

## 2018-02-14 PROCEDURE — 99232 SBSQ HOSP IP/OBS MODERATE 35: CPT

## 2018-02-14 PROCEDURE — 99233 SBSQ HOSP IP/OBS HIGH 50: CPT | Mod: GC

## 2018-02-14 PROCEDURE — 99233 SBSQ HOSP IP/OBS HIGH 50: CPT

## 2018-02-14 RX ORDER — SODIUM CHLORIDE 9 MG/ML
1000 INJECTION INTRAMUSCULAR; INTRAVENOUS; SUBCUTANEOUS
Qty: 0 | Refills: 0 | Status: DISCONTINUED | OUTPATIENT
Start: 2018-02-14 | End: 2018-02-15

## 2018-02-14 RX ORDER — CALCIUM GLUCONATE 100 MG/ML
2 VIAL (ML) INTRAVENOUS ONCE
Qty: 0 | Refills: 0 | Status: COMPLETED | OUTPATIENT
Start: 2018-02-14 | End: 2018-02-14

## 2018-02-14 RX ORDER — ALTEPLASE 100 MG
2 KIT INTRAVENOUS ONCE
Qty: 0 | Refills: 0 | Status: COMPLETED | OUTPATIENT
Start: 2018-02-14 | End: 2018-02-15

## 2018-02-14 RX ORDER — DEXTROSE 50 % IN WATER 50 %
50 SYRINGE (ML) INTRAVENOUS ONCE
Qty: 0 | Refills: 0 | Status: COMPLETED | OUTPATIENT
Start: 2018-02-14 | End: 2018-02-14

## 2018-02-14 RX ORDER — INSULIN HUMAN 100 [IU]/ML
10 INJECTION, SOLUTION SUBCUTANEOUS ONCE
Qty: 0 | Refills: 0 | Status: COMPLETED | OUTPATIENT
Start: 2018-02-14 | End: 2018-02-14

## 2018-02-14 RX ORDER — HYDROMORPHONE HYDROCHLORIDE 2 MG/ML
2 INJECTION INTRAMUSCULAR; INTRAVENOUS; SUBCUTANEOUS EVERY 6 HOURS
Qty: 0 | Refills: 0 | Status: DISCONTINUED | OUTPATIENT
Start: 2018-02-14 | End: 2018-02-15

## 2018-02-14 RX ADMIN — SODIUM CHLORIDE 70 MILLILITER(S): 9 INJECTION INTRAMUSCULAR; INTRAVENOUS; SUBCUTANEOUS at 17:39

## 2018-02-14 RX ADMIN — PIPERACILLIN AND TAZOBACTAM 25 GRAM(S): 4; .5 INJECTION, POWDER, LYOPHILIZED, FOR SOLUTION INTRAVENOUS at 05:51

## 2018-02-14 RX ADMIN — ENOXAPARIN SODIUM 40 MILLIGRAM(S): 100 INJECTION SUBCUTANEOUS at 15:17

## 2018-02-14 RX ADMIN — SODIUM CHLORIDE 2 GRAM(S): 9 INJECTION INTRAMUSCULAR; INTRAVENOUS; SUBCUTANEOUS at 23:45

## 2018-02-14 RX ADMIN — SODIUM CHLORIDE 70 MILLILITER(S): 9 INJECTION INTRAMUSCULAR; INTRAVENOUS; SUBCUTANEOUS at 23:46

## 2018-02-14 RX ADMIN — Medication 200 GRAM(S): at 17:39

## 2018-02-14 RX ADMIN — Medication 1 SPRAY(S): at 05:52

## 2018-02-14 RX ADMIN — LACTULOSE 10 GRAM(S): 10 SOLUTION ORAL at 05:52

## 2018-02-14 RX ADMIN — SODIUM CHLORIDE 2 GRAM(S): 9 INJECTION INTRAMUSCULAR; INTRAVENOUS; SUBCUTANEOUS at 16:34

## 2018-02-14 RX ADMIN — INSULIN HUMAN 10 UNIT(S): 100 INJECTION, SOLUTION SUBCUTANEOUS at 16:35

## 2018-02-14 RX ADMIN — SODIUM CHLORIDE 2 GRAM(S): 9 INJECTION INTRAMUSCULAR; INTRAVENOUS; SUBCUTANEOUS at 05:51

## 2018-02-14 RX ADMIN — LACTULOSE 10 GRAM(S): 10 SOLUTION ORAL at 17:38

## 2018-02-14 RX ADMIN — Medication 50 MILLILITER(S): at 16:34

## 2018-02-14 RX ADMIN — Medication 1 SPRAY(S): at 17:38

## 2018-02-14 NOTE — PROGRESS NOTE ADULT - PROBLEM SELECTOR PLAN 6
- Hyponatremia likely 2/2 decreased effective blood volume in setting of liver failure.   - c/w salt tabs

## 2018-02-14 NOTE — PROGRESS NOTE ADULT - SUBJECTIVE AND OBJECTIVE BOX
Patient is a 32y old  Male who presents with a chief complaint of Metastatic rectal CA (08 Feb 2018 14:16)      SUBJECTIVE / OVERNIGHT EVENTS: No acute events over night. Pt seen and examined- lethargic throughout encounter. Pain well controlled throughout the night. No reported fevers, chills, n/v, CP, sob, diarrhea or melena.     MEDICATIONS  (STANDING):  enoxaparin Injectable 40 milliGRAM(s) SubCutaneous daily  fluticasone propionate 50 MICROgram(s)/spray Nasal Spray 1 Spray(s) Both Nostrils two times a day  lactulose Syrup 10 Gram(s) Oral two times a day  piperacillin/tazobactam IVPB. 3.375 Gram(s) IV Intermittent every 8 hours  sodium chloride 2 Gram(s) Oral three times a day  sodium chloride 0.9%. 1000 milliLiter(s) (75 mL/Hr) IV Continuous <Continuous>    MEDICATIONS  (PRN):  ALPRAZolam 0.5 milliGRAM(s) Oral two times a day PRN anxiety      CAPILLARY BLOOD GLUCOSE      POCT Blood Glucose.: 180 mg/dL (13 Feb 2018 11:50)  POCT Blood Glucose.: 97 mg/dL (13 Feb 2018 11:08)    I&O's Summary    13 Feb 2018 07:01  -  14 Feb 2018 07:00  --------------------------------------------------------  IN: 440 mL / OUT: 595 mL / NET: -155 mL        T(C): 36.4 (02-14-18 @ 04:31), Max: 36.4 (02-14-18 @ 04:31)  HR: 104 (02-14-18 @ 05:01) (102 - 108)  BP: 94/58 (02-14-18 @ 05:01) (94/58 - 100/66)  RR: 18 (02-14-18 @ 04:31) (17 - 18)  SpO2: 97% (02-14-18 @ 04:31) (97% - 100%)    PHYSICAL EXAM:  GENERAL: cachectic lethargic in bed.  HEENT: sclera anicteric, temporal wasting  CHEST: CTAB  HEART:  RRR, no MRG,   ABDOMEN:  ostomy site c/d/i.  Patient with distended abdomen, +fluid wave., , (+) hepatomegaly.   EXTREMITIES:  3+ LE edema  SKIN:  No rash  NEURO:  Alert, orientedx3,    LABS:                        11.7   16.3  )-----------( 168      ( 13 Feb 2018 07:00 )             39.9     WBC Trend: 16.3<--, 19.23<--, 15.63<--  02-13    126<L>  |  87<L>  |  73<H>  ----------------------------<  96  5.3   |  14<L>  |  2.47<H>    Ca    8.6      13 Feb 2018 18:06    TPro  5.6<L>  /  Alb  2.3<L>  /  TBili  12.0<H>  /  DBili  x   /  AST  1132<H>  /  ALT  500<H>  /  AlkPhos  311<H>  02-13    Creatinine Trend: 2.47<--, 2.39<--, 2.37<--, 2.22<--, 1.18<--, 1.18<--  PT/INR - ( 13 Feb 2018 07:00 )   PT: 18.2 sec;   INR: 1.65 ratio         PTT - ( 13 Feb 2018 07:00 )  PTT:32.6 sec            RADIOLOGY & ADDITIONAL TESTS:    Imaging Personally Reviewed:    Consultant(s) Notes Reviewed:      Care Discussed with Consultants/Other Providers:

## 2018-02-14 NOTE — PROGRESS NOTE ADULT - PROBLEM SELECTOR PLAN 5
- Patient has multiple potential sources of infection including his NT, rectal abscess, and mild to moderate ascites.  - c/w zosyn. Bcx and Ucx NGTD from 2/6.   - Paracentesis showing transudative effusion. Seg count less than 250  - Currently Afebrile, monitor fever curve.

## 2018-02-14 NOTE — PROGRESS NOTE ADULT - PROBLEM SELECTOR PLAN 2
- Likely due to metastatic disease and hepatic failure  - No response to fluids  - Renal ultrasound without evidence of hydronephrosis or obstruction  - Will cont to treat electrolyte abnormalities. Pt agreeable to BMP q12h.   - Nephrostomy tube flushed by IR, no further interventions  - Nephrology following - Likely due to metastatic disease and hepatic failure  - No response to fluids  - Renal ultrasound without evidence of hydronephrosis or obstruction  - Will cont to treat electrolyte abnormalities. Pt agreeable to BMP q12h.   - Nephrostomy tube flushed by IR, no further interventions

## 2018-02-14 NOTE — PROGRESS NOTE ADULT - ASSESSMENT
32 M w/ metastatic rectal ca currently not on systemic therapy due to h/o pelvic abscess now admitted with metabolic derangements noted to have progression of disease, now with MSOF, pending transfer to PCU

## 2018-02-14 NOTE — PROGRESS NOTE ADULT - PROBLEM SELECTOR PLAN 9
Patient now DNR/DNI, continuing current medical management but with focus on comfort care Patient now DNR/DNI, continuing current medical management but with focus on comfort care.  will visit patient this AM.

## 2018-02-14 NOTE — PROGRESS NOTE ADULT - PROBLEM SELECTOR PLAN 7
- Stable microcytic anemia, likely due to anemia or chronic disease and rectal bleeding from colorectal Ca

## 2018-02-14 NOTE — PROGRESS NOTE ADULT - SUBJECTIVE AND OBJECTIVE BOX
CC: F/U for Perirectal abscess    Saw/spoke to patient. Patient fatigued, weak. Ill appearing.    Allergies  No Known Allergies    ANTIMICROBIALS:  piperacillin/tazobactam IVPB. 3.375 every 8 hours    PE:    Vital Signs Last 24 Hrs  T(C): 36.4 (14 Feb 2018 12:22), Max: 36.4 (14 Feb 2018 04:31)  T(F): 97.5 (14 Feb 2018 12:22), Max: 97.5 (14 Feb 2018 04:31)  HR: 112 (14 Feb 2018 12:22) (102 - 112)  BP: 112/74 (14 Feb 2018 12:22) (94/58 - 112/74)  BP(mean): --  RR: 18 (14 Feb 2018 12:22) (1 - 18)  SpO2: 97% (14 Feb 2018 12:22) (97% - 100%)    Gen: AOx3, NAD, non-toxic, pleasant  CV: S1+S2 normal, no murmurs, nontachycardic  Resp: Clear bilat, no resp distress, no crackles/wheezes  Abd: Soft, nontender, +BS  Ext: No LE edema, no wounds  : Nephrostomy tube drainage; perirectal drain    LABS:                        11.7   16.3  )-----------( 168      ( 13 Feb 2018 07:00 )             39.9     02-13    126<L>  |  87<L>  |  73<H>  ----------------------------<  96  5.3   |  14<L>  |  2.47<H>    Ca    8.6      13 Feb 2018 18:06    TPro  5.6<L>  /  Alb  2.3<L>  /  TBili  12.0<H>  /  DBili  x   /  AST  1132<H>  /  ALT  500<H>  /  AlkPhos  311<H>  02-13    MICROBIOLOGY:    .Stool Feces  02-10-18   No enteric pathogens isolated.  (Stool culture examined for Salmonella,  Shigella, Campylobacter, Aeromonas, Plesiomonas,  Vibrio, E.coli O157 and Yersinia)      .Body Fluid Peritoneal Fluid  02-08-18   No growth at 5 days   polymorphonuclear leukocytes seen  No organisms seen  by cytocentrifuge    .Urine Nephrostomy - Right  02-07-18   No growth    .Urine Clean Catch (Midstream)  02-06-18   No growth     .Blood Blood  02-06-18   No growth at 5 days.      RADIOLOGY:    2/13 USG:    FINDINGS:    Right kidney:  12.1 cm. No renal mass, hydronephrosis or calculi. Right   nephrostomy tube is noted entering the kidney.    Left kidney:  12.7 cm. No renal mass, hydronephrosis or calculi.    Miscellaneous: Ascites. Metastatic liver lesions partially imaged.    IMPRESSION:     No hydronephrosis.    Ascites and partially imaged metastatic liver lesions.

## 2018-02-14 NOTE — PROGRESS NOTE ADULT - PROBLEM SELECTOR PLAN 1
- Patient now DNR/DNI and comfort measures. Continuing current medical management but with focus on comfort care.  - Plan for transfer to PCU when bed available  - Surgical oncology consulted, no intervention indicated  - Med Onc consulted, palliative care recommended - Patient now DNR/DNI and comfort measures. Continuing current medical management but with focus on comfort care.  - Plan for transfer to PCU when bed available

## 2018-02-14 NOTE — PROGRESS NOTE ADULT - ASSESSMENT
32 year old male rectal cancer (unresectable and metastatic to liver and lungs) s/p a diverting colostomy on 10/23/17 with post-operative course complicated by perirectal abscess requiring IR drainage, R nephrostomy tube for R Hydronephrosis who presented from IR for hyponatremia, hyperkalemia and worsening leukocytosis. Recently treated for bacteroides, enterococcus reji-rectal abscess with drainage. Now returns with LE edema, lab abnormalities including elevated lactate and leukocytosis. UCX Neg, Ascitic fluid negative for peritonitis, BCX NGTD. Approaching end of life, poor prognosis. Appreciate palliative. Overall, leukocytosis, malignancy, abnormal imaging.  - Zosyn 3.375g q 8 for 2 weeks total; however can consider discontinuing depending on patient GOC  - Palliative follow up  - Will sign off. Please call with further questions or change in status.    Joey Lozada MD  Pager [phone]  After 5pm and on weekends call [phone]

## 2018-02-14 NOTE — PROGRESS NOTE ADULT - PROBLEM SELECTOR PLAN 4
Patient is DNR/ DNI. Patient's family wishes to continue with medical management of acute issues such as MIKI workup. They are in agreement with transfer to PCU for further ongoing care and closer symptomatic management. D/w Chinmay at bedside. D/w primary team attending.

## 2018-02-14 NOTE — PROGRESS NOTE ADULT - SUBJECTIVE AND OBJECTIVE BOX
INTERVAL HPI/OVERNIGHT EVENTS: Patient feels fatigued and cold. Denies pain.     Allergies    No Known Allergies    Intolerances        ADVANCE DIRECTIVES:    DNR: [ x] NO [ ] YES (Date) MOLST [ x] NO [ ] YES (Date)    MEDICATIONS  (STANDING):  enoxaparin Injectable 40 milliGRAM(s) SubCutaneous daily  fluticasone propionate 50 MICROgram(s)/spray Nasal Spray 1 Spray(s) Both Nostrils two times a day  lactulose Syrup 10 Gram(s) Oral two times a day  piperacillin/tazobactam IVPB. 3.375 Gram(s) IV Intermittent every 8 hours    MEDICATIONS  (PRN):  ALPRAZolam 0.5 milliGRAM(s) Oral two times a day PRN anxiety  HYDROmorphone   Tablet 2 milliGRAM(s) Oral every 6 hours PRN Severe Pain (7 - 10)  ibuprofen  Tablet 600 milliGRAM(s) Oral every 6 hours PRN moderate pain (4-6)      PRESENT SYMPTOMS:  Source: [x ] Patient   [ ] Family   [ ] Team     Pain:                        [ x] No [ ] Yes             [ ] Mild [ ] Moderate [ ] Severe    Onset -  Location -  Duration -  Character -  Alleviating/Aggravating -  Radiation -  Timing -    Dyspnea:                [x ] No [ ] Yes             [ ] Mild [ ] Moderate [ ] Severe    Anxiety:                  [x ] No [ ] Yes             [ ] Mild [ ] Moderate [ ] Severe    Fatigue:                  [ ] No [x ] Yes             [x ] Mild [ ] Moderate [ ] Severe    Nausea:                  [ x] No [ ] Yes             [ ] Mild [ ] Moderate [ ] Severe    Loss of appetite:   [ x] No [ ] Yes             [ ] Mild [ ] Moderate [ ] Severe    Constipation:        [x ] No [ ] Yes             [ ] Mild [ ] Moderate [ ] Severe    Other Symptoms:  [x ] All other review of systems negative   [ ] Unable to obtain due to poor mentation     Karnofsky Performance Score/Palliative Performance Status Version 2:     40    %    PHYSICAL EXAM:  Vital Signs Last 24 Hrs  T(C): 36.4 (12 Feb 2018 04:05), Max: 37 (11 Feb 2018 13:30)  T(F): 97.5 (12 Feb 2018 04:05), Max: 98.6 (11 Feb 2018 13:30)  HR: 106 (12 Feb 2018 04:05) (106 - 113)  BP: 95/67 (12 Feb 2018 04:05) (95/67 - 108/70)  BP(mean): --  RR: 18 (12 Feb 2018 04:05) (17 - 18)  SpO2: 97% (12 Feb 2018 04:05) (94% - 97%) I&O's Summary    11 Feb 2018 07:01  -  12 Feb 2018 07:00  --------------------------------------------------------  IN: 1250 mL / OUT: 575 mL / NET: 675 mL    12 Feb 2018 07:01  -  12 Feb 2018 13:22  --------------------------------------------------------  IN: 120 mL / OUT: 0 mL / NET: 120 mL        General:  [x ] Alert  [x ] Oriented x  3    [ ] Lethargic  [ ] Agitated   [x ] Cachexia   [ ] Unarousable  [x ] Verbal  [ ] Non-Verbal    HEENT:  [x ] Normal   [ ] Dry mouth   [ ] ET Tube    [ ] Trach  [ ] Oral lesions    Lungs:   [x ] Clear [ ] Tachypnea  [ ] Audible excessive secretions   [ ] Rhonchi        [ ] Right [ ] Left [ ] Bilateral  [ ] Crackles        [ ] Right [ ] Left [ ] Bilateral  [ ] Wheezing     [ ] Right [ ] Left [ ] Bilateral    Cardiovascular:  [x ] Regular [ ] Irregular [ ] Tachycardia   [ ] Bradycardia  [ ] Murmur [ ] Other    Abdomen: [ x] Soft  [ ] Distended   [x ] +BS  [ ] Non tender [ ] Tender  [ ]PEG   [ ]OGT/ NGT   Last BM:       Genitourinary: [x ] Normal [ ] Incontinent   [ ] Oliguria/Anuria   [ ] Anderson    Musculoskeletal:  [ ] Normal   [x ] Weakness  [ ] Bedbound/Wheelchair bound [ ] Edema    Neurological: [x ] No focal deficits  [ ] Cognitive impairment  [ ] Dysphagia [ ] Dysarthria [ ] Paresis [ ] Other     Skin: [ x] Normal   [ ] Pressure ulcer(s)                  [ ] Rash    LABS: reviewed      Oral Intake: [ ] Unable/mouth care only [ ] Minimal [ ] Moderate [ x] Full Capability  Diet: [ ] NPO [ ] Tube feeds [ ] TPN [ ] Other     RADIOLOGY & ADDITIONAL STUDIES: reviewed    REFERRALS:   [x ] Chaplaincy  [ ] Hospice  [ ] Child Life  [x ] Social Work  [ ] Case management [ ] Holistic Therapy

## 2018-02-14 NOTE — PROGRESS NOTE ADULT - PROBLEM SELECTOR PLAN 1
End stage metastatic colon cancer with multisystem organ failure.   No further disease modifying therapy. pt and family understand that the GOC is comfort.   Pending transfer to PCU  Continue symptom management.

## 2018-02-14 NOTE — PROGRESS NOTE ADULT - PROBLEM SELECTOR PLAN 4
- In setting of MIKI  - Check BMP q12  - Calcium gluconate, dextrose, IV insulin, and albuterol given when K 6.0 yesterday AM  - Will cont to treat electrolyte abnormalities - In setting of MIKI  - BMP q12  - Calcium gluconate, dextrose, IV insulin, and albuterol given when K 6.0 yesterday AM w/ mild improvement in K+  - Will cont to treat electrolyte abnormalities

## 2018-02-14 NOTE — PROGRESS NOTE ADULT - SUBJECTIVE AND OBJECTIVE BOX
Chief Complaint: metastatic colon cancer    INTERVAL HPI/OVERNIGHT EVENTS: Very weak. Some discomfort in back and abdomen. Father at bedside.     MEDICATIONS  (STANDING):  enoxaparin Injectable 40 milliGRAM(s) SubCutaneous daily  fluticasone propionate 50 MICROgram(s)/spray Nasal Spray 1 Spray(s) Both Nostrils two times a day  lactulose Syrup 10 Gram(s) Oral two times a day  sodium chloride 2 Gram(s) Oral three times a day  sodium chloride 0.9%. 1000 milliLiter(s) (75 mL/Hr) IV Continuous <Continuous>  sodium chloride 0.9%. 1000 milliLiter(s) (70 mL/Hr) IV Continuous <Continuous>    MEDICATIONS  (PRN):  ALPRAZolam 0.5 milliGRAM(s) Oral two times a day PRN anxiety  HYDROmorphone   Tablet 2 milliGRAM(s) Oral every 6 hours PRN Severe Pain (7 - 10)      Allergies    No Known Allergies    Intolerances        ROS: as above     Vital Signs Last 24 Hrs  T(C): 36.3 (15 Feb 2018 06:35), Max: 36.7 (14 Feb 2018 20:55)  T(F): 97.4 (15 Feb 2018 06:35), Max: 98.1 (14 Feb 2018 20:55)  HR: 111 (15 Feb 2018 06:35) (111 - 113)  BP: 109/71 (15 Feb 2018 06:35) (96/67 - 112/74)  BP(mean): --  RR: 17 (15 Feb 2018 06:35) (17 - 18)  SpO2: 98% (15 Feb 2018 06:35) (97% - 99%)    Physical Exam:   AAO x 3, chronically ill appearing   + jaundice  RRR S1S2  decr BS b/l at bases   distended   + edema     LABS:                        10.7   19.47 )-----------( 146      ( 14 Feb 2018 13:05 )             36.0     02-14    126<L>  |  88<L>  |  80<H>  ----------------------------<  134<H>  4.9   |  14<L>  |  2.65<H>    Ca    8.9      14 Feb 2018 18:23  Phos  6.5     02-14  Mg     3.4     02-14    TPro  5.7<L>  /  Alb  2.0<L>  /  TBili  12.7<H>  /  DBili  x   /  AST  515<H>  /  ALT  394<H>  /  AlkPhos  333<H>  02-14    PT/INR - ( 14 Feb 2018 07:30 )   PT: 18.7 sec;   INR: 1.64 ratio

## 2018-02-15 LAB
ANION GAP SERPL CALC-SCNC: 21 MMOL/L — HIGH (ref 5–17)
BASOPHILS # BLD AUTO: 0.09 K/UL — SIGNIFICANT CHANGE UP (ref 0–0.2)
BASOPHILS NFR BLD AUTO: 0.5 % — SIGNIFICANT CHANGE UP (ref 0–2)
BUN SERPL-MCNC: 87 MG/DL — HIGH (ref 7–23)
CALCIUM SERPL-MCNC: 8.6 MG/DL — SIGNIFICANT CHANGE UP (ref 8.4–10.5)
CHLORIDE SERPL-SCNC: 90 MMOL/L — LOW (ref 96–108)
CO2 SERPL-SCNC: 17 MMOL/L — LOW (ref 22–31)
CREAT SERPL-MCNC: 2.64 MG/DL — HIGH (ref 0.5–1.3)
EOSINOPHIL # BLD AUTO: 0.07 K/UL — SIGNIFICANT CHANGE UP (ref 0–0.5)
EOSINOPHIL NFR BLD AUTO: 0.4 % — SIGNIFICANT CHANGE UP (ref 0–6)
GLUCOSE SERPL-MCNC: 64 MG/DL — LOW (ref 70–99)
IMM GRANULOCYTES NFR BLD AUTO: 6.2 % — HIGH (ref 0–1.5)
LYMPHOCYTES # BLD AUTO: 0.58 K/UL — LOW (ref 1–3.3)
LYMPHOCYTES # BLD AUTO: 3 % — LOW (ref 13–44)
MONOCYTES # BLD AUTO: 2.28 K/UL — HIGH (ref 0–0.9)
MONOCYTES NFR BLD AUTO: 11.7 % — SIGNIFICANT CHANGE UP (ref 2–14)
NEUTROPHILS # BLD AUTO: 15.25 K/UL — HIGH (ref 1.8–7.4)
NEUTROPHILS NFR BLD AUTO: 78.2 % — HIGH (ref 43–77)
PLATELET # BLD AUTO: 146 K/UL — LOW (ref 150–400)
POTASSIUM SERPL-MCNC: 5.2 MMOL/L — SIGNIFICANT CHANGE UP (ref 3.5–5.3)
POTASSIUM SERPL-SCNC: 5.2 MMOL/L — SIGNIFICANT CHANGE UP (ref 3.5–5.3)
SODIUM SERPL-SCNC: 128 MMOL/L — LOW (ref 135–145)

## 2018-02-15 PROCEDURE — 99233 SBSQ HOSP IP/OBS HIGH 50: CPT

## 2018-02-15 PROCEDURE — 99233 SBSQ HOSP IP/OBS HIGH 50: CPT | Mod: GC

## 2018-02-15 RX ORDER — DEXTROSE 50 % IN WATER 50 %
50 SYRINGE (ML) INTRAVENOUS ONCE
Qty: 0 | Refills: 0 | Status: COMPLETED | OUTPATIENT
Start: 2018-02-15 | End: 2018-02-15

## 2018-02-15 RX ORDER — ACETAMINOPHEN 500 MG
650 TABLET ORAL EVERY 6 HOURS
Qty: 0 | Refills: 0 | Status: DISCONTINUED | OUTPATIENT
Start: 2018-02-15 | End: 2018-02-25

## 2018-02-15 RX ORDER — HYDROMORPHONE HYDROCHLORIDE 2 MG/ML
0.5 INJECTION INTRAMUSCULAR; INTRAVENOUS; SUBCUTANEOUS ONCE
Qty: 0 | Refills: 0 | Status: COMPLETED | OUTPATIENT
Start: 2018-02-15 | End: 2018-02-15

## 2018-02-15 RX ORDER — HYDROMORPHONE HYDROCHLORIDE 2 MG/ML
0.2 INJECTION INTRAMUSCULAR; INTRAVENOUS; SUBCUTANEOUS
Qty: 0 | Refills: 0 | Status: DISCONTINUED | OUTPATIENT
Start: 2018-02-15 | End: 2018-02-16

## 2018-02-15 RX ORDER — ONDANSETRON 8 MG/1
4 TABLET, FILM COATED ORAL EVERY 6 HOURS
Qty: 0 | Refills: 0 | Status: DISCONTINUED | OUTPATIENT
Start: 2018-02-15 | End: 2018-02-25

## 2018-02-15 RX ORDER — HYDROMORPHONE HYDROCHLORIDE 2 MG/ML
0.2 INJECTION INTRAMUSCULAR; INTRAVENOUS; SUBCUTANEOUS EVERY 12 HOURS
Qty: 0 | Refills: 0 | Status: DISCONTINUED | OUTPATIENT
Start: 2018-02-15 | End: 2018-02-16

## 2018-02-15 RX ADMIN — Medication 1 SPRAY(S): at 17:29

## 2018-02-15 RX ADMIN — HYDROMORPHONE HYDROCHLORIDE 2 MILLIGRAM(S): 2 INJECTION INTRAMUSCULAR; INTRAVENOUS; SUBCUTANEOUS at 04:02

## 2018-02-15 RX ADMIN — HYDROMORPHONE HYDROCHLORIDE 0.2 MILLIGRAM(S): 2 INJECTION INTRAMUSCULAR; INTRAVENOUS; SUBCUTANEOUS at 17:44

## 2018-02-15 RX ADMIN — Medication 50 MILLILITER(S): at 13:09

## 2018-02-15 RX ADMIN — HYDROMORPHONE HYDROCHLORIDE 2 MILLIGRAM(S): 2 INJECTION INTRAMUSCULAR; INTRAVENOUS; SUBCUTANEOUS at 03:11

## 2018-02-15 RX ADMIN — LACTULOSE 10 GRAM(S): 10 SOLUTION ORAL at 17:28

## 2018-02-15 RX ADMIN — HYDROMORPHONE HYDROCHLORIDE 0.2 MILLIGRAM(S): 2 INJECTION INTRAMUSCULAR; INTRAVENOUS; SUBCUTANEOUS at 17:29

## 2018-02-15 RX ADMIN — ALTEPLASE 2 MILLIGRAM(S): KIT at 02:53

## 2018-02-15 RX ADMIN — Medication 0.5 MILLIGRAM(S): at 03:54

## 2018-02-15 RX ADMIN — ENOXAPARIN SODIUM 40 MILLIGRAM(S): 100 INJECTION SUBCUTANEOUS at 13:15

## 2018-02-15 NOTE — CHART NOTE - NSCHARTNOTEFT_GEN_A_CORE
RN called because pt's R port was not drawing back. She mentioned that there was an order for alteplase to clear the clot, however, she mentioned that an MD had to push it. Pt was seen at bedside and it was confirmed by me that the port was not drawing blood. Prepared 2ml of Ateplase as per instructions and injected it into the port. After waiting a period of 30 mins, I aspirated to see if the clot had cleared, however, there was no blood return. As per instructions on drug insert, I waited for 120 min... RN called because pt's R port was not drawing back. She mentioned that there was an order for alteplase to clear the clot, however, she mentioned that an MD had to push it. Pt was seen at bedside and it was confirmed by me that the port was not drawing blood. Prepared 2ml of Ateplase as per instructions and injected it into the port. After waiting a period of 30 mins, I aspirated to see if the clot had cleared, however, there was no blood return. As per instructions on drug insert, I waited for 120 min and retied, however, there was no blood return. I will inform the day team to attempt  again.    MAYANK Butts MD-PGY1  Internal Medicine Department  Pager: [phone] / [phone]

## 2018-02-15 NOTE — PROGRESS NOTE ADULT - PROBLEM SELECTOR PLAN 4
- In setting of MIKI  - BMP q12  - Calcium gluconate, dextrose, IV insulin, and albuterol given when K 6.0 yesterday AM w/ mild improvement in K+  - Will cont to treat electrolyte abnormalities - In setting of MIKI  - BMP q12  - Will cont to treat electrolyte abnormalities

## 2018-02-15 NOTE — PROGRESS NOTE ADULT - PROBLEM SELECTOR PLAN 2
- Likely due to metastatic disease and hepatic failure  - No response to fluids  - Renal ultrasound without evidence of hydronephrosis or obstruction  - Will cont to treat electrolyte abnormalities. Pt agreeable to BMP q12h.   - Nephrostomy tube flushed by IR, no further interventions - Likely due to metastatic disease and hepatic failure  - No response to fluids  - Renal ultrasound without evidence of hydronephrosis or obstruction  - Will cont to treat electrolyte abnormalities. Pt agreeable to BMP q12h.

## 2018-02-15 NOTE — PROGRESS NOTE ADULT - ASSESSMENT
31 yo M h/o rectal cancer (unresectable, and metastatic to liver and lungs) s/p diverting colostomy on (10/17) with post-op course c/b perirectal abscess s/p STEPHANE drain and R nephrostomy w/ pelvic collection/malignancy caused R hydronephrosis who presents from IR for hyponatremia, hyperkalemia and leukocytosis, now w/ new MIKI for which Nephrology was consulted, also relative hyperKalemia, w/ acute decline in UOP concerning for obstruction.

## 2018-02-15 NOTE — PROGRESS NOTE ADULT - PROBLEM SELECTOR PLAN 9
Patient now DNR/DNI, continuing current medical management but with focus on comfort care. Transfer to PCU when bed available.

## 2018-02-15 NOTE — PROGRESS NOTE ADULT - SUBJECTIVE AND OBJECTIVE BOX
Note Author: Seth Burden, Medicine PGY 2  [facility] Pager: [phone]  [facility] Pager: [phone]  *** Nephrology Attending attestation to follow     Patient is a 32y old  Male who presents with a chief complaint of Hyponatremia, Hyperkalemia, Leukocytosis (08 Feb 2018 14:16)    SUBJECTIVE / OVERNIGHT EVENTS:  No acute events overnight. Patient seen and examined in AM. Pt resting comfortably in bed, no complaints.     MEDICATIONS  (STANDING):  dextrose 50% Injectable 50 milliLiter(s) IV Push once  enoxaparin Injectable 40 milliGRAM(s) SubCutaneous daily  fluticasone propionate 50 MICROgram(s)/spray Nasal Spray 1 Spray(s) Both Nostrils two times a day  HYDROmorphone  Injectable 0.2 milliGRAM(s) IV Push every 12 hours  lactulose Syrup 10 Gram(s) Oral two times a day  sodium chloride 2 Gram(s) Oral three times a day  sodium chloride 0.9%. 1000 milliLiter(s) (75 mL/Hr) IV Continuous <Continuous>  sodium chloride 0.9%. 1000 milliLiter(s) (70 mL/Hr) IV Continuous <Continuous>    MEDICATIONS  (PRN):  ALPRAZolam 0.5 milliGRAM(s) Oral two times a day PRN anxiety  HYDROmorphone   Tablet 2 milliGRAM(s) Oral every 6 hours PRN Severe Pain (7 - 10)      CAPILLARY BLOOD GLUCOSE      POCT Blood Glucose.: 91 mg/dL (14 Feb 2018 16:30)    I&O's Summary    14 Feb 2018 07:01  -  15 Feb 2018 07:00  --------------------------------------------------------  IN: 160 mL / OUT: 810 mL / NET: -650 mL        Vital Signs Last 24 Hrs  T(C): 36.3 (15 Feb 2018 11:43), Max: 36.7 (14 Feb 2018 20:55)  T(F): 97.4 (15 Feb 2018 11:43), Max: 98.1 (14 Feb 2018 20:55)  HR: 106 (15 Feb 2018 11:43) (106 - 113)  BP: 98/68 (15 Feb 2018 11:43) (96/67 - 109/71)  BP(mean): --  RR: 18 (15 Feb 2018 11:43) (17 - 18)  SpO2: 97% (15 Feb 2018 11:43) (97% - 99%)    PHYSICAL EXAM:  General: NAD, well-developed  Eyes: EOMI, (+) scleral icteris  Neck: Supple, No JVD  Chest/Lung: Clear to auscultation bilaterally; No wheezes  Heart: (+) tachycardic; No murmurs, rubs, or gallops.   Abdome: Soft, Nontender, (+) mild/mod distension, (+) L ostomy bag in place, (+) R nephrostomy in place with both sites dry  Extremities: 3-4+ lower extremity edema   Psych: AAOx3  Neurology: non-focal, strength and sensation grossly intact UE and LE BL. No spinal TTP  Skin: (+) jaundiced  LABS:                        10.7   19.47 )-----------( 146      ( 14 Feb 2018 13:05 )             36.0       02-15    128<L>  |  90<L>  |  87<H>  ----------------------------<  64<L>  5.2   |  17<L>  |  2.64<H>    Ca    8.6      15 Feb 2018 11:20  Phos  6.5     02-14  Mg     3.4     02-14    TPro  5.7<L>  /  Alb  2.0<L>  /  TBili  12.7<H>  /  DBili  x   /  AST  515<H>  /  ALT  394<H>  /  AlkPhos  333<H>  02-14      PT/INR - ( 14 Feb 2018 07:30 )   PT: 18.7 sec;   INR: 1.64 ratio

## 2018-02-15 NOTE — PROGRESS NOTE ADULT - PROBLEM SELECTOR PLAN 1
- Neph team reconsulted to eval etiology of MIKI and to treat any reversible causes.   - US on Feb 13th revealed no evid of hydronephrosis  - albumin remains low, contributing to 3rd spacing. SAAG score Feb 8th at 1.7 c/w portal hypertension as etiology of ascites.   - feb 12th FeNa at 0.44 c/w pre-renal dz. HRS unlikely given urine Na in ~40s (expect u Na < 20 in HRS)  - pt remains w/ low UOPs.   - MIKI etiology most likely pre-Renal w/ pt 3rd spacing, likely poor circulating volume w/ decr'd flow to kidney.    - difficult to improve kidney function in setting of advanced cirrhosis.   - cannot rule out mild AIN given 2-5 WBCs in urine  - though data suggests limited improvement w/ albumin, would be reasonable to attempt albumin challenge and monitor kidney function to potentially prolonged further renal function deterioration.  - Could also add midodrine and/or octreotide given lack of alternative therapies, though all of above unlikely to change overall clinical course.   - pt remains comfort care status, pt and fammily understand severity of condition, would not draw labs more than once per day, would be reasonable to hold off further interventions given overall poor prognosis and would continue to prioritize pt comfort.   - will discuss w/ attending regarding additional recommendations.

## 2018-02-15 NOTE — PROGRESS NOTE ADULT - PROBLEM SELECTOR PLAN 4
Patient is DNR/ DNI. Family wishes to stop checking bloodwork as patient gets woken up at night, and they just wish to keep patient comfortable. Spoke with Chinmay and patient's father outside of room, and they are aware of patient's guarded prognosis. They understand his renal failure is not improving, and that he will continue to decline. They wish to transfer the patient to the PCU as soon as a bed opens up. Father became tearful as he stated that he will never be prepared for when his son passes, but he is fully aware of the "whole picture." Psychosocial support provided. Will continue to follow.

## 2018-02-15 NOTE — CHART NOTE - NSCHARTNOTEFT_GEN_A_CORE
Source: Patient [ ]    Family [x ]     other [x ] Medical records, pt asleep with father at bedside, Team 1, pt discussed during rounds this AM; Pt seen for Malnutrition follow up. Per chart, 33 y/o male with stage 4 metastatic rectal cancer to liver and lungs, s/p [place] w/ colostomy c/b perirectal abscess s/p STEPHANE drain (10/23/2017), right hydronephrosis s/p nephrostomy, admitted with hyponatremia, hyperkalemia, leukocytosis during STEPHANE drain manipulation, ? fall at home, poor po intakes/appetite and increased water intake, worsening liver function 2/2 tumor burden, large volume ascites s/p diagnostic paracentesis 420cc removed (2/7), pending transfer to PCU, continue with medical treatment but focus on comfort measures. Pt remains with hyponatremia on salt tabs, per Team 1 no fluid restriction as pt pending transfer to PCU.       Diet : Regular Ensure Enlive 2 x day, prosource 1 x day      Patient reports [ ] nausea  [ ] vomiting [ ] diarrhea [ ] constipation  [ ]chewing problems [ ] swallowing issues  [ ] other: No acute GI distress noted at this time, but pt with abdominal distention, ostomy output 250cc per flowsheet today      PO intake:  < 50% [ x] 50-75% [ ]   % [ ]  other :     Source for PO intake [ ] Patient [x ] family [x ] chart [ ] staff [ ] other- Per father, pt was awake all night and too tired to eat during the day, reports <50% po intakes per father      Current Weight: 160 pounds (dosing), 174.1 pounds (today)  % Weight Change    Pertinent Medications: MEDICATIONS  (STANDING):  enoxaparin Injectable 40 milliGRAM(s) SubCutaneous daily  fluticasone propionate 50 MICROgram(s)/spray Nasal Spray 1 Spray(s) Both Nostrils two times a day  lactulose Syrup 10 Gram(s) Oral two times a day  sodium chloride 2 Gram(s) Oral three times a day  sodium chloride 0.9%. 1000 milliLiter(s) (75 mL/Hr) IV Continuous <Continuous>  sodium chloride 0.9%. 1000 milliLiter(s) (70 mL/Hr) IV Continuous <Continuous>    MEDICATIONS  (PRN):  ALPRAZolam 0.5 milliGRAM(s) Oral two times a day PRN anxiety  HYDROmorphone   Tablet 2 milliGRAM(s) Oral every 6 hours PRN Severe Pain (7 - 10)    Pertinent Labs:  02-14 Na126 mmol/L<L> Glu 134 mg/dL<H> K+ 4.9 mmol/L Cr  2.65 mg/dL<H> BUN 80 mg/dL<H> Phos n/a   Alb n/a   PAB n/a         Skin: +2 generalized, +3 bilateral legs edema, no pressure injuries     Estimated Needs:   [ x] no change since previous assessment  [ ] recalculated:       Previous Nutrition Diagnosis:     [ ] Inadequate Energy Intake [ ]Inadequate Oral Intake [ ] Excessive Energy Intake     [ ] Underweight [ ] Increased Nutrient Needs [ ] Overweight/Obesity     [ ] Altered GI Function [ ] Unintended Weight Loss [ ] Food & Nutrition Related Knowledge Deficit [x ] Malnutrition - severe         Nutrition Diagnosis is [ x] ongoing  [ ] resolved [ ] not applicable          New Nutrition Diagnosis: [x ] not applicable        Recommend    [ x] Change Diet To: Noted potassium trending high - recommend diet change to no concentrated potassium diet    [x ] Nutrition Supplement: Recommend to change supplement to Nepro 2 x day and continue with Prosource 1 x day, discussed with Team 1    [ ] Nutrition Support    [ ] Other:        Monitoring and Evaluation:     [x ] PO intake [x ] Tolerance to diet prescription [x ] weights [ ] follow up per protocol    [ ] other:

## 2018-02-15 NOTE — PROGRESS NOTE ADULT - PROBLEM SELECTOR PLAN 3
Secondary to progression of disease. Patient with decreased PO intake. Encouraged family to bring in foods that patients likes to eat.

## 2018-02-15 NOTE — PROGRESS NOTE ADULT - PROBLEM SELECTOR PLAN 6
- Hyponatremia likely 2/2 decreased effective blood volume in setting of liver failure.   - c/w salt tabs - Hyponatremia likely 2/2 decreased effective blood volume in setting of liver failure.   - c/w salt tabs and NS @70cc/h stop after 12h

## 2018-02-15 NOTE — PROGRESS NOTE ADULT - PROBLEM SELECTOR PLAN 1
- Patient now DNR/DNI and comfort measures. Continuing current medical management but with focus on comfort care.  - Plan for transfer to PCU when bed available

## 2018-02-15 NOTE — PROGRESS NOTE ADULT - PROBLEM SELECTOR PLAN 8
DVT: Lovenox  Diet: Reg, NPO past midnight for possible IR procedure today  Dispo: PT
DVT: Lovenox  Diet: Reg, NPO past midnight for possible IR procedure today  Dispo: PT
DVT: Lovenox  Diet: Reg,   Dispo: Plan for PCU when bed becomes available

## 2018-02-15 NOTE — PROGRESS NOTE ADULT - SUBJECTIVE AND OBJECTIVE BOX
INTERVAL HPI/OVERNIGHT EVENTS: Patient feels fatigued. Endorses discomfort. Father and partner at bedside.     Allergies    No Known Allergies    Intolerances        ADVANCE DIRECTIVES:    DNR: [ x] NO [ ] YES (Date) MOLST [ x] NO [ ] YES (Date)    MEDICATIONS  (STANDING):  enoxaparin Injectable 40 milliGRAM(s) SubCutaneous daily  fluticasone propionate 50 MICROgram(s)/spray Nasal Spray 1 Spray(s) Both Nostrils two times a day  lactulose Syrup 10 Gram(s) Oral two times a day  piperacillin/tazobactam IVPB. 3.375 Gram(s) IV Intermittent every 8 hours    MEDICATIONS  (PRN):  ALPRAZolam 0.5 milliGRAM(s) Oral two times a day PRN anxiety  HYDROmorphone   Tablet 2 milliGRAM(s) Oral every 6 hours PRN Severe Pain (7 - 10)  ibuprofen  Tablet 600 milliGRAM(s) Oral every 6 hours PRN moderate pain (4-6)      PRESENT SYMPTOMS:  Source: [x ] Patient   [ ] Family   [ ] Team     Pain:                        [ x] No [ ] Yes             [ ] Mild [ ] Moderate [ ] Severe- states he is uncomfortable, but not in pain    Onset -  Location -  Duration -  Character -  Alleviating/Aggravating -  Radiation -  Timing -    Dyspnea:                [x ] No [ ] Yes             [ ] Mild [ ] Moderate [ ] Severe    Anxiety:                  [x ] No [ ] Yes             [ ] Mild [ ] Moderate [ ] Severe    Fatigue:                  [ ] No [x ] Yes             [x ] Mild [ ] Moderate [ ] Severe    Nausea:                  [ x] No [ ] Yes             [ ] Mild [ ] Moderate [ ] Severe    Loss of appetite:   [ ] No [x ] Yes             [ x Mild [ ] Moderate [ ] Severe    Constipation:        [x ] No [ ] Yes             [ ] Mild [ ] Moderate [ ] Severe    Other Symptoms:  [x ] All other review of systems negative   [ ] Unable to obtain due to poor mentation     Karnofsky Performance Score/Palliative Performance Status Version 2:     40    %    PHYSICAL EXAM:  Vital Signs Last 24 Hrs  T(C): 36.4 (12 Feb 2018 04:05), Max: 37 (11 Feb 2018 13:30)  T(F): 97.5 (12 Feb 2018 04:05), Max: 98.6 (11 Feb 2018 13:30)  HR: 106 (12 Feb 2018 04:05) (106 - 113)  BP: 95/67 (12 Feb 2018 04:05) (95/67 - 108/70)  BP(mean): --  RR: 18 (12 Feb 2018 04:05) (17 - 18)  SpO2: 97% (12 Feb 2018 04:05) (94% - 97%) I&O's Summary    11 Feb 2018 07:01  -  12 Feb 2018 07:00  --------------------------------------------------------  IN: 1250 mL / OUT: 575 mL / NET: 675 mL    12 Feb 2018 07:01  -  12 Feb 2018 13:22  --------------------------------------------------------  IN: 120 mL / OUT: 0 mL / NET: 120 mL        General:  [x ] Alert  [x ] Oriented x  3    [ ] Lethargic  [ ] Agitated   [x ] Cachexia   [ ] Unarousable  [x ] Verbal  [ ] Non-Verbal    HEENT:  [x ] Normal   [ ] Dry mouth   [ ] ET Tube    [ ] Trach  [ ] Oral lesions    Lungs:   [x ] Clear [ ] Tachypnea  [ ] Audible excessive secretions   [ ] Rhonchi        [ ] Right [ ] Left [ ] Bilateral  [ ] Crackles        [ ] Right [ ] Left [ ] Bilateral  [ ] Wheezing     [ ] Right [ ] Left [ ] Bilateral    Cardiovascular:  [x ] Regular [ ] Irregular [ ] Tachycardia   [ ] Bradycardia  [ ] Murmur [ ] Other    Abdomen: [ x] Soft  [ ] Distended   [x ] +BS  [ ] Non tender [ ] Tender  [ ]PEG   [ ]OGT/ NGT   Last BM:       Genitourinary: [x ] Normal [ ] Incontinent   [ ] Oliguria/Anuria   [ ] Anderson    Musculoskeletal:  [ ] Normal   [x ] Weakness  [ ] Bedbound/Wheelchair bound [ ] Edema    Neurological: [x ] No focal deficits  [ ] Cognitive impairment  [ ] Dysphagia [ ] Dysarthria [ ] Paresis [ ] Other     Skin: [ x] Normal   [ ] Pressure ulcer(s)                  [ ] Rash    LABS: reviewed      Oral Intake: [ ] Unable/mouth care only [ ] Minimal [ ] Moderate [ x] Full Capability  Diet: [ ] NPO [ ] Tube feeds [ ] TPN [ ] Other     RADIOLOGY & ADDITIONAL STUDIES: reviewed    REFERRALS:   [x ] Chaplaincy  [ ] Hospice  [ ] Child Life  [x ] Social Work  [ ] Case management [ ] Holistic Therapy

## 2018-02-15 NOTE — PROGRESS NOTE ADULT - PROBLEM SELECTOR PLAN 5
- Patient has multiple potential sources of infection including his NT, rectal abscess, and mild to moderate ascites.  - c/w zosyn. Bcx and Ucx NGTD from 2/6.   - Paracentesis showing transudative effusion. Seg count less than 250  - Currently Afebrile, monitor fever curve. - Patient has multiple potential sources of infection including his NT, rectal abscess, and mild to moderate ascites.  - Zosyn d/c  - Paracentesis showing transudative effusion. Seg count less than 250  - Currently Afebrile

## 2018-02-15 NOTE — PROGRESS NOTE ADULT - PROBLEM SELECTOR PROBLEM 9
Goals of care, counseling/discussion

## 2018-02-15 NOTE — PROGRESS NOTE ADULT - PROBLEM SELECTOR PLAN 1
Not a candidate for further DMT. Patient now with MIKI and worsening hepatic dysfunction. Father and partner are in agreement with starting low dose IV dilaudid (0.2mg q12hrs) as patient is not asking for pain meds.

## 2018-02-16 PROCEDURE — 99233 SBSQ HOSP IP/OBS HIGH 50: CPT | Mod: GC

## 2018-02-16 RX ORDER — HYDROMORPHONE HYDROCHLORIDE 2 MG/ML
0.5 INJECTION INTRAMUSCULAR; INTRAVENOUS; SUBCUTANEOUS
Qty: 0 | Refills: 0 | Status: DISCONTINUED | OUTPATIENT
Start: 2018-02-16 | End: 2018-02-20

## 2018-02-16 RX ORDER — HYDROMORPHONE HYDROCHLORIDE 2 MG/ML
0.2 INJECTION INTRAMUSCULAR; INTRAVENOUS; SUBCUTANEOUS EVERY 6 HOURS
Qty: 0 | Refills: 0 | Status: DISCONTINUED | OUTPATIENT
Start: 2018-02-16 | End: 2018-02-18

## 2018-02-16 RX ADMIN — SODIUM CHLORIDE 2 GRAM(S): 9 INJECTION INTRAMUSCULAR; INTRAVENOUS; SUBCUTANEOUS at 09:28

## 2018-02-16 RX ADMIN — ENOXAPARIN SODIUM 40 MILLIGRAM(S): 100 INJECTION SUBCUTANEOUS at 12:01

## 2018-02-16 RX ADMIN — HYDROMORPHONE HYDROCHLORIDE 0.2 MILLIGRAM(S): 2 INJECTION INTRAMUSCULAR; INTRAVENOUS; SUBCUTANEOUS at 01:31

## 2018-02-16 RX ADMIN — HYDROMORPHONE HYDROCHLORIDE 0.2 MILLIGRAM(S): 2 INJECTION INTRAMUSCULAR; INTRAVENOUS; SUBCUTANEOUS at 02:00

## 2018-02-16 RX ADMIN — HYDROMORPHONE HYDROCHLORIDE 0.2 MILLIGRAM(S): 2 INJECTION INTRAMUSCULAR; INTRAVENOUS; SUBCUTANEOUS at 12:01

## 2018-02-16 RX ADMIN — HYDROMORPHONE HYDROCHLORIDE 0.2 MILLIGRAM(S): 2 INJECTION INTRAMUSCULAR; INTRAVENOUS; SUBCUTANEOUS at 06:36

## 2018-02-16 RX ADMIN — LACTULOSE 10 GRAM(S): 10 SOLUTION ORAL at 06:36

## 2018-02-16 RX ADMIN — LACTULOSE 10 GRAM(S): 10 SOLUTION ORAL at 19:48

## 2018-02-16 RX ADMIN — SODIUM CHLORIDE 2 GRAM(S): 9 INJECTION INTRAMUSCULAR; INTRAVENOUS; SUBCUTANEOUS at 01:30

## 2018-02-16 RX ADMIN — Medication 1 SPRAY(S): at 06:36

## 2018-02-16 RX ADMIN — SODIUM CHLORIDE 2 GRAM(S): 9 INJECTION INTRAMUSCULAR; INTRAVENOUS; SUBCUTANEOUS at 15:25

## 2018-02-16 RX ADMIN — HYDROMORPHONE HYDROCHLORIDE 0.5 MILLIGRAM(S): 2 INJECTION INTRAMUSCULAR; INTRAVENOUS; SUBCUTANEOUS at 15:34

## 2018-02-16 RX ADMIN — HYDROMORPHONE HYDROCHLORIDE 0.2 MILLIGRAM(S): 2 INJECTION INTRAMUSCULAR; INTRAVENOUS; SUBCUTANEOUS at 18:15

## 2018-02-16 RX ADMIN — HYDROMORPHONE HYDROCHLORIDE 0.5 MILLIGRAM(S): 2 INJECTION INTRAMUSCULAR; INTRAVENOUS; SUBCUTANEOUS at 15:48

## 2018-02-16 NOTE — PROGRESS NOTE ADULT - PROBLEM SELECTOR PLAN 5
Patient is DNR/ DNI. resting comfortably in bed.  pt required 1 dose of dilaudid overnight.  Chinmay and pt in agreement that pt needs pain meds more frequently ATC as he does not like to ask for the meds.  Will continue to follow.

## 2018-02-16 NOTE — PROGRESS NOTE ADULT - SUBJECTIVE AND OBJECTIVE BOX
INTERVAL HPI/OVERNIGHT EVENTS: Patient feels fatigued. Endorses discomfort. Father and partner at bedside.     Allergies    No Known Allergies    Intolerances        ADVANCE DIRECTIVES:    DNR: [ x] NO [ ] YES (Date) MOLST [ x] NO [ ] YES (Date)    MEDICATIONS  (STANDING):  enoxaparin Injectable 40 milliGRAM(s) SubCutaneous daily  fluticasone propionate 50 MICROgram(s)/spray Nasal Spray 1 Spray(s) Both Nostrils two times a day  HYDROmorphone  Injectable 0.2 milliGRAM(s) IV Push every 6 hours  lactulose Syrup 10 Gram(s) Oral two times a day  sodium chloride 2 Gram(s) Oral three times a day    MEDICATIONS  (PRN):  acetaminophen  Suppository 650 milliGRAM(s) Rectal every 6 hours PRN For Temp greater than 38 C (100.4 F)  HYDROmorphone  Injectable 0.5 milliGRAM(s) IV Push every 1 hour PRN pain  HYDROmorphone  Injectable 0.5 milliGRAM(s) IV Push every 1 hour PRN dyspnea  LORazepam   Injectable 0.5 milliGRAM(s) IV Push every 6 hours PRN Agitation  ondansetron Injectable 4 milliGRAM(s) IV Push every 6 hours PRN Nausea and/or Vomiting        PRESENT SYMPTOMS:  Source: [x ] Patient   [ ] Family   [ ] Team     Pain:                        [ x] No [ ] Yes             [ ] Mild [ ] Moderate [ ] Severe- states he is uncomfortable, but not in pain    Onset -  Location -  Duration -  Character -  Alleviating/Aggravating -  Radiation -  Timing -    Dyspnea:                [x ] No [ ] Yes             [ ] Mild [ ] Moderate [ ] Severe    Anxiety:                  [x ] No [ ] Yes             [ ] Mild [ ] Moderate [ ] Severe    Fatigue:                  [ ] No [x ] Yes             [x ] Mild [ ] Moderate [ ] Severe    Nausea:                  [ x] No [ ] Yes             [ ] Mild [ ] Moderate [ ] Severe    Loss of appetite:   [ ] No [x ] Yes             [ x Mild [ ] Moderate [ ] Severe    Constipation:        [x ] No [ ] Yes             [ ] Mild [ ] Moderate [ ] Severe    Other Symptoms:  [x ] All other review of systems negative   [ ] Unable to obtain due to poor mentation     Karnofsky Performance Score/Palliative Performance Status Version 2:     40    %    PHYSICAL EXAM:  Vital Signs Last 24 Hrs  T(C): 36.5 (16 Feb 2018 11:00), Max: 36.5 (16 Feb 2018 11:00)  T(F): 97.7 (16 Feb 2018 11:00), Max: 97.7 (16 Feb 2018 11:00)  HR: 107 (16 Feb 2018 11:00) (107 - 111)  BP: 106/67 (16 Feb 2018 11:00) (101/69 - 106/67)  BP(mean): --  RR: 16 (16 Feb 2018 11:00) (16 - 18)  SpO2: 95% (16 Feb 2018 11:00) (95% - 97%)mL        General:  [x ] Alert  [x ] Oriented x  3    [ ] Lethargic  [ ] Agitated   [x ] Cachexia   [ ] Unarousable  [x ] Verbal  [ ] Non-Verbal    HEENT:  [x ] Normal   [ ] Dry mouth   [ ] ET Tube    [ ] Trach  [ ] Oral lesions    Lungs:   [x ] Clear [ ] Tachypnea  [ ] Audible excessive secretions   [ ] Rhonchi        [ ] Right [ ] Left [ ] Bilateral  [ ] Crackles        [ ] Right [ ] Left [ ] Bilateral  [ ] Wheezing     [ ] Right [ ] Left [ ] Bilateral    Cardiovascular:  [x ] Regular [ ] Irregular [x ] Tachycardia   [ ] Bradycardia  [ ] Murmur [ ] Other    Abdomen: [ x] Soft  [ ] Distended   [x ] +BS  [ ] Non tender [ ] Tender  [ ]PEG   [ ]OGT/ NGT   Last BM:   colostomy    Genitourinary: [x ] Normal [ ] Incontinent   [ ] Oliguria/Anuria   [ ] Anderson    Musculoskeletal:  [ ] Normal   [x ] Weakness  [ ] Bedbound/Wheelchair bound [ ] Edema    Neurological: [x ] No focal deficits  [ ] Cognitive impairment  [ ] Dysphagia [ ] Dysarthria [ ] Paresis [ ] Other     Skin: [ x] Normal   [ ] Pressure ulcer(s)                  [ ] Rash    LABS: reviewed      Oral Intake: [ ] Unable/mouth care only [ ] Minimal [ ] Moderate [ x] Full Capability  Diet: [ ] NPO [ ] Tube feeds [ ] TPN [ ] Other     RADIOLOGY & ADDITIONAL STUDIES: reviewed    REFERRALS:   [x ] Chaplaincy  [ ] Hospice  [ ] Child Life  [x ] Social Work  [ ] Case management [ ] Holistic Therapy INTERVAL HPI/OVERNIGHT EVENTS: Patient feels fatigued. Endorses discomfort. Father and partner at bedside.     Allergies    No Known Allergies    Intolerances        ADVANCE DIRECTIVES:    DNR: [ ] NO [x ] YES (Date) MOLST [ x] NO [ ] YES (Date)    MEDICATIONS  (STANDING):  enoxaparin Injectable 40 milliGRAM(s) SubCutaneous daily  fluticasone propionate 50 MICROgram(s)/spray Nasal Spray 1 Spray(s) Both Nostrils two times a day  HYDROmorphone  Injectable 0.2 milliGRAM(s) IV Push every 6 hours  lactulose Syrup 10 Gram(s) Oral two times a day  sodium chloride 2 Gram(s) Oral three times a day    MEDICATIONS  (PRN):  acetaminophen  Suppository 650 milliGRAM(s) Rectal every 6 hours PRN For Temp greater than 38 C (100.4 F)  HYDROmorphone  Injectable 0.5 milliGRAM(s) IV Push every 1 hour PRN pain  HYDROmorphone  Injectable 0.5 milliGRAM(s) IV Push every 1 hour PRN dyspnea  LORazepam   Injectable 0.5 milliGRAM(s) IV Push every 6 hours PRN Agitation  ondansetron Injectable 4 milliGRAM(s) IV Push every 6 hours PRN Nausea and/or Vomiting        PRESENT SYMPTOMS:  Source: [x ] Patient   [ ] Family   [ ] Team     Pain:                        [ x] No [ ] Yes             [ ] Mild [ ] Moderate [ ] Severe- states he is uncomfortable, but not in pain    Onset -  Location -  Duration -  Character -  Alleviating/Aggravating -  Radiation -  Timing -    Dyspnea:                [x ] No [ ] Yes             [ ] Mild [ ] Moderate [ ] Severe    Anxiety:                  [x ] No [ ] Yes             [ ] Mild [ ] Moderate [ ] Severe    Fatigue:                  [ ] No [x ] Yes             [x ] Mild [ ] Moderate [ ] Severe    Nausea:                  [ x] No [ ] Yes             [ ] Mild [ ] Moderate [ ] Severe    Loss of appetite:   [ ] No [x ] Yes             [ x Mild [ ] Moderate [ ] Severe    Constipation:        [x ] No [ ] Yes             [ ] Mild [ ] Moderate [ ] Severe    Other Symptoms:  [x ] All other review of systems negative   [ ] Unable to obtain due to poor mentation     Karnofsky Performance Score/Palliative Performance Status Version 2:     40    %    PHYSICAL EXAM:  Vital Signs Last 24 Hrs  T(C): 36.5 (16 Feb 2018 11:00), Max: 36.5 (16 Feb 2018 11:00)  T(F): 97.7 (16 Feb 2018 11:00), Max: 97.7 (16 Feb 2018 11:00)  HR: 107 (16 Feb 2018 11:00) (107 - 111)  BP: 106/67 (16 Feb 2018 11:00) (101/69 - 106/67)  BP(mean): --  RR: 16 (16 Feb 2018 11:00) (16 - 18)  SpO2: 95% (16 Feb 2018 11:00) (95% - 97%)mL        General: Cachectic  [x ] Alert  [x ] Oriented x  3    [ ] Lethargic  [ ] Agitated   [x ] Cachexia   [ ] Unarousable  [x ] Verbal  [ ] Non-Verbal    HEENT:  [x ] Normal   [ ] Dry mouth   [ ] ET Tube    [ ] Trach  [ ] Oral lesions    Lungs:   [x ] Clear [ ] Tachypnea  [ ] Audible excessive secretions   [ ] Rhonchi        [ ] Right [ ] Left [ ] Bilateral  [ ] Crackles        [ ] Right [ ] Left [ ] Bilateral  [ ] Wheezing     [ ] Right [ ] Left [ ] Bilateral    Cardiovascular:  [x ] Regular [ ] Irregular [x ] Tachycardia   [ ] Bradycardia  [ ] Murmur [ ] Other    Abdomen: [ x] Soft  [x ] Distended   [x ] +BS  [x ] Non tender [ ] Tender  [ ]PEG   [ ]OGT/ NGT   Last BM:   colostomy    Genitourinary: [x ] Normal [ ] Incontinent   [ ] Oliguria/Anuria   [ ] Anderson    Musculoskeletal:  [ ] Normal   [x ] Weakness  [ ] Bedbound/Wheelchair bound [ ] Edema    Neurological: [x ] No focal deficits  [ ] Cognitive impairment  [ ] Dysphagia [ ] Dysarthria [ ] Paresis [ ] Other     Skin: [ x] Normal   [ ] Pressure ulcer(s)                  [ ] Rash    LABS: reviewed      Oral Intake: [ ] Unable/mouth care only [ ] Minimal [ ] Moderate [ x] Full Capability  Diet: [ ] NPO [ ] Tube feeds [ ] TPN [ ] Other     RADIOLOGY & ADDITIONAL STUDIES: reviewed    REFERRALS:   [x ] Chaplaincy  [ ] Hospice  [ ] Child Life  [x ] Social Work  [ ] Case management [ ] Holistic Therapy

## 2018-02-16 NOTE — PROGRESS NOTE ADULT - PROBLEM SELECTOR PLAN 4
Patient is DNR/ DNI. resting comfortably in bed.  pt required 1 dose of dilaudid overnight.  Chinmay and pt in agreement that pt needs pain meds more frequently ATC as he does not like to ask for the meds.  Will continue to follow. Secondary to progression of disease. Patient with decreased PO intake. Encouraged family to bring in foods that patients likes to eat and to follow patients lead.

## 2018-02-16 NOTE — PROGRESS NOTE ADULT - PROBLEM SELECTOR PLAN 2
Secondary to metastatic cancer. Patient with worsening hepatic failure.  continue to monitor and manage symptoms of encephalopathy Not a candidate for further DMT due to poor performance status and multi-organ failure (liver/kidneys)

## 2018-02-16 NOTE — PROGRESS NOTE ADULT - PROBLEM SELECTOR PLAN 1
Not a candidate for further DMT. Patient now with MIKI and worsening hepatic dysfunction. Started on Dliaudid 0.2mg q12h.  Pt required 1 prn dose.  Will increase dilaudid to 0.2 mgs q6h atc and prn dosing Patient hesitant to ask for pain medication.  Does appear more comfortable after administration.  Started on Dliaudid 0.2mg q12h.  Pt required 1 prn dose.  Will increase dilaudid to 0.2 mgs q6h atc and prn dosing after discussion with family that comfort is the goal.

## 2018-02-16 NOTE — PROGRESS NOTE ADULT - ASSESSMENT
31 yo M h/o rectal cancer (unresectable, and metastatic to liver and lungs) s/p a diverting colostomy on 10/23/2017 with post-op course c/b perirectal abscess s/p IR placed STEPHANE drain and IR placed R nephrostomy pelvic collection/malignancy caused R hydronephrosis who presents from IR for hyponatremia, hyperkalemia and leukocytosis. Palliative care called for goals of care. 31 yo M h/o rectal cancer (unresectable, and metastatic to liver and lungs) s/p a diverting colostomy on 10/23/2017 with post-op course c/b perirectal abscess s/p IR placed STEPHANE drain and IR placed R nephrostomy pelvic collection/malignancy caused R hydronephrosis who presents from IR for hyponatremia, hyperkalemia and leukocytosis.   Patient transferred to palliative care unit for ongoing medical care in anticipation of escalating symptom burden.

## 2018-02-16 NOTE — PROGRESS NOTE ADULT - PROBLEM SELECTOR PLAN 3
Secondary to progression of disease. Patient with decreased PO intake. Encouraged family to bring in foods that patients likes to eat. Secondary to metastatic cancer. Patient with worsening hepatic failure.  continue to monitor and manage symptoms of encephalopathy

## 2018-02-17 DIAGNOSIS — G89.3 NEOPLASM RELATED PAIN (ACUTE) (CHRONIC): ICD-10-CM

## 2018-02-17 PROCEDURE — 99233 SBSQ HOSP IP/OBS HIGH 50: CPT | Mod: GC

## 2018-02-17 RX ORDER — SODIUM CHLORIDE 9 MG/ML
2 INJECTION INTRAMUSCULAR; INTRAVENOUS; SUBCUTANEOUS THREE TIMES A DAY
Qty: 0 | Refills: 0 | Status: DISCONTINUED | OUTPATIENT
Start: 2018-02-17 | End: 2018-02-21

## 2018-02-17 RX ADMIN — LACTULOSE 10 GRAM(S): 10 SOLUTION ORAL at 05:13

## 2018-02-17 RX ADMIN — LACTULOSE 10 GRAM(S): 10 SOLUTION ORAL at 18:05

## 2018-02-17 RX ADMIN — HYDROMORPHONE HYDROCHLORIDE 0.2 MILLIGRAM(S): 2 INJECTION INTRAMUSCULAR; INTRAVENOUS; SUBCUTANEOUS at 13:20

## 2018-02-17 RX ADMIN — ENOXAPARIN SODIUM 40 MILLIGRAM(S): 100 INJECTION SUBCUTANEOUS at 13:25

## 2018-02-17 RX ADMIN — Medication 1 SPRAY(S): at 05:13

## 2018-02-17 RX ADMIN — SODIUM CHLORIDE 2 GRAM(S): 9 INJECTION INTRAMUSCULAR; INTRAVENOUS; SUBCUTANEOUS at 16:21

## 2018-02-17 RX ADMIN — HYDROMORPHONE HYDROCHLORIDE 0.5 MILLIGRAM(S): 2 INJECTION INTRAMUSCULAR; INTRAVENOUS; SUBCUTANEOUS at 17:00

## 2018-02-17 RX ADMIN — HYDROMORPHONE HYDROCHLORIDE 0.2 MILLIGRAM(S): 2 INJECTION INTRAMUSCULAR; INTRAVENOUS; SUBCUTANEOUS at 05:13

## 2018-02-17 RX ADMIN — Medication 1 SPRAY(S): at 18:02

## 2018-02-17 RX ADMIN — HYDROMORPHONE HYDROCHLORIDE 0.2 MILLIGRAM(S): 2 INJECTION INTRAMUSCULAR; INTRAVENOUS; SUBCUTANEOUS at 00:08

## 2018-02-17 RX ADMIN — HYDROMORPHONE HYDROCHLORIDE 0.5 MILLIGRAM(S): 2 INJECTION INTRAMUSCULAR; INTRAVENOUS; SUBCUTANEOUS at 16:47

## 2018-02-17 NOTE — PROGRESS NOTE ADULT - PROBLEM SELECTOR PLAN 2
Not a candidate for further DMT due to poor performance status and multi-organ failure (liver/kidneys) Not a candidate for further disease modifying Rx (DMT) due to poor performance status and multi-organ failure (liver/kidneys)

## 2018-02-17 NOTE — PROGRESS NOTE ADULT - PROBLEM SELECTOR PLAN 5
Patient is DNR/ DNI. resting comfortably in bed, no questions about his care or any concerns Patient is DNR/ DNI. resting comfortably in bed, no questions about his care or any concerns.

## 2018-02-17 NOTE — PROGRESS NOTE ADULT - PROBLEM SELECTOR PLAN 4
Secondary to progression of disease. Patient with decreased PO intake. Encouraged family to bring in foods that patients likes to eat and to follow patients lead.  Father would like salt tabs to be offered with food and/or fluid if patient able to take them/alert.

## 2018-02-17 NOTE — PROGRESS NOTE ADULT - PROBLEM SELECTOR PLAN 3
Secondary to metastatic cancer. Patient with worsening hepatic failure.  continue to monitor and manage symptoms of encephalopathy

## 2018-02-17 NOTE — PROGRESS NOTE ADULT - ASSESSMENT
33 yo M h/o rectal cancer (unresectable, and metastatic to liver and lungs) s/p a diverting colostomy on 10/23/2017 with post-op course c/b perirectal abscess s/p IR placed STEPHANE drain and IR placed R nephrostomy pelvic collection/malignancy caused R hydronephrosis who presents from IR for hyponatremia, hyperkalemia and leukocytosis.   Patient transferred to palliative care unit for ongoing medical care in anticipation of escalating symptom burden.

## 2018-02-17 NOTE — PROGRESS NOTE ADULT - SUBJECTIVE AND OBJECTIVE BOX
INTERVAL HPI/OVERNIGHT EVENTS: Patient seen bedside, is sleepy, but alert and following conversation about plan of care and needs.  He states he is comfortable, father and partner are bedside and emphasize: no blood draws, full comfort care, feeds if he arousable and asking for them.      Allergies    No Known Allergies    Intolerances        ADVANCE DIRECTIVES:    DNR: [ ] NO [x ] YES (Date) MOLST [ x] NO [ ] YES (Date)    MEDICATIONS  (STANDING):  enoxaparin Injectable 40 milliGRAM(s) SubCutaneous daily  fluticasone propionate 50 MICROgram(s)/spray Nasal Spray 1 Spray(s) Both Nostrils two times a day  HYDROmorphone  Injectable 0.2 milliGRAM(s) IV Push every 6 hours  lactulose Syrup 10 Gram(s) Oral two times a day  sodium chloride 2 Gram(s) Oral three times a day    MEDICATIONS  (PRN):  acetaminophen  Suppository 650 milliGRAM(s) Rectal every 6 hours PRN For Temp greater than 38 C (100.4 F)  HYDROmorphone  Injectable 0.5 milliGRAM(s) IV Push every 1 hour PRN pain  HYDROmorphone  Injectable 0.5 milliGRAM(s) IV Push every 1 hour PRN dyspnea  LORazepam   Injectable 0.5 milliGRAM(s) IV Push every 6 hours PRN Agitation  ondansetron Injectable 4 milliGRAM(s) IV Push every 6 hours PRN Nausea and/or Vomiting          PRESENT SYMPTOMS:  Source: [x ] Patient   [ x] Family   [ x] Team     Pain:                        [ x] No [ ] Yes             [ ] Mild [ ] Moderate [ ] Severe    Onset -  Location -  Duration -  Character -  Alleviating/Aggravating -  Radiation -  Timing -    Dyspnea:                [x ] No [ ] Yes             [ ] Mild [ ] Moderate [ ] Severe    Anxiety:                  [x ] No [ ] Yes             [ ] Mild [ ] Moderate [ ] Severe    Fatigue:                  [ ] No [x ] Yes             [x ] Mild [ ] Moderate [ ] Severe    Nausea:                  [ x] No [ ] Yes             [ ] Mild [ ] Moderate [ ] Severe    Loss of appetite:   [ ] No [x ] Yes             [ x Mild [ ] Moderate [ ] Severe    Constipation:        [x ] No [ ] Yes             [ ] Mild [ ] Moderate [ ] Severe    Other Symptoms:  [x ] All other review of systems negative   [ ] Unable to obtain due to poor mentation     Karnofsky Performance Score/Palliative Performance Status Version 2:     40    %    PHYSICAL EXAM:  Vital Signs Last 24 Hrs  T(C): 36.5 (17 Feb 2018 07:45), Max: 36.5 (17 Feb 2018 07:45)  T(F): 97.7 (17 Feb 2018 07:45), Max: 97.7 (17 Feb 2018 07:45)  HR: 108 (17 Feb 2018 07:45) (108 - 108)  BP: 96/63 (17 Feb 2018 07:45) (96/63 - 96/63)  BP(mean): --  RR: 16 (17 Feb 2018 07:45) (16 - 16)  SpO2: 94% (17 Feb 2018 07:45) (94% - 94%)    General: Cachectic  [x ] Alert  [x ] Oriented x  3    [ ] Lethargic  [ ] Agitated   [x ] Cachexia   [ ] Unarousable  [x ] Verbal  [ ] Non-Verbal    HEENT:  [x ] Normal   [ ] Dry mouth   [ ] ET Tube    [ ] Trach  [ ] Oral lesions    Lungs:   [x ] Clear [ ] Tachypnea  [ ] Audible excessive secretions   [ ] Rhonchi        [ ] Right [ ] Left [ ] Bilateral  [ ] Crackles        [ ] Right [ ] Left [ ] Bilateral  [ ] Wheezing     [ ] Right [ ] Left [ ] Bilateral    Cardiovascular:  [x ] Regular [ ] Irregular [x ] Tachycardia   [ ] Bradycardia  [ ] Murmur [ ] Other    Abdomen: [ x] Soft  [x ] Distended   [x ] +BS  [x ] Non tender [ ] Tender  [ ]PEG   [ ]OGT/ NGT   Last BM:   colostomy    Genitourinary: [x ] Normal [ ] Incontinent   [ ] Oliguria/Anuria   [ ] Anderson    Musculoskeletal:  [ ] Normal   [x ] Weakness  [ ] Bedbound/Wheelchair bound [x ] Edema - BL lowerextremity    Neurological: [x ] No focal deficits  [ ] Cognitive impairment  [ ] Dysphagia [ ] Dysarthria [ ] Paresis [ ] Other     Skin: [ x] Normal   [ ] Pressure ulcer(s)                  [ ] Rash    LABS: reviewed      Oral Intake: [ ] Unable/mouth care only [x ] Minimal [ ] Moderate [ ] Full Capability  Diet: [ ] NPO [ ] Tube feeds [ ] TPN [ ] Other     RADIOLOGY & ADDITIONAL STUDIES: reviewed    REFERRALS:   [x ] Chaplaincy  [ ] Hospice  [ ] Child Life  [x ] Social Work  [ ] Case management [ ] Holistic Therapy HPI: 31 yo M h/o rectal cancer (unresectable, and metastatic to liver and lungs) s/p a diverting colostomy on 10/23/2017 with post-op course c/b perirectal abscess s/p IR placed STEPHANE drain and IR placed R nephrostomy pelvic collection/malignancy caused R hydronephrosis who presented hyponatremia, hyperkalemia and leukocytosis. Palliative care was initially called for goals of care.     INTERVAL HPI/OVERNIGHT EVENTS: Patient seen bedside, is sleepy, but alert and following conversation about plan of care and needs.  He states he is comfortable, father and partner are at bedside and emphasized GOC are towards avoiding distressful symptoms. They agree on no blood draws, full , feeds if he is awake and asking for food. The patient was actually listening to our conversation with his family and he was OK with their decisions. They also asked for salt tablets to be re-started.     Allergies    No Known Allergies    Intolerances        ADVANCE DIRECTIVES:    DNR: [ ] NO [x ] YES (Date) MOLST [ x] NO [ ] YES (Date)    MEDICATIONS  (STANDING):  enoxaparin Injectable 40 milliGRAM(s) SubCutaneous daily  fluticasone propionate 50 MICROgram(s)/spray Nasal Spray 1 Spray(s) Both Nostrils two times a day  HYDROmorphone  Injectable 0.2 milliGRAM(s) IV Push every 6 hours  lactulose Syrup 10 Gram(s) Oral two times a day  sodium chloride 2 Gram(s) Oral three times a day    MEDICATIONS  (PRN):  acetaminophen  Suppository 650 milliGRAM(s) Rectal every 6 hours PRN For Temp greater than 38 C (100.4 F)  HYDROmorphone  Injectable 0.5 milliGRAM(s) IV Push every 1 hour PRN pain  HYDROmorphone  Injectable 0.5 milliGRAM(s) IV Push every 1 hour PRN dyspnea  LORazepam   Injectable 0.5 milliGRAM(s) IV Push every 6 hours PRN Agitation  ondansetron Injectable 4 milliGRAM(s) IV Push every 6 hours PRN Nausea and/or Vomiting          PRESENT SYMPTOMS:  Source: [x ] Patient   [ x] Family   [ x] Team     Pain:                        [ x] No [ ] Yes             [ ] Mild [ ] Moderate [ ] Severe    Onset -  Location -  Duration -  Character -  Alleviating/Aggravating -  Radiation -  Timing -    Dyspnea:                [x ] No [ ] Yes             [ ] Mild [ ] Moderate [ ] Severe    Anxiety:                  [x ] No [ ] Yes             [ ] Mild [ ] Moderate [ ] Severe    Fatigue:                  [ ] No [x ] Yes             [x ] Mild [ ] Moderate [ ] Severe    Nausea:                  [ x] No [ ] Yes             [ ] Mild [ ] Moderate [ ] Severe    Loss of appetite:   [ ] No [x ] Yes             [ x Mild [ ] Moderate [ ] Severe    Constipation:        [x ] No [ ] Yes             [ ] Mild [ ] Moderate [ ] Severe    Other Symptoms:  [x ] All other review of systems negative   [ ] Unable to obtain due to poor mentation     Karnofsky Performance Score/Palliative Performance Status Version 2:     40    %    PHYSICAL EXAM:  Vital Signs Last 24 Hrs  T(C): 36.5 (17 Feb 2018 07:45), Max: 36.5 (17 Feb 2018 07:45)  T(F): 97.7 (17 Feb 2018 07:45), Max: 97.7 (17 Feb 2018 07:45)  HR: 108 (17 Feb 2018 07:45) (108 - 108)  BP: 96/63 (17 Feb 2018 07:45) (96/63 - 96/63)  BP(mean): --  RR: 16 (17 Feb 2018 07:45) (16 - 16)  SpO2: 94% (17 Feb 2018 07:45) (94% - 94%)    General: Cachectic  [x ] Alert  [x ] Oriented x  3    [ ] Lethargic  [ ] Agitated   [x ] Cachexia   [ ] Unarousable  [x ] Verbal  [ ] Non-Verbal    HEENT:  [x ] Normal   [ ] Dry mouth   [ ] ET Tube    [ ] Trach  [ ] Oral lesions    Lungs:   [x ] Clear [ ] Tachypnea  [ ] Audible excessive secretions   [ ] Rhonchi        [ ] Right [ ] Left [ ] Bilateral  [ ] Crackles        [ ] Right [ ] Left [ ] Bilateral  [ ] Wheezing     [ ] Right [ ] Left [ ] Bilateral    Cardiovascular:  [x ] Regular [ ] Irregular [x ] Tachycardia   [ ] Bradycardia  [ ] Murmur [ ] Other    Abdomen: [ x] Soft  [x ] Distended   [x ] +BS  [x ] Non tender [ ] Tender  [ ]PEG   [ ]OGT/ NGT   Last BM:   colostomy with active output.     Genitourinary: [x ] Normal [ ] Incontinent   [ ] Oliguria/Anuria   [ ] Anderson    Musculoskeletal:  [ ] Normal   [x ] Weakness  [ ] Bedbound/Wheelchair bound [x ] Edema - BL lowerextremity    Neurological: [x ] No focal deficits  [ ] Cognitive impairment  [ ] Dysphagia [ ] Dysarthria [ ] Paresis [ ] Other     Skin: [ x] Normal   [ ] Pressure ulcer(s)                  [ ] Rash    LABS: reviewed      Oral Intake: [ ] Unable/mouth care only [x ] Minimal [ ] Moderate [ ] Full Capability  Diet: [ ] NPO [ ] Tube feeds [ ] TPN [ ] Other     RADIOLOGY & ADDITIONAL STUDIES: reviewed    REFERRALS:   [x ] Chaplaincy  [ ] Hospice  [ ] Child Life  [x ] Social Work  [ ] Case management [ ] Holistic Therapy

## 2018-02-17 NOTE — PROGRESS NOTE ADULT - PROBLEM SELECTOR PLAN 1
Patient comfortable on Dliaudid 0.2mg q6h.  Pt required 1 prn dose of diluadid 0.5mg.  Again father and partner in agreement: that comfort is the goal. Patient comfortable on Dliaudid 0.2mg q6h.  Pt required 1 prn dose of diluadid 0.5mg over 24 huors.  Again father and partner in agreement that appropriate symptomatic Rx is the goal.

## 2018-02-18 PROCEDURE — 99233 SBSQ HOSP IP/OBS HIGH 50: CPT | Mod: GC

## 2018-02-18 RX ORDER — HYDROMORPHONE HYDROCHLORIDE 2 MG/ML
0.2 INJECTION INTRAMUSCULAR; INTRAVENOUS; SUBCUTANEOUS EVERY 4 HOURS
Qty: 0 | Refills: 0 | Status: DISCONTINUED | OUTPATIENT
Start: 2018-02-18 | End: 2018-02-19

## 2018-02-18 RX ADMIN — Medication 1 SPRAY(S): at 17:19

## 2018-02-18 RX ADMIN — HYDROMORPHONE HYDROCHLORIDE 0.5 MILLIGRAM(S): 2 INJECTION INTRAMUSCULAR; INTRAVENOUS; SUBCUTANEOUS at 06:46

## 2018-02-18 RX ADMIN — SODIUM CHLORIDE 2 GRAM(S): 9 INJECTION INTRAMUSCULAR; INTRAVENOUS; SUBCUTANEOUS at 10:29

## 2018-02-18 RX ADMIN — Medication 1 SPRAY(S): at 05:39

## 2018-02-18 RX ADMIN — ENOXAPARIN SODIUM 40 MILLIGRAM(S): 100 INJECTION SUBCUTANEOUS at 16:26

## 2018-02-18 RX ADMIN — HYDROMORPHONE HYDROCHLORIDE 0.2 MILLIGRAM(S): 2 INJECTION INTRAMUSCULAR; INTRAVENOUS; SUBCUTANEOUS at 17:19

## 2018-02-18 RX ADMIN — HYDROMORPHONE HYDROCHLORIDE 0.2 MILLIGRAM(S): 2 INJECTION INTRAMUSCULAR; INTRAVENOUS; SUBCUTANEOUS at 21:22

## 2018-02-18 RX ADMIN — HYDROMORPHONE HYDROCHLORIDE 0.5 MILLIGRAM(S): 2 INJECTION INTRAMUSCULAR; INTRAVENOUS; SUBCUTANEOUS at 21:12

## 2018-02-18 RX ADMIN — HYDROMORPHONE HYDROCHLORIDE 0.2 MILLIGRAM(S): 2 INJECTION INTRAMUSCULAR; INTRAVENOUS; SUBCUTANEOUS at 15:31

## 2018-02-18 RX ADMIN — LACTULOSE 10 GRAM(S): 10 SOLUTION ORAL at 10:29

## 2018-02-18 RX ADMIN — HYDROMORPHONE HYDROCHLORIDE 0.2 MILLIGRAM(S): 2 INJECTION INTRAMUSCULAR; INTRAVENOUS; SUBCUTANEOUS at 00:02

## 2018-02-18 RX ADMIN — HYDROMORPHONE HYDROCHLORIDE 0.2 MILLIGRAM(S): 2 INJECTION INTRAMUSCULAR; INTRAVENOUS; SUBCUTANEOUS at 05:39

## 2018-02-18 RX ADMIN — HYDROMORPHONE HYDROCHLORIDE 0.5 MILLIGRAM(S): 2 INJECTION INTRAMUSCULAR; INTRAVENOUS; SUBCUTANEOUS at 07:00

## 2018-02-18 RX ADMIN — HYDROMORPHONE HYDROCHLORIDE 0.2 MILLIGRAM(S): 2 INJECTION INTRAMUSCULAR; INTRAVENOUS; SUBCUTANEOUS at 10:30

## 2018-02-18 RX ADMIN — HYDROMORPHONE HYDROCHLORIDE 0.5 MILLIGRAM(S): 2 INJECTION INTRAMUSCULAR; INTRAVENOUS; SUBCUTANEOUS at 20:57

## 2018-02-18 NOTE — PROGRESS NOTE ADULT - ASSESSMENT
31 yo M h/o rectal cancer (unresectable, and metastatic to liver and lungs) s/p a diverting colostomy on 10/23/2017 with post-op course c/b perirectal abscess s/p IR placed STEPHANE drain and IR placed R nephrostomy pelvic collection/malignancy caused R hydronephrosis who presents from IR for hyponatremia, hyperkalemia and leukocytosis.   Patient transferred to palliative care unit for ongoing medical care in anticipation of escalating symptom burden.

## 2018-02-18 NOTE — PROGRESS NOTE ADULT - PROBLEM SELECTOR PLAN 2
Not a candidate for further disease modifying Rx (DMT) due to poor performance status and multi-organ failure (liver/kidneys)

## 2018-02-18 NOTE — PROGRESS NOTE ADULT - SUBJECTIVE AND OBJECTIVE BOX
HPI: 31 yo M h/o rectal cancer (unresectable, and metastatic to liver and lungs) s/p a diverting colostomy on 10/23/2017 with post-op course c/b perirectal abscess s/p IR placed STEPHANE drain and IR placed R nephrostomy pelvic collection/malignancy caused R hydronephrosis who presented hyponatremia, hyperkalemia and leukocytosis. Palliative care was initially called for goals of care. Patient admitted to PCU for symptom management and full comfort measures.     INTERVAL HPI/OVERNIGHT EVENTS: Patient seen bedside, arousable and alert, has no complaints, able to understand plan of care and has no questions.  Partner, Blaine, is bedside and has no acute concerns, stayed with him overnight and just noted patient complained of pain and medication helped     Allergies  No Known Allergies  Intolerances    ADVANCE DIRECTIVES:    DNR: [ ] NO [x ] YES (Date) MOLST [ x] NO [ ] YES (Date)    MEDICATIONS  (STANDING):  enoxaparin Injectable 40 milliGRAM(s) SubCutaneous daily  fluticasone propionate 50 MICROgram(s)/spray Nasal Spray 1 Spray(s) Both Nostrils two times a day  HYDROmorphone  Injectable 0.2 milliGRAM(s) IV Push every 4 hours  lactulose Syrup 10 Gram(s) Oral two times a day  sodium chloride 2 Gram(s) Oral three times a day    MEDICATIONS  (PRN):  acetaminophen  Suppository 650 milliGRAM(s) Rectal every 6 hours PRN For Temp greater than 38 C (100.4 F)  HYDROmorphone  Injectable 0.5 milliGRAM(s) IV Push every 1 hour PRN pain  HYDROmorphone  Injectable 0.5 milliGRAM(s) IV Push every 1 hour PRN dyspnea  LORazepam   Injectable 0.5 milliGRAM(s) IV Push every 6 hours PRN Agitation  ondansetron Injectable 4 milliGRAM(s) IV Push every 6 hours PRN Nausea and/or Vomiting    PRESENT SYMPTOMS:  Source: [x ] Patient   [ x] Family   [ x] Team     Pain:                        [ x] No [ ] Yes             [ ] Mild [ ] Moderate [ ] Severe    Onset -n/a  Location -  Duration -  Character -  Alleviating/Aggravating -  Radiation -  Timing -    Dyspnea:                [x ] No [ ] Yes             [ ] Mild [ ] Moderate [ ] Severe    Anxiety:                  [x ] No [ ] Yes             [ ] Mild [ ] Moderate [ ] Severe    Fatigue:                  [ ] No [x ] Yes             [x ] Mild [ ] Moderate [ ] Severe    Nausea:                  [ x] No [ ] Yes             [ ] Mild [ ] Moderate [ ] Severe    Loss of appetite:   [ ] No [x ] Yes             [ x Mild [ ] Moderate [ ] Severe    Constipation:        [x ] No [ ] Yes             [ ] Mild [ ] Moderate [ ] Severe    Other Symptoms:  [x ] All other review of systems negative   [ ] Unable to obtain due to poor mentation     Karnofsky Performance Score/Palliative Performance Status Version 2:     40    %    PHYSICAL EXAM:  Vital Signs Last 24 Hrs  T(C): 36.5 (17 Feb 2018 07:45), Max: 36.5 (17 Feb 2018 07:45)  T(F): 97.7 (17 Feb 2018 07:45), Max: 97.7 (17 Feb 2018 07:45)  HR: 108 (17 Feb 2018 07:45) (108 - 108)  BP: 96/63 (17 Feb 2018 07:45) (96/63 - 96/63)  BP(mean): --  RR: 16 (17 Feb 2018 07:45) (16 - 16)  SpO2: 94% (17 Feb 2018 07:45) (94% - 94%)    General: Cachectic  [x ] Alert  [x ] Oriented x  3    [ ] Lethargic  [ ] Agitated   [x ] Cachexia   [ ] Unarousable  [x ] Verbal  [ ] Non-Verbal    HEENT:  [x ] Normal   [ ] Dry mouth   [ ] ET Tube    [ ] Trach  [ ] Oral lesions    Lungs:   [x ] Clear [ ] Tachypnea  [ ] Audible excessive secretions   [ ] Rhonchi        [ ] Right [ ] Left [ ] Bilateral  [ ] Crackles        [ ] Right [ ] Left [ ] Bilateral  [ ] Wheezing     [ ] Right [ ] Left [ ] Bilateral    Cardiovascular:  [x ] Regular [ ] Irregular [x ] Tachycardia   [ ] Bradycardia  [ ] Murmur [ ] Other    Abdomen: [ x] Soft  [x ] Distended   [x ] +BS  [x ] Non tender [ ] Tender  [ ]PEG   [ ]OGT/ NGT   Last BM:   colostomy with active output.     Genitourinary: [x ] Normal [ ] Incontinent   [ ] Oliguria/Anuria   [ ] Anderson    Musculoskeletal:  [ ] Normal   [x ] Weakness  [ ] Bedbound/Wheelchair bound [x ] Edema - BL lowerextremity    Neurological: [x ] No focal deficits  [ ] Cognitive impairment  [ ] Dysphagia [ ] Dysarthria [ ] Paresis [ ] Other     Skin: [ x] Normal   [ ] Pressure ulcer(s)                  [ ] Rash    LABS: reviewed      Oral Intake: [ ] Unable/mouth care only [x ] Minimal [ ] Moderate [ ] Full Capability  Diet: [ ] NPO [ ] Tube feeds [ ] TPN [ ] Other     RADIOLOGY & ADDITIONAL STUDIES: reviewed    REFERRALS:   [x ] Chaplaincy  [ ] Hospice  [ ] Child Life  [x ] Social Work  [ ] Case management [ ] Holistic Therapy HPI: 33 yo M h/o rectal cancer (unresectable, and metastatic to liver and lungs) s/p a diverting colostomy on 10/23/2017 with post-op course c/b perirectal abscess s/p IR placed STEPHANE drain and IR placed R nephrostomy pelvic collection/malignancy caused R hydronephrosis who presented hyponatremia, hyperkalemia and leukocytosis. Palliative care was initially called for goals of care. Patient admitted to PCU for symptom management and full comfort measures.     INTERVAL HPI/OVERNIGHT EVENTS: Patient seen bedside, arousable and alert, has no complaints, able to understand plan of care and has no questions.  Partner, Blaine, is at bedside and has no acute concerns, stayed with him overnight and just noted patient complained of pain that improved with medications    Allergies  No Known Allergies  Intolerances    ADVANCE DIRECTIVES:    DNR: [ ] NO [x ] YES (Date) MOLST [ x] NO [ ] YES (Date)    MEDICATIONS  (STANDING):  enoxaparin Injectable 40 milliGRAM(s) SubCutaneous daily  fluticasone propionate 50 MICROgram(s)/spray Nasal Spray 1 Spray(s) Both Nostrils two times a day  HYDROmorphone  Injectable 0.2 milliGRAM(s) IV Push every 4 hours  lactulose Syrup 10 Gram(s) Oral two times a day  sodium chloride 2 Gram(s) Oral three times a day    MEDICATIONS  (PRN):  acetaminophen  Suppository 650 milliGRAM(s) Rectal every 6 hours PRN For Temp greater than 38 C (100.4 F)  HYDROmorphone  Injectable 0.5 milliGRAM(s) IV Push every 1 hour PRN pain  HYDROmorphone  Injectable 0.5 milliGRAM(s) IV Push every 1 hour PRN dyspnea  LORazepam   Injectable 0.5 milliGRAM(s) IV Push every 6 hours PRN Agitation  ondansetron Injectable 4 milliGRAM(s) IV Push every 6 hours PRN Nausea and/or Vomiting    PRESENT SYMPTOMS:  Source: [x ] Patient   [ x] Family   [ x] Team     Pain:                        [ x] No [ ] Yes             [ ] Mild [ ] Moderate [ ] Severe    Onset -n/a  Location -  Duration -  Character -  Alleviating/Aggravating -  Radiation -  Timing -    Dyspnea:                [x ] No [ ] Yes             [ ] Mild [ ] Moderate [ ] Severe    Anxiety:                  [x ] No [ ] Yes             [ ] Mild [ ] Moderate [ ] Severe    Fatigue:                  [ ] No [x ] Yes             [x ] Mild [ ] Moderate [ ] Severe    Nausea:                  [ x] No [ ] Yes             [ ] Mild [ ] Moderate [ ] Severe    Loss of appetite:   [ ] No [x ] Yes             [] Mild [x ] Moderate to Severe    Constipation:        [x ] No [ ] Yes             [ ] Mild [ ] Moderate [ ] Severe    Other Symptoms:  [x ] All other review of systems negative   [ ] Unable to obtain due to poor mentation     Karnofsky Performance Score/Palliative Performance Status Version 2:     30    %    PHYSICAL EXAM:  Vital Signs Last 24 Hrs  T(C): 36.5 (17 Feb 2018 07:45), Max: 36.5 (17 Feb 2018 07:45)  T(F): 97.7 (17 Feb 2018 07:45), Max: 97.7 (17 Feb 2018 07:45)  HR: 108 (17 Feb 2018 07:45) (108 - 108)  BP: 96/63 (17 Feb 2018 07:45) (96/63 - 96/63)  BP(mean): --  RR: 16 (17 Feb 2018 07:45) (16 - 16)  SpO2: 94% (17 Feb 2018 07:45) (94% - 94%)    General: Cachectic  [x ] Alert  [x ] Oriented x  3    [ ] Lethargic  [ ] Agitated   [x ] Cachexia   [ ] Unarousable  [x ] Verbal  [ ] Non-Verbal    HEENT:  [x ] Normal   [ ] Dry mouth   [ ] ET Tube    [ ] Trach  [ ] Oral lesions    Lungs:   [x ] Clear [ ] Tachypnea  [ ] Audible excessive secretions   [ ] Rhonchi        [ ] Right [ ] Left [ ] Bilateral  [ ] Crackles        [ ] Right [ ] Left [ ] Bilateral  [ ] Wheezing     [ ] Right [ ] Left [ ] Bilateral    Cardiovascular:  [x ] Regular [ ] Irregular [x ] Tachycardia   [ ] Bradycardia  [ ] Murmur [ ] Other    Abdomen: [ x] Soft  [x ] Distended   [x ] +BS  [x ] Non tender [ ] Tender  [ ]PEG   [ ]OGT/ NGT   Last BM:   colostomy with active output.     Genitourinary: [x ] Normal [ ] Incontinent   [ ] Oliguria/Anuria   [ ] Anderson    Musculoskeletal:  [ ] Normal   [x ] Weakness  [ ] Bedbound/Wheelchair bound [x ] Edema - BL lowerextremity    Neurological: [x ] No focal deficits  [ ] Cognitive impairment  [ ] Dysphagia [ ] Dysarthria [ ] Paresis [ ] Other     Skin: [ x] Normal   [ ] Pressure ulcer(s)                  [ ] Rash    LABS: reviewed      Oral Intake: [ ] Unable/mouth care only [x ] Minimal [ ] Moderate [ ] Full Capability  Diet: [ ] NPO [ ] Tube feeds [ ] TPN [ ] Other     RADIOLOGY & ADDITIONAL STUDIES: reviewed    REFERRALS:   [x ] Chaplaincy  [ ] Hospice  [ ] Child Life  [x ] Social Work  [ ] Case management [ ] Holistic Therapy

## 2018-02-18 NOTE — PROGRESS NOTE ADULT - PROBLEM SELECTOR PLAN 4
Secondary to progression of disease. Patient with decreased PO intake. Now primarily just fluids.  Father would like salt tabs to be offered with food and/or fluid if patient able to take them/alert, however they are making patient's mouth taste "salty" and worsening his appetitie, d/w patient and partner that if he does nto want the salt tabs he does not hae to take them. Secondary to progression of disease. Patient with decreased PO intake. Now primarily just fluids.  Father would like salt tabs to be offered with food and/or fluid if patient able to take them/alert, however they are making patient's mouth taste "salty" and worsening his appetite. It was d/w patient and partner that if he does not want the salt tabs he does not have to take them.

## 2018-02-18 NOTE — PROGRESS NOTE ADULT - PROBLEM SELECTOR PLAN 1
Patient comfortable on Dliaudid 0.2mg q6h, however required management of pain at 4 hours (used 1 prn dose of Dilaudid 0.5mg over 24 hours/ earlier today because medication wore off).  For this reason INCREASING Dilaudid frequency to 0.2mg q4h. Patient is comfortable on Dliaudid 0.2mg q6h, however required management of pain at 4 hours (used 1 prn dose of Dilaudid 0.5mg over 24 hours/ earlier today because medication wearing off).  For this reason we will increase Dilaudid frequency to 0.2mg q4h. Patient and partner agree.

## 2018-02-18 NOTE — PROGRESS NOTE ADULT - PROBLEM SELECTOR PLAN 5
Patient is DNR/ DNI. resting comfortably in bed, no questions about his care or any concerns. Patient is DNR/ DNI. resting comfortably in bed. He and his partner did not have any questions about his care or any concerns.

## 2018-02-19 PROCEDURE — 99233 SBSQ HOSP IP/OBS HIGH 50: CPT | Mod: GC

## 2018-02-19 RX ORDER — HYDROMORPHONE HYDROCHLORIDE 2 MG/ML
0.5 INJECTION INTRAMUSCULAR; INTRAVENOUS; SUBCUTANEOUS EVERY 6 HOURS
Qty: 0 | Refills: 0 | Status: DISCONTINUED | OUTPATIENT
Start: 2018-02-19 | End: 2018-02-21

## 2018-02-19 RX ORDER — HYDROMORPHONE HYDROCHLORIDE 2 MG/ML
0.5 INJECTION INTRAMUSCULAR; INTRAVENOUS; SUBCUTANEOUS ONCE
Qty: 0 | Refills: 0 | Status: DISCONTINUED | OUTPATIENT
Start: 2018-02-19 | End: 2018-02-19

## 2018-02-19 RX ADMIN — HYDROMORPHONE HYDROCHLORIDE 0.2 MILLIGRAM(S): 2 INJECTION INTRAMUSCULAR; INTRAVENOUS; SUBCUTANEOUS at 06:05

## 2018-02-19 RX ADMIN — HYDROMORPHONE HYDROCHLORIDE 0.5 MILLIGRAM(S): 2 INJECTION INTRAMUSCULAR; INTRAVENOUS; SUBCUTANEOUS at 10:30

## 2018-02-19 RX ADMIN — Medication 1 SPRAY(S): at 12:31

## 2018-02-19 RX ADMIN — Medication 1 SPRAY(S): at 17:36

## 2018-02-19 RX ADMIN — HYDROMORPHONE HYDROCHLORIDE 0.2 MILLIGRAM(S): 2 INJECTION INTRAMUSCULAR; INTRAVENOUS; SUBCUTANEOUS at 02:09

## 2018-02-19 RX ADMIN — HYDROMORPHONE HYDROCHLORIDE 0.5 MILLIGRAM(S): 2 INJECTION INTRAMUSCULAR; INTRAVENOUS; SUBCUTANEOUS at 12:31

## 2018-02-19 RX ADMIN — SODIUM CHLORIDE 2 GRAM(S): 9 INJECTION INTRAMUSCULAR; INTRAVENOUS; SUBCUTANEOUS at 21:56

## 2018-02-19 RX ADMIN — HYDROMORPHONE HYDROCHLORIDE 0.5 MILLIGRAM(S): 2 INJECTION INTRAMUSCULAR; INTRAVENOUS; SUBCUTANEOUS at 17:35

## 2018-02-19 RX ADMIN — HYDROMORPHONE HYDROCHLORIDE 0.2 MILLIGRAM(S): 2 INJECTION INTRAMUSCULAR; INTRAVENOUS; SUBCUTANEOUS at 07:19

## 2018-02-19 RX ADMIN — HYDROMORPHONE HYDROCHLORIDE 0.5 MILLIGRAM(S): 2 INJECTION INTRAMUSCULAR; INTRAVENOUS; SUBCUTANEOUS at 23:58

## 2018-02-19 RX ADMIN — ENOXAPARIN SODIUM 40 MILLIGRAM(S): 100 INJECTION SUBCUTANEOUS at 12:31

## 2018-02-19 RX ADMIN — HYDROMORPHONE HYDROCHLORIDE 0.5 MILLIGRAM(S): 2 INJECTION INTRAMUSCULAR; INTRAVENOUS; SUBCUTANEOUS at 10:45

## 2018-02-19 NOTE — PROGRESS NOTE ADULT - SUBJECTIVE AND OBJECTIVE BOX
HPI: 31 yo M h/o rectal cancer (unresectable, and metastatic to liver and lungs) s/p a diverting colostomy on 10/23/2017 with post-op course c/b perirectal abscess s/p IR placed STEPHANE drain and IR placed R nephrostomy pelvic collection/malignancy caused R hydronephrosis who presented hyponatremia, hyperkalemia and leukocytosis. Palliative care was initially called for goals of care. Patient admitted to PCU for symptom management and full comfort measures.     INTERVAL HPI/OVERNIGHT EVENTS: Patient seen bedside, arousable and alert, has no complaints, able to understand plan of care and has no questions.  Partner, Blaine, and patient's cousin are bedside and have no acute concerns.    Allergies  No Known Allergies  Intolerances    ADVANCE DIRECTIVES:    DNR: [ ] NO [x ] YES (Date) MOLST [ x] NO [ ] YES (Date)    MEDICATIONS  (STANDING):  enoxaparin Injectable 40 milliGRAM(s) SubCutaneous daily  fluticasone propionate 50 MICROgram(s)/spray Nasal Spray 1 Spray(s) Both Nostrils two times a day  HYDROmorphone  Injectable 0.5 milliGRAM(s) IV Push every 6 hours - increased 2/19  HYDROmorphone  Injectable 0.5 milliGRAM(s) IV Push once  sodium chloride 2 Gram(s) Oral three times a day - patient declining/refusing    MEDICATIONS  (PRN):  acetaminophen  Suppository 650 milliGRAM(s) Rectal every 6 hours PRN For Temp greater than 38 C (100.4 F)  HYDROmorphone  Injectable 0.5 milliGRAM(s) IV Push every 1 hour PRN pain  HYDROmorphone  Injectable 0.5 milliGRAM(s) IV Push every 1 hour PRN dyspnea  LORazepam   Injectable 0.5 milliGRAM(s) IV Push every 6 hours PRN Agitation  ondansetron Injectable 4 milliGRAM(s) IV Push every 6 hours PRN Nausea and/or Vomiting      PRESENT SYMPTOMS:  Source: [x ] Patient   [ x] Family   [ x] Team     Pain:                        [ x] No [ ] Yes             [ ] Mild [ ] Moderate [ ] Severe    Onset -n/a  Location -  Duration -  Character -  Alleviating/Aggravating -  Radiation -  Timing -    Dyspnea:                [x ] No [ ] Yes             [ ] Mild [ ] Moderate [ ] Severe    Anxiety:                  [x ] No [ ] Yes             [ ] Mild [ ] Moderate [ ] Severe    Fatigue:                  [ ] No [x ] Yes             [x ] Mild [ ] Moderate [ ] Severe    Nausea:                  [ x] No [ ] Yes             [ ] Mild [ ] Moderate [ ] Severe    Loss of appetite:   [ ] No [x ] Yes             [ x Mild [ ] Moderate [ ] Severe    Constipation:        [x ] No [ ] Yes             [ ] Mild [ ] Moderate [ ] Severe    Other Symptoms:  [x ] All other review of systems negative   [ ] Unable to obtain due to poor mentation     Karnofsky Performance Score/Palliative Performance Status Version 2:     40    %    PHYSICAL EXAM: declined in am, will check post shower,    General: Cachectic  [x ] Alert  [x ] Oriented x  3    [ ] Lethargic  [ ] Agitated   [x ] Cachexia   [ ] Unarousable  [x ] Verbal  [ ] Non-Verbal    HEENT:  [x ] Normal   [ ] Dry mouth   [ ] ET Tube    [ ] Trach  [ ] Oral lesions    Lungs:   [x ] Clear [ ] Tachypnea  [ ] Audible excessive secretions   [ ] Rhonchi        [ ] Right [ ] Left [ ] Bilateral  [ ] Crackles        [ ] Right [ ] Left [ ] Bilateral  [ ] Wheezing     [ ] Right [ ] Left [ ] Bilateral    Cardiovascular:  [x ] Regular [ ] Irregular [x ] Tachycardia   [ ] Bradycardia  [ ] Murmur [ ] Other    Abdomen: [ x] Soft  [x ] Distended   [x ] +BS  [x ] Non tender [ ] Tender  [ ]PEG   [ ]OGT/ NGT   Last BM:   colostomy with active output.     Genitourinary: [x ] Normal [ ] Incontinent   [ ] Oliguria/Anuria   [ ] Anderson    Musculoskeletal:  [ ] Normal   [x ] Weakness  [ ] Bedbound/Wheelchair bound [x ] Edema - BL lowerextremity    Neurological: [x ] No focal deficits  [ ] Cognitive impairment  [ ] Dysphagia [ ] Dysarthria [ ] Paresis [ ] Other     Skin: [ x] Normal   [ ] Pressure ulcer(s)                  [ ] Rash    LABS: reviewed  Oral Intake: [ ] Unable/mouth care only [x ] Minimal [ ] Moderate [ ] Full Capability  Diet: [ ] NPO [ ] Tube feeds [ ] TPN [ ] Other     RADIOLOGY & ADDITIONAL STUDIES: reviewed    REFERRALS:   [x ] Chaplaincy  [ ] Hospice  [ ] Child Life  [x ] Social Work  [ ] Case management [ ] Holistic Therapy HPI: 33 yo M h/o rectal cancer (unresectable, and metastatic to liver and lungs) s/p a diverting colostomy on 10/23/2017 with post-op course c/b perirectal abscess s/p IR placed STEPHANE drain and IR placed R nephrostomy pelvic collection/malignancy caused R hydronephrosis who presented hyponatremia, hyperkalemia and leukocytosis. Palliative care was initially called for goals of care. Patient admitted to PCU for symptom management and full comfort measures.     INTERVAL HPI/OVERNIGHT EVENTS: Patient seen bedside, arousable and alert, has no complaints, able to understand plan of care and has no questions.  Partner, Blaine, and patient's cousin are at bedside and have no acute concerns.    Allergies  No Known Allergies  Intolerances    ADVANCE DIRECTIVES:    DNR: [ ] NO [x ] YES (Date) MOLST [ x] NO [ ] YES (Date)    MEDICATIONS  (STANDING):  enoxaparin Injectable 40 milliGRAM(s) SubCutaneous daily  fluticasone propionate 50 MICROgram(s)/spray Nasal Spray 1 Spray(s) Both Nostrils two times a day  HYDROmorphone  Injectable 0.5 milliGRAM(s) IV Push every 6 hours - increased 2/19  HYDROmorphone  Injectable 0.5 milliGRAM(s) IV Push once  sodium chloride 2 Gram(s) Oral three times a day - patient declining/refusing    MEDICATIONS  (PRN):  acetaminophen  Suppository 650 milliGRAM(s) Rectal every 6 hours PRN For Temp greater than 38 C (100.4 F)  HYDROmorphone  Injectable 0.5 milliGRAM(s) IV Push every 1 hour PRN pain  HYDROmorphone  Injectable 0.5 milliGRAM(s) IV Push every 1 hour PRN dyspnea  LORazepam   Injectable 0.5 milliGRAM(s) IV Push every 6 hours PRN Agitation  ondansetron Injectable 4 milliGRAM(s) IV Push every 6 hours PRN Nausea and/or Vomiting      PRESENT SYMPTOMS:  Source: [x ] Patient   [ x] Family   [ x] Team     Pain:                        [ x] No [ ] Yes             [ ] Mild [ ] Moderate [ ] Severe    Onset -n/a  Location -  Duration -  Character -  Alleviating/Aggravating -  Radiation -  Timing -    Dyspnea:                [x ] No [ ] Yes             [ ] Mild [ ] Moderate [ ] Severe    Anxiety:                  [x ] No [ ] Yes             [ ] Mild [ ] Moderate [ ] Severe    Fatigue:                  [ ] No [x ] Yes             [ ] Mild [ ] Moderate [x ] Severe    Nausea:                  [ x] No [ ] Yes             [ ] Mild [ ] Moderate [ ] Severe    Loss of appetite:   [ ] No [x ] Yes             [ ] Mild [ ] Moderate [x ] Severe    Constipation:        [x ] No [ ] Yes             [ ] Mild [ ] Moderate [ ] Severe    Other Symptoms:  [x ] All other review of systems negative   [ ] Unable to obtain due to poor mentation     Karnofsky Performance Score/Palliative Performance Status Version 2:     40    %    PHYSICAL EXAM: declined in am, will check post shower,    General: Cachectic  [x ] Alert  [x ] Oriented x  3    [ ] Lethargic  [ ] Agitated   [x ] Cachexia   [ ] Unarousable  [x ] Verbal  [ ] Non-Verbal    HEENT:  [x ] Normal   [ ] Dry mouth   [ ] ET Tube    [ ] Trach  [ ] Oral lesions    Lungs:   [x ] Clear [ ] Tachypnea  [ ] Audible excessive secretions   [ ] Rhonchi        [ ] Right [ ] Left [ ] Bilateral  [ ] Crackles        [ ] Right [ ] Left [ ] Bilateral  [ ] Wheezing     [ ] Right [ ] Left [ ] Bilateral    Cardiovascular:  [x ] Regular [ ] Irregular [x ] Tachycardia   [ ] Bradycardia  [ ] Murmur [ ] Other    Abdomen: [ x] Soft  [x ] Distended   [x ] +BS  [x ] Non tender [ ] Tender  [ ]PEG   [ ]OGT/ NGT   Last BM:   colostomy with active output.     Genitourinary: [x ] Normal [ ] Incontinent   [ ] Oliguria/Anuria   [ ] Anderson    Musculoskeletal:  [ ] Normal   [x ] Weakness  [ ] Bedbound/Wheelchair bound [x ] Edema - BL lower extremity 3 +    Neurological: [x ] No focal deficits  [ ] Cognitive impairment  [ ] Dysphagia [ ] Dysarthria [ ] Paresis [ ] Other     Skin: [ x] Normal   [ ] Pressure ulcer(s)                  [ ] Rash    LABS: reviewed  Oral Intake: [ ] Unable/mouth care only [x ] Minimal [ ] Moderate [ ] Full Capability  Diet: [ ] NPO [ ] Tube feeds [ ] TPN [ ] Other     RADIOLOGY & ADDITIONAL STUDIES: reviewed    REFERRALS:   [x ] Chaplaincy  [ ] Hospice  [ ] Child Life  [x ] Social Work  [ ] Case management [ ] Holistic Therapy

## 2018-02-19 NOTE — PROGRESS NOTE ADULT - PROBLEM SELECTOR PLAN 1
previously on Dliaudid 0.2mg q4h, required breakthrough dosing three times in 24 hours, signaling symptoms are advancing: have increased Dilaudid to 0.5mg Q6h RTC with breakthrough of Dilaudid 0.5mg IVP Q1h prn dyspnea or pain previously on Dliaudid 0.2mg q4h, required breakthrough dosing three times in 24 hours.   Will increase Dilaudid to 0.5mg Q6h RTC with breakthrough of Dilaudid 0.5mg IV Q1h prn dyspnea or pain. Changes were d/w patient and his partner and they agree.

## 2018-02-19 NOTE — PROGRESS NOTE ADULT - PROBLEM SELECTOR PLAN 3
Secondary to metastatic cancer. Patient with worsening hepatic failure.  continue to monitor and manage symptoms of encephalopathy.  Patient and Partner ask to stop lactulose as he cannot take oral medications anymore, will manage symptoms as progresses.

## 2018-02-19 NOTE — PROGRESS NOTE ADULT - PROBLEM SELECTOR PLAN 5
Patient is DNR/ DNI. resting comfortably in bed, no questions about his care or any concerns. Patient is DNR/ DNI.   Will continue PCU management for pain and possible EOL care. Depending on symptomatic burden and clinical decline, may need to discuss inpatient hospice vs home hospice.

## 2018-02-20 PROCEDURE — 99233 SBSQ HOSP IP/OBS HIGH 50: CPT | Mod: GC

## 2018-02-20 RX ORDER — HYDROMORPHONE HYDROCHLORIDE 2 MG/ML
1 INJECTION INTRAMUSCULAR; INTRAVENOUS; SUBCUTANEOUS
Qty: 0 | Refills: 0 | Status: DISCONTINUED | OUTPATIENT
Start: 2018-02-20 | End: 2018-02-25

## 2018-02-20 RX ORDER — HYDROMORPHONE HYDROCHLORIDE 2 MG/ML
1 INJECTION INTRAMUSCULAR; INTRAVENOUS; SUBCUTANEOUS
Qty: 0 | Refills: 0 | Status: DISCONTINUED | OUTPATIENT
Start: 2018-02-20 | End: 2018-02-21

## 2018-02-20 RX ADMIN — Medication 1 SPRAY(S): at 18:39

## 2018-02-20 RX ADMIN — HYDROMORPHONE HYDROCHLORIDE 1 MILLIGRAM(S): 2 INJECTION INTRAMUSCULAR; INTRAVENOUS; SUBCUTANEOUS at 14:00

## 2018-02-20 RX ADMIN — HYDROMORPHONE HYDROCHLORIDE 0.5 MILLIGRAM(S): 2 INJECTION INTRAMUSCULAR; INTRAVENOUS; SUBCUTANEOUS at 13:13

## 2018-02-20 RX ADMIN — HYDROMORPHONE HYDROCHLORIDE 0.5 MILLIGRAM(S): 2 INJECTION INTRAMUSCULAR; INTRAVENOUS; SUBCUTANEOUS at 09:40

## 2018-02-20 RX ADMIN — HYDROMORPHONE HYDROCHLORIDE 0.5 MILLIGRAM(S): 2 INJECTION INTRAMUSCULAR; INTRAVENOUS; SUBCUTANEOUS at 05:52

## 2018-02-20 RX ADMIN — Medication 0.5 MILLIGRAM(S): at 18:38

## 2018-02-20 RX ADMIN — SODIUM CHLORIDE 2 GRAM(S): 9 INJECTION INTRAMUSCULAR; INTRAVENOUS; SUBCUTANEOUS at 05:53

## 2018-02-20 RX ADMIN — HYDROMORPHONE HYDROCHLORIDE 0.5 MILLIGRAM(S): 2 INJECTION INTRAMUSCULAR; INTRAVENOUS; SUBCUTANEOUS at 01:15

## 2018-02-20 RX ADMIN — SODIUM CHLORIDE 2 GRAM(S): 9 INJECTION INTRAMUSCULAR; INTRAVENOUS; SUBCUTANEOUS at 22:00

## 2018-02-20 RX ADMIN — HYDROMORPHONE HYDROCHLORIDE 1 MILLIGRAM(S): 2 INJECTION INTRAMUSCULAR; INTRAVENOUS; SUBCUTANEOUS at 14:27

## 2018-02-20 RX ADMIN — ENOXAPARIN SODIUM 40 MILLIGRAM(S): 100 INJECTION SUBCUTANEOUS at 13:16

## 2018-02-20 RX ADMIN — HYDROMORPHONE HYDROCHLORIDE 0.5 MILLIGRAM(S): 2 INJECTION INTRAMUSCULAR; INTRAVENOUS; SUBCUTANEOUS at 06:55

## 2018-02-20 RX ADMIN — HYDROMORPHONE HYDROCHLORIDE 0.5 MILLIGRAM(S): 2 INJECTION INTRAMUSCULAR; INTRAVENOUS; SUBCUTANEOUS at 09:24

## 2018-02-20 RX ADMIN — HYDROMORPHONE HYDROCHLORIDE 0.5 MILLIGRAM(S): 2 INJECTION INTRAMUSCULAR; INTRAVENOUS; SUBCUTANEOUS at 18:38

## 2018-02-20 RX ADMIN — Medication 1 SPRAY(S): at 05:53

## 2018-02-20 NOTE — PROGRESS NOTE ADULT - SUBJECTIVE AND OBJECTIVE BOX
HPI: 31 yo M h/o rectal cancer (unresectable, and metastatic to liver and lungs) s/p a diverting colostomy on 10/23/2017 with post-op course c/b perirectal abscess s/p IR placed STEPHANE drain and IR placed R nephrostomy pelvic collection/malignancy caused R hydronephrosis who presented hyponatremia, hyperkalemia and leukocytosis.  Patient admitted to PCU for symptom management and full comfort measures.     INTERVAL HPI/OVERNIGHT EVENTS: Patient seen bedside, arousable and alert but quickly drifts off, has no complaints, able to understand plan of care and has no questions.  Patient's partner Blaine is at bedside and has no acute concerns.    Allergies  No Known Allergies  Intolerances    ADVANCE DIRECTIVES:    DNR: [ ] NO [x ] YES (Date) MOLST [ x] NO [ ] YES (Date)    MEDICATIONS  (STANDING):  enoxaparin Injectable 40 milliGRAM(s) SubCutaneous daily  fluticasone propionate 50 MICROgram(s)/spray Nasal Spray 1 Spray(s) Both Nostrils two times a day  HYDROmorphone  Injectable 0.5 milliGRAM(s) IV Push every 6 hours  sodium chloride 2 Gram(s) Oral three times a day    MEDICATIONS  (PRN):  acetaminophen  Suppository 650 milliGRAM(s) Rectal every 6 hours PRN For Temp greater than 38 C (100.4 F)  HYDROmorphone  Injectable 1 milliGRAM(s) IV Push every 2 hours PRN breakthrough pain  HYDROmorphone  Injectable 1 milliGRAM(s) IV Push every 1 hour PRN dyspnea  LORazepam   Injectable 0.5 milliGRAM(s) IV Push every 6 hours PRN Agitation  ondansetron Injectable 4 milliGRAM(s) IV Push every 6 hours PRN Nausea and/or Vomiting    PRESENT SYMPTOMS:  Source: [x ] Patient   [ x] Family   [ x] Team     Pain:                        [ ] No [x ] Yes             [ ] Mild [x ] Moderate [ ] Severe    Onset -weeks	  Location - generalized abdominal  Duration - constant   Character - aching  Alleviating/Aggravating - movement, relieved by pain regimen  Radiation - diffuse, non radiating  Timing - when pain meds wear off    Dyspnea:                [x ] No [ ] Yes             [ ] Mild [ ] Moderate [ ] Severe    Anxiety:                  [x ] No [ ] Yes             [ ] Mild [ ] Moderate [ ] Severe    Fatigue:                  [ ] No [x ] Yes             [ ] Mild [ ] Moderate [x ] Severe    Nausea:                  [ x] No [ ] Yes             [ ] Mild [ ] Moderate [ ] Severe    Loss of appetite:   [ ] No [x ] Yes             [ ] Mild [ ] Moderate [x ] Severe    Constipation:        [x ] No [ ] Yes             [ ] Mild [ ] Moderate [ ] Severe    Other Symptoms:  [x ] All other review of systems negative   [ ] Unable to obtain due to poor mentation     Karnofsky Performance Score/Palliative Performance Status Version 2:     40    %    Vital Signs Last 24 Hrs  T(C): 36.3 (20 Feb 2018 08:40), Max: 36.4 (20 Feb 2018 01:03)  T(F): 97.4 (20 Feb 2018 08:40), Max: 97.5 (20 Feb 2018 01:03)  HR: 94 (20 Feb 2018 08:40) (94 - 98)  BP: 93/61 (20 Feb 2018 08:40) (93/59 - 93/61)  BP(mean): --  RR: 16 (20 Feb 2018 08:40) (16 - 16)  SpO2: 95% (20 Feb 2018 08:40) (95% - 95%)    PHYSICAL EXAM:  General: Cachectic, jaundiced  [x ] Alert  [x ] Oriented x  3    [ ] Lethargic  [ ] Agitated   [x ] Cachexia   [ ] Unarousable  [x ] Verbal  [ ] Non-Verbal    HEENT:  [x ] Normal   [x ] Dry mouth   [ ] ET Tube    [ ] Trach  [ ] Oral lesions    Lungs:   [x ] Clear [ ] Tachypnea  [ ] Audible excessive secretions   [ ] Rhonchi        [ ] Right [ ] Left [ ] Bilateral  [ ] Crackles        [ ] Right [ ] Left [ ] Bilateral  [ ] Wheezing     [ ] Right [ ] Left [ ] Bilateral    Cardiovascular:  [x ] Regular [ ] Irregular [x ] Tachycardia   [ ] Bradycardia  [ ] Murmur [ ] Other    Abdomen: [ x] Soft  [x ] Distended   [x ] +BS  [x ] Non tender [ ] Tender  [ ]PEG   [ ]OGT/ NGT   Last BM:   colostomy with active output.     Genitourinary: [x ] Normal [ ] Incontinent   [ ] Oliguria/Anuria   [ ] Anderson    Musculoskeletal:  [ ] Normal   [x ] Weakness  [ ] Bedbound/Wheelchair bound [x ] Edema - BL lower extremity 3 +    Neurological: [x ] No focal deficits  [ ] Cognitive impairment  [ ] Dysphagia [ ] Dysarthria [ ] Paresis [ ] Other     Skin: [ x] Normal   [ ] Pressure ulcer(s)                  [ ] Rash    LABS: reviewed  Oral Intake: [ ] Unable/mouth care only [x ] Minimal [ ] Moderate [ ] Full Capability  Diet: [ ] NPO [ ] Tube feeds [ ] TPN [ ] Other     RADIOLOGY & ADDITIONAL STUDIES: reviewed    REFERRALS:   [x ] Chaplaincy  [ ] Hospice  [ ] Child Life  [x ] Social Work  [ ] Case management [ ] Holistic Therapy

## 2018-02-20 NOTE — PROGRESS NOTE ADULT - PROBLEM SELECTOR PLAN 1
On Dilaudid to 0.5mg Q6h RTC with breakthrough of Dilaudid 0.5mg IV Q1h prn dyspnea or pain. Pt stable on medication.

## 2018-02-20 NOTE — PROGRESS NOTE ADULT - PROBLEM SELECTOR PLAN 1
C/w Dilaudid to 0.5mg Q6h RTC with breakthrough of Dilaudid 0.5mg IV Q1h prn dyspnea or pain. Pt stable on medication. On Dilaudid to 0.5mg Q6h RTC with breakthrough of Dilaudid 0.5mg IV Q1h prn dyspnea or pain. Pt stable on medication.

## 2018-02-20 NOTE — PROGRESS NOTE ADULT - PROBLEM SELECTOR PLAN 5
Patient is DNR/ DNI.   Will continue PCU management for pain and possible EOL care. Depending on symptomatic burden and clinical decline, will discuss inpatient hospice care including places like the [facility]. Patient is DNR/ DNI.   Will continue PCU management for pain and EOL care. Depending on symptomatic burden and clinical decline, will discuss inpatient hospice care including places like the [facility].

## 2018-02-20 NOTE — PROGRESS NOTE ADULT - PROBLEM SELECTOR PLAN 5
Patient is DNR/ DNI.   Will continue PCU management for pain and EOL care. Depending on symptomatic burden and clinical decline, will discuss inpatient hospice care including places like the [facility].

## 2018-02-20 NOTE — PROGRESS NOTE ADULT - SUBJECTIVE AND OBJECTIVE BOX
HPI: 33 yo M h/o rectal cancer (unresectable, and metastatic to liver and lungs) s/p a diverting colostomy on 10/23/2017 with post-op course c/b perirectal abscess s/p IR placed STEPHANE drain and IR placed R nephrostomy pelvic collection/malignancy caused R hydronephrosis who presented hyponatremia, hyperkalemia and leukocytosis. Palliative care was initially called for goals of care. Patient admitted to PCU for symptom management and full comfort measures.     INTERVAL HPI/OVERNIGHT EVENTS: Patient seen bedside, arousable and alert, has no complaints, able to understand plan of care and has no questions.  Patient's partner Blaine is at bedside and has no acute concerns.    Allergies  No Known Allergies  Intolerances    ADVANCE DIRECTIVES:    DNR: [ ] NO [x ] YES (Date) MOLST [ x] NO [ ] YES (Date)    MEDICATIONS  (STANDING):  enoxaparin Injectable 40 milliGRAM(s) SubCutaneous daily  fluticasone propionate 50 MICROgram(s)/spray Nasal Spray 1 Spray(s) Both Nostrils two times a day  HYDROmorphone  Injectable 0.5 milliGRAM(s) IV Push every 6 hours  sodium chloride 2 Gram(s) Oral three times a day    MEDICATIONS  (PRN):  acetaminophen  Suppository 650 milliGRAM(s) Rectal every 6 hours PRN For Temp greater than 38 C (100.4 F)  HYDROmorphone  Injectable 1 milliGRAM(s) IV Push every 2 hours PRN breakthrough pain  HYDROmorphone  Injectable 1 milliGRAM(s) IV Push every 1 hour PRN dyspnea  LORazepam   Injectable 0.5 milliGRAM(s) IV Push every 6 hours PRN Agitation  ondansetron Injectable 4 milliGRAM(s) IV Push every 6 hours PRN Nausea and/or Vomiting    PRESENT SYMPTOMS:  Source: [x ] Patient   [ x] Family   [ x] Team     Pain:                        [ x] No [ ] Yes             [ ] Mild [ ] Moderate [ ] Severe    Onset -n/a  Location -  Duration -  Character -  Alleviating/Aggravating -  Radiation -  Timing -    Dyspnea:                [x ] No [ ] Yes             [ ] Mild [ ] Moderate [ ] Severe    Anxiety:                  [x ] No [ ] Yes             [ ] Mild [ ] Moderate [ ] Severe    Fatigue:                  [ ] No [x ] Yes             [ ] Mild [ ] Moderate [x ] Severe    Nausea:                  [ x] No [ ] Yes             [ ] Mild [ ] Moderate [ ] Severe    Loss of appetite:   [ ] No [x ] Yes             [ ] Mild [ ] Moderate [x ] Severe    Constipation:        [x ] No [ ] Yes             [ ] Mild [ ] Moderate [ ] Severe    Other Symptoms:  [x ] All other review of systems negative   [ ] Unable to obtain due to poor mentation     Karnofsky Performance Score/Palliative Performance Status Version 2:     40    %    Vital Signs Last 24 Hrs  T(C): 36.3 (20 Feb 2018 08:40), Max: 36.4 (20 Feb 2018 01:03)  T(F): 97.4 (20 Feb 2018 08:40), Max: 97.5 (20 Feb 2018 01:03)  HR: 94 (20 Feb 2018 08:40) (94 - 98)  BP: 93/61 (20 Feb 2018 08:40) (93/59 - 93/61)  BP(mean): --  RR: 16 (20 Feb 2018 08:40) (16 - 16)  SpO2: 95% (20 Feb 2018 08:40) (95% - 95%)    PHYSICAL EXAM:  General: Cachectic  [x ] Alert  [x ] Oriented x  3    [ ] Lethargic  [ ] Agitated   [x ] Cachexia   [ ] Unarousable  [x ] Verbal  [ ] Non-Verbal    HEENT:  [x ] Normal   [ ] Dry mouth   [ ] ET Tube    [ ] Trach  [ ] Oral lesions    Lungs:   [x ] Clear [ ] Tachypnea  [ ] Audible excessive secretions   [ ] Rhonchi        [ ] Right [ ] Left [ ] Bilateral  [ ] Crackles        [ ] Right [ ] Left [ ] Bilateral  [ ] Wheezing     [ ] Right [ ] Left [ ] Bilateral    Cardiovascular:  [x ] Regular [ ] Irregular [x ] Tachycardia   [ ] Bradycardia  [ ] Murmur [ ] Other    Abdomen: [ x] Soft  [x ] Distended   [x ] +BS  [x ] Non tender [ ] Tender  [ ]PEG   [ ]OGT/ NGT   Last BM:   colostomy with active output.     Genitourinary: [x ] Normal [ ] Incontinent   [ ] Oliguria/Anuria   [ ] Anderson    Musculoskeletal:  [ ] Normal   [x ] Weakness  [ ] Bedbound/Wheelchair bound [x ] Edema - BL lower extremity 3 +    Neurological: [x ] No focal deficits  [ ] Cognitive impairment  [ ] Dysphagia [ ] Dysarthria [ ] Paresis [ ] Other     Skin: [ x] Normal   [ ] Pressure ulcer(s)                  [ ] Rash    LABS: reviewed  Oral Intake: [ ] Unable/mouth care only [x ] Minimal [ ] Moderate [ ] Full Capability  Diet: [ ] NPO [ ] Tube feeds [ ] TPN [ ] Other     RADIOLOGY & ADDITIONAL STUDIES: reviewed    REFERRALS:   [x ] Chaplaincy  [ ] Hospice  [ ] Child Life  [x ] Social Work  [ ] Case management [ ] Holistic Therapy HPI: 31 yo M h/o rectal cancer (unresectable, and metastatic to liver and lungs) s/p a diverting colostomy on 10/23/2017 with post-op course c/b perirectal abscess s/p IR placed STEPHANE drain and IR placed R nephrostomy pelvic collection/malignancy caused R hydronephrosis who presented hyponatremia, hyperkalemia and leukocytosis. Palliative care was initially called for goals of care. Patient admitted to PCU for symptom management and full comfort measures.     INTERVAL HPI/OVERNIGHT EVENTS: Patient seen bedside, arousable and alert but quickly drifts off, has no complaints, able to understand plan of care and has no questions.  Patient's partner Blaine is at bedside and has no acute concerns.    Allergies  No Known Allergies  Intolerances    ADVANCE DIRECTIVES:    DNR: [ ] NO [x ] YES (Date) MOLST [ x] NO [ ] YES (Date)    MEDICATIONS  (STANDING):  enoxaparin Injectable 40 milliGRAM(s) SubCutaneous daily  fluticasone propionate 50 MICROgram(s)/spray Nasal Spray 1 Spray(s) Both Nostrils two times a day  HYDROmorphone  Injectable 0.5 milliGRAM(s) IV Push every 6 hours  sodium chloride 2 Gram(s) Oral three times a day    MEDICATIONS  (PRN):  acetaminophen  Suppository 650 milliGRAM(s) Rectal every 6 hours PRN For Temp greater than 38 C (100.4 F)  HYDROmorphone  Injectable 1 milliGRAM(s) IV Push every 2 hours PRN breakthrough pain  HYDROmorphone  Injectable 1 milliGRAM(s) IV Push every 1 hour PRN dyspnea  LORazepam   Injectable 0.5 milliGRAM(s) IV Push every 6 hours PRN Agitation  ondansetron Injectable 4 milliGRAM(s) IV Push every 6 hours PRN Nausea and/or Vomiting    PRESENT SYMPTOMS:  Source: [x ] Patient   [ x] Family   [ x] Team     Pain:                        [ ] No [x ] Yes             [ ] Mild [x ] Moderate [ ] Severe    Onset -weeks	  Location - generalized abdominal  Duration - constant   Character - aching  Alleviating/Aggravating - movement, relieved by pain regimen  Radiation - diffuse, non radiating  Timing - when pain meds wear off    Dyspnea:                [x ] No [ ] Yes             [ ] Mild [ ] Moderate [ ] Severe    Anxiety:                  [x ] No [ ] Yes             [ ] Mild [ ] Moderate [ ] Severe    Fatigue:                  [ ] No [x ] Yes             [ ] Mild [ ] Moderate [x ] Severe    Nausea:                  [ x] No [ ] Yes             [ ] Mild [ ] Moderate [ ] Severe    Loss of appetite:   [ ] No [x ] Yes             [ ] Mild [ ] Moderate [x ] Severe    Constipation:        [x ] No [ ] Yes             [ ] Mild [ ] Moderate [ ] Severe    Other Symptoms:  [x ] All other review of systems negative   [ ] Unable to obtain due to poor mentation     Karnofsky Performance Score/Palliative Performance Status Version 2:     40    %    Vital Signs Last 24 Hrs  T(C): 36.3 (20 Feb 2018 08:40), Max: 36.4 (20 Feb 2018 01:03)  T(F): 97.4 (20 Feb 2018 08:40), Max: 97.5 (20 Feb 2018 01:03)  HR: 94 (20 Feb 2018 08:40) (94 - 98)  BP: 93/61 (20 Feb 2018 08:40) (93/59 - 93/61)  BP(mean): --  RR: 16 (20 Feb 2018 08:40) (16 - 16)  SpO2: 95% (20 Feb 2018 08:40) (95% - 95%)    PHYSICAL EXAM:  General: Cachectic, jaundiced  [x ] Alert  [x ] Oriented x  3    [ ] Lethargic  [ ] Agitated   [x ] Cachexia   [ ] Unarousable  [x ] Verbal  [ ] Non-Verbal    HEENT:  [x ] Normal   [x ] Dry mouth   [ ] ET Tube    [ ] Trach  [ ] Oral lesions    Lungs:   [x ] Clear [ ] Tachypnea  [ ] Audible excessive secretions   [ ] Rhonchi        [ ] Right [ ] Left [ ] Bilateral  [ ] Crackles        [ ] Right [ ] Left [ ] Bilateral  [ ] Wheezing     [ ] Right [ ] Left [ ] Bilateral    Cardiovascular:  [x ] Regular [ ] Irregular [x ] Tachycardia   [ ] Bradycardia  [ ] Murmur [ ] Other    Abdomen: [ x] Soft  [x ] Distended   [x ] +BS  [x ] Non tender [ ] Tender  [ ]PEG   [ ]OGT/ NGT   Last BM:   colostomy with active output.     Genitourinary: [x ] Normal [ ] Incontinent   [ ] Oliguria/Anuria   [ ] Anderson    Musculoskeletal:  [ ] Normal   [x ] Weakness  [ ] Bedbound/Wheelchair bound [x ] Edema - BL lower extremity 3 +    Neurological: [x ] No focal deficits  [ ] Cognitive impairment  [ ] Dysphagia [ ] Dysarthria [ ] Paresis [ ] Other     Skin: [ x] Normal   [ ] Pressure ulcer(s)                  [ ] Rash    LABS: reviewed  Oral Intake: [ ] Unable/mouth care only [x ] Minimal [ ] Moderate [ ] Full Capability  Diet: [ ] NPO [ ] Tube feeds [ ] TPN [ ] Other     RADIOLOGY & ADDITIONAL STUDIES: reviewed    REFERRALS:   [x ] Chaplaincy  [ ] Hospice  [ ] Child Life  [x ] Social Work  [ ] Case management [ ] Holistic Therapy

## 2018-02-21 PROCEDURE — 99233 SBSQ HOSP IP/OBS HIGH 50: CPT | Mod: GC

## 2018-02-21 RX ORDER — HYDROMORPHONE HYDROCHLORIDE 2 MG/ML
0.2 INJECTION INTRAMUSCULAR; INTRAVENOUS; SUBCUTANEOUS
Qty: 100 | Refills: 0 | Status: DISCONTINUED | OUTPATIENT
Start: 2018-02-21 | End: 2018-02-24

## 2018-02-21 RX ORDER — HYDROMORPHONE HYDROCHLORIDE 2 MG/ML
1 INJECTION INTRAMUSCULAR; INTRAVENOUS; SUBCUTANEOUS
Qty: 0 | Refills: 0 | Status: DISCONTINUED | OUTPATIENT
Start: 2018-02-21 | End: 2018-02-25

## 2018-02-21 RX ORDER — SODIUM CHLORIDE 9 MG/ML
1 INJECTION INTRAMUSCULAR; INTRAVENOUS; SUBCUTANEOUS
Qty: 0 | Refills: 0 | Status: DISCONTINUED | OUTPATIENT
Start: 2018-02-21 | End: 2018-02-21

## 2018-02-21 RX ORDER — DIPHENHYDRAMINE HCL 50 MG
25 CAPSULE ORAL ONCE
Qty: 0 | Refills: 0 | Status: COMPLETED | OUTPATIENT
Start: 2018-02-21 | End: 2018-02-21

## 2018-02-21 RX ORDER — SODIUM CHLORIDE 9 MG/ML
2 INJECTION INTRAMUSCULAR; INTRAVENOUS; SUBCUTANEOUS
Qty: 0 | Refills: 0 | Status: DISCONTINUED | OUTPATIENT
Start: 2018-02-21 | End: 2018-02-23

## 2018-02-21 RX ORDER — SODIUM CHLORIDE 9 MG/ML
1000 INJECTION INTRAMUSCULAR; INTRAVENOUS; SUBCUTANEOUS
Qty: 0 | Refills: 0 | Status: DISCONTINUED | OUTPATIENT
Start: 2018-02-21 | End: 2018-02-24

## 2018-02-21 RX ADMIN — SODIUM CHLORIDE 10 MILLILITER(S): 9 INJECTION INTRAMUSCULAR; INTRAVENOUS; SUBCUTANEOUS at 20:03

## 2018-02-21 RX ADMIN — HYDROMORPHONE HYDROCHLORIDE 0.5 MILLIGRAM(S): 2 INJECTION INTRAMUSCULAR; INTRAVENOUS; SUBCUTANEOUS at 00:22

## 2018-02-21 RX ADMIN — HYDROMORPHONE HYDROCHLORIDE 1 MILLIGRAM(S): 2 INJECTION INTRAMUSCULAR; INTRAVENOUS; SUBCUTANEOUS at 11:11

## 2018-02-21 RX ADMIN — SODIUM CHLORIDE 2 GRAM(S): 9 INJECTION INTRAMUSCULAR; INTRAVENOUS; SUBCUTANEOUS at 06:40

## 2018-02-21 RX ADMIN — Medication 1 SPRAY(S): at 06:41

## 2018-02-21 RX ADMIN — ENOXAPARIN SODIUM 40 MILLIGRAM(S): 100 INJECTION SUBCUTANEOUS at 12:00

## 2018-02-21 RX ADMIN — HYDROMORPHONE HYDROCHLORIDE 0.2 MG/HR: 2 INJECTION INTRAMUSCULAR; INTRAVENOUS; SUBCUTANEOUS at 20:03

## 2018-02-21 RX ADMIN — Medication 25 MILLIGRAM(S): at 21:34

## 2018-02-21 RX ADMIN — HYDROMORPHONE HYDROCHLORIDE 0.5 MILLIGRAM(S): 2 INJECTION INTRAMUSCULAR; INTRAVENOUS; SUBCUTANEOUS at 06:40

## 2018-02-21 RX ADMIN — HYDROMORPHONE HYDROCHLORIDE 1 MILLIGRAM(S): 2 INJECTION INTRAMUSCULAR; INTRAVENOUS; SUBCUTANEOUS at 09:31

## 2018-02-21 RX ADMIN — HYDROMORPHONE HYDROCHLORIDE 1 MILLIGRAM(S): 2 INJECTION INTRAMUSCULAR; INTRAVENOUS; SUBCUTANEOUS at 11:30

## 2018-02-21 RX ADMIN — HYDROMORPHONE HYDROCHLORIDE 1 MILLIGRAM(S): 2 INJECTION INTRAMUSCULAR; INTRAVENOUS; SUBCUTANEOUS at 17:20

## 2018-02-21 RX ADMIN — HYDROMORPHONE HYDROCHLORIDE 1 MILLIGRAM(S): 2 INJECTION INTRAMUSCULAR; INTRAVENOUS; SUBCUTANEOUS at 09:50

## 2018-02-21 RX ADMIN — HYDROMORPHONE HYDROCHLORIDE 1 MILLIGRAM(S): 2 INJECTION INTRAMUSCULAR; INTRAVENOUS; SUBCUTANEOUS at 16:47

## 2018-02-21 NOTE — PROGRESS NOTE ADULT - SUBJECTIVE AND OBJECTIVE BOX
HPI: 31 yo M h/o rectal cancer (unresectable, and metastatic to liver and lungs) s/p a diverting colostomy.  Has drains for intraabdominal/pelvic collections.        INTERVAL HPI/OVERNIGHT EVENTS: Patient seen at bedside, asleep. No new complaints. Patient's partner Blaine is also at bedside, is aware of patient's prognosis, and has no acute concerns.    Allergies  No Known Allergies  Intolerances    ADVANCE DIRECTIVES:    DNR: [ ] NO [x ] YES (Date) MOLST [ x] NO [ ] YES (Date)    MEDICATIONS  (STANDING):  enoxaparin Injectable 40 milliGRAM(s) SubCutaneous daily  fluticasone propionate 50 MICROgram(s)/spray Nasal Spray 1 Spray(s) Both Nostrils two times a day  HYDROmorphone Infusion 0.2 mG/Hr (0.2 mL/Hr) IV Continuous <Continuous>  sodium chloride 2 Gram(s) Oral two times a day    MEDICATIONS  (PRN):  acetaminophen  Suppository 650 milliGRAM(s) Rectal every 6 hours PRN For Temp greater than 38 C (100.4 F)  HYDROmorphone  Injectable 1 milliGRAM(s) IV Push every 1 hour PRN dyspnea  HYDROmorphone  Injectable 1 milliGRAM(s) IV Push every 1 hour PRN breakthrough pain  LORazepam   Injectable 0.5 milliGRAM(s) IV Push every 6 hours PRN Agitation  ondansetron Injectable 4 milliGRAM(s) IV Push every 6 hours PRN Nausea and/or Vomiting    PRESENT SYMPTOMS:  Source: [x ] Patient   [ x] Family   [ x] Team     Pain:                        [ ] No [x ] Yes             [ ] Mild [ ] Moderate [ ] Severe    Onset -weeks	  Location - generalized abdominal  Duration - constant   Character - aching  Alleviating/Aggravating - movement, relieved by pain regimen  Radiation - diffuse, non radiating  Timing - when pain meds wear off    Dyspnea:                [x ] No [ ] Yes             [ ] Mild [ ] Moderate [ ] Severe    Anxiety:                  [x ] No [ ] Yes             [ ] Mild [ ] Moderate [ ] Severe    Fatigue:                  [ ] No [x ] Yes             [ ] Mild [ ] Moderate [x ] Severe    Nausea:                  [ x] No [ ] Yes             [ ] Mild [ ] Moderate [ ] Severe    Loss of appetite:   [ ] No [x ] Yes             [ ] Mild [ ] Moderate [x ] Severe    Constipation:        [x ] No [ ] Yes             [ ] Mild [ ] Moderate [ ] Severe    Other Symptoms:  [x ] All other review of systems negative   [ ] Unable to obtain due to poor mentation     Karnofsky Performance Score/Palliative Performance Status Version 2:     30    %    Vital Signs Last 24 Hrs  T(C): --  T(F): --  HR: 93 (21 Feb 2018 09:35) (93 - 93)  BP: 98/63 (21 Feb 2018 09:35) (98/63 - 98/63)  BP(mean): --  RR: 24 (21 Feb 2018 09:35) (24 - 24)  SpO2: 93% (21 Feb 2018 09:35) (93% - 93%)    Cachectic, jaundiced.    HEENT:  [x ] Normal   [ ] Dry mouth   [ ] ET Tube    [ ] Trach  [ ] Oral lesions    Lungs:   [x ] Clear [ ] Tachypnea  [ ] Audible excessive secretions   [ ] Rhonchi        [ ] Right [ ] Left [ ] Bilateral  [ ] Crackles        [ ] Right [ ] Left [ ] Bilateral  [ ] Wheezing     [ ] Right [ ] Left [ ] Bilateral    Cardiovascular:  [x ] Regular [ ] Irregular [ ] Tachycardia   [ ] Bradycardia  [ ] Murmur [ ] Other    Abdomen: [ x] Soft  [x ] Distended   [x ] +BS  [x ] Non tender [ ] Tender  [ ]PEG   [ ]OGT/ NGT   Last BM:   colostomy with active output.     Genitourinary: [x ] Normal [ ] Incontinent   [ ] Oliguria/Anuria   [ ] Anderson    Musculoskeletal:  [ ] Normal   [x ] Weakness  [ ] Bedbound/Wheelchair bound [x ] Edema - BL lower extremity 3 +    Neurological: [x ] No focal deficits  [ ] Cognitive impairment  [ ] Dysphagia [ ] Dysarthria [ ] Paresis [ ] Other     Skin: [ ] Normal  Jaundiced [ ] Pressure ulcer(s)                  [ ] Rash    LABS: reviewed  Oral Intake: [ ] Unable/mouth care only [x ] Minimal [ ] Moderate [ ] Full Capability  Diet: [ ] NPO [ ] Tube feeds [ ] TPN [ ] Other     RADIOLOGY & ADDITIONAL STUDIES: reviewed    REFERRALS:   [x ] Chaplaincy  [ ] Hospice  [ ] Child Life  [x ] Social Work  [ ] Case management [ ] Holistic Therapy

## 2018-02-21 NOTE — PROGRESS NOTE ADULT - SUBJECTIVE AND OBJECTIVE BOX
HPI: 33 yo M h/o rectal cancer (unresectable, and metastatic to liver and lungs) s/p a diverting colostomy on 10/23/2017 with post-op course c/b perirectal abscess s/p IR placed STEPHANE drain and IR placed R nephrostomy pelvic collection/malignancy caused R hydronephrosis who presented hyponatremia, hyperkalemia and leukocytosis. Palliative care was initially called for goals of care. Patient admitted to PCU for symptom management and full comfort measures.     INTERVAL HPI/OVERNIGHT EVENTS: Patient seen at bedside, asleep. No new complaints. Patient's partner Blaine is also at bedside, is aware of patient's prognosis, and has no acute concerns.    Allergies  No Known Allergies  Intolerances    ADVANCE DIRECTIVES:    DNR: [ ] NO [x ] YES (Date) MOLST [ x] NO [ ] YES (Date)    MEDICATIONS  (STANDING):  enoxaparin Injectable 40 milliGRAM(s) SubCutaneous daily  fluticasone propionate 50 MICROgram(s)/spray Nasal Spray 1 Spray(s) Both Nostrils two times a day  HYDROmorphone Infusion 0.2 mG/Hr (0.2 mL/Hr) IV Continuous <Continuous>  sodium chloride 2 Gram(s) Oral two times a day    MEDICATIONS  (PRN):  acetaminophen  Suppository 650 milliGRAM(s) Rectal every 6 hours PRN For Temp greater than 38 C (100.4 F)  HYDROmorphone  Injectable 1 milliGRAM(s) IV Push every 1 hour PRN dyspnea  HYDROmorphone  Injectable 1 milliGRAM(s) IV Push every 1 hour PRN breakthrough pain  LORazepam   Injectable 0.5 milliGRAM(s) IV Push every 6 hours PRN Agitation  ondansetron Injectable 4 milliGRAM(s) IV Push every 6 hours PRN Nausea and/or Vomiting    PRESENT SYMPTOMS:  Source: [x ] Patient   [ x] Family   [ x] Team     Pain:                        [ x] No [ ] Yes             [ ] Mild [ ] Moderate [ ] Severe    Onset -n/a  Location -  Duration -  Character -  Alleviating/Aggravating -  Radiation -  Timing -    Dyspnea:                [x ] No [ ] Yes             [ ] Mild [ ] Moderate [ ] Severe    Anxiety:                  [x ] No [ ] Yes             [ ] Mild [ ] Moderate [ ] Severe    Fatigue:                  [ ] No [x ] Yes             [ ] Mild [ ] Moderate [x ] Severe    Nausea:                  [ x] No [ ] Yes             [ ] Mild [ ] Moderate [ ] Severe    Loss of appetite:   [ ] No [x ] Yes             [ ] Mild [ ] Moderate [x ] Severe    Constipation:        [x ] No [ ] Yes             [ ] Mild [ ] Moderate [ ] Severe    Other Symptoms:  [x ] All other review of systems negative   [ ] Unable to obtain due to poor mentation     Karnofsky Performance Score/Palliative Performance Status Version 2:     40    %    Vital Signs Last 24 Hrs  T(C): --  T(F): --  HR: 93 (21 Feb 2018 09:35) (93 - 93)  BP: 98/63 (21 Feb 2018 09:35) (98/63 - 98/63)  BP(mean): --  RR: 24 (21 Feb 2018 09:35) (24 - 24)  SpO2: 93% (21 Feb 2018 09:35) (93% - 93%)    HEENT:  [x ] Normal   [ ] Dry mouth   [ ] ET Tube    [ ] Trach  [ ] Oral lesions    Lungs:   [x ] Clear [ ] Tachypnea  [ ] Audible excessive secretions   [ ] Rhonchi        [ ] Right [ ] Left [ ] Bilateral  [ ] Crackles        [ ] Right [ ] Left [ ] Bilateral  [ ] Wheezing     [ ] Right [ ] Left [ ] Bilateral    Cardiovascular:  [x ] Regular [ ] Irregular [ ] Tachycardia   [ ] Bradycardia  [ ] Murmur [ ] Other    Abdomen: [ x] Soft  [x ] Distended   [x ] +BS  [x ] Non tender [ ] Tender  [ ]PEG   [ ]OGT/ NGT   Last BM:   colostomy with active output.     Genitourinary: [x ] Normal [ ] Incontinent   [ ] Oliguria/Anuria   [ ] Anderson    Musculoskeletal:  [ ] Normal   [x ] Weakness  [ ] Bedbound/Wheelchair bound [x ] Edema - BL lower extremity 3 +    Neurological: [x ] No focal deficits  [ ] Cognitive impairment  [ ] Dysphagia [ ] Dysarthria [ ] Paresis [ ] Other     Skin: [ x] Normal   [ ] Pressure ulcer(s)                  [ ] Rash    LABS: reviewed  Oral Intake: [ ] Unable/mouth care only [x ] Minimal [ ] Moderate [ ] Full Capability  Diet: [ ] NPO [ ] Tube feeds [ ] TPN [ ] Other     RADIOLOGY & ADDITIONAL STUDIES: reviewed    REFERRALS:   [x ] Chaplaincy  [ ] Hospice  [ ] Child Life  [x ] Social Work  [ ] Case management [ ] Holistic Therapy

## 2018-02-21 NOTE — PROGRESS NOTE ADULT - PROBLEM SELECTOR PLAN 5
Patient is DNR/ DNI.   Will continue PCU management for pain and possible EOL care. Depending on symptomatic burden and clinical decline, will discuss inpatient hospice care including places like the [facility].

## 2018-02-21 NOTE — PROGRESS NOTE ADULT - PROBLEM SELECTOR PLAN 5
Patient is DNR/ DNI.   Will continue PCU management for pain and  EOL care. Depending on symptomatic burden and clinical decline, will discuss inpatient hospice care including places like the [facility].

## 2018-02-21 NOTE — PROGRESS NOTE ADULT - PROBLEM SELECTOR PLAN 1
Remains on  Dilaudid drip of 0.2 mg/hr with breakthrough of Dilaudid 0.5mg IV Q1h prn dyspnea or pain. Pt required 2 breakthrough doses in the past 24 hours. Pt had increased need for Dilaudid, discussed with , may  benefit from continuous infusion, to which all agreed.

## 2018-02-22 DIAGNOSIS — L29.9 PRURITUS, UNSPECIFIED: ICD-10-CM

## 2018-02-22 PROCEDURE — 99233 SBSQ HOSP IP/OBS HIGH 50: CPT | Mod: GC

## 2018-02-22 RX ORDER — DIPHENHYDRAMINE HCL/ZINC ACET 2 %-0.1 %
1 CREAM (GRAM) TOPICAL
Qty: 0 | Refills: 0 | Status: DISCONTINUED | OUTPATIENT
Start: 2018-02-22 | End: 2018-02-25

## 2018-02-22 RX ADMIN — ENOXAPARIN SODIUM 40 MILLIGRAM(S): 100 INJECTION SUBCUTANEOUS at 11:04

## 2018-02-22 RX ADMIN — HYDROMORPHONE HYDROCHLORIDE 0.2 MG/HR: 2 INJECTION INTRAMUSCULAR; INTRAVENOUS; SUBCUTANEOUS at 08:31

## 2018-02-22 RX ADMIN — Medication 1 SPRAY(S): at 05:37

## 2018-02-22 RX ADMIN — Medication 1 SPRAY(S): at 17:57

## 2018-02-22 RX ADMIN — HYDROMORPHONE HYDROCHLORIDE 1 MILLIGRAM(S): 2 INJECTION INTRAMUSCULAR; INTRAVENOUS; SUBCUTANEOUS at 22:16

## 2018-02-22 RX ADMIN — HYDROMORPHONE HYDROCHLORIDE 1 MILLIGRAM(S): 2 INJECTION INTRAMUSCULAR; INTRAVENOUS; SUBCUTANEOUS at 11:02

## 2018-02-22 RX ADMIN — SODIUM CHLORIDE 2 GRAM(S): 9 INJECTION INTRAMUSCULAR; INTRAVENOUS; SUBCUTANEOUS at 18:03

## 2018-02-22 RX ADMIN — HYDROMORPHONE HYDROCHLORIDE 0.2 MG/HR: 2 INJECTION INTRAMUSCULAR; INTRAVENOUS; SUBCUTANEOUS at 17:56

## 2018-02-22 RX ADMIN — SODIUM CHLORIDE 2 GRAM(S): 9 INJECTION INTRAMUSCULAR; INTRAVENOUS; SUBCUTANEOUS at 05:36

## 2018-02-22 NOTE — PROGRESS NOTE ADULT - SUBJECTIVE AND OBJECTIVE BOX
HPI: 31 y/o M h/o rectal cancer (unresectable, and metastatic to liver and lungs) s/p a diverting colostomy.  Has drains for intraabdominal/pelvic collections.        INTERVAL HPI/OVERNIGHT EVENTS: Patient seen at bedside. Offers no new complaints. Patient's partner Blaine and mother are also at bedside, is aware of patient's prognosis, and has no acute concerns.    Allergies  No Known Allergies  Intolerances    ADVANCE DIRECTIVES:    DNR: [ ] NO [x ] YES (Date) MOLST [ x] NO [ ] YES (Date)    MEDICATIONS  (STANDING):  enoxaparin Injectable 40 milliGRAM(s) SubCutaneous daily  fluticasone propionate 50 MICROgram(s)/spray Nasal Spray 1 Spray(s) Both Nostrils two times a day  HYDROmorphone Infusion 0.2 mG/Hr (0.2 mL/Hr) IV Continuous <Continuous>  sodium chloride 2 Gram(s) Oral two times a day    MEDICATIONS  (PRN):  acetaminophen  Suppository 650 milliGRAM(s) Rectal every 6 hours PRN For Temp greater than 38 C (100.4 F)  HYDROmorphone  Injectable 1 milliGRAM(s) IV Push every 1 hour PRN dyspnea  HYDROmorphone  Injectable 1 milliGRAM(s) IV Push every 1 hour PRN breakthrough pain  LORazepam   Injectable 0.5 milliGRAM(s) IV Push every 6 hours PRN Agitation  ondansetron Injectable 4 milliGRAM(s) IV Push every 6 hours PRN Nausea and/or Vomiting    PRESENT SYMPTOMS:  Source: [x ] Patient   [ ] Family   [ x] Team     Pain:                        [ ] No [x ] Yes             [x ] Mild [ ] Moderate [ ] Severe  Well-controlled on current regimen.    Onset -weeks	  Location - generalized abdominal  Duration - constant   Character - aching  Alleviating/Aggravating - movement, relieved by pain regimen  Radiation - diffuse, non radiating  Timing - when pain meds wear off    Dyspnea:                [x ] No [ ] Yes             [ ] Mild [ ] Moderate [ ] Severe    Anxiety:                  [x ] No [ ] Yes             [ ] Mild [ ] Moderate [ ] Severe    Fatigue:                  [ ] No [x ] Yes             [ ] Mild [ ] Moderate [x ] Severe    Nausea:                  [ x] No [ ] Yes             [ ] Mild [ ] Moderate [ ] Severe    Loss of appetite:   [ ] No [x ] Yes             [ ] Mild [ ] Moderate [x ] Severe    Constipation:        [x ] No [ ] Yes             [ ] Mild [ ] Moderate [ ] Severe    Other Symptoms:  [x ] All other review of systems negative   [ ] Unable to obtain due to poor mentation     Karnofsky Performance Score/Palliative Performance Status Version 2:     30    %    Vital Signs Last 24 Hrs  T(C): --  T(F): --  HR: 93 (21 Feb 2018 09:35) (93 - 93)  BP: 98/63 (21 Feb 2018 09:35) (98/63 - 98/63)  BP(mean): --  RR: 24 (21 Feb 2018 09:35) (24 - 24)  SpO2: 93% (21 Feb 2018 09:35) (93% - 93%)    Cachectic, jaundiced.    HEENT:  [x ] Normal   [ ] Dry mouth   [ ] ET Tube    [ ] Trach  [ ] Oral lesions    Lungs:   [x ] Clear [ ] Tachypnea  [ ] Audible excessive secretions   [ ] Rhonchi        [ ] Right [ ] Left [ ] Bilateral  [ ] Crackles        [ ] Right [ ] Left [ ] Bilateral  [ ] Wheezing     [ ] Right [ ] Left [ ] Bilateral    Cardiovascular:  [x ] Regular [ ] Irregular [ ] Tachycardia   [ ] Bradycardia  [ ] Murmur [ ] Other    Abdomen: [ x] Soft  [x ] Distended   [x ] +BS  [x ] Non tender [ ] Tender  [ ]PEG   [ ]OGT/ NGT   Last BM:   colostomy with active output.     Genitourinary: [x ] Normal [ ] Incontinent   [ ] Oliguria/Anuria   [ ] Anderson    Musculoskeletal:  [ ] Normal   [x ] Weakness  [ ] Bedbound/Wheelchair bound [x ] Edema - BL lower extremity 3 +    Neurological: [x ] No focal deficits  [ ] Cognitive impairment  [ ] Dysphagia [ ] Dysarthria [ ] Paresis [ ] Other     Skin: [ ] Normal  Jaundiced [ ] Pressure ulcer(s)                  [ ] Rash      LABS: None new interim    Oral Intake: [ ] Unable/mouth care only [x ] Minimal [ ] Moderate [ ] Full Capability  Diet: [ ] NPO [ ] Tube feeds [ ] TPN [ ] Other       RADIOLOGY & ADDITIONAL STUDIES: None new interim.    REFERRALS:   [x ] Chaplaincy  [ ] Hospice  [ ] Child Life  [x ] Social Work  [ ] Case management [ ] Holistic Therapy HPI: 33 y/o M h/o rectal cancer (unresectable, and metastatic to liver and lungs) s/p a diverting colostomy.  Has drains for intraabdominal/pelvic collections.        INTERVAL HPI/OVERNIGHT EVENTS: Patient seen at bedside. Offers no new complaints. Patient's partner Blaine and mother are also at bedside, is aware of patient's prognosis, and have no acute concerns.    Allergies  No Known Allergies  Intolerances    ADVANCE DIRECTIVES:    DNR: [ ] NO [x ] YES (Date) MOLST [ x] NO [ ] YES (Date)    MEDICATIONS  (STANDING):  enoxaparin Injectable 40 milliGRAM(s) SubCutaneous daily  fluticasone propionate 50 MICROgram(s)/spray Nasal Spray 1 Spray(s) Both Nostrils two times a day  HYDROmorphone Infusion 0.2 mG/Hr (0.2 mL/Hr) IV Continuous <Continuous>  sodium chloride 2 Gram(s) Oral two times a day    MEDICATIONS  (PRN):  acetaminophen  Suppository 650 milliGRAM(s) Rectal every 6 hours PRN For Temp greater than 38 C (100.4 F)  HYDROmorphone  Injectable 1 milliGRAM(s) IV Push every 1 hour PRN dyspnea  HYDROmorphone  Injectable 1 milliGRAM(s) IV Push every 1 hour PRN breakthrough pain  LORazepam   Injectable 0.5 milliGRAM(s) IV Push every 6 hours PRN Agitation  ondansetron Injectable 4 milliGRAM(s) IV Push every 6 hours PRN Nausea and/or Vomiting    PRESENT SYMPTOMS:  Source: [x ] Patient   [ ] Family   [ x] Team     Pain:                        [ ] No [x ] Yes             [x ] Mild [ ] Moderate [ ] Severe  Well-controlled on current regimen.    Onset -weeks	  Location - generalized abdominal  Duration - constant   Character - aching  Alleviating/Aggravating - movement, relieved by pain regimen  Radiation - diffuse, non radiating  Timing - when pain meds wear off    Dyspnea:                [x ] No [ ] Yes             [ ] Mild [ ] Moderate [ ] Severe    Anxiety:                  [x ] No [ ] Yes             [ ] Mild [ ] Moderate [ ] Severe    Fatigue:                  [ ] No [x ] Yes             [ ] Mild [ ] Moderate [x ] Severe    Nausea:                  [ x] No [ ] Yes             [ ] Mild [ ] Moderate [ ] Severe    Loss of appetite:   [ ] No [x ] Yes             [ ] Mild [ ] Moderate [x ] Severe    Constipation:        [x ] No [ ] Yes             [ ] Mild [ ] Moderate [ ] Severe    Other Symptoms:  [x ] All other review of systems negative   [ ] Unable to obtain due to poor mentation     Karnofsky Performance Score/Palliative Performance Status Version 2:     30    %    Vital Signs Last 24 Hrs  Vital Signs Last 24 Hrs  T(C): 36.4 (22 Feb 2018 07:59), Max: 36.4 (22 Feb 2018 07:59)  T(F): 97.6 (22 Feb 2018 07:59), Max: 97.6 (22 Feb 2018 07:59)  HR: 91 (22 Feb 2018 07:59) (91 - 91)  BP: 85/54 (22 Feb 2018 07:59) (85/54 - 85/54)  BP(mean): --  RR: 18 (22 Feb 2018 07:59) (18 - 18)  SpO2: 93% (22 Feb 2018 07:59) (93% - 93%)      Cachectic, jaundiced.    HEENT:  [ ] Normal   [x ] Dry mouth   [ ] ET Tube    [ ] Trach  [ ] Oral lesions    Lungs:   [x ] Clear [ ] Tachypnea  [ ] Audible excessive secretions   [ ] Rhonchi        [ ] Right [ ] Left [ ] Bilateral  [ ] Crackles        [ ] Right [ ] Left [ ] Bilateral  [ ] Wheezing     [ ] Right [ ] Left [ ] Bilateral    Cardiovascular:  [x ] Regular [ ] Irregular [ ] Tachycardia   [ ] Bradycardia  [ ] Murmur [ ] Other +S1 +S2     Abdomen: [ x] Soft  [x ] Distended   [x ] +BS  [x ] Non tender [ ] Tender  [ ]PEG   [ ]OGT/ NGT   Last BM:   colostomy with minimal output, plus flatus.     Genitourinary: [x ] Normal [ ] Incontinent   [ ] Oliguria/Anuria   [ ] Anderson    Musculoskeletal:  [ ] Normal   [x ] Weakness  [ ] Bedbound/Wheelchair bound [x ] Edema - BL lower extremity 3 +    Neurological: [x ] No focal deficits  [ ] Cognitive impairment  [ ] Dysphagia [ ] Dysarthria [ ] Paresis [ ] Other     Skin: [ ] Normal  Jaundiced [ ] Pressure ulcer(s)                  [ ] Rash      LABS: None new interim    Oral Intake: [ ] Unable/mouth care only [x ] Minimal [ ] Moderate [ ] Full Capability  Diet: [ ] NPO [ ] Tube feeds [ ] TPN [ ] Other       RADIOLOGY & ADDITIONAL STUDIES: None new interim.    REFERRALS:   [x ] Chaplaincy  [ ] Hospice  [ ] Child Life  [x ] Social Work  [ ] Case management [ ] Holistic Therapy

## 2018-02-22 NOTE — PROGRESS NOTE ADULT - PROBLEM SELECTOR PLAN 1
Remains on  Dilaudid drip of 0.2 mg/hr with breakthrough of Dilaudid 1mg IV Q1h prn dyspnea or pain. Pt required 2 breakthrough doses in the past 24 hours. Pt placed on continuous infusion of Dilaudid yesterday. Pain is adequately controlled on regimen, no dose-adjustment necessary.

## 2018-02-22 NOTE — PROVIDER CONTACT NOTE (OTHER) - ACTION/TREATMENT ORDERED:
MD made aware, benadryl 25mg ivp x1 ordered and administered, will continue to monitor patient
md assessed pt no new orders
sodium tab & fluild restriction

## 2018-02-22 NOTE — PROGRESS NOTE ADULT - PROBLEM SELECTOR PLAN 5
Secondary to progression of disease. Patient with decreased PO intake. Now primarily just fluids. Salt tablets available as patient tolerates.

## 2018-02-22 NOTE — PROGRESS NOTE ADULT - PROBLEM SELECTOR PLAN 6
Will continue PCU management for pain and  EOL care. Depending on symptomatic burden and clinical decline, will discuss inpatient hospice care including places like the [facility].

## 2018-02-22 NOTE — PROVIDER CONTACT NOTE (OTHER) - ASSESSMENT
patient unrelieved by lotions
pt alert & oriented
pt aox2-3..vss tachycardia ..redness on back and neck

## 2018-02-22 NOTE — PROGRESS NOTE ADULT - PROBLEM SELECTOR PLAN 4
Secondary to metastatic cancer. Patient with worsening hepatic failure.  continue to monitor and manage symptoms of encephalopathy.  Patient and Partner asked to stop lactulose as he cannot take oral medications anymore, will manage symptoms as progresses.

## 2018-02-22 NOTE — PROGRESS NOTE ADULT - PROBLEM SELECTOR PLAN 2
Pt to receive Bendaryl 2%/Zinc acetate 0.1% cream. Consider trial of cholestyramine for  pruritus 2/2 to cholestasis.

## 2018-02-22 NOTE — PROGRESS NOTE ADULT - PROBLEM SELECTOR PLAN 3
Not a candidate for further disease modifying Rx (DMT) due to poor performance status and multi-organ failure (liver/kidneys).

## 2018-02-22 NOTE — CHART NOTE - NSCHARTNOTEFT_GEN_A_CORE
Pt seen for malnutrition follow up. Pt with rectal cancer (unresectable, and metastatic to liver and lungs) s/p a diverting colostomy on 10/23/2017 with post-op course c/b perirectal abscess s/p IR placed STEPHANE drain and IR placed R nephrostomy pelvic collection/malignancy caused R hydronephrosis who presents from IR for hyponatremia, hyperkalemia and leukocytosis. Patient transferred to palliative care unit for ongoing medical care in anticipation of escalating symptom burden. Overall poor prognosis, pt declining.     Source: Patient [ ]    Family [ ]     other [ x] Partner Blaine at bedside     Diet : No concentrated potassium diet with nepro 2 daily and prosource 1 daily       Patient reports [ ] nausea  [ ] vomiting [ ] diarrhea [ ] constipation  [ ]chewing problems [ ] swallowing issues  [ ] other: No N+V, pt with colostomy per RN no output recently, last output documented 2/15     PO intake:  < 50% [ x] 50-75% [ ]   % [ ]  other : Per family, pt has been taking mostly liquids including fruit punch and various juices, family will mix prosource 1 packet daily with various beverages, family requesting chocolate ensure shake-communicated with medical team.       Current Weight: No new wt  % Weight Change    Pertinent Medications: MEDICATIONS  (STANDING):  diphenhydrAMINE 2%/zinc acetate 0.1% Cream 1 Application(s) Topical two times a day  enoxaparin Injectable 40 milliGRAM(s) SubCutaneous daily  fluticasone propionate 50 MICROgram(s)/spray Nasal Spray 1 Spray(s) Both Nostrils two times a day  HYDROmorphone Infusion 0.2 mG/Hr (0.2 mL/Hr) IV Continuous <Continuous>  sodium chloride 2 Gram(s) Oral two times a day  sodium chloride 0.9%. 1000 milliLiter(s) (10 mL/Hr) IV Continuous <Continuous>    MEDICATIONS  (PRN):  acetaminophen  Suppository 650 milliGRAM(s) Rectal every 6 hours PRN For Temp greater than 38 C (100.4 F)  HYDROmorphone  Injectable 1 milliGRAM(s) IV Push every 1 hour PRN dyspnea  HYDROmorphone  Injectable 1 milliGRAM(s) IV Push every 1 hour PRN breakthrough pain  LORazepam   Injectable 0.5 milliGRAM(s) IV Push every 6 hours PRN Agitation  ondansetron Injectable 4 milliGRAM(s) IV Push every 6 hours PRN Nausea and/or Vomiting    Pertinent Labs:  No labs since 2/14    Skin: No edema, skin noted with suspected deep tissue injury to L buttocks     Estimated Needs:   [x ] no change since previous assessment  [ ] recalculated:       Previous Nutrition Diagnosis:     [ x] Malnutrition (severe)         Nutrition Diagnosis is [ x] ongoing, addressed with supplements          New Nutrition Diagnosis: [x ] not applicable         Interventions:     Recommend    1. Diet deferred to medical team, given prolonged poor po intake may consider liberalizing diet to regular  2. If in line with GOC may consider changing supplement to Ensure enlive 2 x daily per family request, communicated with medical team, per MD pt is in active dying process and diet restriction not warranted at this time       Monitoring and Evaluation:     [ ] PO intake [ x] Tolerance to diet prescription [ ] weights [x ] follow up per protocol    [ ] other: Pt seen for malnutrition follow up. Pt with rectal cancer (unresectable, and metastatic to liver and lungs) s/p a diverting colostomy on 10/23/2017 with post-op course c/b perirectal abscess s/p IR placed STEPHANE drain and IR placed R nephrostomy pelvic collection/malignancy caused R hydronephrosis who presents from IR for hyponatremia, hyperkalemia and leukocytosis. Patient transferred to palliative care unit for ongoing medical care in anticipation of escalating symptom burden. Overall poor prognosis, pt declining.     Source: Patient [ ]    Family [ ]     other [ x] Partner Blaine at bedside     Diet : No concentrated potassium diet with nepro 2 daily and prosource 1 daily       Patient reports [ ] nausea  [ ] vomiting [ ] diarrhea [ ] constipation  [ ]chewing problems [ ] swallowing issues  [ ] other: No N+V, pt with colostomy per RN no output recently, last output documented 2/15     PO intake:  < 50% [ x] 50-75% [ ]   % [ ]  other : Per family, pt has been taking mostly liquids including fruit punch and various juices, family will mix prosource 1 packet daily with various beverages, family requesting chocolate ensure shake-communicated with medical team.       Current Weight: No new wt  % Weight Change    Pertinent Medications: MEDICATIONS  (STANDING):  diphenhydrAMINE 2%/zinc acetate 0.1% Cream 1 Application(s) Topical two times a day  enoxaparin Injectable 40 milliGRAM(s) SubCutaneous daily  fluticasone propionate 50 MICROgram(s)/spray Nasal Spray 1 Spray(s) Both Nostrils two times a day  HYDROmorphone Infusion 0.2 mG/Hr (0.2 mL/Hr) IV Continuous <Continuous>  sodium chloride 2 Gram(s) Oral two times a day  sodium chloride 0.9%. 1000 milliLiter(s) (10 mL/Hr) IV Continuous <Continuous>    MEDICATIONS  (PRN):  acetaminophen  Suppository 650 milliGRAM(s) Rectal every 6 hours PRN For Temp greater than 38 C (100.4 F)  HYDROmorphone  Injectable 1 milliGRAM(s) IV Push every 1 hour PRN dyspnea  HYDROmorphone  Injectable 1 milliGRAM(s) IV Push every 1 hour PRN breakthrough pain  LORazepam   Injectable 0.5 milliGRAM(s) IV Push every 6 hours PRN Agitation  ondansetron Injectable 4 milliGRAM(s) IV Push every 6 hours PRN Nausea and/or Vomiting    Pertinent Labs:  No labs since 2/14    Skin: No edema, skin noted with suspected deep tissue injury to L buttocks     Estimated Needs:   [x ] no change since previous assessment  [ ] recalculated:       Previous Nutrition Diagnosis:     [ x] Malnutrition (severe)         Nutrition Diagnosis is [ x] ongoing, addressed with supplements          New Nutrition Diagnosis: [x ] not applicable         Interventions:     Recommend    1. Diet deferred to medical team, given prolonged poor po intake may consider liberalizing diet to regular  2. If in line with GOC may consider changing supplement to Ensure enlive 1 x daily per family request, communicated with medical team, per MD pt is in active dying process and diet restriction not warranted at this time       Monitoring and Evaluation:     [ ] PO intake [ x] Tolerance to diet prescription [ ] weights [x ] follow up per protocol    [ ] other: Pt seen for malnutrition follow up. Pt with rectal cancer (unresectable, and metastatic to liver and lungs) s/p a diverting colostomy on 10/23/2017 with post-op course c/b perirectal abscess s/p IR placed STEPHANE drain and IR placed R nephrostomy pelvic collection/malignancy caused R hydronephrosis who presents from IR for hyponatremia, hyperkalemia and leukocytosis. Patient transferred to palliative care unit for ongoing medical care in anticipation of escalating symptom burden. Overall poor prognosis, pt declining.     Source: Patient [ ]    Family [ ]     other [ x] Partner Blaine at bedside     Diet : No concentrated potassium diet with nepro 2 daily and prosource 1 daily       Patient reports [ ] nausea  [ ] vomiting [ ] diarrhea [ ] constipation  [ ]chewing problems [ ] swallowing issues  [ ] other: No N+V, pt with colostomy per RN no output recently, last output documented 2/15     PO intake:  < 50% [ x] 50-75% [ ]   % [ ]  other : Per family, pt has been taking mostly liquids including fruit punch and various juices, family will mix prosource 1 packet daily with various beverages, family requesting chocolate ensure shake-communicated with medical team.       Current Weight: No new wt  % Weight Change    Pertinent Medications: MEDICATIONS  (STANDING):  diphenhydrAMINE 2%/zinc acetate 0.1% Cream 1 Application(s) Topical two times a day  enoxaparin Injectable 40 milliGRAM(s) SubCutaneous daily  fluticasone propionate 50 MICROgram(s)/spray Nasal Spray 1 Spray(s) Both Nostrils two times a day  HYDROmorphone Infusion 0.2 mG/Hr (0.2 mL/Hr) IV Continuous <Continuous>  sodium chloride 2 Gram(s) Oral two times a day  sodium chloride 0.9%. 1000 milliLiter(s) (10 mL/Hr) IV Continuous <Continuous>    MEDICATIONS  (PRN):  acetaminophen  Suppository 650 milliGRAM(s) Rectal every 6 hours PRN For Temp greater than 38 C (100.4 F)  HYDROmorphone  Injectable 1 milliGRAM(s) IV Push every 1 hour PRN dyspnea  HYDROmorphone  Injectable 1 milliGRAM(s) IV Push every 1 hour PRN breakthrough pain  LORazepam   Injectable 0.5 milliGRAM(s) IV Push every 6 hours PRN Agitation  ondansetron Injectable 4 milliGRAM(s) IV Push every 6 hours PRN Nausea and/or Vomiting    Pertinent Labs:  No labs since 2/14    Skin: No edema, skin noted with suspected deep tissue injury to L buttocks     Estimated Needs:   [x ] no change since previous assessment  [ ] recalculated:       Previous Nutrition Diagnosis:     [ x] Malnutrition (severe)         Nutrition Diagnosis is [ x] ongoing, addressed with supplements          New Nutrition Diagnosis: [x ] not applicable         Interventions:     Recommend    1. Diet deferred to medical team, given prolonged poor po intake may consider liberalizing diet to regular, continue prosource 1 x daily   2. If in line with GOC may consider changing supplement to Ensure enlive 1 x daily per family request, communicated with medical team, per MD pt is in active dying process and diet restriction not warranted at this time  3. Continue to obtain/honor food preferences as requested          Monitoring and Evaluation:     [ ] PO intake [ x] Tolerance to diet prescription [ ] weights [x ] follow up per protocol    [ ] other:

## 2018-02-23 PROCEDURE — 99233 SBSQ HOSP IP/OBS HIGH 50: CPT | Mod: GC

## 2018-02-23 RX ADMIN — HYDROMORPHONE HYDROCHLORIDE 1 MILLIGRAM(S): 2 INJECTION INTRAMUSCULAR; INTRAVENOUS; SUBCUTANEOUS at 21:06

## 2018-02-23 RX ADMIN — Medication 1 SPRAY(S): at 17:17

## 2018-02-23 RX ADMIN — HYDROMORPHONE HYDROCHLORIDE 0.2 MG/HR: 2 INJECTION INTRAMUSCULAR; INTRAVENOUS; SUBCUTANEOUS at 08:47

## 2018-02-23 RX ADMIN — HYDROMORPHONE HYDROCHLORIDE 1 MILLIGRAM(S): 2 INJECTION INTRAMUSCULAR; INTRAVENOUS; SUBCUTANEOUS at 06:23

## 2018-02-23 RX ADMIN — HYDROMORPHONE HYDROCHLORIDE 1 MILLIGRAM(S): 2 INJECTION INTRAMUSCULAR; INTRAVENOUS; SUBCUTANEOUS at 06:38

## 2018-02-23 RX ADMIN — HYDROMORPHONE HYDROCHLORIDE 0.2 MG/HR: 2 INJECTION INTRAMUSCULAR; INTRAVENOUS; SUBCUTANEOUS at 16:42

## 2018-02-23 RX ADMIN — HYDROMORPHONE HYDROCHLORIDE 1 MILLIGRAM(S): 2 INJECTION INTRAMUSCULAR; INTRAVENOUS; SUBCUTANEOUS at 16:16

## 2018-02-23 RX ADMIN — ENOXAPARIN SODIUM 40 MILLIGRAM(S): 100 INJECTION SUBCUTANEOUS at 12:26

## 2018-02-23 RX ADMIN — HYDROMORPHONE HYDROCHLORIDE 1 MILLIGRAM(S): 2 INJECTION INTRAMUSCULAR; INTRAVENOUS; SUBCUTANEOUS at 20:31

## 2018-02-23 RX ADMIN — HYDROMORPHONE HYDROCHLORIDE 1 MILLIGRAM(S): 2 INJECTION INTRAMUSCULAR; INTRAVENOUS; SUBCUTANEOUS at 09:34

## 2018-02-23 RX ADMIN — HYDROMORPHONE HYDROCHLORIDE 1 MILLIGRAM(S): 2 INJECTION INTRAMUSCULAR; INTRAVENOUS; SUBCUTANEOUS at 16:01

## 2018-02-23 RX ADMIN — Medication 1 APPLICATION(S): at 06:29

## 2018-02-23 RX ADMIN — SODIUM CHLORIDE 10 MILLILITER(S): 9 INJECTION INTRAMUSCULAR; INTRAVENOUS; SUBCUTANEOUS at 06:28

## 2018-02-23 RX ADMIN — Medication 1 APPLICATION(S): at 17:18

## 2018-02-23 RX ADMIN — SODIUM CHLORIDE 10 MILLILITER(S): 9 INJECTION INTRAMUSCULAR; INTRAVENOUS; SUBCUTANEOUS at 17:19

## 2018-02-23 NOTE — PROGRESS NOTE ADULT - PROBLEM SELECTOR PLAN 1
Remains on  Dilaudid drip of 0.2 mg/hr with breakthrough of Dilaudid 1mg IV Q1h prn dyspnea or pain. Pt required no breakthrough doses in the past 24 hours.  Pain is adequately controlled on regimen, no dose-adjustment necessary.

## 2018-02-23 NOTE — PROGRESS NOTE ADULT - SUBJECTIVE AND OBJECTIVE BOX
HPI: 33 y/o M h/o rectal cancer (unresectable, and metastatic to liver and lungs) s/p a diverting colostomy.  Has drains for intraabdominal/pelvic collections.        INTERVAL HPI/OVERNIGHT EVENTS: Patient seen at bedside. Offers no new complaints. Patient's partner Blaine and mother are also at bedside.  We spent some time discussing professions and talents of both the patient and his partner    Allergies  No Known Allergies  Intolerances    ADVANCE DIRECTIVES:    DNR: [ ] NO [x ] YES (Date) MOLST [ x] NO [ ] YES (Date)    Vital Signs Last 24 Hrs  T(C): 35 (23 Feb 2018 07:45), Max: 35 (23 Feb 2018 07:45)  T(F): 95 (23 Feb 2018 07:45), Max: 95 (23 Feb 2018 07:45)  HR: 78 (23 Feb 2018 07:45) (78 - 78)  BP: 93/56 (23 Feb 2018 07:45) (93/56 - 93/56)  BP(mean): --  RR: 16 (23 Feb 2018 07:45) (16 - 16)  SpO2: 93% (23 Feb 2018 07:45) (93% - 93%)    PRESENT SYMPTOMS:  Source: [x ] Patient   [ ] Family   [ x] Team     Pain:                        [ ] No [x ] Yes             [x ] Mild [ ] Moderate [ ] Severe  Well-controlled on current regimen.    Onset -weeks	  Location - generalized abdominal  Duration - constant   Character - aching  Alleviating/Aggravating - movement, relieved by pain regimen  Radiation - diffuse, non radiating  Timing - when pain meds wear off    Dyspnea:                [x ] No [ ] Yes             [ ] Mild [ ] Moderate [ ] Severe    Anxiety:                  [x ] No [ ] Yes             [ ] Mild [ ] Moderate [ ] Severe    Fatigue:                  [ ] No [x ] Yes             [ ] Mild [ ] Moderate [x ] Severe    Nausea:                  [ x] No [ ] Yes             [ ] Mild [ ] Moderate [ ] Severe    Loss of appetite:   [ ] No [x ] Yes             [ ] Mild [ ] Moderate [x ] Severe    Constipation:        [x ] No [ ] Yes             [ ] Mild [ ] Moderate [ ] Severe    Other Symptoms:  [x ] All other review of systems negative   [ ] Unable to obtain due to poor mentation     Karnofsky Performance Score/Palliative Performance Status Version 2:     20    %    Vital Signs Last 24 Hrs  Vital Signs Last 24 Hrs  T(C): 36.4 (22 Feb 2018 07:59), Max: 36.4 (22 Feb 2018 07:59)  T(F): 97.6 (22 Feb 2018 07:59), Max: 97.6 (22 Feb 2018 07:59)  HR: 91 (22 Feb 2018 07:59) (91 - 91)  BP: 85/54 (22 Feb 2018 07:59) (85/54 - 85/54)  BP(mean): --  RR: 18 (22 Feb 2018 07:59) (18 - 18)  SpO2: 93% (22 Feb 2018 07:59) (93% - 93%)      Cachectic, jaundiced.    HEENT:  [ ] Normal   [x ] Dry mouth   [ ] ET Tube    [ ] Trach  [ ] Oral lesions    Lungs:   [x ] Clear [ ] Tachypnea  [ ] Audible excessive secretions   [ ] Rhonchi        [ ] Right [ ] Left [ ] Bilateral  [ ] Crackles        [ ] Right [ ] Left [ ] Bilateral  [ ] Wheezing     [ ] Right [ ] Left [ ] Bilateral    Cardiovascular:  [x ] Regular [ ] Irregular [ ] Tachycardia   [ ] Bradycardia  [ ] Murmur [ ] Other +S1 +S2     Abdomen: [ x] Soft  [x ] Distended   [x ] +BS  [x ] Non tender [ ] Tender  [ ]PEG   [ ]OGT/ NGT   Last BM:   colostomy with minimal output, plus flatus.     Genitourinary: [x ] Normal [ ] Incontinent   [ ] Oliguria/Anuria   [ ] Anderson    Musculoskeletal:  [ ] Normal   [x ] Weakness  [ ] Bedbound/Wheelchair bound [x ] Edema - BL lower extremity 3 +    Neurological: [x ] No focal deficits  [ ] Cognitive impairment  [ ] Dysphagia [ ] Dysarthria [ ] Paresis [ ] Other     Skin: [ ] Normal  Jaundiced [ ] Pressure ulcer(s)                  [ ] Rash      LABS: None new     Oral Intake: [ ] Unable/mouth care only [x ] Minimal [ ] Moderate [ ] Full Capability  Diet: [ ] NPO [ ] Tube feeds [ ] TPN [x ] Other only liquids      RADIOLOGY & ADDITIONAL STUDIES: None new     REFERRALS:   [x ] Chaplaincy  [ ] Hospice  [ ] Child Life  [x ] Social Work  [ ] Case management [ ] Holistic Therapy

## 2018-02-23 NOTE — PROGRESS NOTE ADULT - PROBLEM SELECTOR PLAN 4
Secondary to metastatic cancer. Patient with worsening hepatic failure.  continue to monitor and manage symptoms of encephalopathy.  Jaundice increasing

## 2018-02-24 PROCEDURE — 99233 SBSQ HOSP IP/OBS HIGH 50: CPT | Mod: GC

## 2018-02-24 RX ORDER — SOD,AMMONIUM,POTASSIUM LACTATE
1 CREAM (GRAM) TOPICAL
Qty: 0 | Refills: 0 | Status: DISCONTINUED | OUTPATIENT
Start: 2018-02-24 | End: 2018-02-25

## 2018-02-24 RX ORDER — DIPHENHYDRAMINE HCL 50 MG
50 CAPSULE ORAL EVERY 4 HOURS
Qty: 0 | Refills: 0 | Status: DISCONTINUED | OUTPATIENT
Start: 2018-02-24 | End: 2018-02-24

## 2018-02-24 RX ORDER — SODIUM CHLORIDE 9 MG/ML
1000 INJECTION INTRAMUSCULAR; INTRAVENOUS; SUBCUTANEOUS
Qty: 0 | Refills: 0 | Status: DISCONTINUED | OUTPATIENT
Start: 2018-02-24 | End: 2018-02-25

## 2018-02-24 RX ORDER — DIPHENHYDRAMINE HCL 50 MG
25 CAPSULE ORAL EVERY 4 HOURS
Qty: 0 | Refills: 0 | Status: DISCONTINUED | OUTPATIENT
Start: 2018-02-24 | End: 2018-02-25

## 2018-02-24 RX ORDER — HYDROMORPHONE HYDROCHLORIDE 2 MG/ML
0.5 INJECTION INTRAMUSCULAR; INTRAVENOUS; SUBCUTANEOUS
Qty: 100 | Refills: 0 | Status: DISCONTINUED | OUTPATIENT
Start: 2018-02-24 | End: 2018-02-25

## 2018-02-24 RX ADMIN — ENOXAPARIN SODIUM 40 MILLIGRAM(S): 100 INJECTION SUBCUTANEOUS at 11:47

## 2018-02-24 RX ADMIN — HYDROMORPHONE HYDROCHLORIDE 1 MILLIGRAM(S): 2 INJECTION INTRAMUSCULAR; INTRAVENOUS; SUBCUTANEOUS at 06:52

## 2018-02-24 RX ADMIN — HYDROMORPHONE HYDROCHLORIDE 1 MILLIGRAM(S): 2 INJECTION INTRAMUSCULAR; INTRAVENOUS; SUBCUTANEOUS at 16:54

## 2018-02-24 RX ADMIN — Medication 25 MILLIGRAM(S): at 10:15

## 2018-02-24 RX ADMIN — Medication 0.5 MILLIGRAM(S): at 01:34

## 2018-02-24 RX ADMIN — HYDROMORPHONE HYDROCHLORIDE 0.2 MG/HR: 2 INJECTION INTRAMUSCULAR; INTRAVENOUS; SUBCUTANEOUS at 07:51

## 2018-02-24 RX ADMIN — HYDROMORPHONE HYDROCHLORIDE 0.5 MG/HR: 2 INJECTION INTRAMUSCULAR; INTRAVENOUS; SUBCUTANEOUS at 10:16

## 2018-02-24 RX ADMIN — Medication 1 APPLICATION(S): at 06:00

## 2018-02-24 RX ADMIN — Medication 1 APPLICATION(S): at 11:48

## 2018-02-24 RX ADMIN — Medication 1 APPLICATION(S): at 20:58

## 2018-02-24 RX ADMIN — HYDROMORPHONE HYDROCHLORIDE 1 MILLIGRAM(S): 2 INJECTION INTRAMUSCULAR; INTRAVENOUS; SUBCUTANEOUS at 11:20

## 2018-02-24 RX ADMIN — HYDROMORPHONE HYDROCHLORIDE 1 MILLIGRAM(S): 2 INJECTION INTRAMUSCULAR; INTRAVENOUS; SUBCUTANEOUS at 06:27

## 2018-02-24 RX ADMIN — Medication 1 APPLICATION(S): at 17:54

## 2018-02-24 RX ADMIN — HYDROMORPHONE HYDROCHLORIDE 0.5 MG/HR: 2 INJECTION INTRAMUSCULAR; INTRAVENOUS; SUBCUTANEOUS at 16:54

## 2018-02-24 RX ADMIN — HYDROMORPHONE HYDROCHLORIDE 1 MILLIGRAM(S): 2 INJECTION INTRAMUSCULAR; INTRAVENOUS; SUBCUTANEOUS at 10:44

## 2018-02-24 RX ADMIN — SODIUM CHLORIDE 10 MILLILITER(S): 9 INJECTION INTRAMUSCULAR; INTRAVENOUS; SUBCUTANEOUS at 07:52

## 2018-02-24 NOTE — PROGRESS NOTE ADULT - PROBLEM SELECTOR PLAN 6
Will continue PCU management for pain and  EOL care.   Patient continues to decline slowly.  Blaine and patient's parents aware of disease trajectory and focus on comfort care

## 2018-02-24 NOTE — PROGRESS NOTE ADULT - SUBJECTIVE AND OBJECTIVE BOX
HPI: 31 y/o M h/o rectal cancer (unresectable, and metastatic to liver and lungs) s/p a diverting colostomy.  Has drains for intraabdominal/pelvic collections.  Patient in PCU for management of pruritis, pain, and end of life symptoms.    INTERVAL HPI/OVERNIGHT EVENTS: Patient seen at bedside: answers questions appropriately but is slightly confused, he agrees he is very itchy, otherwise offers no new complaints. Patient's partner Blaine also at bedside, agrees benadryl cream is no longer enough for prurits (related to jaundice.)    Allergies  No Known Allergies  Intolerances    MEDICATIONS  (STANDING):  ammonium lactate 12% Lotion 1 Application(s) Topical four times a day  diphenhydrAMINE 2%/zinc acetate 0.1% Cream 1 Application(s) Topical two times a day  enoxaparin Injectable 40 milliGRAM(s) SubCutaneous daily  fluticasone propionate 50 MICROgram(s)/spray Nasal Spray 1 Spray(s) Both Nostrils two times a day  HYDROmorphone Infusion 0.5 mG/Hr (0.5 mL/Hr) IV Continuous <Continuous> - increased today  sodium chloride 0.9%. 1000 milliLiter(s) (20 mL/Hr) IV Continuous <Continuous>    MEDICATIONS  (PRN):  acetaminophen  Suppository 650 milliGRAM(s) Rectal every 6 hours PRN For Temp greater than 38 C (100.4 F)  diphenhydrAMINE   Injectable 25 milliGRAM(s) IV Push every 4 hours PRN Rash and/or Itching - started today  HYDROmorphone  Injectable 1 milliGRAM(s) IV Push every 1 hour PRN dyspnea  HYDROmorphone  Injectable 1 milliGRAM(s) IV Push every 1 hour PRN breakthrough pain  LORazepam   Injectable 0.5 milliGRAM(s) IV Push every 6 hours PRN Agitation  ondansetron Injectable 4 milliGRAM(s) IV Push every 6 hours PRN Nausea and/or Vomiting      ADVANCE DIRECTIVES:    DNR: [ ] NO [x ] YES (Date) MOLST [ x] NO [ ] YES (Date)    PRESENT SYMPTOMS:  Source: [x ] Patient   [ ] Family   [ x] Team     Pain:                        [ ] No [x ] Yes             [x ] Mild [ ] Moderate [ ] Severe  Well-controlled on current regimen.    Onset -weeks	  Location - generalized abdominal  Duration - constant   Character - aching  Alleviating/Aggravating - movement, relieved by pain regimen  Radiation - diffuse, non radiating  Timing - when pain meds wear off    Dyspnea:                [x ] No [ ] Yes             [ ] Mild [ ] Moderate [ ] Severe    Anxiety:                  [x ] No [ ] Yes             [ ] Mild [ ] Moderate [ ] Severe    Fatigue:                  [ ] No [x ] Yes             [ ] Mild [ ] Moderate [x ] Severe    Nausea:                  [ x] No [ ] Yes             [ ] Mild [ ] Moderate [ ] Severe    Loss of appetite:   [ ] No [x ] Yes             [ ] Mild [ ] Moderate [x ] Severe    Constipation:        [x ] No [ ] Yes             [ ] Mild [ ] Moderate [ ] Severe    Other Symptoms:  [x ] All other review of systems negative   [ ] Unable to obtain due to poor mentation     Karnofsky Performance Score/Palliative Performance Status Version 2:     20    %    Vital Signs Last 24 Hrs  ICU Vital Signs Last 24 Hrs  T(C): 36.4 (24 Feb 2018 11:22), Max: 36.4 (24 Feb 2018 11:22)  T(F): 97.5 (24 Feb 2018 11:22), Max: 97.5 (24 Feb 2018 11:22)  HR: 77 (24 Feb 2018 11:22) (77 - 77)  BP: 67/35 (24 Feb 2018 11:22) (67/35 - 67/35)  BP(mean): --  ABP: --  ABP(mean): --  RR: 16 (24 Feb 2018 11:22) (16 - 16)  SpO2: 93% (24 Feb 2018 11:22) (93% - 93%)      Cachectic, jaundiced.    HEENT:  [ ] Normal   [x ] Dry mouth   [ ] ET Tube    [ ] Trach  [ ] Oral lesions    Lungs:   [x ] Clear [ ] Tachypnea  [ ] Audible excessive secretions   [ ] Rhonchi        [ ] Right [ ] Left [ ] Bilateral  [ ] Crackles        [ ] Right [ ] Left [ ] Bilateral  [ ] Wheezing     [ ] Right [ ] Left [ ] Bilateral    Cardiovascular:  [x ] Regular [ ] Irregular [ ] Tachycardia   [ ] Bradycardia  [ ] Murmur [ ] Other +S1 +S2     Abdomen: [ x] Soft  [x ] Distended   [x ] +BS  [x ] Non tender [ ] Tender  [ ]PEG   [ ]OGT/ NGT   Last BM:   colostomy with minimal output, plus flatus.     Genitourinary: [x ] Normal [ ] Incontinent   [ ] Oliguria/Anuria   [ ] Anderson    Musculoskeletal:  [ ] Normal   [x ] Weakness  [ ] Bedbound/Wheelchair bound [x ] Edema - BL lower extremity 3 +    Neurological: [x ] No focal deficits  [ ] Cognitive impairment  [ ] Dysphagia [ ] Dysarthria [ ] Paresis [ ] Other     Skin: [ ] Normal  [x]Jaundiced    [ ] Pressure ulcer(s)                  [ ] Rash      LABS: None new     Oral Intake: [ ] Unable/mouth care only [x ] Minimal [ ] Moderate [ ] Full Capability  Diet: [ ] NPO [ ] Tube feeds [ ] TPN [x ] Other only liquids      RADIOLOGY & ADDITIONAL STUDIES: None new     REFERRALS:   [x ] Chaplaincy  [ ] Hospice  [ ] Child Life  [x ] Social Work  [ ] Case management [ ] Holistic Therapy

## 2018-02-24 NOTE — PROGRESS NOTE ADULT - PROBLEM SELECTOR PROBLEM 5
Acute liver failure without hepatic coma
Acute liver failure without hepatic coma
Leukocytosis, unspecified type
Palliative care encounter
Acute liver failure without hepatic coma
Leukocytosis, unspecified type
Palliative care encounter
Protein calorie malnutrition
Acute liver failure without hepatic coma
Palliative care encounter

## 2018-02-24 NOTE — PROGRESS NOTE ADULT - PROBLEM SELECTOR PLAN 5
Secondary to progression of disease. Patient with decreased PO intake. Now primarily just fluids, patient declining salt tabs.

## 2018-02-24 NOTE — PROGRESS NOTE ADULT - PROBLEM SELECTOR PLAN 1
Dilaudid drip increased from 0.2 to 0.5 mg/hr with breakthrough of Dilaudid 1mg IV Q1h prn dyspnea or pain. Pt requiredmore cotnrol in the past 24 hours due to his own complaitns of breakthrough pain and nursing noting pain when poitioning and providing care. Dilaudid drip increased from 0.2 to 0.5 mg/hr with breakthrough of Dilaudid 1mg IV Q1h prn dyspnea or pain. Pt required more control in the past 24 hours due to his own complaints of breakthrough pain and nursing noting pain when positioning and providing care.

## 2018-02-24 NOTE — PROGRESS NOTE ADULT - PROVIDER SPECIALTY LIST ADULT
Heme/Onc
Infectious Disease
Internal Medicine
Intervent Radiology
Palliative Care
Infectious Disease
Infectious Disease
Nephrology
Internal Medicine
Palliative Care
Palliative Care
Internal Medicine
Palliative Care
Internal Medicine

## 2018-02-24 NOTE — PROGRESS NOTE ADULT - ATTENDING COMMENTS
32 year old male rectal cancer (unresectable and metastatic to liver and lungs) s/p a diverting colostomy on 10/23/17 with post-operative course complicated by perirectal abscess requiring IR drainage, R nephrostomy tube for R Hydronephrosis who presented from IR for hyponatremia, hyperkalemia and worsening leukocytosis. Recently treated for bacteroides, enterococcus reji-rectal abscess with drainage. Now returns with LE edema, lab abnormalities including elevated lactate and leukocytosis. UCX Neg, Ascitic fluid negative for peritonitis, BCX NGTD. Appears stable. Seems unlikely peritonitis, less likely cholangitis. Possible non-infection, but cover for concern of reji-rectal abscess. Overall, leukocytosis, malignancy, abnormal imaging.  - Zosyn 3.375g q 8  - Appreciate GI eval  - F/U pending cultures  - Malignancy/GOC per primary team  - Over weekend, call [phone] with questions or change in status    Joey Lozada MD  Pager [phone]  After 5pm and on weekends call [phone]
33 yo rectal ca with mets  Liver cirrhosis  MIKI abrupt 2/12  Most likely due to increased intrabdominal pressure or ATN.  IAP could be measured with bladder transducer but unclear if treatment would be indicated  ATN, no specific treatment, other than conservative  K should be OK with decreased po intake
[phone]
[phone]
[phone]
I have personally seen and examined this patient and agree with the above assessment and plan.   Meet with pt and partner at bedside.  Emotional support provided.
I have personally seen and examined this patient and agree with the above assessment and plan.   Patient actively declining daily, changed scheduled dilaudid to infusion so as to avoid ebbs and flows of pain.  Partner at bedside.  Emotional support given.
I have personally seen and examined this patient and agree with the above assessment and plan.
I have personally seen and examined this patient and agree with the above assessment and plan.   Emotional support provided for the family.  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  The partner is a .
I have personally seen and examined this patient and agree with the above assessment and plan.   Continues to decline daily, now with puritis, plan as above.
I have personally seen and examined this patient and agree with the above assessment and plan.   Meet with pt and partner at bedside.  Emotional support provided.
Pt seen with fellow. Agree with above.  Optimize pain regimen and anti puritic medication.
Pt seen in bed. Appears comfortable. Father and sister by bedside.     GOC discussion ongoing. Sister seems receptive to arranging supportive care at home.     Continue current care.
I have personally seen and examined this patient and agree with the above assessment and plan.   Met with father at bedside and with patient's permission, outside of room.  Father somewhat distressed over minimal nutrition and IV fluids.  Educated him on the natural loss of appetite and decreased oral intake at this stage of his son's disease.  We will have food/fluids available as patient desires.  Emotional support provided.  Given worsening status, unlikely to survive to discharge.  Father is not aware of patients sexual orientation or marriage as per patient request.
Agree with above with following addendum:    1. elevated lactate  -likely in setting of CA + cirrhosis and inability to clear lacate  -continue to trend  -on Abx, but DO NOT think this is due to sepsis.    2. GOC  -palliative and oncology discussed case with patient and family.  Hospice care nurse also spoke to patient and family.  Plan for home hospice.     3.  Hyponatremia  -urine studies consistent with pre-renal state.  -will attempt small amount of fluid and re-assess.      4. Loewr eztremity swelling  -in setting of hypoalbuminemia  -will not give lasix as aptient already not eating or drinking much, will not intravascularly deplete patient furtehr.    5. Cirrhosis with a scites  -reviewed paracentesis labs, not consistent with SBP  -on Zosyn empirically, however unsure whether helping or not.     Prognosis poor.
Agree with above.  No reversible cause of MIKI found.  If no cuase found at this time, will be terminal event in days.  Will confer with nephrology for alternate reversible acuses.  Plan in any event is PCU as prognosis in days-weeks.
Patient now full comfort care at this time.  No more blood draw.  Bed opening in PCU.  To transfer today.

## 2018-02-24 NOTE — PROGRESS NOTE ADULT - PROBLEM SELECTOR PLAN 2
Pt to receive Bendaryl 2%/Zinc acetate 0.1% cream with good relief for last 2 days, as anticipated symptom is worsening, started benadryl 25mg IVP PRN, will re evaluate.  Oral medications not the best option right now as minimally takes medication orally. Pt to receive Benadryl 2%/Zinc acetate 0.1% cream with good relief for last 2 days, as anticipated symptom is worsening, started benadryl 25mg IVP PRN, will re evaluate.  Oral medications not the best option right now as minimally takes medication orally.

## 2018-02-25 VITALS
HEART RATE: 90 BPM | DIASTOLIC BLOOD PRESSURE: 38 MMHG | SYSTOLIC BLOOD PRESSURE: 80 MMHG | RESPIRATION RATE: 20 BRPM | OXYGEN SATURATION: 91 %

## 2018-02-25 PROCEDURE — 96374 THER/PROPH/DIAG INJ IV PUSH: CPT | Mod: XU

## 2018-02-25 PROCEDURE — 87070 CULTURE OTHR SPECIMN AEROBIC: CPT

## 2018-02-25 PROCEDURE — 83605 ASSAY OF LACTIC ACID: CPT

## 2018-02-25 PROCEDURE — 49083 ABD PARACENTESIS W/IMAGING: CPT

## 2018-02-25 PROCEDURE — 84295 ASSAY OF SERUM SODIUM: CPT

## 2018-02-25 PROCEDURE — 87046 STOOL CULTR AEROBIC BACT EA: CPT

## 2018-02-25 PROCEDURE — 87086 URINE CULTURE/COLONY COUNT: CPT

## 2018-02-25 PROCEDURE — 82042 OTHER SOURCE ALBUMIN QUAN EA: CPT

## 2018-02-25 PROCEDURE — 86900 BLOOD TYPING SEROLOGIC ABO: CPT

## 2018-02-25 PROCEDURE — 50435 EXCHANGE NEPHROSTOMY CATH: CPT

## 2018-02-25 PROCEDURE — 83550 IRON BINDING TEST: CPT

## 2018-02-25 PROCEDURE — 81001 URINALYSIS AUTO W/SCOPE: CPT

## 2018-02-25 PROCEDURE — 87206 SMEAR FLUORESCENT/ACID STAI: CPT

## 2018-02-25 PROCEDURE — 80048 BASIC METABOLIC PNL TOTAL CA: CPT

## 2018-02-25 PROCEDURE — 82728 ASSAY OF FERRITIN: CPT

## 2018-02-25 PROCEDURE — 96375 TX/PRO/DX INJ NEW DRUG ADDON: CPT

## 2018-02-25 PROCEDURE — C1729: CPT

## 2018-02-25 PROCEDURE — 93005 ELECTROCARDIOGRAM TRACING: CPT

## 2018-02-25 PROCEDURE — 87075 CULTR BACTERIA EXCEPT BLOOD: CPT

## 2018-02-25 PROCEDURE — 97530 THERAPEUTIC ACTIVITIES: CPT

## 2018-02-25 PROCEDURE — 97162 PT EVAL MOD COMPLEX 30 MIN: CPT

## 2018-02-25 PROCEDURE — 83735 ASSAY OF MAGNESIUM: CPT

## 2018-02-25 PROCEDURE — 86850 RBC ANTIBODY SCREEN: CPT

## 2018-02-25 PROCEDURE — 89051 BODY FLUID CELL COUNT: CPT

## 2018-02-25 PROCEDURE — 87205 SMEAR GRAM STAIN: CPT

## 2018-02-25 PROCEDURE — 82962 GLUCOSE BLOOD TEST: CPT

## 2018-02-25 PROCEDURE — 87045 FECES CULTURE AEROBIC BACT: CPT

## 2018-02-25 PROCEDURE — 76770 US EXAM ABDO BACK WALL COMP: CPT

## 2018-02-25 PROCEDURE — 85610 PROTHROMBIN TIME: CPT

## 2018-02-25 PROCEDURE — 83935 ASSAY OF URINE OSMOLALITY: CPT

## 2018-02-25 PROCEDURE — 94640 AIRWAY INHALATION TREATMENT: CPT

## 2018-02-25 PROCEDURE — 86901 BLOOD TYPING SEROLOGIC RH(D): CPT

## 2018-02-25 PROCEDURE — 87102 FUNGUS ISOLATION CULTURE: CPT

## 2018-02-25 PROCEDURE — 84466 ASSAY OF TRANSFERRIN: CPT

## 2018-02-25 PROCEDURE — 85730 THROMBOPLASTIN TIME PARTIAL: CPT

## 2018-02-25 PROCEDURE — 84157 ASSAY OF PROTEIN OTHER: CPT

## 2018-02-25 PROCEDURE — 84100 ASSAY OF PHOSPHORUS: CPT

## 2018-02-25 PROCEDURE — C1769: CPT

## 2018-02-25 PROCEDURE — 84550 ASSAY OF BLOOD/URIC ACID: CPT

## 2018-02-25 PROCEDURE — 82570 ASSAY OF URINE CREATININE: CPT

## 2018-02-25 PROCEDURE — 71045 X-RAY EXAM CHEST 1 VIEW: CPT

## 2018-02-25 PROCEDURE — 74177 CT ABD & PELVIS W/CONTRAST: CPT

## 2018-02-25 PROCEDURE — 88305 TISSUE EXAM BY PATHOLOGIST: CPT

## 2018-02-25 PROCEDURE — 80053 COMPREHEN METABOLIC PANEL: CPT

## 2018-02-25 PROCEDURE — 99285 EMERGENCY DEPT VISIT HI MDM: CPT | Mod: 25

## 2018-02-25 PROCEDURE — 82945 GLUCOSE OTHER FLUID: CPT

## 2018-02-25 PROCEDURE — 84300 ASSAY OF URINE SODIUM: CPT

## 2018-02-25 PROCEDURE — 87116 MYCOBACTERIA CULTURE: CPT

## 2018-02-25 PROCEDURE — 82435 ASSAY OF BLOOD CHLORIDE: CPT

## 2018-02-25 PROCEDURE — 87015 SPECIMEN INFECT AGNT CONCNTJ: CPT

## 2018-02-25 PROCEDURE — 93970 EXTREMITY STUDY: CPT

## 2018-02-25 PROCEDURE — 85014 HEMATOCRIT: CPT

## 2018-02-25 PROCEDURE — 76705 ECHO EXAM OF ABDOMEN: CPT

## 2018-02-25 PROCEDURE — 96376 TX/PRO/DX INJ SAME DRUG ADON: CPT

## 2018-02-25 PROCEDURE — 85027 COMPLETE CBC AUTOMATED: CPT

## 2018-02-25 PROCEDURE — 83615 LACTATE (LD) (LDH) ENZYME: CPT

## 2018-02-25 PROCEDURE — 82565 ASSAY OF CREATININE: CPT

## 2018-02-25 PROCEDURE — 49424 ASSESS CYST CONTRAST INJECT: CPT

## 2018-02-25 PROCEDURE — 82947 ASSAY GLUCOSE BLOOD QUANT: CPT

## 2018-02-25 PROCEDURE — 76080 X-RAY EXAM OF FISTULA: CPT

## 2018-02-25 PROCEDURE — 82140 ASSAY OF AMMONIA: CPT

## 2018-02-25 PROCEDURE — 87040 BLOOD CULTURE FOR BACTERIA: CPT

## 2018-02-25 PROCEDURE — 88112 CYTOPATH CELL ENHANCE TECH: CPT

## 2018-02-25 PROCEDURE — 84132 ASSAY OF SERUM POTASSIUM: CPT

## 2018-02-25 PROCEDURE — 82803 BLOOD GASES ANY COMBINATION: CPT

## 2018-02-25 PROCEDURE — 97116 GAIT TRAINING THERAPY: CPT

## 2018-02-25 PROCEDURE — 82330 ASSAY OF CALCIUM: CPT

## 2018-02-25 RX ADMIN — HYDROMORPHONE HYDROCHLORIDE 1 MILLIGRAM(S): 2 INJECTION INTRAMUSCULAR; INTRAVENOUS; SUBCUTANEOUS at 06:35

## 2018-02-25 RX ADMIN — Medication 1 APPLICATION(S): at 00:00

## 2018-02-25 RX ADMIN — HYDROMORPHONE HYDROCHLORIDE 1 MILLIGRAM(S): 2 INJECTION INTRAMUSCULAR; INTRAVENOUS; SUBCUTANEOUS at 09:12

## 2018-02-25 RX ADMIN — Medication 1 APPLICATION(S): at 06:08

## 2018-02-25 RX ADMIN — HYDROMORPHONE HYDROCHLORIDE 0.5 MG/HR: 2 INJECTION INTRAMUSCULAR; INTRAVENOUS; SUBCUTANEOUS at 07:26

## 2018-02-25 RX ADMIN — Medication 1 APPLICATION(S): at 06:44

## 2018-02-25 RX ADMIN — HYDROMORPHONE HYDROCHLORIDE 1 MILLIGRAM(S): 2 INJECTION INTRAMUSCULAR; INTRAVENOUS; SUBCUTANEOUS at 06:50

## 2018-02-25 RX ADMIN — SODIUM CHLORIDE 20 MILLILITER(S): 9 INJECTION INTRAMUSCULAR; INTRAVENOUS; SUBCUTANEOUS at 07:25

## 2018-02-25 NOTE — DISCHARGE NOTE FOR THE EXPIRED PATIENT - HOSPITAL COURSE
33 yo M h/o rectal cancer (unresectable, and metastatic to liver and lungs) s/p a diverting colostomy on 10/23/2017 with post-op course c/b perirectal abscess s/p IR placed STEPHANE drain and IR placed R nephrostomy pelvic collection/malignancy caused R hydronephrosis who presents from IR to medicine for hyponatremia, hyperkalemia and leukocytosis.   Patient in PCU for management of pruritis, pain, and end of life symptoms.  Patient managed for this symptoms, became progressively weaker, poor po intake, worsening encephalopathy.  Partner and Family extensively counseled and supported over one week and  18 @11:05am

## 2018-02-25 NOTE — PROVIDER CONTACT NOTE (CHANGE IN STATUS NOTIFICATION) - ASSESSMENT
no response to external stimuli  no respirations  no apical heart beat  pupils are fixed and dilated

## 2018-02-26 ENCOUNTER — OTHER (OUTPATIENT)
Age: 33
End: 2018-02-26

## 2018-03-10 LAB
CULTURE RESULTS: SIGNIFICANT CHANGE UP
SPECIMEN SOURCE: SIGNIFICANT CHANGE UP

## 2018-03-31 LAB
CULTURE RESULTS: SIGNIFICANT CHANGE UP
SPECIMEN SOURCE: SIGNIFICANT CHANGE UP

## 2018-07-16 PROBLEM — F41.9 ANXIETY DISORDER, UNSPECIFIED: Chronic | Status: ACTIVE | Noted: 2017-10-03

## 2018-09-27 NOTE — H&P PST ADULT - EYES
9/27/2018    Re: Lew Marroquin    Procedure:  EP Study/ Atrial flutter ablation    Provider: Parvez Salvador MD     Date: Monday, November 12, 2018  Arrival time: 10:30 am     Location: [address] in [place]. Use the MAIN ENTRANCE of the hospital    Check in on the 2nd Floor at Procedure Registration.   Bring your insurance card(s).      Please follow these instructions:     • Do not have anything to eat or drink after midnight the night before procedure. Failure to follow this policy will result in cancellation of procedure.   • No alcoholic beverages for 24 hours prior to the procedure.   • HOLD Atenolol 5 days prior to your procedure, taking your last dose on Tuesday, November 6th.  • HOLD Aspirin 7 days prior to your procedure, taking your last dose on Sunday, November 4th.  • HOLD Eliquis the night before and the morning of the procedure.  • Medication instructions:  All other medications can be taken may be taken the morning of procedure with just a sip of water.  • Bring your medications with you in their original bottles, nursing staff will verify your medications, however during your stay your medications will be dispensed from our pharmacy you will NOT be able to use your home medications.   • Be prepared for an overnight stay and bring a small overnight bag.   • You must have someone to drive you home after your procedure and to oversee your care for the first 24 hours. This is a hospital requirement.    Important dates to remember:      • Your PRE-OP appointment is on Tuesday, October 30th at 9:00 am with KIKI Askew. Appointment is at [facility], [facility] [facility] [address], [place]. This appointment is required prior to your procedure. All questions will be answered at this appointment.  · Your POST-OP appointment is on Tuesday, December 18th at 2:30 pm KIKI Askew. Appointment is at [facility]  [place]  3rd Floor.    If you develop any signs of infection, such as fever, cough, chills, have open wounds or sores, diarrhea, or recent onset of urinary tract symptoms within 2 weeks prior to the procedure, please call our office. This is important because if you have an infection your procedure may be cancelled and rescheduled for a later date.      Please be aware that although we extend the courtesy of obtaining pre-certification with your insurance company for your procedure(s), we cannot accept responsibility for obtaining information regarding your insurance’s referral requirements, in or out-of-network co-payments, or your individual deductible status.     If you should have any other questions, please call [phone].        detailed exam pupil L/conjunctiva clear/pupil R/PERRL

## 2018-12-10 NOTE — DIETITIAN INITIAL EVALUATION ADULT. - PROBLEM/PLAN-6
DISPLAY PLAN FREE TEXT
No erythema/No edema/No clubbing/No cyanosis/Full range of motion with no contractures/No inguinal adenopathy

## 2019-01-15 NOTE — ED ADULT NURSE NOTE - FALL HARM RISK
Quality 110: Preventive Care And Screening: Influenza Immunization: Influenza Immunization Administered during Influenza season
Quality 111:Pneumonia Vaccination Status For Older Adults: Pneumococcal Vaccination Previously Received
Detail Level: Detailed
other

## 2019-11-16 NOTE — PATIENT PROFILE ADULT. - HARM RISK FACTORS
patient resting comfortably at bedside     VE: 6-7 / [employer]ING [employer]ANES/ -3 patient resting comfortably at bedside     VE: 6-7 / [employer]ES/ -3    Patient is advancing in dilatation on her own; currently in active labor ; will allow patient to rupture on her own and will then augment with pitocin   s/p BMZ  course   continue maternal fetal monitoring   anticipate vaginal delivery no

## 2020-03-19 NOTE — ED ADULT NURSE NOTE - CAS DISCH ACCOMP BY
Health Maintenance Due   Topic Date Due   • Influenza Vaccine (1) 09/01/2019       Patient is due for topics as listed above but is not proceeding with Immunization(s) Influenza at this time due to pregnancy.           Last 7 PHQ 2/9 Test Results  0: Not at all  1: Several days  2: More than half the days  3: Nearly every day       PHQ9 SCREENING FLOWSHEET 3/19/2020 2/12/2020 11/23/2019 6/27/2017   Adult PHQ2 Score 0 0 0 0   Little interest or pleasure in activity 0 0 0 Not at all   Feeling down, depressed or hopeless 0 0 0 Not at all      
Patient presented to the clinic for her initial OB appointment.  Chart was updated.  Patient was given her folder.  Lab was ordered and appointment was scheduled.    
Transporter

## 2020-09-18 NOTE — PROGRESS NOTE ADULT - PROBLEM SELECTOR PLAN 2
c/o rt. quadrant pain x 3 days unclear etiology, most likely cause is obstruction, however US renal without evidence  -only had diagnostic para, Tello 40, less likely hepatorenal, though still could be  -given extensive mets, possible non-obsructed hydro?  -no nephrotoxins on board, D/c ibuprofen.

## 2020-10-12 NOTE — PROGRESS NOTE ADULT - PROBLEM SELECTOR PLAN 5
- Patient has multiple potential sources of infection including his NT, rectal abscess, and mild to moderate ascites.  - c/w zosyn d/c Vanc per ID. Bcx and Ucx NGTD from 2/6.   - Paracentesis showing transudative effusion. Seg count less than 250, no concern for SBP.  - Currently Afebrile, monitor fever curve. Eucrisa Counseling: Patient may experience a mild burning sensation during topical application. Eucrisa is not approved in children less than 2 years of age.

## 2021-04-09 NOTE — PATIENT PROFILE ADULT. - MEDICATION HERBAL REMEDIES, PROFILE
no
As certified below, I, or a nurse practitioner or physician assistant working with me, had a face-to-face encounter that meets the physician face-to-face encounter requirements.

## 2021-08-30 NOTE — PATIENT PROFILE ADULT. - COMFORT LEVEL, ACCEPTABLE
Sample bottle of Eliquis 5 mg twice daily 30 tablets  given due to high co-pay. Patient to  from office. 2

## 2021-09-14 NOTE — DIETITIAN INITIAL EVALUATION ADULT. - NUTRITIONGOAL OUTCOME1
Left VM - following-up on message that was sent this morning by Dr. Cooley about seeing pt in clinic today. After discussing with Dr. Hernandez, he would like to start by having the patient send us a picture of her eye.     Please send picture to [email or web address]    Gave direct line for call-back    Lena Moses on 9/14/2021 at 8:24 AM    
Pt. to state 3 teach-back points.

## 2021-10-14 NOTE — H&P PST ADULT - PUPIL SIZE R
FAMILY HISTORY:  Family history of pancreatic cancer    Father  Still living? Unknown  Family history of diabetes mellitus in father, Age at diagnosis: Age Unknown     mm/3

## 2021-10-31 NOTE — PROVIDER CONTACT NOTE (OTHER) - ACTION/TREATMENT ORDERED:
As per MD its okay to draw labs now.
cont to monitor. all w/u already done.
IV tylenol for fever. fever w/u to be ordered if not already done. no other intervention at this time. cont to monitor.
Team made aware, No interventions at this time.
Tylenol for fever, continue to monitor heart rate
tylenol, IV antibiotics, and blood cultures
waiting for orders
15

## 2022-01-28 NOTE — PRE-OP CHECKLIST - HIBICLENS SHOWER 1 DATE
1/28/2022  [address] [facility] [place] [address]    Dear Jewel Severance,     The  biopsy/pathology findings from your colonoscopy showed:  1. Colon polyp: a hyperplastic polyp which is a benign non-cancerous growth that was removed.     Kal Hallman 21-Oct-2017

## 2022-05-15 NOTE — PROVIDER CONTACT NOTE (OTHER) - SITUATION
89y old  Female with a PMH of HTN, HLD, fibromyalgia, spinal stenosis on chronic pain medications, Afib on Eliquis,  s/p [employer] PPM on 3/24 Afib with TBS, CVA, CAD s/p WAGNER to LAD in 12/21 at [facility], hypothyroidism, anxiety, sent in from EP clinic for PPM site infection.      - tentative schedule on 5/16 Monday for PPM extraction with implantation ( after pt. is clear with no systemic infection)  - NPO after Sunday MN, AM labs and T&S , COVID swab  - Blood Ctx and wound Ctx X 2 sent, broad spectrum ABX Vanco and Cefepime started, ID consult appreciated  - Please place pt. on continuous telemetric monitoring  - Monitor electrolytes and replete K to 4 and Mg to 2  - Continue Eliquis for thromboembolic ppx  given that patient has a [id number] score of 7 and hold Sunday 5/15 PM dose and 5/16 AM dose for anticipated PPM extraction  - Continue care per primary team    Bayron Ariza  Cardiology Fellow  [phone]    All Cardiology service information can be found 24/7 on [email or web address], password: [email or web address]   pt HR is 113. /74. afebrile.

## 2023-06-08 NOTE — DIETITIAN INITIAL EVALUATION ADULT. - PROBLEM SELECTOR PROBLEM 4
Doctor Erasmo Watson resubmitted the prescription electronically with clarification.  nw Rectal cancer metastasized to liver

## 2024-09-17 NOTE — PROGRESS NOTE ADULT - SUBJECTIVE AND OBJECTIVE BOX
Noted that Cindy Martinez refilled this medication today.  Long term management for this medication should be continued by PCP.   Patient is a 32y old  Male who presents with a chief complaint of Metastatic rectal CA (08 Feb 2018 14:16)      SUBJECTIVE / OVERNIGHT EVENTS: No acute events over night. Pt seen and examined reports feeling well- no active complaints. Denies any fevers, chills, n/v, CP, SOB, abd pain dysuria, or melena.     MEDICATIONS  (STANDING):  enoxaparin Injectable 40 milliGRAM(s) SubCutaneous daily  fluticasone propionate 50 MICROgram(s)/spray Nasal Spray 1 Spray(s) Both Nostrils two times a day  lactulose Syrup 10 Gram(s) Oral two times a day  sodium chloride 2 Gram(s) Oral three times a day  sodium chloride 0.9%. 1000 milliLiter(s) (75 mL/Hr) IV Continuous <Continuous>  sodium chloride 0.9%. 1000 milliLiter(s) (70 mL/Hr) IV Continuous <Continuous>    MEDICATIONS  (PRN):  ALPRAZolam 0.5 milliGRAM(s) Oral two times a day PRN anxiety  HYDROmorphone   Tablet 2 milliGRAM(s) Oral every 6 hours PRN Severe Pain (7 - 10)      CAPILLARY BLOOD GLUCOSE      POCT Blood Glucose.: 91 mg/dL (14 Feb 2018 16:30)    I&O's Summary    14 Feb 2018 07:01  -  15 Feb 2018 07:00  --------------------------------------------------------  IN: 160 mL / OUT: 810 mL / NET: -650 mL        T(C): 36.3 (02-15-18 @ 06:35), Max: 36.7 (02-14-18 @ 20:55)  HR: 111 (02-15-18 @ 06:35) (111 - 113)  BP: 109/71 (02-15-18 @ 06:35) (96/67 - 112/74)  RR: 17 (02-15-18 @ 06:35) (17 - 18)  SpO2: 98% (02-15-18 @ 06:35) (97% - 99%)    PHYSICAL EXAM:  GENERAL: cachectic lethargic in bed.  HEENT: sclera anicteric, temporal wasting  CHEST: CTAB  HEART:  RRR, no MRG,   ABDOMEN:  ostomy site c/d/i.  Patient with distended abdomen, +fluid wave., , (+) hepatomegaly.   EXTREMITIES:  3+ LE edema  SKIN:  No rash  NEURO:  Alert, orientedx3,        LABS:                        10.7   19.47 )-----------( 146      ( 14 Feb 2018 13:05 )             36.0     WBC Trend: 19.47<--, 16.3<--, 19.23<--  02-14    126<L>  |  88<L>  |  80<H>  ----------------------------<  134<H>  4.9   |  14<L>  |  2.65<H>    Ca    8.9      14 Feb 2018 18:23  Phos  6.5     02-14  Mg     3.4     02-14    TPro  5.7<L>  /  Alb  2.0<L>  /  TBili  12.7<H>  /  DBili  x   /  AST  515<H>  /  ALT  394<H>  /  AlkPhos  333<H>  02-14    Creatinine Trend: 2.65<--, 2.75<--, 2.47<--, 2.39<--, 2.37<--, 2.22<--  PT/INR - ( 14 Feb 2018 07:30 )   PT: 18.7 sec;   INR: 1.64 ratio                     RADIOLOGY & ADDITIONAL TESTS:    Imaging Personally Reviewed:    Consultant(s) Notes Reviewed:      Care Discussed with Consultants/Other Providers: Patient is a 32y old  Male who presents with a chief complaint of Metastatic rectal CA (08 Feb 2018 14:16)      SUBJECTIVE / OVERNIGHT EVENTS: No acute events over night. Pt seen and examined reports feeling well- no active complaints. Denies any fevers, chills, n/v, CP, SOB, abd pain dysuria, or melena.     MEDICATIONS  (STANDING):  enoxaparin Injectable 40 milliGRAM(s) SubCutaneous daily  fluticasone propionate 50 MICROgram(s)/spray Nasal Spray 1 Spray(s) Both Nostrils two times a day  lactulose Syrup 10 Gram(s) Oral two times a day  sodium chloride 2 Gram(s) Oral three times a day  sodium chloride 0.9%. 1000 milliLiter(s) (75 mL/Hr) IV Continuous <Continuous>  sodium chloride 0.9%. 1000 milliLiter(s) (70 mL/Hr) IV Continuous <Continuous>    MEDICATIONS  (PRN):  ALPRAZolam 0.5 milliGRAM(s) Oral two times a day PRN anxiety  HYDROmorphone   Tablet 2 milliGRAM(s) Oral every 6 hours PRN Severe Pain (7 - 10)      CAPILLARY BLOOD GLUCOSE      POCT Blood Glucose.: 91 mg/dL (14 Feb 2018 16:30)    I&O's Summary    14 Feb 2018 07:01  -  15 Feb 2018 07:00  --------------------------------------------------------  IN: 160 mL / OUT: 810 mL / NET: -650 mL        T(C): 36.3 (02-15-18 @ 06:35), Max: 36.7 (02-14-18 @ 20:55)  HR: 111 (02-15-18 @ 06:35) (111 - 113)  BP: 109/71 (02-15-18 @ 06:35) (96/67 - 112/74)  RR: 17 (02-15-18 @ 06:35) (17 - 18)  SpO2: 98% (02-15-18 @ 06:35) (97% - 99%)    PHYSICAL EXAM:  GENERAL: cachectic lethargic in bed.  HEENT: sclera anicteric, temporal wasting  CHEST: CTAB  HEART:  RRR, no MRG,   ABDOMEN:  ostomy site c/d/i.  Patient with distended abdomen, +fluid wave., , (+) hepatomegaly.   EXTREMITIES:  3+ LE edema  SKIN:  No rash  NEURO:  Alert, orientedx3,        LABS:                        10.7   19.47 )-----------( 146      ( 14 Feb 2018 13:05 )             36.0     WBC Trend: 19.47<--, 16.3<--, 19.23<--  02-14    126<L>  |  88<L>  |  80<H>  ----------------------------<  134<H>  4.9   |  14<L>  |  2.65<H>    Ca    8.9      14 Feb 2018 18:23  Phos  6.5     02-14  Mg     3.4     02-14    TPro  5.7<L>  /  Alb  2.0<L>  /  TBili  12.7<H>  /  DBili  x   /  AST  515<H>  /  ALT  394<H>  /  AlkPhos  333<H>  02-14    Creatinine Trend: 2.65<--, 2.75<--, 2.47<--, 2.39<--, 2.37<--, 2.22<--  PT/INR - ( 14 Feb 2018 07:30 )   PT: 18.7 sec;   INR: 1.64 ratio                     RADIOLOGY & ADDITIONAL TESTS:    Imaging Personally Reviewed:    Consultant(s) Notes Reviewed:      Care Discussed with Consultants/Other Providers: Palliative attending (Dr. Mcdaniels), nephrology attending (Dr. Freedman)

## 2024-11-20 NOTE — PROGRESS NOTE ADULT - SUBJECTIVE AND OBJECTIVE BOX
70y with a history of type 2 diabetes and glaucoma, who presented to [facility] on 11/10 after son found her on the ground + presumed seizure-like activity. 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 To be managed on AED + steroid.  Hospital course by right soleal + peroneal DVT.  s/p  therapeutic Lovenox, now transitioned to oral AC.  Now admitted to [facility] after for initiation of a multidisciplinary rehab program consisting focused on functional mobility, transfers and ADLs (activities of daily living).    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 No parenchymal signal abnormality of the adjacent frontal lobes. Mild ventriculomegaly secondary findings raising possibility of normal pressure hydrocephalus.  - Patient deferred surgery; steroid taper for mass effect  - Decadron: 4mg q6 x1D > 3 q6 x 1D > 3 q8 x 1D > 4 BID x 1D > 2 TID x 1D >2 BID x 1D> 1 BID x 1D > 1 QD til outpatient [place] [facility] [place] to d/c [place], taper was only for post op.  Only received 1 day of steroid, no need to taper  - EEG without recorded seizure 11/11-14  - Seizure ppx: Vimpat 100 BID +  Rescue med: 1st line- Lorazepam 1 mg IV push for seizures lasting >3 minutes with vital sign changes. 2nd line- Briviact 100 mg IV push for seizures lasting >3 minutes refractory to lorazepam.  - Will consider neuro guidance on Seizure and steroid taper management  - last 11/10, MR 11/12 - low threshold to repeat CTH if there's any changes  Deficits: left LE proximal weakness  Comprehensive Multidisciplinary Rehab Program: 3 hours a day ( 1 hr PT, 1 hr OT, 1 hr SLP), 5 days a week.    #DVTs: Right soleal occlusive + right peroneal (nonocculsive)  - CTA neg for PE  - S/p therapeutic Lovenox > now transition to oral since there's no planned surgery  - Eliquis 5 BID    #HTN  - Losartan 25  - BP: 149/70 - 154/72    #Hyperglycemia (on steroid)  #Type 2 Diabetes (A1C 7.1)   OP Dr. Andrea? > Lantus 15 HS, Janumet 50/1000 BID  - Lantus 15 HS while on steroids per endo  - Admelog 7 premeal  - FS + ISS  - -293 (11/19)    #Enlarged Left Adnexa  - Outpatient GYN    #Pain Management:  Analgesic: Tylenol PRN  Avoid sedating medications that may interfere with cognitive recovery    #GI/Bowel:  Senna QHS, Miralax BID  GI ppx: Protonix    #/Bladder:   Prior UTI (Ecoli) - s/p CTX x3 days (completed on 11/13)  - PVR q 8 hours (SC if > 400) - PVR <200; dc monitoring    #Skin/Pressure Injury:   - Skin assessment on admission: all skin intact , generalized bruising   - Skin barrier cream as needed  - Nursing to monitor skin Qshift    #Diet:  Dysphagia: SLP consult for swallow function evaluation and treatment  Current Diet: Regular   [X] Aspiration Precautions  Nutrition consult     #Precautions / PROPHYLAXIS:   - Falls, Seizure   - Lungs: Aspiration, Incentive Spirometer     ---------------  Code Status/Emergency Contact:    Outpatient Follow-up (Specialty/Name of physician):  Yomaira López  Neurology  [address] [facility] [facility], [address]  [place] [address]  Phone: [phone]  Fax: [phone]  Follow Up Time: 2 weeks    Neville Chaudhary  Neurosurgery  [address] [place] [address]  Phone: [phone]  Fax: [phone]  Follow Up Time: 2 weeks    Hal Carvajal  Vascular Medicine  [address] [facility], [address] [place] [address]  Phone: [phone]  Fax: [phone]  Follow Up Time: 2 weeks   CC: F/U perirectal abscess/ metastatic anal cancer    Inverval History/ROS: Patient has no complaints today. Feels well. Nontoxic appearing. Denies fever, chills, chest pain, sob.    Allergies  No Known Allergies      ANTIMICROBIALS:  piperacillin/tazobactam IVPB. 3.375 every 8 hours      OTHER MEDS:  acetaminophen   Tablet. 650 milliGRAM(s) Oral every 6 hours PRN  enoxaparin Injectable 40 milliGRAM(s) SubCutaneous every 24 hours  oxyCODONE    IR 5 milliGRAM(s) Oral every 4 hours PRN  oxyCODONE    IR 10 milliGRAM(s) Oral every 6 hours PRN  sodium chloride 0.9% lock flush 3 milliLiter(s) IV Push every 8 hours      PE:    Vital Signs Last 24 Hrs  T(C): 37.6 (23 Dec 2017 07:56), Max: 37.7 (22 Dec 2017 15:33)  T(F): 99.6 (23 Dec 2017 07:56), Max: 99.8 (22 Dec 2017 15:33)  HR: 104 (23 Dec 2017 07:56) (94 - 116)  BP: 115/75 (23 Dec 2017 07:56) (115/75 - 123/74)  BP(mean): --  RR: 17 (23 Dec 2017 07:56) (17 - 18)  SpO2: 99% (23 Dec 2017 07:56) (98% - 99%)    Gen: AOx3, NAD, non-toxic, pleasant  CV: S1+S2 normal, no murmurs  Resp: Clear bilat, no resp distress  Abd: Soft, nontender, +BS  Ext: No LE edema, no wounds  : No Anderson  IV/Skin: No thrombophlebitis, +right nephrostomy tube -emptied, +STEPHANE drain with min serosanguinous fluid, + colostomy   Neuro: no focal deficits    LABS:                          6.9    14.64 )-----------( 583      ( 23 Dec 2017 06:24 )             23.5       12-23    136  |  95<L>  |  12  ----------------------------<  108<H>  4.0   |  25  |  0.96    Ca    9.1      23 Dec 2017 06:24  Phos  3.6     12-23  Mg     1.9     12-23            MICROBIOLOGY:  v  NEPHROSTOMY - RIGHT  12-21-17 --  --  --      BLOOD PERIPHERAL  12-21-17 --  --  --      OTHER  12-18-17 --  --  Enterococcus faecium  Bacteroides fragilis      BLOOD VENOUS  12-17-17 --  --  --      BLOOD PERIPHERAL  12-17-17 --  --  --      KIDNEY - RIGHT  12-15-17 --  --  --      BLOOD PERIPHERAL  12-14-17 --  --  --      URINE MIDSTREAM  12-14-17 --  --  --      BLOOD VENOUS  12-14-17 --  --  --    RADIOLOGY:    < from: IR Procedure (12.22.17 @ 18:02) >  IMPRESSION:  SUCCESSFUL FLUOROSCOPIC TUBE CHECK WITH REPOSITIONING.    < end of copied text >    < from: CT Abdomen and Pelvis w/ Oral Cont and w/ IV Cont (12.21.17 @ 22:23) >  IMPRESSION:  Slight improvement to no significant change in the thick-walled   collection adjacent to the rectal stump, post drainage catheter   placement. A new tubular collection now identified, perhaps along the   course of a tract, with onecomponent in the right anterior abdominal   wall.  Slight interval increase in a few of the lesions in the right hepatic   lobe since the recent exam. Given sudden increase in size, hepatic   abscesses are considered.      < end of copied text > 70y with a history of type 2 diabetes and glaucoma, who presented to [facility] on 11/10 after son found her on the ground + presumed seizure-like activity. 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 To be managed on AED + steroid.  Hospital course by right soleal + peroneal DVT.  s/p  therapeutic Lovenox, now transitioned to oral AC.  Now admitted to [facility] after for initiation of a multidisciplinary rehab program consisting focused on functional mobility, transfers and ADLs (activities of daily living).    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 No parenchymal signal abnormality of the adjacent frontal lobes. Mild ventriculomegaly secondary findings raising possibility of normal pressure hydrocephalus.  - Patient deferred surgery; steroid taper for mass effect  - Decadron: 4mg q6 x1D > 3 q6 x 1D > 3 q8 x 1D > 4 BID x 1D > 2 TID x 1D >2 BID x 1D> 1 BID x 1D > 1 QD til outpatient - [facility] [place] team message said to d/c dex taper, taper was only for post op.  Only received 1 day of steroid, no need to taper  - EEG without recorded seizure 11/11-14  - Seizure ppx: Vimpat 100 BID +  Rescue med: 1st line- Lorazepam 1 mg IV push for seizures lasting >3 minutes with vital sign changes. 2nd line- Briviact 100 mg IV push for seizures lasting >3 minutes refractory to lorazepam.  - Will consider neuro guidance on Seizure and steroid taper management  - last 11/10, MR 11/12 - low threshold to repeat CTH if there's any changes  Deficits: left LE proximal weakness  Comprehensive Multidisciplinary Rehab Program: 3 hours a day ( 1 hr PT, 1 hr OT, 1 hr SLP), 5 days a week.    #DVTs: Right soleal occlusive + right peroneal (nonocculsive)  - CTA neg for PE  - S/p therapeutic Lovenox > now transition to oral since there's no planned surgery  - Eliquis 5 BID    #HTN  - Losartan 25  - BP: 133/73 - 137/72 (11/20)    #Hyperglycemia from steroid (now off)  #Type 2 Diabetes (A1C 7.1)   OP Dr. Andrea? > Lantus 15 HS, Janumet 50/1000 BID  - Lantus 15 HS while on steroids per endo  - Admelog 7 premeal  - FS + ISS  - -353 (11/20)    #Enlarged Left Adnexa  - Outpatient GYN    #Pain Management:  Analgesic: Tylenol PRN  Avoid sedating medications that may interfere with cognitive recovery    #GI/Bowel:  Senna QHS, Miralax BID  GI ppx: Protonix    #/Bladder:   Prior UTI (Ecoli) - s/p CTX x3 days (completed on 11/13)  - PVR q 8 hours (SC if > 400) - PVR <200; dc monitoring  - voiding without any issues    #Skin/Pressure Injury:   - Skin assessment on admission: all skin intact , generalized bruising   - Skin barrier cream as needed  - Nursing to monitor skin Qshift    #Diet:  Dysphagia: SLP consult for swallow function evaluation and treatment  Current Diet: Regular   [X] Aspiration Precautions  Nutrition consult     #Precautions / PROPHYLAXIS:   - Falls, Seizure   - Lungs: Aspiration, Incentive Spirometer     ---------------  Code Status/Emergency Contact:    Outpatient Follow-up (Specialty/Name of physician):  Yomaira López  Neurology  [address] [facility], [address]  [place] [address]  Phone: [phone]  Fax: [phone]  Follow Up Time: 2 weeks    Neville Chaudhary  Neurosurgery  [address] [place] [address]  Phone: [phone]  Fax: [phone]  Follow Up Time: 2 weeks    Hal Carvajal  Vascular Medicine  [address] [place] [address], [address] [place] [address]  Phone: [phone]  Fax: [phone]  Follow Up Time: 2 weeks   70y with a history of type 2 diabetes and glaucoma, who presented to [facility] on 11/10 after son found her on the ground + presumed seizure-like activity. 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 To be managed on AED + steroid.  Hospital course by right soleal + peroneal DVT.  s/p  therapeutic Lovenox, now transitioned to oral AC.  Now admitted to [facility] after for initiation of a multidisciplinary rehab program consisting focused on functional mobility, transfers and ADLs (activities of daily living).    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 No parenchymal signal abnormality of the adjacent frontal lobes. Mild ventriculomegaly secondary findings raising possibility of normal pressure hydrocephalus.  - Patient deferred surgery; steroid taper for mass effect  - Decadron: 4mg q6 x1D > 3 q6 x 1D > 3 q8 x 1D > 4 BID x 1D > 2 TID x 1D >2 BID x 1D> 1 BID x 1D > 1 QD til outpatient - [facility] [place] team message said to d/c dex taper, taper was only for post op.  Only received 1 day of steroid, no need to taper  - EEG without recorded seizure 11/11-14  - Seizure ppx: Vimpat 100 BID +  Rescue med: 1st line- Lorazepam 1 mg IV push for seizures lasting >3 minutes with vital sign changes. 2nd line- Briviact 100 mg IV push for seizures lasting >3 minutes refractory to lorazepam.  - Will consider neuro guidance on Seizure and steroid taper management  - last 11/10, MR 11/12 - low threshold to repeat CTH if there's any changes  Deficits: left LE proximal weakness  Comprehensive Multidisciplinary Rehab Program: 3 hours a day ( 1 hr PT, 1 hr OT, 1 hr SLP), 5 days a week.    #DVTs: Right soleal occlusive + right peroneal (nonocculsive)  - CTA neg for PE  - S/p therapeutic Lovenox > now transition to oral since there's no planned surgery  - Eliquis 5 BID    #HTN  - Losartan 25  - BP: 133/73 - 137/72 (11/20)    #Hyperglycemia from steroid (now off)  #Type 2 Diabetes (A1C 7.1)   OP Dr. Andrea? > Lantus 15 HS, Janumet 50/1000 BID  - Lantus 15 HS while on steroids per endo  - Admelog 7 premeal  - FS + ISS  - -353 (11/20)    #Enlarged Left Adnexa  - Outpatient GYN    #Pain Management:  Analgesic: Tylenol PRN  Avoid sedating medications that may interfere with cognitive recovery    #GI/Bowel:  Senna QHS, Miralax BID  GI ppx: Protonix    #/Bladder:   Prior UTI (Ecoli) - s/p CTX x3 days (completed on 11/13)  - PVR q 8 hours (SC if > 400) - PVR <200; dc monitoring  - voiding without any issues    #Skin/Pressure Injury:   - Skin assessment on admission: all skin intact , generalized bruising   - Skin barrier cream as needed  - Nursing to monitor skin Qshift    #Diet:  Dysphagia: SLP consult for swallow function evaluation and treatment  Current Diet: Regular   [X] Aspiration Precautions  Nutrition consult     #Precautions / PROPHYLAXIS:   - Falls, Seizure   - Lungs: Aspiration, Incentive Spirometer     ---------------  Code Status/Emergency Contact:    Outpatient Follow-up (Specialty/Name of physician):  Yomaira López  Neurology  [address] [facility], [address]  [place] [address]  Phone: [phone]  Fax: [phone]  Follow Up Time: 2 weeks    Neville Chaudhary  Neurosurgery  [address] [place] [address]  Phone: [phone]  Fax: [phone]  Follow Up Time: 2 weeks    Hal Carvajal  Vascular Medicine  [address] [place] [address], [address] [place] [address]  Phone: [phone]  Fax: [phone]  Follow Up Time: 2 weeks      IDT 11/20  NSG: cont B/B  SW: Lives with her son in a PH, 2 MELODY, 1 FOS down to basement  SLP: reg/thin, mod cog, reduced inattention/working and immediate memory/quantitative reasoning/delay recall  OT: overall SV with all ADLs + transfer, except for shower transfer with close SV.  Goal: mod i  PT: SV bed mob/transfer, CGA amb 75' without AD/8steps 2 HR. Goal: mod i  Team goals: amb to bathroom mod I; recall therapy info and medical update independently  Barriers: balance, strength, endurance, cog, knee pain  TDD: 11/27 Home

## 2025-04-01 NOTE — DISCHARGE NOTE ADULT - NS MD DC FALL RISK RISK
Chronic cond  Previous lab showed slightly low Hg 13.5  Pt asympt  Denies h.o bleeding  Recommend pt to repeat lab for followup   For information on Fall & Injury Prevention, visit [email or web address]